# Patient Record
Sex: FEMALE | Race: WHITE | Employment: UNEMPLOYED | URBAN - METROPOLITAN AREA
[De-identification: names, ages, dates, MRNs, and addresses within clinical notes are randomized per-mention and may not be internally consistent; named-entity substitution may affect disease eponyms.]

---

## 2017-02-28 ENCOUNTER — OFFICE VISIT (OUTPATIENT)
Dept: INTERNAL MEDICINE CLINIC | Age: 57
End: 2017-02-28

## 2017-02-28 VITALS
BODY MASS INDEX: 44.03 KG/M2 | HEART RATE: 82 BPM | RESPIRATION RATE: 16 BRPM | WEIGHT: 274 LBS | HEIGHT: 66 IN | DIASTOLIC BLOOD PRESSURE: 94 MMHG | SYSTOLIC BLOOD PRESSURE: 130 MMHG

## 2017-02-28 DIAGNOSIS — I10 BENIGN ESSENTIAL HTN: Primary | ICD-10-CM

## 2017-02-28 DIAGNOSIS — R73.01 IMPAIRED FASTING GLUCOSE: ICD-10-CM

## 2017-02-28 DIAGNOSIS — N18.30 CKD (CHRONIC KIDNEY DISEASE) STAGE 3, GFR 30-59 ML/MIN (HCC): ICD-10-CM

## 2017-02-28 DIAGNOSIS — K21.9 GASTROESOPHAGEAL REFLUX DISEASE WITHOUT ESOPHAGITIS: ICD-10-CM

## 2017-02-28 DIAGNOSIS — I87.2 VENOUS INSUFFICIENCY OF BOTH LOWER EXTREMITIES: ICD-10-CM

## 2017-02-28 DIAGNOSIS — E78.5 HYPERLIPIDEMIA, UNSPECIFIED HYPERLIPIDEMIA TYPE: ICD-10-CM

## 2017-02-28 DIAGNOSIS — E03.9 ACQUIRED HYPOTHYROIDISM: ICD-10-CM

## 2017-02-28 LAB
A/G RATIO: 1.8 (ref 1.1–2.2)
ALBUMIN SERPL-MCNC: 4.2 G/DL (ref 3.4–5)
ALP BLD-CCNC: 130 U/L (ref 40–129)
ALT SERPL-CCNC: 17 U/L (ref 10–40)
ANION GAP SERPL CALCULATED.3IONS-SCNC: 15 MMOL/L (ref 3–16)
AST SERPL-CCNC: 13 U/L (ref 15–37)
BILIRUB SERPL-MCNC: 0.4 MG/DL (ref 0–1)
BUN BLDV-MCNC: 21 MG/DL (ref 7–20)
CALCIUM SERPL-MCNC: 9.1 MG/DL (ref 8.3–10.6)
CHLORIDE BLD-SCNC: 103 MMOL/L (ref 99–110)
CHOLESTEROL, TOTAL: 174 MG/DL (ref 0–199)
CO2: 23 MMOL/L (ref 21–32)
CREAT SERPL-MCNC: 1.1 MG/DL (ref 0.6–1.1)
GFR AFRICAN AMERICAN: >60
GFR NON-AFRICAN AMERICAN: 51
GLOBULIN: 2.3 G/DL
GLUCOSE BLD-MCNC: 97 MG/DL (ref 70–99)
HDLC SERPL-MCNC: 36 MG/DL (ref 40–60)
LDL CHOLESTEROL CALCULATED: 95 MG/DL
POTASSIUM SERPL-SCNC: 4.5 MMOL/L (ref 3.5–5.1)
SODIUM BLD-SCNC: 141 MMOL/L (ref 136–145)
TOTAL PROTEIN: 6.5 G/DL (ref 6.4–8.2)
TRIGL SERPL-MCNC: 215 MG/DL (ref 0–150)
TSH SERPL DL<=0.05 MIU/L-ACNC: 0.79 UIU/ML (ref 0.27–4.2)
VLDLC SERPL CALC-MCNC: 43 MG/DL

## 2017-02-28 PROCEDURE — 99214 OFFICE O/P EST MOD 30 MIN: CPT | Performed by: INTERNAL MEDICINE

## 2017-03-01 LAB
ESTIMATED AVERAGE GLUCOSE: 131.2 MG/DL
HBA1C MFR BLD: 6.2 %

## 2017-03-03 ENCOUNTER — TELEPHONE (OUTPATIENT)
Dept: INTERNAL MEDICINE CLINIC | Age: 57
End: 2017-03-03

## 2017-03-07 ENCOUNTER — OFFICE VISIT (OUTPATIENT)
Dept: ORTHOPEDIC SURGERY | Age: 57
End: 2017-03-07

## 2017-03-07 VITALS — DIASTOLIC BLOOD PRESSURE: 82 MMHG | SYSTOLIC BLOOD PRESSURE: 124 MMHG | HEART RATE: 84 BPM

## 2017-03-07 DIAGNOSIS — M17.12 PRIMARY OSTEOARTHRITIS OF LEFT KNEE: Primary | ICD-10-CM

## 2017-03-07 PROCEDURE — 20610 DRAIN/INJ JOINT/BURSA W/O US: CPT | Performed by: ORTHOPAEDIC SURGERY

## 2017-03-07 PROCEDURE — 99214 OFFICE O/P EST MOD 30 MIN: CPT | Performed by: ORTHOPAEDIC SURGERY

## 2017-03-24 ENCOUNTER — TELEPHONE (OUTPATIENT)
Dept: INTERNAL MEDICINE CLINIC | Age: 57
End: 2017-03-24

## 2017-06-20 ENCOUNTER — OFFICE VISIT (OUTPATIENT)
Dept: INTERNAL MEDICINE CLINIC | Age: 57
End: 2017-06-20

## 2017-06-20 VITALS
DIASTOLIC BLOOD PRESSURE: 80 MMHG | SYSTOLIC BLOOD PRESSURE: 146 MMHG | WEIGHT: 264 LBS | HEART RATE: 70 BPM | BODY MASS INDEX: 42.43 KG/M2 | HEIGHT: 66 IN | OXYGEN SATURATION: 97 %

## 2017-06-20 DIAGNOSIS — E03.9 ACQUIRED HYPOTHYROIDISM: ICD-10-CM

## 2017-06-20 DIAGNOSIS — I87.2 VENOUS INSUFFICIENCY OF BOTH LOWER EXTREMITIES: ICD-10-CM

## 2017-06-20 DIAGNOSIS — K21.9 GASTROESOPHAGEAL REFLUX DISEASE WITHOUT ESOPHAGITIS: ICD-10-CM

## 2017-06-20 DIAGNOSIS — E78.5 HYPERLIPIDEMIA, UNSPECIFIED HYPERLIPIDEMIA TYPE: ICD-10-CM

## 2017-06-20 DIAGNOSIS — I10 BENIGN ESSENTIAL HTN: Primary | ICD-10-CM

## 2017-06-20 DIAGNOSIS — E66.01 MORBID OBESITY WITH BMI OF 40.0-44.9, ADULT (HCC): ICD-10-CM

## 2017-06-20 DIAGNOSIS — N18.30 CKD (CHRONIC KIDNEY DISEASE) STAGE 3, GFR 30-59 ML/MIN (HCC): ICD-10-CM

## 2017-06-20 DIAGNOSIS — R73.01 IMPAIRED FASTING GLUCOSE: ICD-10-CM

## 2017-06-20 LAB
A/G RATIO: 1.9 (ref 1.1–2.2)
ALBUMIN SERPL-MCNC: 4.3 G/DL (ref 3.4–5)
ALP BLD-CCNC: 163 U/L (ref 40–129)
ALT SERPL-CCNC: 37 U/L (ref 10–40)
ANION GAP SERPL CALCULATED.3IONS-SCNC: 16 MMOL/L (ref 3–16)
AST SERPL-CCNC: 27 U/L (ref 15–37)
BASOPHILS ABSOLUTE: 0 K/UL (ref 0–0.2)
BASOPHILS RELATIVE PERCENT: 0.8 %
BILIRUB SERPL-MCNC: 0.4 MG/DL (ref 0–1)
BUN BLDV-MCNC: 20 MG/DL (ref 7–20)
CALCIUM SERPL-MCNC: 9.2 MG/DL (ref 8.3–10.6)
CHLORIDE BLD-SCNC: 103 MMOL/L (ref 99–110)
CHOLESTEROL, TOTAL: 157 MG/DL (ref 0–199)
CO2: 25 MMOL/L (ref 21–32)
CREAT SERPL-MCNC: 1.2 MG/DL (ref 0.6–1.1)
EOSINOPHILS ABSOLUTE: 0.2 K/UL (ref 0–0.6)
EOSINOPHILS RELATIVE PERCENT: 3.3 %
GFR AFRICAN AMERICAN: 56
GFR NON-AFRICAN AMERICAN: 46
GLOBULIN: 2.3 G/DL
GLUCOSE BLD-MCNC: 160 MG/DL (ref 70–99)
HCT VFR BLD CALC: 42.1 % (ref 36–48)
HDLC SERPL-MCNC: 41 MG/DL (ref 40–60)
HEMOGLOBIN: 13.3 G/DL (ref 12–16)
LDL CHOLESTEROL CALCULATED: 86 MG/DL
LYMPHOCYTES ABSOLUTE: 1.1 K/UL (ref 1–5.1)
LYMPHOCYTES RELATIVE PERCENT: 17.5 %
MCH RBC QN AUTO: 27.5 PG (ref 26–34)
MCHC RBC AUTO-ENTMCNC: 31.5 G/DL (ref 31–36)
MCV RBC AUTO: 87.4 FL (ref 80–100)
MONOCYTES ABSOLUTE: 0.3 K/UL (ref 0–1.3)
MONOCYTES RELATIVE PERCENT: 5.2 %
NEUTROPHILS ABSOLUTE: 4.5 K/UL (ref 1.7–7.7)
NEUTROPHILS RELATIVE PERCENT: 73.2 %
PARATHYROID HORMONE INTACT: 140.9 PG/ML (ref 14–72)
PDW BLD-RTO: 14.6 % (ref 12.4–15.4)
PLATELET # BLD: 248 K/UL (ref 135–450)
PMV BLD AUTO: 9.3 FL (ref 5–10.5)
POTASSIUM SERPL-SCNC: 4.1 MMOL/L (ref 3.5–5.1)
RBC # BLD: 4.82 M/UL (ref 4–5.2)
SODIUM BLD-SCNC: 144 MMOL/L (ref 136–145)
TOTAL PROTEIN: 6.6 G/DL (ref 6.4–8.2)
TRIGL SERPL-MCNC: 152 MG/DL (ref 0–150)
TSH SERPL DL<=0.05 MIU/L-ACNC: 0.75 UIU/ML (ref 0.27–4.2)
VITAMIN D 25-HYDROXY: 16.3 NG/ML
VLDLC SERPL CALC-MCNC: 30 MG/DL
WBC # BLD: 6.2 K/UL (ref 4–11)

## 2017-06-20 PROCEDURE — 99214 OFFICE O/P EST MOD 30 MIN: CPT | Performed by: INTERNAL MEDICINE

## 2017-06-21 LAB
ESTIMATED AVERAGE GLUCOSE: 111.2 MG/DL
HBA1C MFR BLD: 5.5 %

## 2017-06-27 ENCOUNTER — OFFICE VISIT (OUTPATIENT)
Dept: ORTHOPEDIC SURGERY | Age: 57
End: 2017-06-27

## 2017-06-27 VITALS
BODY MASS INDEX: 40.18 KG/M2 | HEART RATE: 89 BPM | WEIGHT: 250 LBS | SYSTOLIC BLOOD PRESSURE: 128 MMHG | DIASTOLIC BLOOD PRESSURE: 82 MMHG | HEIGHT: 66 IN

## 2017-06-27 DIAGNOSIS — M17.12 PRIMARY OSTEOARTHRITIS OF LEFT KNEE: Primary | ICD-10-CM

## 2017-06-27 PROCEDURE — 20610 DRAIN/INJ JOINT/BURSA W/O US: CPT | Performed by: ORTHOPAEDIC SURGERY

## 2017-06-27 PROCEDURE — 99214 OFFICE O/P EST MOD 30 MIN: CPT | Performed by: ORTHOPAEDIC SURGERY

## 2017-08-30 ENCOUNTER — TELEPHONE (OUTPATIENT)
Dept: INTERNAL MEDICINE CLINIC | Age: 57
End: 2017-08-30

## 2017-08-30 NOTE — TELEPHONE ENCOUNTER
Pt is returning Denisse's call about her Mammogram results. Pt was given Mammogram results per Dr Malhotra Form note. Pt voiced understanding.

## 2017-09-15 ENCOUNTER — OFFICE VISIT (OUTPATIENT)
Dept: ORTHOPEDIC SURGERY | Age: 57
End: 2017-09-15

## 2017-09-15 VITALS
WEIGHT: 220 LBS | HEART RATE: 75 BPM | HEIGHT: 65 IN | DIASTOLIC BLOOD PRESSURE: 83 MMHG | BODY MASS INDEX: 36.65 KG/M2 | SYSTOLIC BLOOD PRESSURE: 125 MMHG

## 2017-09-15 DIAGNOSIS — M17.12 PRIMARY OSTEOARTHRITIS OF LEFT KNEE: Primary | ICD-10-CM

## 2017-09-15 PROCEDURE — 20610 DRAIN/INJ JOINT/BURSA W/O US: CPT | Performed by: ORTHOPAEDIC SURGERY

## 2017-09-29 ENCOUNTER — OFFICE VISIT (OUTPATIENT)
Dept: ORTHOPEDIC SURGERY | Age: 57
End: 2017-09-29

## 2017-09-29 ENCOUNTER — TELEPHONE (OUTPATIENT)
Dept: ORTHOPEDIC SURGERY | Age: 57
End: 2017-09-29

## 2017-09-29 VITALS
HEIGHT: 65 IN | SYSTOLIC BLOOD PRESSURE: 151 MMHG | HEART RATE: 85 BPM | BODY MASS INDEX: 36.65 KG/M2 | DIASTOLIC BLOOD PRESSURE: 98 MMHG | WEIGHT: 220 LBS

## 2017-09-29 DIAGNOSIS — S63.602A SPRAIN OF LEFT THUMB, UNSPECIFIED SITE OF FINGER, INITIAL ENCOUNTER: ICD-10-CM

## 2017-09-29 DIAGNOSIS — M79.642 LEFT HAND PAIN: ICD-10-CM

## 2017-09-29 DIAGNOSIS — S80.02XA CONTUSION OF LEFT KNEE, INITIAL ENCOUNTER: Primary | ICD-10-CM

## 2017-09-29 DIAGNOSIS — M25.572 LEFT ANKLE PAIN, UNSPECIFIED CHRONICITY: ICD-10-CM

## 2017-09-29 DIAGNOSIS — S93.492A HIGH ANKLE SPRAIN, LEFT, INITIAL ENCOUNTER: ICD-10-CM

## 2017-09-29 PROCEDURE — 73130 X-RAY EXAM OF HAND: CPT | Performed by: ORTHOPAEDIC SURGERY

## 2017-09-29 PROCEDURE — 73610 X-RAY EXAM OF ANKLE: CPT | Performed by: ORTHOPAEDIC SURGERY

## 2017-09-29 PROCEDURE — L3908 WHO COCK-UP NONMOLDE PRE OTS: HCPCS | Performed by: ORTHOPAEDIC SURGERY

## 2017-09-29 PROCEDURE — 99214 OFFICE O/P EST MOD 30 MIN: CPT | Performed by: ORTHOPAEDIC SURGERY

## 2017-09-29 PROCEDURE — L4361 PNEUMA/VAC WALK BOOT PRE OTS: HCPCS | Performed by: ORTHOPAEDIC SURGERY

## 2017-10-09 ENCOUNTER — OFFICE VISIT (OUTPATIENT)
Dept: INTERNAL MEDICINE CLINIC | Age: 57
End: 2017-10-09

## 2017-10-09 ENCOUNTER — HOSPITAL ENCOUNTER (OUTPATIENT)
Dept: OTHER | Age: 57
Discharge: HOME | End: 2017-10-09
Attending: INTERNAL MEDICINE | Admitting: INTERNAL MEDICINE

## 2017-10-09 VITALS — OXYGEN SATURATION: 94 % | DIASTOLIC BLOOD PRESSURE: 70 MMHG | SYSTOLIC BLOOD PRESSURE: 118 MMHG | HEART RATE: 96 BPM

## 2017-10-09 DIAGNOSIS — I10 BENIGN ESSENTIAL HTN: Primary | ICD-10-CM

## 2017-10-09 DIAGNOSIS — R52 PAIN: ICD-10-CM

## 2017-10-09 DIAGNOSIS — E78.5 HYPERLIPIDEMIA, UNSPECIFIED HYPERLIPIDEMIA TYPE: ICD-10-CM

## 2017-10-09 DIAGNOSIS — I87.2 VENOUS INSUFFICIENCY OF BOTH LOWER EXTREMITIES: ICD-10-CM

## 2017-10-09 DIAGNOSIS — R55 SYNCOPE, UNSPECIFIED SYNCOPE TYPE: ICD-10-CM

## 2017-10-09 DIAGNOSIS — N18.30 CKD (CHRONIC KIDNEY DISEASE) STAGE 3, GFR 30-59 ML/MIN (HCC): ICD-10-CM

## 2017-10-09 DIAGNOSIS — E03.9 ACQUIRED HYPOTHYROIDISM: ICD-10-CM

## 2017-10-09 DIAGNOSIS — S20.212A RIB CONTUSION, LEFT, INITIAL ENCOUNTER: ICD-10-CM

## 2017-10-09 DIAGNOSIS — R73.01 IMPAIRED FASTING GLUCOSE: ICD-10-CM

## 2017-10-09 LAB
BASOPHILS ABSOLUTE: 0.1 K/UL (ref 0–0.2)
BASOPHILS RELATIVE PERCENT: 0.6 %
EOSINOPHILS ABSOLUTE: 0.2 K/UL (ref 0–0.6)
EOSINOPHILS RELATIVE PERCENT: 1.6 %
HCT VFR BLD CALC: 40.1 % (ref 36–48)
HEMOGLOBIN: 13.1 G/DL (ref 12–16)
LYMPHOCYTES ABSOLUTE: 1.1 K/UL (ref 1–5.1)
LYMPHOCYTES RELATIVE PERCENT: 10.6 %
MCH RBC QN AUTO: 28.4 PG (ref 26–34)
MCHC RBC AUTO-ENTMCNC: 32.8 G/DL (ref 31–36)
MCV RBC AUTO: 86.7 FL (ref 80–100)
MONOCYTES ABSOLUTE: 0.9 K/UL (ref 0–1.3)
MONOCYTES RELATIVE PERCENT: 8.5 %
NEUTROPHILS ABSOLUTE: 7.9 K/UL (ref 1.7–7.7)
NEUTROPHILS RELATIVE PERCENT: 78.7 %
PDW BLD-RTO: 14.5 % (ref 12.4–15.4)
PLATELET # BLD: 231 K/UL (ref 135–450)
PMV BLD AUTO: 8.8 FL (ref 5–10.5)
RBC # BLD: 4.62 M/UL (ref 4–5.2)
WBC # BLD: 10.1 K/UL (ref 4–11)

## 2017-10-09 PROCEDURE — 99214 OFFICE O/P EST MOD 30 MIN: CPT | Performed by: INTERNAL MEDICINE

## 2017-10-09 RX ORDER — FUROSEMIDE 20 MG/1
20 TABLET ORAL DAILY
Qty: 90 TABLET | Refills: 3 | Status: SHIPPED | OUTPATIENT
Start: 2017-10-09 | End: 2018-10-18 | Stop reason: SDUPTHER

## 2017-10-09 RX ORDER — VALSARTAN 80 MG/1
80 TABLET ORAL DAILY
Qty: 90 TABLET | Refills: 3 | Status: SHIPPED | OUTPATIENT
Start: 2017-10-09 | End: 2018-09-20 | Stop reason: ALTCHOICE

## 2017-10-09 RX ORDER — LEVOTHYROXINE SODIUM 0.15 MG/1
150 TABLET ORAL DAILY
Qty: 90 TABLET | Refills: 3 | Status: SHIPPED | OUTPATIENT
Start: 2017-10-09 | End: 2018-10-18 | Stop reason: SDUPTHER

## 2017-10-09 RX ORDER — ATORVASTATIN CALCIUM 10 MG/1
10 TABLET, FILM COATED ORAL DAILY
Qty: 90 TABLET | Refills: 3 | Status: SHIPPED | OUTPATIENT
Start: 2017-10-09 | End: 2018-10-18 | Stop reason: SDUPTHER

## 2017-10-09 NOTE — PROGRESS NOTES
[facility]) Physicians  Internal Medicine  Patient Encounter  Addi Suresh D.O., [facility]          Chief Complaint   Patient presents with   Elsy Mage    Medication Check       HPI-- 61 y.o. female seen today for follow up regarding the status of her multiple chronic medical problems listed below along with a medication review. Clinically she is unchanged. She offers no new concerns. She did have a fall in [facility]. She fell in a parking lot and landed on her left hand, left chest, and left leg. She was not evaluated by ER. They traveled on to [facility]. This past Monday, she was on the beach. Upon standing, she had a syncopal episodes. She had pain at the time she passed out. There is no report of palpitations, chest pain, shortness of breath, heart racing. She refused to call 911. She felt fine when she came to. She continued to have left lateral lower leg pain. She was seen by the ortho this past Friday. X-rays were OK. Dr. Lillie Ruffin saw her and diagnosed her with contusion of the right knee, oblique strain, and injury to the left thumb. HTN-- Patient is been compliant with her medication including Diovan 80 mg daily. she denies any episodes of lightheadedness or syncope. She also denies any new episodes of unilateral weakness or paresthesias. She is on a full aspirin 325 mg daily. Hyperlipidemia:    Lab Results   Component Value Date    LDLCALC 86 06/20/2017   She denies any problems with new myalgias or weakness. She denies muscle cramps. She is compliant with the Lipitor 10 mg daily. Hypothyroidism--   She denies problems with constipation, diarrhea, cold or heat intolerance, hair loss, muscle cramps, tremor, edema or heart racing. Lab Results   Component Value Date    TSH 0.75 06/20/2017     Venous reflux disease--  H/O DVT x 5 times. Despite recommendation she continues to wear her compression stockings only intermittently.   She continues to have symptoms of severe venous insufficiency with tired achy legs. IFG-- her last blood sugar was 160. This was not fasting. She denies any new onset of polyuria or polydipsia. Her weight on 9/29/2017 was 220. This is down 30 pounds since June. Lab Results   Component Value Date    LABA1C 5.5 06/20/2017     Lab Results   Component Value Date    .2 06/20/2017        CKD-- She denies urinary difficulties. No increased edema above baseline. Lab Results   Component Value Date    BUN 20 06/20/2017      Lab Results   Component Value Date    CREATININE 1.2 (H) 06/20/2017            Medical/Surgical Histories     Past Medical History:   Diagnosis Date    Chronic superficial venous thrombosis of left lower extremity 8/13/2015    CKD (chronic kidney disease) stage 3, GFR 30-59 ml/min     [facility]    Gastroesophageal reflux disease without esophagitis 11/28/2016    GERD (gastroesophageal reflux disease)     Hypertension     IBS (irritable bowel syndrome)     Osteoarthritis, knee     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 Tongue midline. Throat is NL. NECK:  Supple. No thyromegaly or nodules. soft tissue masses. No JVD. LYMPH: No C/SC nodes   CARDIAC:   Regular rhythm, rate NL. No ectopy. VASC:   No carotid bruits. Pedal pulses sym. PULM:  Lungs are CTA. No wheezing or crackles. GI:  Abdomen is soft, NT,  No organomegaly. No masses. EXT:   Venous stasis changes.     +

## 2017-10-09 NOTE — PATIENT INSTRUCTIONS
Please call the office immediately with any complaints of lightheadedness, passing out, palpitations, chest pain, or shortness of breath. Patient Education        Vasovagal Syncope: Care Instructions  Your Care Instructions    Vasovagal syncope (say \"fyh-vcy-APJ-beatrice WALDRONWMUX-qxu-mgk\") is sudden dizziness or fainting that can be set off by things such as pain, stress, fear, or trauma. You may sweat or feel lightheaded, sick to your stomach, or tingly. The problem causes the heart rate to slow and the blood vessels to widen, or dilate, for a short time. When this happens, blood pools in the lower body, and less blood goes to the brain. You can usually get relief by lying down with your legs raised (elevated). This helps more blood to flow to your brain and may help relieve symptoms like feeling dizzy. Some doctors may recommend a technique that involves tensing your fists and arms. This type of fainting is often easy to predict. For example, it happens to some people when they see blood or have to get a shot. They may feel symptoms before they faint. An episode of vasovagal syncope usually responds well to self-care. Other treatment often isn't needed. But if the fainting keeps happening, your doctor may suggest further treatments. Follow-up care is a key part of your treatment and safety. Be sure to make and go to all appointments, and call your doctor if you are having problems. It's also a good idea to know your test results and keep a list of the medicines you take. How can you care for yourself at home? · Drink plenty of fluids to prevent dehydration. If you have kidney, heart, or liver disease and have to limit fluids, talk with your doctor before you increase your fluid intake. · Try to avoid things that you think may set off vasovagal syncope. · Talk to your doctor about any medicines you take. Some medicines may increase the chance of this condition occurring.   · If you feel symptoms, lie down with your legs raised. Talk to your doctor about what to do if your symptoms come back. When should you call for help? Call 911 anytime you think you may need emergency care. For example, call if:  · You have symptoms of a heart problem. These may include:  ¨ Chest pain or pressure. ¨ Severe trouble breathing. ¨ A fast or irregular heartbeat. Watch closely for changes in your health, and be sure to contact your doctor if:  · You have more episodes of fainting at home. · You do not get better as expected. Where can you learn more? Go to https://Vasona NetworkspeEmbrace+eb.miacosa. org and sign in to your Clinithink account. Enter L754 in the sharing.it box to learn more about \"Vasovagal Syncope: Care Instructions. \"     If you do not have an account, please click on the \"Sign Up Now\" link. Current as of: March 20, 2017  Content Version: 11.3  © 8882-5274 [employer], [employer]. Care instructions adapted under license by [facility] ([facility]). If you have questions about a medical condition or this instruction, always ask your healthcare professional. [place] [address] any warranty or liability for your use of this information.

## 2017-10-10 LAB
A/G RATIO: 1.9 (ref 1.1–2.2)
ALBUMIN SERPL-MCNC: 4.3 G/DL (ref 3.4–5)
ALP BLD-CCNC: 152 U/L (ref 40–129)
ALT SERPL-CCNC: 15 U/L (ref 10–40)
ANION GAP SERPL CALCULATED.3IONS-SCNC: 16 MMOL/L (ref 3–16)
AST SERPL-CCNC: 13 U/L (ref 15–37)
BILIRUB SERPL-MCNC: 0.4 MG/DL (ref 0–1)
BUN BLDV-MCNC: 25 MG/DL (ref 7–20)
CALCIUM SERPL-MCNC: 9.2 MG/DL (ref 8.3–10.6)
CHLORIDE BLD-SCNC: 101 MMOL/L (ref 99–110)
CHOLESTEROL, TOTAL: 156 MG/DL (ref 0–199)
CO2: 26 MMOL/L (ref 21–32)
CREAT SERPL-MCNC: 1.3 MG/DL (ref 0.6–1.1)
ESTIMATED AVERAGE GLUCOSE: 114 MG/DL
GFR AFRICAN AMERICAN: 51
GFR NON-AFRICAN AMERICAN: 42
GLOBULIN: 2.3 G/DL
GLUCOSE BLD-MCNC: 100 MG/DL (ref 70–99)
HBA1C MFR BLD: 5.6 %
HDLC SERPL-MCNC: 40 MG/DL (ref 40–60)
LDL CHOLESTEROL CALCULATED: 74 MG/DL
POTASSIUM SERPL-SCNC: 4.2 MMOL/L (ref 3.5–5.1)
SODIUM BLD-SCNC: 143 MMOL/L (ref 136–145)
TOTAL PROTEIN: 6.6 G/DL (ref 6.4–8.2)
TRIGL SERPL-MCNC: 211 MG/DL (ref 0–150)
TSH SERPL DL<=0.05 MIU/L-ACNC: 0.67 UIU/ML (ref 0.27–4.2)
VLDLC SERPL CALC-MCNC: 42 MG/DL

## 2017-10-12 ENCOUNTER — TELEPHONE (OUTPATIENT)
Dept: INTERNAL MEDICINE CLINIC | Age: 57
End: 2017-10-12

## 2017-10-12 NOTE — TELEPHONE ENCOUNTER
Pt called to get her x-ray results. I advised pt of Dr. Ann Marie Koenig message and pt voiced understanding.  Pt would still like for Governor  to give her a call when she gets in the office on Monday

## 2017-10-17 ENCOUNTER — OFFICE VISIT (OUTPATIENT)
Dept: ORTHOPEDIC SURGERY | Age: 57
End: 2017-10-17

## 2017-10-17 VITALS
HEART RATE: 74 BPM | WEIGHT: 220 LBS | SYSTOLIC BLOOD PRESSURE: 118 MMHG | BODY MASS INDEX: 36.65 KG/M2 | HEIGHT: 65 IN | DIASTOLIC BLOOD PRESSURE: 83 MMHG

## 2017-10-17 DIAGNOSIS — S93.402A SPRAIN OF LEFT ANKLE, UNSPECIFIED LIGAMENT, INITIAL ENCOUNTER: Primary | ICD-10-CM

## 2017-10-17 PROCEDURE — 99213 OFFICE O/P EST LOW 20 MIN: CPT | Performed by: ORTHOPAEDIC SURGERY

## 2017-10-17 PROCEDURE — L4350 ANKLE CONTROL ORTHO PRE OTS: HCPCS | Performed by: ORTHOPAEDIC SURGERY

## 2017-10-17 NOTE — PROGRESS NOTES
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 Philly Llanos  1/1999    KNEE ARTHROSCOPY  3/2015    right with medial meninsectomy    KNEE ARTHROSCOPY      right x 3-4     TONSILLECTOMY      TOTAL KNEE ARTHROPLASTY  5/2013    right    VARICOSE VEIN SURGERY  1980's.          Family History   Problem Relation Age of Onset    High Blood Pressure Mother     Osteoporosis Mother        Social History     Social History    Marital status:      Spouse name: N/A    Number of children: N/A    Years of education: N/A     Social History Main Topics    Smoking status: Never Smoker    Smokeless tobacco: Never Used    Alcohol use No    Drug use: No    Sexual activity: Not Asked     Other Topics Concern    None     Social History Narrative    None       Current Outpatient Prescriptions   Medication Sig Dispense Refill    levothyroxine (LEVOTHROID) 150 MCG tablet Take 1 tablet by mouth daily 90 tablet 3    furosemide (LASIX) 20 MG tablet Take 1 tablet by mouth daily 90 tablet 3    atorvastatin (LIPITOR) 10 MG tablet Take 1 tablet by mouth daily 90 tablet 3    valsartan (DIOVAN) 80 MG tablet Take 1 tablet by mouth daily 90 tablet 3    esomeprazole (NEXIUM) 40 MG delayed release capsule Take 1 capsule by mouth daily as needed For heart burn (Reflux) 90 capsule 3    aspirin 325 MG tablet Take 1 tablet by mouth daily 90 tablet 3     No current facility-administered

## 2017-10-19 NOTE — PROGRESS NOTES
Chief complaint: Left ankle sprain follow-up    HPI: Mrs. [place] comes in today 2 weeks status post a fall where she suffered a left ankle sprain, as well as right knee contusion, right thumb contusion, and a left flank/abdominal contusion. She states they are getting progressively better. She has been walking normally in her boot, but occasionally barefoot in her house with minimal problems. 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 She states the pain is slowly diminishing, but her function is not completely returned yet. She denies night pain. She does state that she saw her primary care physician for regularly scheduled follow-up, he obtained a chest x-ray to ensure there were no rib fractures, and this was negative. Overall she feels she is improving. Previous history: Patient comes in today for evaluation of left-sided abdominal pain, left thumb pain and left ankle pain and right knee pain. She was on vacation a week ago she tripped on a pavement and came down on her right knee and slid onto her left side. She's had a knee replacement done a few years ago on the right. She thinks her anterior knee pain on the right is from really bruised and there is no ecchymosis present in this region. Her pain on the left side is a little below her rib cage and primarily with direct pressure and with twisting turning movements. She has no shortness of breath.      Pain Assessment  Location of Pain: Ankle  Location Modifiers: Left  Severity of Pain: 6  Quality of Pain: Aching  Duration of Pain: A few minutes  Frequency of Pain: Intermittent  Aggravating Factors: Walking  Limiting Behavior: Yes  Relieving Factors: Rest  Result of Injury: Yes  Work-Related Injury: No  Are there other pain locations you wish to document?: No    Past Medical History:   Diagnosis Date    Chronic superficial venous thrombosis of left lower extremity 8/13/2015    CKD sign.  Negative Phalen sign. Skin: There are no additional worrisome rashes, ulcerations or lesions. Gait: Normal    Circulation: Well perfused      Imaging: No new imaging today    Impression: Left ankle sprain, Resolving. Plan: We are going to switch her over to an ankle stirrup brace today. She was provided an ankle home exercise plan today. She is to wean out of her boot over the next few days and transition to the brace. We will like her to wear the brace at all times with footwear. We'll explain her she is at risk of repeat ankle sprain until she regains strength and proprioception that she had prior to her fall. We think her prognosis is excellent. We'll see her back in a few more weeks time. We anticipate her needing to rehab this throughout the fallen into the winter before she regains to regain normal function and strength in her leg. Matias Lomax MD  Fellow - [address] [phone]            I supervised my sports medicine fellow in the evaluation and development of a treatment plan  for this patient. I personally interviewed the patient and performed a physical examination. In addition, I discussed the patient's condition and treatment options with them. All of their questions were answered. I personally reviewed the patient's pain scale, review of systems, family history, social history, past medical history, allergies and medications. 13 point review of systems was collected today and is filed in the medical record. Greater than 50% of the visit was spent counseling the patient. Zari Thurman MD  Sports Medicine, Arthroscopic Knee and Shoulder Surgery    This dictation was performed with a verbal recognition program [facility]) and it was checked for errors. It is possible that there are still dictated errors within this office note. If so, please bring any errors to my attention for an addendum.   All efforts were made to ensure that this office note is accurate.

## 2017-12-12 ENCOUNTER — OFFICE VISIT (OUTPATIENT)
Dept: ORTHOPEDIC SURGERY | Age: 57
End: 2017-12-12

## 2017-12-12 VITALS
SYSTOLIC BLOOD PRESSURE: 145 MMHG | HEART RATE: 77 BPM | BODY MASS INDEX: 36.65 KG/M2 | WEIGHT: 220 LBS | DIASTOLIC BLOOD PRESSURE: 91 MMHG | HEIGHT: 65 IN

## 2017-12-12 DIAGNOSIS — M17.12 PRIMARY OSTEOARTHRITIS OF LEFT KNEE: Primary | ICD-10-CM

## 2017-12-12 PROCEDURE — 20610 DRAIN/INJ JOINT/BURSA W/O US: CPT | Performed by: ORTHOPAEDIC SURGERY

## 2017-12-12 NOTE — PROGRESS NOTES
Review of Systems   HENT:        Thyroid disease   Cardiovascular:        Hypertension  Poor circulation   Musculoskeletal:        Left knee pain    All other systems reviewed and are negative.

## 2017-12-12 NOTE — PROGRESS NOTES
Cortisone injection: Left Knee    2cc depo medrol 40mg    ERJ:[id number]  Exp 01/2020  Ndc:[id number]    4cc lidocaine 1%hcl    Lot:-[id number]  Exp: Jan 1 2019  Marc Serrano: [id number]

## 2017-12-12 NOTE — PROGRESS NOTES
Date:  2017    Name:  Comfort Lynch  Address:  [address] [address]    :  1960      Age:   61 y.o.    SSN:  [id number]      Medical Record Number:  [id number]    Reason for Visit:    Chief Complaint    Follow-up (L knee pain; requesting injection)      DOS:2017     HPI: Comfort yLnch is a 61 y.o. female here today for her left knee osteoarthritis. She had a steroid injection in 3 months ago. Her pain is returned. She is requesting another injection. No new injuries to the left knee. No change in her medical history. Pain Assessment  Location of Pain: Knee  Location Modifiers: Left  Severity of Pain: 10  Frequency of Pain: Constant  Limiting Behavior: Yes  Relieving Factors: Rest, Ice  Result of Injury: No  Work-Related Injury: No  Are there other pain locations you wish to document?: No    ROS: A 14 point review of systems available in the scanned medical record as documented by the patient. The review is negative with the exception of those things mentioned in the History of Present Illness and Past Medical History.        Past Medical History:   Diagnosis Date    Chronic superficial venous thrombosis of left lower extremity 2015    CKD (chronic kidney disease) stage 3, GFR 30-59 ml/min     [facility]) [facility] 2003, 2013    Gastroesophageal reflux disease without esophagitis 2016    GERD (gastroesophageal reflux disease)     Hypertension     IBS (irritable bowel syndrome)     Osteoarthritis, knee     Saphenofemoral venous reflux 2015    BIlateral    Steatosis of liver     Thyroid disease     Venous insufficiency         Past Surgical History:   Procedure Laterality Date    BREAST SURGERY      left fibroid tumor    CHOLECYSTECTOMY  2003    COLONOSCOPY  2015    Dr. Hermosillo Ear  1999    KNEE ARTHROSCOPY  3/2015    right with medial meninsectomy    KNEE ARTHROSCOPY      right x 3-4     TONSILLECTOMY      TOTAL KNEE ARTHROPLASTY  5/2013    right    VARICOSE VEIN SURGERY  1980's. Family History   Problem Relation Age of Onset    High Blood Pressure Mother     Osteoporosis Mother        Social History     Social History    Marital status:      Spouse name: N/A    Number of children: N/A    Years of education: N/A     Social History Main Topics    Smoking status: Never Smoker    Smokeless tobacco: Never Used    Alcohol use No    Drug use: No    Sexual activity: Not Asked     Other Topics Concern    None     Social History Narrative    None       Current Outpatient Prescriptions   Medication Sig Dispense Refill    esomeprazole (NEXIUM) 40 MG delayed release capsule TAKE 1 CAPSULE EVERY MORNING BEFORE BREAKFAST 90 capsule 0    levothyroxine (LEVOTHROID) 150 MCG tablet Take 1 tablet by mouth daily 90 tablet 3    furosemide (LASIX) 20 MG tablet Take 1 tablet by mouth daily 90 tablet 3    atorvastatin (LIPITOR) 10 MG tablet Take 1 tablet by mouth daily 90 tablet 3    valsartan (DIOVAN) 80 MG tablet Take 1 tablet by mouth daily 90 tablet 3    esomeprazole (NEXIUM) 40 MG delayed release capsule Take 1 capsule by mouth daily as needed For heart burn (Reflux) 90 capsule 3    aspirin 325 MG tablet Take 1 tablet by mouth daily 90 tablet 3     No current facility-administered medications for this visit. Allergies   Allergen Reactions    Codeine Other (See Comments)     hallucinations    Morphine And Related        Vital signs:  BP (!) 145/91 (Site: Right Arm)   Pulse 77   Ht 5' 5\" (1.651 m)   Wt 220 lb (99.8 kg)   BMI 36.61 kg/m²      Body mass index is 36.61 kg/m². Neuro: Alert & oriented x 3,  normal,  no focal deficits noted. Normal affect.   Eyes: sclera clear  Ears: Normal external ear  Lymph:  No lymphedema  Pulm: Respirations unlabored and regular  Pulse: Regular rate and rhythm   Skin: Warm, well perfused       left Knee Exam:   Overall alignment is was provided. Questions were entertained and answered to the patient's satisfaction. Procedure: The patient was seated over the end of the exam table with the left knee at 90deg. Skin was prepped with chlorhexidine. Ethyl chloride was used for superficial anesthesia. 80mg depomedrol and 4cc 1% lidocaine was injected from an sonal-lateral approach without complication. The patient tolerated the procedure well. Follow-up in 3 months. Joce Gayle MD  Clinical Fellow in [address] [place] [address] Certified Orthopaedic Surgeon  [address] [place] [address]    Date:    12/12/2017    This dictation was performed with a verbal recognition program [facility]) and it was checked for errors. It is possible that there are still dictated errors within this office note. If so, please bring any errors to my attention for an addendum. All efforts were made to ensure that this office note is accurate.

## 2018-02-27 ENCOUNTER — OFFICE VISIT (OUTPATIENT)
Dept: INTERNAL MEDICINE CLINIC | Age: 58
End: 2018-02-27

## 2018-02-27 VITALS
RESPIRATION RATE: 12 BRPM | DIASTOLIC BLOOD PRESSURE: 84 MMHG | HEIGHT: 65 IN | HEART RATE: 94 BPM | BODY MASS INDEX: 42.49 KG/M2 | TEMPERATURE: 97.8 F | SYSTOLIC BLOOD PRESSURE: 120 MMHG | WEIGHT: 255 LBS

## 2018-02-27 DIAGNOSIS — E03.9 ACQUIRED HYPOTHYROIDISM: ICD-10-CM

## 2018-02-27 DIAGNOSIS — E78.5 HYPERLIPIDEMIA, UNSPECIFIED HYPERLIPIDEMIA TYPE: ICD-10-CM

## 2018-02-27 DIAGNOSIS — I87.2 VENOUS INSUFFICIENCY OF BOTH LOWER EXTREMITIES: ICD-10-CM

## 2018-02-27 DIAGNOSIS — R73.01 IMPAIRED FASTING GLUCOSE: ICD-10-CM

## 2018-02-27 DIAGNOSIS — K21.9 GASTROESOPHAGEAL REFLUX DISEASE WITHOUT ESOPHAGITIS: ICD-10-CM

## 2018-02-27 DIAGNOSIS — I10 BENIGN ESSENTIAL HTN: Primary | ICD-10-CM

## 2018-02-27 DIAGNOSIS — N18.30 CKD (CHRONIC KIDNEY DISEASE) STAGE 3, GFR 30-59 ML/MIN (HCC): ICD-10-CM

## 2018-02-27 DIAGNOSIS — R25.2 LEG CRAMPS: ICD-10-CM

## 2018-02-27 PROCEDURE — 99214 OFFICE O/P EST MOD 30 MIN: CPT | Performed by: INTERNAL MEDICINE

## 2018-02-27 RX ORDER — RANITIDINE 150 MG/1
150 TABLET ORAL 2 TIMES DAILY
Qty: 60 TABLET | Refills: 3 | Status: SHIPPED | OUTPATIENT
Start: 2018-02-27 | End: 2019-01-28 | Stop reason: ALTCHOICE

## 2018-02-27 NOTE — PROGRESS NOTES
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 10/2017 and Dec 2017 weights incorrect. GEN: NAD, A&O, Non-toxic  HEENT: NC/AT, SILAS, EOMI, Anicteric, Throat NL. Oral cavity Clear,  TM's NL, Nasal cavity clear. NECK: Supple. No thyromegaly. No JVD  LYMPH: No C/SC nodes. CV: Regular rhythm. Rate normal.  No murmurs. No ectopy. PULM: CTA. No crackles. GI:  Soft, Obese, NT, BS +. No masses. EXT: Large legs. 1+ LE edema, less. SKIN:  Venous stasis changes bilateral lower extremity. NEURO: No focal or lateralizing deficits. ASSESSMENT[de-identified]  Marleny Penaloza was seen today for fever, diarrhea and fatigue. Diagnoses and all orders for this visit:    Benign essential HTN  -- Condition is well controlled. Due for lab  -- Continue current medication  -- Advised patient to stay well-hydrated particularly in times when she is having diarrhea  -     Comprehensive Metabolic Panel  -     Lipid Panel    Venous insufficiency of both lower extremities  -- Condition is improved  -- Continue wearing compression stockings  -- Continue a no added salt and low sodium diet  -- Continue efforts with weight loss    Impaired fasting glucose  -- Condition stability is uncertain. Due for lab  -- Continue working with weight loss.   -- Counseled on lifestyle modification including a carb restricted diet  -     Hemoglobin A1C  -     Comprehensive Metabolic Panel    Hyperlipidemia, unspecified hyperlipidemia type  --

## 2018-02-27 NOTE — PATIENT INSTRUCTIONS
(Tylenol), ibuprofen (Advil, Motrin), or naproxen (Aleve). Be safe with medicines. Read and follow all instructions on the label. · Drink plenty of fluids. If you have kidney, heart, or liver disease and have to limit fluids, talk with your doctor before you increase the amount of fluids you drink. When should you call for help? Watch closely for changes in your health, and be sure to contact your doctor if:  ? · You often have muscle cramps that do not go away after home treatment. ? · Your muscle cramps often wake you up at night. ? · You do not get better as expected. Where can you learn more? Go to https://ConvivapemyThingseweb.[facility]. org and sign in to your [employer] account. Enter S910 in the Darby Smart box to learn more about \"Nighttime Leg Cramps: Care Instructions. \"     If you do not have an account, please click on the \"Sign Up Now\" link. Current as of: October 14, 2016  Content Version: 11.5  © 2477-2585 Let it Wave. Care instructions adapted under license by [facility] ([facility]). If you have questions about a medical condition or this instruction, always ask your healthcare professional. [place] [address] any warranty or liability for your use of this information. Patient Education        Gastroesophageal Reflux Disease (GERD): Care Instructions  Your Care Instructions    Gastroesophageal reflux disease (GERD) is the backward flow of stomach acid into the esophagus. The esophagus is the tube that leads from your throat to your stomach. A one-way valve prevents the stomach acid from moving up into this tube. When you have GERD, this valve does not close tightly enough. If you have mild GERD symptoms including heartburn, you may be able to control the problem with antacids or over-the-counter medicine. Changing your diet, losing weight, and making other lifestyle changes can also help reduce symptoms.   Follow-up care is a key part of your treatment and closely for changes in your health, and be sure to contact your doctor if:  ? · Your symptoms have not improved after 2 days. ? · Food seems to catch in your throat or chest.   Where can you learn more? Go to https://chmisbaheb.Newshubby. org and sign in to your [employer] account. Enter T077 in the real trends box to learn more about \"Gastroesophageal Reflux Disease (GERD): Care Instructions. \"     If you do not have an account, please click on the \"Sign Up Now\" link. Current as of: May 12, 2017  Content Version: 11.5  © 8316-7200 [employer]wise, Incorporated. Care instructions adapted under license by [facility] ([facility]). If you have questions about a medical condition or this instruction, always ask your healthcare professional. [address] any warranty or liability for your use of this information.

## 2018-02-28 LAB
A/G RATIO: 2 (ref 1.1–2.2)
ALBUMIN SERPL-MCNC: 4.4 G/DL (ref 3.4–5)
ALP BLD-CCNC: 146 U/L (ref 40–129)
ALT SERPL-CCNC: 30 U/L (ref 10–40)
ANION GAP SERPL CALCULATED.3IONS-SCNC: 14 MMOL/L (ref 3–16)
AST SERPL-CCNC: 13 U/L (ref 15–37)
BASOPHILS ABSOLUTE: 0 K/UL (ref 0–0.2)
BASOPHILS RELATIVE PERCENT: 0.4 %
BILIRUB SERPL-MCNC: 0.4 MG/DL (ref 0–1)
BUN BLDV-MCNC: 16 MG/DL (ref 7–20)
CALCIUM SERPL-MCNC: 9.2 MG/DL (ref 8.3–10.6)
CHLORIDE BLD-SCNC: 105 MMOL/L (ref 99–110)
CHOLESTEROL, TOTAL: 147 MG/DL (ref 0–199)
CO2: 26 MMOL/L (ref 21–32)
CREAT SERPL-MCNC: 1.1 MG/DL (ref 0.6–1.1)
EOSINOPHILS ABSOLUTE: 0.2 K/UL (ref 0–0.6)
EOSINOPHILS RELATIVE PERCENT: 1.8 %
ESTIMATED AVERAGE GLUCOSE: 116.9 MG/DL
GFR AFRICAN AMERICAN: >60
GFR NON-AFRICAN AMERICAN: 51
GLOBULIN: 2.2 G/DL
GLUCOSE BLD-MCNC: 110 MG/DL (ref 70–99)
HBA1C MFR BLD: 5.7 %
HCT VFR BLD CALC: 42.1 % (ref 36–48)
HDLC SERPL-MCNC: 35 MG/DL (ref 40–60)
HEMOGLOBIN: 13.8 G/DL (ref 12–16)
LDL CHOLESTEROL CALCULATED: 70 MG/DL
LYMPHOCYTES ABSOLUTE: 1.4 K/UL (ref 1–5.1)
LYMPHOCYTES RELATIVE PERCENT: 16.4 %
MAGNESIUM: 2.1 MG/DL (ref 1.8–2.4)
MCH RBC QN AUTO: 28.2 PG (ref 26–34)
MCHC RBC AUTO-ENTMCNC: 32.9 G/DL (ref 31–36)
MCV RBC AUTO: 85.8 FL (ref 80–100)
MONOCYTES ABSOLUTE: 1.1 K/UL (ref 0–1.3)
MONOCYTES RELATIVE PERCENT: 12.8 %
NEUTROPHILS ABSOLUTE: 5.9 K/UL (ref 1.7–7.7)
NEUTROPHILS RELATIVE PERCENT: 68.6 %
PARATHYROID HORMONE INTACT: 132.7 PG/ML (ref 14–72)
PDW BLD-RTO: 15 % (ref 12.4–15.4)
PLATELET # BLD: 245 K/UL (ref 135–450)
PMV BLD AUTO: 9.2 FL (ref 5–10.5)
POTASSIUM SERPL-SCNC: 4 MMOL/L (ref 3.5–5.1)
RBC # BLD: 4.91 M/UL (ref 4–5.2)
SODIUM BLD-SCNC: 145 MMOL/L (ref 136–145)
TOTAL PROTEIN: 6.6 G/DL (ref 6.4–8.2)
TRIGL SERPL-MCNC: 208 MG/DL (ref 0–150)
TSH SERPL DL<=0.05 MIU/L-ACNC: 0.81 UIU/ML (ref 0.27–4.2)
VLDLC SERPL CALC-MCNC: 42 MG/DL
WBC # BLD: 8.6 K/UL (ref 4–11)

## 2018-03-01 ENCOUNTER — TELEPHONE (OUTPATIENT)
Dept: INTERNAL MEDICINE CLINIC | Age: 58
End: 2018-03-01

## 2018-03-23 ENCOUNTER — OFFICE VISIT (OUTPATIENT)
Dept: ORTHOPEDIC SURGERY | Age: 58
End: 2018-03-23

## 2018-03-23 VITALS
SYSTOLIC BLOOD PRESSURE: 128 MMHG | WEIGHT: 255 LBS | HEIGHT: 65 IN | BODY MASS INDEX: 42.49 KG/M2 | HEART RATE: 88 BPM | DIASTOLIC BLOOD PRESSURE: 86 MMHG

## 2018-03-23 DIAGNOSIS — M76.62 LEFT ACHILLES TENDINITIS: ICD-10-CM

## 2018-03-23 DIAGNOSIS — M17.12 PRIMARY OSTEOARTHRITIS OF LEFT KNEE: Primary | ICD-10-CM

## 2018-03-23 PROCEDURE — 99214 OFFICE O/P EST MOD 30 MIN: CPT | Performed by: ORTHOPAEDIC SURGERY

## 2018-03-23 PROCEDURE — 20610 DRAIN/INJ JOINT/BURSA W/O US: CPT | Performed by: ORTHOPAEDIC SURGERY

## 2018-03-23 NOTE — PROGRESS NOTES
Review of Systems   Constitutional: Positive for weight loss. Cardiovascular:        High blood pressure    Musculoskeletal: Positive for joint pain. Left knee pain   Thyroid disease   Left ankle pain    All other systems reviewed and are negative.

## 2018-03-23 NOTE — PROGRESS NOTES
Cortisone injection: left knee    2cc depo medrol 40mg    Lot:[id number]  Exp:02/2019  Ndc:0702-3836-91    4cc lidocaine 1%hcl    Lot:-[id number]  Exp:jan 1 2019  Ndc: 5652-0580-64

## 2018-03-23 NOTE — LETTER
Patient Name: Teja Garner MRN: [id number]  DOS: 3/23/2018   Diagnosis:   1. Primary osteoarthritis of left knee    2. Left Achilles tendinitis                           Goal:  []Decrease Pain and/or Swelling [x]Increase ROM and/or Flexibility     [x]Increase Function                           [x]Increase Strength and/or Endurance   []Other   Evaluation:  [x]Evaluation and Treatment []KT-1000   []Isokinetic Exam   []Preoperative Eval    Recommended Modalities:  [x]Modalities of Choice      []HCVS            []Electrical Stimulation     [] Remove Dressing  []Ultrasound        []TENS/TNS    [] Lumbar Traction           [] Cervical Traction   []Phonophoresis         []Hot Pack/Cold Pack   []PT Treatment, Unlisted []Other:  Therapeutic Exercises:    []Isometrics    []Range of Motion []Progressive Exer. []Balance Coordination   []Flexibility  []ROM Limited  []Total Hip Replacement   []Passive  []ROM Full   []Total Knee Replacement  []Active Assisted    []Shoulder Impingement Prog  []Active   []Tennis Elbow Program   []Capsular Shift Regular        []Isokinetics      []Spine Program   []Straight Leg Raises  [] Gait    []Fixation                   [] Supine                                              [] Running   [] Extension   [] Prone   [] Throwing   [] Stabilization   [] AB    [] [place]grin Denita Oka   [] AD      [] Spine Eval   [] Cervical Eval  [] Conditioning   [] Lumbar  [] Stationary Bike   [] [place] Track   [] Lumbar Exer.  [] Stairmaster         [] Treadmill   [] Functional Cap [] Aquatic Prog.      [] Return to work    Treatment Program:  Frequency: [] 1x  [x] 2x  [] 3x  [] 4x  [] 5x week/month  Duration: [] 1  [] 2  [] 3  [x] 4  [] 5 week/month  Weight Bearing: [] Non  [] 1/4  [] 1/2  [] 3/4  [] Full  ROM: [] Restricted  [] Full  [] Follow established:        [] Other:

## 2018-03-28 ENCOUNTER — HOSPITAL ENCOUNTER (OUTPATIENT)
Dept: PHYSICAL THERAPY | Age: 58
Discharge: HOME | End: 2018-03-31
Admitting: ORTHOPAEDIC SURGERY

## 2018-04-01 ENCOUNTER — HOSPITAL ENCOUNTER (OUTPATIENT)
Dept: PHYSICAL THERAPY | Age: 58
Discharge: HOME | End: 2018-04-30
Attending: ORTHOPAEDIC SURGERY | Admitting: ORTHOPAEDIC SURGERY

## 2018-04-10 ENCOUNTER — HOSPITAL ENCOUNTER (OUTPATIENT)
Dept: PHYSICAL THERAPY | Age: 58
Discharge: HOME | End: 2018-04-11
Admitting: ORTHOPAEDIC SURGERY

## 2018-04-12 ENCOUNTER — HOSPITAL ENCOUNTER (OUTPATIENT)
Dept: PHYSICAL THERAPY | Age: 58
Discharge: HOME | End: 2018-04-13
Admitting: ORTHOPAEDIC SURGERY

## 2018-04-18 ENCOUNTER — HOSPITAL ENCOUNTER (OUTPATIENT)
Dept: PHYSICAL THERAPY | Age: 58
Discharge: HOME | End: 2018-04-19
Admitting: ORTHOPAEDIC SURGERY

## 2018-04-24 ENCOUNTER — HOSPITAL ENCOUNTER (OUTPATIENT)
Dept: PHYSICAL THERAPY | Age: 58
Discharge: HOME | End: 2018-04-25
Admitting: ORTHOPAEDIC SURGERY

## 2018-04-26 ENCOUNTER — HOSPITAL ENCOUNTER (OUTPATIENT)
Dept: PHYSICAL THERAPY | Age: 58
Discharge: HOME | End: 2018-04-27
Admitting: ORTHOPAEDIC SURGERY

## 2018-05-01 ENCOUNTER — HOSPITAL ENCOUNTER (OUTPATIENT)
Dept: PHYSICAL THERAPY | Age: 58
Discharge: HOME | End: 2018-05-31
Attending: ORTHOPAEDIC SURGERY | Admitting: ORTHOPAEDIC SURGERY

## 2018-06-15 ENCOUNTER — OFFICE VISIT (OUTPATIENT)
Dept: ORTHOPEDIC SURGERY | Age: 58
End: 2018-06-15

## 2018-06-15 VITALS — WEIGHT: 220 LBS | HEIGHT: 65 IN | BODY MASS INDEX: 36.65 KG/M2

## 2018-06-15 DIAGNOSIS — M17.12 PRIMARY OSTEOARTHRITIS OF LEFT KNEE: Primary | ICD-10-CM

## 2018-06-15 PROCEDURE — 20610 DRAIN/INJ JOINT/BURSA W/O US: CPT | Performed by: ORTHOPAEDIC SURGERY

## 2018-06-15 PROCEDURE — 99214 OFFICE O/P EST MOD 30 MIN: CPT | Performed by: ORTHOPAEDIC SURGERY

## 2018-07-20 RX ORDER — ESOMEPRAZOLE MAGNESIUM 40 MG/1
CAPSULE, DELAYED RELEASE ORAL
Qty: 30 CAPSULE | Refills: 0 | Status: SHIPPED | OUTPATIENT
Start: 2018-07-20 | End: 2018-08-23 | Stop reason: SDUPTHER

## 2018-08-21 ENCOUNTER — OFFICE VISIT (OUTPATIENT)
Dept: ORTHOPEDIC SURGERY | Age: 58
End: 2018-08-21

## 2018-08-21 VITALS
HEART RATE: 77 BPM | HEIGHT: 65 IN | BODY MASS INDEX: 39.99 KG/M2 | WEIGHT: 240 LBS | SYSTOLIC BLOOD PRESSURE: 124 MMHG | DIASTOLIC BLOOD PRESSURE: 86 MMHG

## 2018-08-21 DIAGNOSIS — M17.12 PRIMARY OSTEOARTHRITIS OF LEFT KNEE: Primary | ICD-10-CM

## 2018-08-21 PROCEDURE — 20610 DRAIN/INJ JOINT/BURSA W/O US: CPT | Performed by: ORTHOPAEDIC SURGERY

## 2018-08-21 PROCEDURE — 99214 OFFICE O/P EST MOD 30 MIN: CPT | Performed by: ORTHOPAEDIC SURGERY

## 2018-08-21 NOTE — PROGRESS NOTES
Review of Systems   Cardiovascular:        Hypertension    Musculoskeletal: Positive for joint pain. Left knee pain    All other systems reviewed and are negative.

## 2018-08-21 NOTE — PROGRESS NOTES
History of Present Illness:  Chapin Deshpande is a pleasant 60 y.o. female presenting today for follow-up of left knee osteoarthritis. Underwent a left knee cortisone injection on 6/15/2018 with significant relief. Recently she's had a flareup of left knee pain and has her daughter's wedding coming up in the near future. She is requesting another cortisone injection today. No recent injuries reported. As an aside, she's been doing the exercises for left Achilles tendinitis but she still has some discomfort with it. Medical History:  Patient's medications, allergies, past medical, surgical, social and family histories were reviewed and updated as appropriate. Pertinent items are noted in HPI  Review of systems reviewed from Patient History Form dated on 8/21/2018 and available in the patient's chart under the Media tab. Vital Signs:  Vitals:    08/21/18 1117   BP: 124/86   Pulse: 77         Neuro: Alert & oriented x 3,  normal,  no focal deficits noted. Normal affect. Eyes: sclera clear  Ears: Normal external ear  Mouth:  No perioral lesions  Pulm: Respirations unlabored and regular  Pulse: Regular rate and rhythm   Skin: Warm, well perfused      Constitutional: In no apparent distress. Normal affect. Alert and oriented X3 and is cooperative. left Knee Examination:    Gait: No use of assistive devices. No antalgic gait. Alignment: Alignment appreciated. Inspection/skin: Quadriceps well developed. Skin is intact without erythema or ecchymosis. No gross deformity. Palpation: Crepitus. Nontender along joint line. No pain with compression of patella. Nontender to light touch. Range of Motion: Full ROM. Strength: 5/5 quad strength    Effusion: No apparent effusion. Ligamentous stability: Stable to valgus and varus stress at 0° and 30°. Solid endpoint with Lachman's. Negative posterior and anterior drawer signs. Patella tracking: Smooth translation of patella. Special tests: Negative Jody sign. Patella apprehension sign negative. Neurologic and vascular: Skin is warm and well-perfused. Distally neurovascularly intact. Additional findings: Calf soft nontender. Sensation is intact to light-touch. No pretibial edema. Comparison right Knee Examination:    Gait: No use of assistive devices. No antalgic gait. Alignment: Alignment appreciated. Inspection/skin: Quadriceps well developed. Skin is intact without erythema or ecchymosis. No gross deformity. Palpation: No crepitus. Nontender along joint line. No pain with compression of patella. Nontender to light touch. Range of Motion: Full ROM. Strength: 5/5 quad strength    Effusion: No apparent effusion. Ligamentous stability: Stable to valgus and varus stress at 0° and 30°. Solid endpoint with Lachman's. Negative posterior and anterior drawer signs. Patella tracking: Smooth translation of patella. Special tests: Negative Jody sign. Patella apprehension sign negative. Neurologic and vascular: Skin is warm and well-perfused. Distally neurovascularly intact. Additional findings: Calf soft nontender. Sensation is intact to light-touch. No pretibial edema. Radiology:     Radiographs were obtained and reviewed in the office; 4 views: bilateral PA, bilateral Radha Hurry, bilateral Merchants AND left lateral    Impression: left knee medial compartment bone-on-bone osteoarthritis and patellofemoral osteoarthritis. Right total knee replacement. Assessment :  58-year-old female with left knee osteoarthritis. Impression:  Encounter Diagnosis   Name Primary?     Primary osteoarthritis of left knee Yes       Office Procedures:  Orders Placed This Encounter   Procedures    XR KNEE LEFT (MIN 4 VIEWS)     [id number]     Order Specific Question:   Reason for exam:     Answer:   Pain    XR KNEE RIGHT (3 VIEWS)     Order Specific Question:   Reason for exam:     Answer: verbal recognition program (DRAGON) and it was checked for errors. It is possible that there are still dictated errors within this office note. If so, please bring any errors to my attention for an addendum. All efforts were made to ensure that this office note is accurate.

## 2018-08-21 NOTE — PROGRESS NOTES
History of Present Illness:  Andrew Vera is a pleasant 60 y.o. female presenting today for follow-up of left knee osteoarthritis. Underwent a left knee cortisone injection on 6/15/2018 with significant relief. Recently she's had a flareup of left knee pain and has her daughter's wedding coming up in the near future. She is requesting another cortisone injection today. No recent injuries reported. As an aside, she's been doing the exercises for left Achilles tendinitis but she still has some discomfort with it. Medical History:  Patient's medications, allergies, past medical, surgical, social and family histories were reviewed and updated as appropriate. Pertinent items are noted in HPI  Review of systems reviewed from Patient History Form dated on 8/21/2018 and available in the patient's chart under the Media tab. Vital Signs:  Vitals:    08/21/18 1117   BP: 124/86   Pulse: 77         Neuro: Alert & oriented x 3,  normal,  no focal deficits noted. Normal affect. Eyes: sclera clear  Ears: Normal external ear  Mouth:  No perioral lesions  Pulm: Respirations unlabored and regular  Pulse: Regular rate and rhythm   Skin: Warm, well perfused      Constitutional: In no apparent distress. Normal affect. Alert and oriented X3 and is cooperative. left Knee Examination:    Gait: No use of assistive devices. No antalgic gait. Alignment: Alignment appreciated. Inspection/skin: Quadriceps well developed. Skin is intact without erythema or ecchymosis. No gross deformity. Palpation: Crepitus. Nontender along joint line. No pain with compression of patella. Nontender to light touch. Range of Motion: Full ROM. Strength: 5/5 quad strength    Effusion: No apparent effusion. Ligamentous stability: Stable to valgus and varus stress at 0° and 30°. Solid endpoint with Lachman's. Negative posterior and anterior drawer signs. Patella tracking: Smooth translation of patella. Special tests: Negative Jody sign. Patella apprehension sign negative. Neurologic and vascular: Skin is warm and well-perfused. Distally neurovascularly intact. Additional findings: Calf soft nontender. Sensation is intact to light-touch. No pretibial edema. Comparison right Knee Examination:    Gait: No use of assistive devices. No antalgic gait. Alignment: Alignment appreciated. Inspection/skin: Quadriceps well developed. Skin is intact without erythema or ecchymosis. No gross deformity. Palpation: No crepitus. Nontender along joint line. No pain with compression of patella. Nontender to light touch. Range of Motion: Full ROM. Strength: 5/5 quad strength    Effusion: No apparent effusion. Ligamentous stability: Stable to valgus and varus stress at 0° and 30°. Solid endpoint with Lachman's. Negative posterior and anterior drawer signs. Patella tracking: Smooth translation of patella. Special tests: Negative Jody sign. Patella apprehension sign negative. Neurologic and vascular: Skin is warm and well-perfused. Distally neurovascularly intact. Additional findings: Calf soft nontender. Sensation is intact to light-touch. No pretibial edema. Radiology:     Radiographs were obtained and reviewed in the office; 4 views: bilateral PA, bilateral Yennifer Teresa, bilateral Merchants AND left lateral    Impression: left knee medial compartment bone-on-bone osteoarthritis and patellofemoral osteoarthritis. Right total knee replacement. Assessment :  69-year-old female with left knee osteoarthritis. Impression:  Encounter Diagnosis   Name Primary?     Primary osteoarthritis of left knee Yes       Office Procedures:  Orders Placed This Encounter   Procedures    XR KNEE LEFT (MIN 4 VIEWS)     [id number]     Order Specific Question:   Reason for exam:     Answer:   Pain    XR KNEE RIGHT (3 VIEWS)     Order Specific Question:   Reason for exam:     Answer:

## 2018-08-23 RX ORDER — ESOMEPRAZOLE MAGNESIUM 40 MG/1
CAPSULE, DELAYED RELEASE ORAL
Qty: 30 CAPSULE | Refills: 0 | Status: SHIPPED | OUTPATIENT
Start: 2018-08-23 | End: 2018-11-27 | Stop reason: SDUPTHER

## 2018-09-05 ENCOUNTER — OFFICE VISIT (OUTPATIENT)
Dept: INTERNAL MEDICINE CLINIC | Age: 58
End: 2018-09-05

## 2018-09-05 VITALS
HEART RATE: 84 BPM | HEIGHT: 65 IN | SYSTOLIC BLOOD PRESSURE: 120 MMHG | DIASTOLIC BLOOD PRESSURE: 84 MMHG | WEIGHT: 252 LBS | BODY MASS INDEX: 41.99 KG/M2 | RESPIRATION RATE: 12 BRPM

## 2018-09-05 DIAGNOSIS — E78.5 HYPERLIPIDEMIA, UNSPECIFIED HYPERLIPIDEMIA TYPE: ICD-10-CM

## 2018-09-05 DIAGNOSIS — N18.30 CKD (CHRONIC KIDNEY DISEASE) STAGE 3, GFR 30-59 ML/MIN (HCC): ICD-10-CM

## 2018-09-05 DIAGNOSIS — I10 BENIGN ESSENTIAL HTN: Primary | ICD-10-CM

## 2018-09-05 DIAGNOSIS — R73.01 IMPAIRED FASTING GLUCOSE: ICD-10-CM

## 2018-09-05 DIAGNOSIS — I87.2 SAPHENOFEMORAL VENOUS REFLUX: ICD-10-CM

## 2018-09-05 DIAGNOSIS — I87.009 POSTPHLEBITIC SYNDROME: ICD-10-CM

## 2018-09-05 DIAGNOSIS — I87.2 VENOUS INSUFFICIENCY OF BOTH LOWER EXTREMITIES: ICD-10-CM

## 2018-09-05 DIAGNOSIS — E03.9 ACQUIRED HYPOTHYROIDISM: ICD-10-CM

## 2018-09-05 PROCEDURE — 99214 OFFICE O/P EST MOD 30 MIN: CPT | Performed by: INTERNAL MEDICINE

## 2018-09-05 ASSESSMENT — PATIENT HEALTH QUESTIONNAIRE - PHQ9
SUM OF ALL RESPONSES TO PHQ9 QUESTIONS 1 & 2: 0
2. FEELING DOWN, DEPRESSED OR HOPELESS: 0
1. LITTLE INTEREST OR PLEASURE IN DOING THINGS: 0
SUM OF ALL RESPONSES TO PHQ QUESTIONS 1-9: 0
SUM OF ALL RESPONSES TO PHQ QUESTIONS 1-9: 0

## 2018-09-05 NOTE — PROGRESS NOTES
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 10/2017 and Dec 2017 weights incorrect. GEN: NAD, A&O, Non-toxic  HEENT: NC/AT, SILAS, EOMI, Anicteric, Throat NL. Oral cavity Clear,  TM's NL, Nasal cavity clear. NECK: Supple. No thyromegaly. No JVD  LYMPH: No C/SC nodes. CV: Regular rhythm. Rate normal.  No murmurs. No ectopy. PULM: CTA. No crackles. GI:  Soft, Obese, NT, BS +. No masses. EXT: Large legs. 1+ LE edema, less. SKIN:  Venous stasis changes bilateral lower extremity. NEURO: No focal or lateralizing deficits. VASC:  BL LE venous stasis changes and dilated venules and varicose veins BL. Pedal pulses are symmetrical.      ASSESSMENT/PLAN:    1. Benign essential HTN  Blood pressure is well controlled  Continue current medications  Continue no added salt dietliterature provided  - Comprehensive Metabolic Panel; Future  - CBC Auto Differential; Future  - Lipid Panel; Future  - TSH without Reflex; Future    2. Acquired hypothyroidism  Condition control is uncertain. Due for lab  Continue levothyroxine  - TSH without Reflex; Future    3. Impaired fasting glucose  Condition stability is uncertain. Due for lab  Continue a sensible low simple sugar diet  Encouraged patient to be more physically active. - Comprehensive Metabolic Panel;  Future  - Hemoglobin A1C;

## 2018-09-05 NOTE — PATIENT INSTRUCTIONS
Patient Education        DASH Diet: Care Instructions  Your Care Instructions    The DASH diet is an eating plan that can help lower your blood pressure. DASH stands for Dietary Approaches to Stop Hypertension. Hypertension is high blood pressure. The DASH diet focuses on eating foods that are high in calcium, potassium, and magnesium. These nutrients can lower blood pressure. The foods that are highest in these nutrients are fruits, vegetables, low-fat dairy products, nuts, seeds, and legumes. But taking calcium, potassium, and magnesium supplements instead of eating foods that are high in those nutrients does not have the same effect. The DASH diet also includes whole grains, fish, and poultry. The DASH diet is one of several lifestyle changes your doctor may recommend to lower your high blood pressure. Your doctor may also want you to decrease the amount of sodium in your diet. Lowering sodium while following the DASH diet can lower blood pressure even further than just the DASH diet alone. Follow-up care is a key part of your treatment and safety. Be sure to make and go to all appointments, and call your doctor if you are having problems. It's also a good idea to know your test results and keep a list of the medicines you take. How can you care for yourself at home? Following the DASH diet  · Eat 4 to 5 servings of fruit each day. A serving is 1 medium-sized piece of fruit, ½ cup chopped or canned fruit, 1/4 cup dried fruit, or 4 ounces (½ cup) of fruit juice. Choose fruit more often than fruit juice. · Eat 4 to 5 servings of vegetables each day. A serving is 1 cup of lettuce or raw leafy vegetables, ½ cup of chopped or cooked vegetables, or 4 ounces (½ cup) of vegetable juice. Choose vegetables more often than vegetable juice. · Get 2 to 3 servings of low-fat and fat-free dairy each day. A serving is 8 ounces of milk, 1 cup of yogurt, or 1 ½ ounces of cheese. · Eat 6 to 8 servings of grains each day. A serving is 1 slice of bread, 1 ounce of dry cereal, or ½ cup of cooked rice, pasta, or cooked cereal. Try to choose whole-grain products as much as possible. · Limit lean meat, poultry, and fish to 2 servings each day. A serving is 3 ounces, about the size of a deck of cards. · Eat 4 to 5 servings of nuts, seeds, and legumes (cooked dried beans, lentils, and split peas) each week. A serving is 1/3 cup of nuts, 2 tablespoons of seeds, or ½ cup of cooked beans or peas. · Limit fats and oils to 2 to 3 servings each day. A serving is 1 teaspoon of vegetable oil or 2 tablespoons of salad dressing. · Limit sweets and added sugars to 5 servings or less a week. A serving is 1 tablespoon jelly or jam, ½ cup sorbet, or 1 cup of lemonade. · Eat less than 2,300 milligrams (mg) of sodium a day. If you limit your sodium to 1,500 mg a day, you can lower your blood pressure even more. Tips for success  · Start small. Do not try to make dramatic changes to your diet all at once. You might feel that you are missing out on your favorite foods and then be more likely to not follow the plan. Make small changes, and stick with them. Once those changes become habit, add a few more changes. · Try some of the following:  ¨ Make it a goal to eat a fruit or vegetable at every meal and at snacks. This will make it easy to get the recommended amount of fruits and vegetables each day. ¨ Try yogurt topped with fruit and nuts for a snack or healthy dessert. ¨ Add lettuce, tomato, cucumber, and onion to sandwiches. ¨ Combine a ready-made pizza crust with low-fat mozzarella cheese and lots of vegetable toppings. Try using tomatoes, squash, spinach, broccoli, carrots, cauliflower, and onions. ¨ Have a variety of cut-up vegetables with a low-fat dip as an appetizer instead of chips and dip. ¨ Sprinkle sunflower seeds or chopped almonds over salads. Or try adding chopped walnuts or almonds to cooked vegetables.   ¨ Try some vegetarian meals using beans and peas. Add garbanzo or kidney beans to salads. Make burritos and tacos with mashed sanchez beans or black beans. Where can you learn more? Go to https://haider.Foldees. org and sign in to your easy2map account. Enter O993 in the [facility] box to learn more about \"DASH Diet: Care Instructions. \"     If you do not have an account, please click on the \"Sign Up Now\" link. Current as of: December 6, 2017  Content Version: 11.7  © 6535-9753 [employer]. Care instructions adapted under license by [facility] ([facility]). If you have questions about a medical condition or this instruction, always ask your healthcare professional. [address] any warranty or liability for your use of this information. Patient Education        High Cholesterol: Care Instructions  Your Care Instructions    Cholesterol is a type of fat in your blood. It is needed for many body functions, such as making new cells. Cholesterol is made by your body. It also comes from food you eat. High cholesterol means that you have too much of the fat in your blood. This raises your risk of a heart attack and stroke. LDL and HDL are part of your total cholesterol. LDL is the \"bad\" cholesterol. High LDL can raise your risk for heart disease, heart attack, and stroke. HDL is the \"good\" cholesterol. It helps clear bad cholesterol from the body. High HDL is linked with a lower risk of heart disease, heart attack, and stroke. Your cholesterol levels help your doctor find out your risk for having a heart attack or stroke. You and your doctor can talk about whether you need to lower your risk and what treatment is best for you. A heart-healthy lifestyle along with medicines can help lower your cholesterol and your risk. The way you choose to lower your risk will depend on how high your risk is for heart attack and stroke.  It will also depend on how you feel about taking medicines. Follow-up care is a key part of your treatment and safety. Be sure to make and go to all appointments, and call your doctor if you are having problems. It's also a good idea to know your test results and keep a list of the medicines you take. How can you care for yourself at home? · Eat a variety of foods every day. Good choices include fruits, vegetables, whole grains (like oatmeal), dried beans and peas, nuts and seeds, soy products (like tofu), and fat-free or low-fat dairy products. · Replace butter, margarine, and hydrogenated or partially hydrogenated oils with olive and canola oils. (Canola oil margarine without trans fat is fine.)  · Replace red meat with fish, poultry, and soy protein (like tofu). · Limit processed and packaged foods like chips, crackers, and cookies. · Bake, broil, or steam foods. Don't marley them. · Be physically active. Get at least 30 minutes of exercise on most days of the week. Walking is a good choice. You also may want to do other activities, such as running, swimming, cycling, or playing tennis or team sports. · Stay at a healthy weight or lose weight by making the changes in eating and physical activity listed above. Losing just a small amount of weight, even 5 to 10 pounds, can reduce your risk for having a heart attack or stroke. · Do not smoke. When should you call for help? Watch closely for changes in your health, and be sure to contact your doctor if:    · You need help making lifestyle changes.     · You have questions about your medicine. Where can you learn more? Go to https://Lumena Pharmaceuticalsnormaneweb.Alloy Digital. org and sign in to your VtagO account. Enter W750 in the [employer] box to learn more about \"High Cholesterol: Care Instructions. \"     If you do not have an account, please click on the \"Sign Up Now\" link. Current as of: May 10, 2017  Content Version: 11.7  © 0089-1414 [employer].  Care instructions adapted under

## 2018-09-06 DIAGNOSIS — N18.30 CKD (CHRONIC KIDNEY DISEASE) STAGE 3, GFR 30-59 ML/MIN (HCC): Primary | ICD-10-CM

## 2018-09-06 DIAGNOSIS — N25.81 SECONDARY HYPERPARATHYROIDISM (HCC): ICD-10-CM

## 2018-09-11 ENCOUNTER — TELEPHONE (OUTPATIENT)
Dept: INTERNAL MEDICINE CLINIC | Age: 58
End: 2018-09-11

## 2018-09-17 RX ORDER — ESOMEPRAZOLE MAGNESIUM 40 MG/1
CAPSULE, DELAYED RELEASE ORAL
Qty: 30 CAPSULE | Refills: 5 | Status: SHIPPED | OUTPATIENT
Start: 2018-09-17 | End: 2019-02-19 | Stop reason: SDUPTHER

## 2018-09-19 ENCOUNTER — TELEPHONE (OUTPATIENT)
Dept: INTERNAL MEDICINE CLINIC | Age: 58
End: 2018-09-19

## 2018-09-20 ENCOUNTER — TELEPHONE (OUTPATIENT)
Dept: INTERNAL MEDICINE CLINIC | Age: 58
End: 2018-09-20

## 2018-09-20 RX ORDER — IRBESARTAN 150 MG/1
150 TABLET ORAL DAILY
Qty: 90 TABLET | Refills: 3 | Status: SHIPPED | OUTPATIENT
Start: 2018-09-20 | End: 2019-02-12 | Stop reason: SDUPTHER

## 2018-09-20 NOTE — TELEPHONE ENCOUNTER
They left message on non emergency line. They received rx for Valsartan 80 mg  This med is on recall. Would the doctor like to prescribe a alternate med? Please call phone# provided.

## 2018-09-25 ENCOUNTER — TELEPHONE (OUTPATIENT)
Dept: INTERNAL MEDICINE CLINIC | Age: 58
End: 2018-09-25

## 2018-09-26 NOTE — TELEPHONE ENCOUNTER
She can start by taking 1/2 of the 150 and see how her BP goes if she would feel more comfortable, but watch for high BP. Jeannette Gale You cannot compare 80 vs the 150 because they are different medications.

## 2018-10-24 ENCOUNTER — TELEPHONE (OUTPATIENT)
Dept: INTERNAL MEDICINE CLINIC | Age: 58
End: 2018-10-24

## 2018-10-26 ENCOUNTER — TELEPHONE (OUTPATIENT)
Dept: INTERNAL MEDICINE CLINIC | Age: 58
End: 2018-10-26

## 2018-11-20 ENCOUNTER — OFFICE VISIT (OUTPATIENT)
Dept: INTERNAL MEDICINE CLINIC | Age: 58
End: 2018-11-20
Payer: COMMERCIAL

## 2018-11-20 VITALS — TEMPERATURE: 97.7 F | SYSTOLIC BLOOD PRESSURE: 140 MMHG | HEART RATE: 81 BPM | DIASTOLIC BLOOD PRESSURE: 72 MMHG

## 2018-11-20 DIAGNOSIS — L03.116 LEFT LEG CELLULITIS: ICD-10-CM

## 2018-11-20 DIAGNOSIS — I87.2 VENOUS STASIS DERMATITIS OF LEFT LOWER EXTREMITY: Primary | ICD-10-CM

## 2018-11-20 LAB
ANION GAP SERPL CALCULATED.3IONS-SCNC: 15 MMOL/L (ref 3–16)
BASOPHILS ABSOLUTE: 0.1 K/UL (ref 0–0.2)
BASOPHILS RELATIVE PERCENT: 0.7 %
BUN BLDV-MCNC: 20 MG/DL (ref 7–20)
C-REACTIVE PROTEIN: 10.7 MG/L (ref 0–5.1)
CALCIUM SERPL-MCNC: 9.5 MG/DL (ref 8.3–10.6)
CHLORIDE BLD-SCNC: 106 MMOL/L (ref 99–110)
CO2: 25 MMOL/L (ref 21–32)
CREAT SERPL-MCNC: 1.3 MG/DL (ref 0.6–1.1)
EOSINOPHILS ABSOLUTE: 0.3 K/UL (ref 0–0.6)
EOSINOPHILS RELATIVE PERCENT: 3.8 %
GFR AFRICAN AMERICAN: 51
GFR NON-AFRICAN AMERICAN: 42
GLUCOSE BLD-MCNC: 126 MG/DL (ref 70–99)
HCT VFR BLD CALC: 42.3 % (ref 36–48)
HEMOGLOBIN: 13.6 G/DL (ref 12–16)
LYMPHOCYTES ABSOLUTE: 1.7 K/UL (ref 1–5.1)
LYMPHOCYTES RELATIVE PERCENT: 20 %
MCH RBC QN AUTO: 28.7 PG (ref 26–34)
MCHC RBC AUTO-ENTMCNC: 32.1 G/DL (ref 31–36)
MCV RBC AUTO: 89.4 FL (ref 80–100)
MONOCYTES ABSOLUTE: 0.7 K/UL (ref 0–1.3)
MONOCYTES RELATIVE PERCENT: 8.6 %
NEUTROPHILS ABSOLUTE: 5.6 K/UL (ref 1.7–7.7)
NEUTROPHILS RELATIVE PERCENT: 66.9 %
PDW BLD-RTO: 14.3 % (ref 12.4–15.4)
PLATELET # BLD: 256 K/UL (ref 135–450)
PMV BLD AUTO: 9 FL (ref 5–10.5)
POTASSIUM SERPL-SCNC: 4.9 MMOL/L (ref 3.5–5.1)
RBC # BLD: 4.74 M/UL (ref 4–5.2)
SODIUM BLD-SCNC: 146 MMOL/L (ref 136–145)
WBC # BLD: 8.4 K/UL (ref 4–11)

## 2018-11-20 PROCEDURE — 99213 OFFICE O/P EST LOW 20 MIN: CPT | Performed by: INTERNAL MEDICINE

## 2018-11-20 RX ORDER — CEPHALEXIN 500 MG/1
500 CAPSULE ORAL 4 TIMES DAILY
Qty: 40 CAPSULE | Refills: 0 | Status: SHIPPED | OUTPATIENT
Start: 2018-11-20 | End: 2018-11-30

## 2018-11-20 RX ORDER — TRIAMCINOLONE ACETONIDE 1 MG/G
CREAM TOPICAL
Qty: 60 G | Refills: 1 | Status: SHIPPED | OUTPATIENT
Start: 2018-11-20 | End: 2019-04-25

## 2018-11-20 RX ORDER — PREDNISONE 20 MG/1
20 TABLET ORAL DAILY
Qty: 5 TABLET | Refills: 0 | Status: SHIPPED | OUTPATIENT
Start: 2018-11-20 | End: 2018-11-25

## 2018-11-20 NOTE — PATIENT INSTRUCTIONS
scrapes, or other injuries to your skin. Cellulitis most often occurs where there is a break in the skin. · If you get a scrape, cut, mild burn, or bite, wash the wound with clean water as soon as you can to help avoid infection. Don't use hydrogen peroxide or alcohol, which can slow healing. · If you have swelling in your legs (edema), support stockings and good skin care may help prevent leg sores and cellulitis. · Take care of your feet, especially if you have diabetes or other conditions that increase the risk of infection. Wear shoes and socks. Do not go barefoot. If you have athlete's foot or other skin problems on your feet, talk to your doctor about how to treat them. When should you call for help? Call your doctor now or seek immediate medical care if:    · You have signs that your infection is getting worse, such as:  ? Increased pain, swelling, warmth, or redness. ? Red streaks leading from the area. ? Pus draining from the area. ? A fever.     · You get a rash.    Watch closely for changes in your health, and be sure to contact your doctor if:    · You do not get better as expected. Where can you learn more? Go to https://Vdopia.2CRisk. org and sign in to your Jaeger account. Enter X750 in the [facility] box to learn more about \"Cellulitis: Care Instructions. \"     If you do not have an account, please click on the \"Sign Up Now\" link. Current as of: April 18, 2018  Content Version: 11.8  © 1545-7550 [employer]. Care instructions adapted under license by [facility] ([facility]). If you have questions about a medical condition or this instruction, always ask your healthcare professional. [place] [address] any warranty or liability for your use of this information.

## 2018-11-20 NOTE — PROGRESS NOTES
room if symptoms persist or worsen or if new symptoms develop such as fever  4. We'll place her on prednisone 20 mg daily for 5 days only along with topical triamcinolone cream to address the stasis dermatitis. Discussed medications with patient who voiced understanding of their use, indication and potential side effects. Pt also understands the above recommendations. All questions answered.

## 2018-11-21 LAB — SEDIMENTATION RATE, ERYTHROCYTE: 16 MM/HR (ref 0–30)

## 2018-11-27 ENCOUNTER — OFFICE VISIT (OUTPATIENT)
Dept: INTERNAL MEDICINE CLINIC | Age: 58
End: 2018-11-27
Payer: COMMERCIAL

## 2018-11-27 VITALS — DIASTOLIC BLOOD PRESSURE: 72 MMHG | SYSTOLIC BLOOD PRESSURE: 120 MMHG

## 2018-11-27 DIAGNOSIS — I87.2 VENOUS STASIS DERMATITIS OF LEFT LOWER EXTREMITY: Primary | ICD-10-CM

## 2018-11-27 PROCEDURE — 99212 OFFICE O/P EST SF 10 MIN: CPT | Performed by: INTERNAL MEDICINE

## 2018-11-29 ENCOUNTER — HOSPITAL ENCOUNTER (OUTPATIENT)
Age: 58
Discharge: HOME | End: 2018-11-29
Payer: COMMERCIAL

## 2018-11-29 ENCOUNTER — HOSPITAL ENCOUNTER (OUTPATIENT)
Dept: ULTRASOUND IMAGING | Age: 58
Discharge: HOME | End: 2018-11-29
Payer: COMMERCIAL

## 2018-11-29 DIAGNOSIS — N18.30 CHRONIC KIDNEY DISEASE, STAGE III (MODERATE) (HCC): ICD-10-CM

## 2018-11-29 LAB
24HR URINE VOLUME (ML): 1950 ML
ALBUMIN SERPL-MCNC: 3.8 G/DL (ref 3.4–5)
ANION GAP SERPL CALCULATED.3IONS-SCNC: 12 MMOL/L (ref 3–16)
BILIRUBIN URINE: NEGATIVE
BLOOD, URINE: NEGATIVE
BUN BLDV-MCNC: 22 MG/DL (ref 7–20)
C3 COMPLEMENT: 179.3 MG/DL (ref 90–180)
C4 COMPLEMENT: 32.7 MG/DL (ref 10–40)
CALCIUM SERPL-MCNC: 9.1 MG/DL (ref 8.3–10.6)
CHLORIDE BLD-SCNC: 102 MMOL/L (ref 99–110)
CLARITY: CLEAR
CO2: 23 MMOL/L (ref 21–32)
COLOR: YELLOW
CREAT SERPL-MCNC: 1.3 MG/DL (ref 0.6–1.1)
CREAT SERPL-MCNC: 1.3 MG/DL (ref 0.6–1.2)
CREATININE 24 HOUR URINE: 1.7 G/24HR (ref 0.6–1.5)
CREATININE CLEARANCE: 75 ML/MIN (ref 88–128)
CREATININE URINE: 43.9 MG/DL (ref 28–259)
EPITHELIAL CELLS, UA: 1 /HPF (ref 0–5)
GFR AFRICAN AMERICAN: 51
GFR NON-AFRICAN AMERICAN: 42
GLUCOSE BLD-MCNC: 138 MG/DL (ref 70–99)
GLUCOSE URINE: NEGATIVE MG/DL
HCT VFR BLD CALC: 43.2 % (ref 36–48)
HEMOGLOBIN: 13.8 G/DL (ref 12–16)
HYALINE CASTS: 0 /LPF (ref 0–8)
KETONES, URINE: NEGATIVE MG/DL
LEUKOCYTE ESTERASE, URINE: ABNORMAL
Lab: 24 HR
MAGNESIUM: 2.2 MG/DL (ref 1.8–2.4)
MCH RBC QN AUTO: 28.2 PG (ref 26–34)
MCHC RBC AUTO-ENTMCNC: 31.8 G/DL (ref 31–36)
MCV RBC AUTO: 88.6 FL (ref 80–100)
MICROSCOPIC EXAMINATION: YES
NITRITE, URINE: NEGATIVE
PDW BLD-RTO: 14.7 % (ref 12.4–15.4)
PH UA: 6
PHOSPHORUS: 3.4 MG/DL (ref 2.5–4.9)
PLATELET # BLD: 271 K/UL (ref 135–450)
PMV BLD AUTO: 9.3 FL (ref 5–10.5)
POTASSIUM SERPL-SCNC: 4.3 MMOL/L (ref 3.5–5.1)
PROTEIN 24 HOUR URINE: 0.25 G/24HR
PROTEIN PROTEIN: 8 MG/DL
PROTEIN UA: NEGATIVE MG/DL
RBC # BLD: 4.87 M/UL (ref 4–5.2)
RBC UA: 1 /HPF (ref 0–4)
SODIUM BLD-SCNC: 137 MMOL/L (ref 136–145)
SPECIFIC GRAVITY UA: 1.01
URINE TYPE: ABNORMAL
UROBILINOGEN, URINE: 0.2 E.U./DL
WBC # BLD: 9.6 K/UL (ref 4–11)
WBC UA: 1 /HPF (ref 0–5)

## 2018-11-29 PROCEDURE — 83735 ASSAY OF MAGNESIUM: CPT

## 2018-11-29 PROCEDURE — 36415 COLL VENOUS BLD VENIPUNCTURE: CPT

## 2018-11-29 PROCEDURE — 86038 ANTINUCLEAR ANTIBODIES: CPT

## 2018-11-29 PROCEDURE — 84155 ASSAY OF PROTEIN SERUM: CPT

## 2018-11-29 PROCEDURE — 82575 CREATININE CLEARANCE TEST: CPT

## 2018-11-29 PROCEDURE — 80069 RENAL FUNCTION PANEL: CPT

## 2018-11-29 PROCEDURE — 84165 PROTEIN E-PHORESIS SERUM: CPT

## 2018-11-29 PROCEDURE — 81001 URINALYSIS AUTO W/SCOPE: CPT

## 2018-11-29 PROCEDURE — 84166 PROTEIN E-PHORESIS/URINE/CSF: CPT

## 2018-11-29 PROCEDURE — 76770 US EXAM ABDO BACK WALL COMP: CPT

## 2018-11-29 PROCEDURE — 85027 COMPLETE CBC AUTOMATED: CPT

## 2018-11-29 PROCEDURE — 84156 ASSAY OF PROTEIN URINE: CPT

## 2018-11-29 PROCEDURE — 86160 COMPLEMENT ANTIGEN: CPT

## 2018-11-29 PROCEDURE — 82570 ASSAY OF URINE CREATININE: CPT

## 2018-11-30 ENCOUNTER — OFFICE VISIT (OUTPATIENT)
Dept: ORTHOPEDIC SURGERY | Age: 58
End: 2018-11-30
Payer: COMMERCIAL

## 2018-11-30 VITALS
BODY MASS INDEX: 46.38 KG/M2 | DIASTOLIC BLOOD PRESSURE: 84 MMHG | HEART RATE: 66 BPM | SYSTOLIC BLOOD PRESSURE: 130 MMHG | HEIGHT: 62 IN | WEIGHT: 252 LBS

## 2018-11-30 DIAGNOSIS — M53.3 SACROILIAC JOINT DYSFUNCTION OF LEFT SIDE: ICD-10-CM

## 2018-11-30 DIAGNOSIS — M25.552 LEFT HIP PAIN: ICD-10-CM

## 2018-11-30 DIAGNOSIS — M47.816 LUMBAR SPONDYLOSIS: ICD-10-CM

## 2018-11-30 DIAGNOSIS — M17.12 PRIMARY OSTEOARTHRITIS OF LEFT KNEE: Primary | ICD-10-CM

## 2018-11-30 LAB
ALBUMIN SERPL-MCNC: 3.3 G/DL (ref 3.1–4.9)
ALPHA-1-GLOBULIN: 0.2 G/DL (ref 0.2–0.4)
ALPHA-2-GLOBULIN: 0.9 G/DL (ref 0.4–1.1)
ANA INTERPRETATION: NORMAL
ANTI-NUCLEAR ANTIBODY (ANA): NEGATIVE
BETA GLOBULIN: 1.1 G/DL (ref 0.9–1.6)
GAMMA GLOBULIN: 0.7 G/DL (ref 0.6–1.8)
SPE/IFE INTERPRETATION: NORMAL
TOTAL PROTEIN: 6.2 G/DL (ref 6.4–8.2)
URINE ELECTROPHORESIS INTERP: NORMAL

## 2018-11-30 PROCEDURE — 99214 OFFICE O/P EST MOD 30 MIN: CPT | Performed by: ORTHOPAEDIC SURGERY

## 2018-11-30 PROCEDURE — 20610 DRAIN/INJ JOINT/BURSA W/O US: CPT | Performed by: ORTHOPAEDIC SURGERY

## 2018-11-30 RX ORDER — PREDNISONE 10 MG/1
TABLET ORAL
Qty: 30 TABLET | Refills: 0 | Status: SHIPPED | OUTPATIENT
Start: 2018-11-30 | End: 2019-01-28 | Stop reason: ALTCHOICE

## 2018-11-30 NOTE — PROGRESS NOTES
Chief Complaint  Follow-up (left knee ) and Hip Pain (left hip pain )      History of Present Illness:  Chirag Yancey is a pleasant 60 y.o. female who comes in today for continued left knee pain from osteoarthritis. She states her pain has increased and bothers her when she stands and walks. The patient received a cortisone injection on 8/21/2018 which did help her pain for a while. She also states her pain has intensified. She also complains of new left hip pain that has been present for 6 months. She is unable to recall a specific injury. The pain intermittently bothers her at night and is increased when she stands for long periods of time. She states her pain has gotten so bad that all she wants to do is sit. Patient has been taking up to 10 pills a day of Tylenol Arthritis. She has not had any chiropractic treatment. Pain is exacerbated by standing and walking.      Pain Assessment  Location of Pain: Pelvis  Location Modifiers: Left  Severity of Pain: 10  Quality of Pain: Aching, Dull  Duration of Pain: Persistent  Frequency of Pain: Constant  Aggravating Factors: Walking, Stairs  Limiting Behavior: Yes  Relieving Factors: Rest  Result of Injury: No  Work-Related Injury: No  Are there other pain locations you wish to document?: No    Past Medical History:   Diagnosis Date    Chronic superficial venous thrombosis of left lower extremity 8/13/2015    CKD (chronic kidney disease) stage 3, GFR 30-59 ml/min ([facility])     DVT (deep venous thrombosis) ([facility]) 4/2003, 5/2013    Gastroesophageal reflux disease without esophagitis 11/28/2016    GERD (gastroesophageal reflux disease)     Hypertension     IBS (irritable bowel syndrome)     Osteoarthritis, knee     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 Normal affect. Eyes: sclera clear  Ears: Normal external ear  Mouth:  No perioral lesions  Pulm: Respirations unlabored and regular  Pulse: Regular rate and rhythm   Skin: Warm, well perfused      Constitutional: In no apparent distress. Normal affect. Alert and oriented X3 and is cooperative. LEFT knee exam    Gait: No use of assistive devices. No antalgic gait. Alignment: normal alignment. Inspection/skin: Skin is intact without erythema or ecchymosis. No gross deformity. Palpation: Mild crepitus. Mild joint line tenderness present. Range of Motion: There is full range of motion. Strength: Normal quadriceps development. Effusion: No effusion or swelling present. Ligamentous stability: No cruciate or collateral ligament instability. Neurologic and vascular: Skin is warm and well-perfused. Sensation is intact to light-touch. Special tests: Negative Jody sign. RIGHT knee exam    Gait: No use of assistive devices. No antalgic gait. Alignment: normal alignment. Inspection/skin: Skin is intact without erythema or ecchymosis. No gross deformity. Palpation: no crepitus. no joint line tenderness present. Range of Motion: There is full range of motion. Strength: Normal quadriceps development. Effusion: No effusion or swelling present. Ligamentous stability: No cruciate or collateral ligament instability. Neurologic and vascular: Skin is warm and well-perfused. Sensation is intact to light-touch. Special tests: Negative Jody sign. LEFT Hip Examination:    Inspection:  No erythema swelling or signs of infection    Palpation:  Mild tenderness to palpation of the lateral aspect over the SI joint. Lower lumbar pain.      Range of Motion:  Full range of motion but does have some discomfort in the hip     Strength:  5/5 hip flexion and abduction and adduction    Special Tests:  Negative Bautista's test.     Skin: There are no rashes, ulcerations or lesions. Gait: Normal    Vascular: Skin warm dry and well perfused. No calf tenderness. Neurologic: No focal motor deficits. Sensation intact. Additional Findings: No additional remarkable findings. RIGHT Hip Examination:    Inspection:  No erythema swelling or signs of infection    Palpation:  No tenderness found on exam.     Range of Motion:  Full range of motion but does have some discomfort laterally at the hip    Strength:  5/5 hip flexion and abduction and adduction    Special Tests:  Negative Bautista's test.    Skin: There are no rashes, ulcerations or lesions. Gait: Normal    Vascular: Skin warm dry and well perfused. No calf tenderness. Neurologic: No focal motor deficits. Sensation intact. Additional Findings: No additional remarkable findings. Radiology:     Radiographs of the left hip were taken and reviewed today. Views include AP, Pelvis and Frog Lateral.     Impression: No bony abnormalities in left hip. Lumbar spondylosis. Assessment :  Left knee osteoarthritis, lumbar spondylosis and SI joint dysfunction. Impression:  Encounter Diagnoses   Name Primary?  Primary osteoarthritis of left knee Yes    Left hip pain     Lumbar spondylosis     Sacroiliac joint dysfunction of left side        Office Procedures:  Orders Placed This Encounter   Procedures    XR HIP 2-3 VW W PELVIS LEFT     Order Specific Question:   Reason for exam:     Answer:   pain         Plan: I think at this time the patient is amenable to a cortisone injection in the left knee due to the exacerbation of her symptoms. We have had a lengthy discussion in regards to the proper administration of all OTC NSAIDs and Tylenol. In regards to her hip, I think she would benefit from chiropractic treatment. She states she does know one that another family member sees, so she will try to get an appointment with them.  If she does not see relief from chiropractic treatment, then collected and reviewed today. It is noncontributory. I personally performed the services described in this documentation and scribed by Jun Gottlieb ATC. Carloz Martinez MD  Sports Medicine, Arthroscopic Knee and Shoulder Surgery    This dictation was performed with a verbal recognition program [facility]) and it was checked for errors. It is possible that there are still dictated errors within this office note. If so, please bring any errors to my attention for an addendum. All efforts were made to ensure that this office note is accurate.

## 2018-11-30 NOTE — PROGRESS NOTES
Cortisone injection: LEFT knee    2cc methylprednisolone 40mg    Lot:[id number]  Exp:12/2019  LSY:1153-9496-50    4cc ropivocaine 0.5%    MYL:[id number]  Exp:05/2022  Nd: [id number]

## 2019-01-28 ENCOUNTER — OFFICE VISIT (OUTPATIENT)
Dept: INTERNAL MEDICINE CLINIC | Age: 59
End: 2019-01-28
Payer: COMMERCIAL

## 2019-01-28 VITALS
OXYGEN SATURATION: 97 % | WEIGHT: 272 LBS | RESPIRATION RATE: 12 BRPM | DIASTOLIC BLOOD PRESSURE: 94 MMHG | HEART RATE: 88 BPM | HEIGHT: 62 IN | SYSTOLIC BLOOD PRESSURE: 140 MMHG | BODY MASS INDEX: 50.05 KG/M2

## 2019-01-28 DIAGNOSIS — R73.01 IMPAIRED FASTING GLUCOSE: ICD-10-CM

## 2019-01-28 DIAGNOSIS — I87.2 VENOUS INSUFFICIENCY OF BOTH LOWER EXTREMITIES: Primary | ICD-10-CM

## 2019-01-28 DIAGNOSIS — I87.2 VENOUS STASIS DERMATITIS OF BOTH LOWER EXTREMITIES: ICD-10-CM

## 2019-01-28 DIAGNOSIS — I10 BENIGN ESSENTIAL HTN: ICD-10-CM

## 2019-01-28 DIAGNOSIS — E78.5 HYPERLIPIDEMIA, UNSPECIFIED HYPERLIPIDEMIA TYPE: ICD-10-CM

## 2019-01-28 DIAGNOSIS — I87.2 SAPHENOFEMORAL VENOUS REFLUX: ICD-10-CM

## 2019-01-28 DIAGNOSIS — E03.9 ACQUIRED HYPOTHYROIDISM: ICD-10-CM

## 2019-01-28 DIAGNOSIS — J30.9 ALLERGIC RHINITIS, UNSPECIFIED SEASONALITY, UNSPECIFIED TRIGGER: ICD-10-CM

## 2019-01-28 PROCEDURE — 99214 OFFICE O/P EST MOD 30 MIN: CPT | Performed by: INTERNAL MEDICINE

## 2019-01-28 RX ORDER — FLUTICASONE PROPIONATE 50 MCG
2 SPRAY, SUSPENSION (ML) NASAL DAILY
Qty: 1 BOTTLE | Refills: 2 | Status: SHIPPED | OUTPATIENT
Start: 2019-01-28 | End: 2019-04-25

## 2019-01-28 RX ORDER — CETIRIZINE HYDROCHLORIDE 10 MG/1
10 TABLET ORAL DAILY
Qty: 30 TABLET | Refills: 2 | Status: SHIPPED | OUTPATIENT
Start: 2019-01-28 | End: 2019-02-27

## 2019-01-28 RX ORDER — GUAIFENESIN 600 MG/1
600 TABLET, EXTENDED RELEASE ORAL 2 TIMES DAILY
Qty: 30 TABLET | Refills: 0 | COMMUNITY
Start: 2019-01-28 | End: 2019-02-12

## 2019-01-31 ENCOUNTER — TELEPHONE (OUTPATIENT)
Dept: INTERNAL MEDICINE CLINIC | Age: 59
End: 2019-01-31

## 2019-02-01 ENCOUNTER — OFFICE VISIT (OUTPATIENT)
Dept: ORTHOPEDIC SURGERY | Age: 59
End: 2019-02-01
Payer: COMMERCIAL

## 2019-02-01 VITALS
SYSTOLIC BLOOD PRESSURE: 139 MMHG | DIASTOLIC BLOOD PRESSURE: 88 MMHG | HEIGHT: 62 IN | WEIGHT: 272 LBS | HEART RATE: 89 BPM | BODY MASS INDEX: 50.05 KG/M2

## 2019-02-01 DIAGNOSIS — M17.12 PRIMARY OSTEOARTHRITIS OF LEFT KNEE: Primary | ICD-10-CM

## 2019-02-01 PROCEDURE — 99213 OFFICE O/P EST LOW 20 MIN: CPT | Performed by: ORTHOPAEDIC SURGERY

## 2019-02-01 PROCEDURE — 20610 DRAIN/INJ JOINT/BURSA W/O US: CPT | Performed by: ORTHOPAEDIC SURGERY

## 2019-02-04 DIAGNOSIS — E78.5 HYPERLIPIDEMIA, UNSPECIFIED HYPERLIPIDEMIA TYPE: Primary | ICD-10-CM

## 2019-02-04 RX ORDER — PRAVASTATIN SODIUM 40 MG
40 TABLET ORAL DAILY
Qty: 90 TABLET | Refills: 1 | Status: SHIPPED | OUTPATIENT
Start: 2019-02-04 | End: 2019-07-03 | Stop reason: SDUPTHER

## 2019-02-12 ENCOUNTER — TELEPHONE (OUTPATIENT)
Dept: INTERNAL MEDICINE CLINIC | Age: 59
End: 2019-02-12

## 2019-02-12 RX ORDER — IRBESARTAN 150 MG/1
150 TABLET ORAL DAILY
Qty: 90 TABLET | Refills: 3 | Status: SHIPPED | OUTPATIENT
Start: 2019-02-12 | End: 2019-02-19

## 2019-02-19 ENCOUNTER — TELEPHONE (OUTPATIENT)
Dept: INTERNAL MEDICINE CLINIC | Age: 59
End: 2019-02-19

## 2019-02-19 RX ORDER — IRBESARTAN 150 MG/1
150 TABLET ORAL DAILY
Qty: 90 TABLET | Refills: 3 | Status: CANCELLED | OUTPATIENT
Start: 2019-02-19

## 2019-02-19 RX ORDER — ESOMEPRAZOLE MAGNESIUM 40 MG/1
CAPSULE, DELAYED RELEASE ORAL
Qty: 90 CAPSULE | Refills: 3 | Status: SHIPPED | OUTPATIENT
Start: 2019-02-19 | End: 2020-02-12

## 2019-02-19 RX ORDER — IRBESARTAN 150 MG/1
150 TABLET ORAL DAILY
Qty: 90 TABLET | Refills: 3 | Status: SHIPPED | OUTPATIENT
Start: 2019-02-19 | End: 2019-07-03 | Stop reason: SDUPTHER

## 2019-03-04 ENCOUNTER — TELEPHONE (OUTPATIENT)
Dept: INTERNAL MEDICINE CLINIC | Age: 59
End: 2019-03-04

## 2019-03-04 DIAGNOSIS — M79.661 RIGHT CALF PAIN: Primary | ICD-10-CM

## 2019-03-04 DIAGNOSIS — Z86.718 HISTORY OF DVT (DEEP VEIN THROMBOSIS): ICD-10-CM

## 2019-03-05 ENCOUNTER — HOSPITAL ENCOUNTER (OUTPATIENT)
Dept: VASCULAR LAB | Age: 59
Discharge: HOME | End: 2019-03-05
Payer: COMMERCIAL

## 2019-03-05 DIAGNOSIS — Z86.718 HISTORY OF RECURRENT DEEP VEIN THROMBOSIS (DVT): ICD-10-CM

## 2019-03-05 DIAGNOSIS — Z86.718 HISTORY OF DVT (DEEP VEIN THROMBOSIS): ICD-10-CM

## 2019-03-05 DIAGNOSIS — M79.661 RIGHT CALF PAIN: ICD-10-CM

## 2019-03-05 DIAGNOSIS — I82.811 ACUTE SUPERFICIAL VENOUS THROMBOSIS OF RIGHT LOWER EXTREMITY: Primary | ICD-10-CM

## 2019-03-05 PROCEDURE — 93971 EXTREMITY STUDY: CPT

## 2019-04-04 ENCOUNTER — HOSPITAL ENCOUNTER (OUTPATIENT)
Dept: VASCULAR LAB | Age: 59
Discharge: HOME | End: 2019-04-04
Payer: COMMERCIAL

## 2019-04-04 DIAGNOSIS — R60.0 EDEMA OF BOTH LEGS: Primary | ICD-10-CM

## 2019-04-04 PROCEDURE — 93970 EXTREMITY STUDY: CPT

## 2019-04-19 DIAGNOSIS — I87.2 VENOUS INSUFFICIENCY OF BOTH LOWER EXTREMITIES: ICD-10-CM

## 2019-04-19 DIAGNOSIS — E03.9 ACQUIRED HYPOTHYROIDISM: ICD-10-CM

## 2019-04-19 DIAGNOSIS — I10 BENIGN ESSENTIAL HTN: ICD-10-CM

## 2019-04-20 RX ORDER — FUROSEMIDE 20 MG/1
TABLET ORAL
Qty: 90 TABLET | Refills: 3 | Status: SHIPPED | OUTPATIENT
Start: 2019-04-20 | End: 2020-05-06 | Stop reason: SDUPTHER

## 2019-04-20 RX ORDER — LEVOTHYROXINE SODIUM 0.15 MG/1
TABLET ORAL
Qty: 90 TABLET | Refills: 3 | Status: SHIPPED | OUTPATIENT
Start: 2019-04-20 | End: 2020-05-06 | Stop reason: SDUPTHER

## 2019-04-25 ENCOUNTER — OFFICE VISIT (OUTPATIENT)
Dept: VASCULAR SURGERY | Age: 59
End: 2019-04-25
Payer: COMMERCIAL

## 2019-04-25 VITALS
WEIGHT: 274 LBS | SYSTOLIC BLOOD PRESSURE: 160 MMHG | DIASTOLIC BLOOD PRESSURE: 90 MMHG | HEIGHT: 65 IN | BODY MASS INDEX: 45.65 KG/M2

## 2019-04-25 DIAGNOSIS — E66.01 MORBID OBESITY WITH BMI OF 45.0-49.9, ADULT (HCC): ICD-10-CM

## 2019-04-25 DIAGNOSIS — I87.2 STASIS DERMATITIS OF BOTH LEGS: ICD-10-CM

## 2019-04-25 DIAGNOSIS — I87.2 VENOUS INSUFFICIENCY OF BOTH LOWER EXTREMITIES: Primary | ICD-10-CM

## 2019-04-25 DIAGNOSIS — I82.811 ACUTE SUPERFICIAL VENOUS THROMBOSIS OF LOWER EXTREMITY, RIGHT: ICD-10-CM

## 2019-04-25 PROCEDURE — 29580 STRAPPING UNNA BOOT: CPT | Performed by: SURGERY

## 2019-04-25 PROCEDURE — 99205 OFFICE O/P NEW HI 60 MIN: CPT | Performed by: SURGERY

## 2019-04-25 NOTE — Clinical Note
Marcellus,I saw Abby Chavez in the office today for evaluation of her leg swelling and changes as well as her superficial venous thrombophlebitis. This is a difficult problem but can be managed with the initiation of good compression wraps and good compression stockings long-term. I would be in favor of lifelong anticoagulation considering her multiple recurrent phlebitic events. I will see her back in the office on a weekly basis until her problems have been controlled and a reasonable resolution has been accomplished. Thanks for asking see her and please let me know if I can help you with any of your other patients in the future. Vinod Chirinos

## 2019-04-25 NOTE — PROGRESS NOTES
Subjective:      Patient ID: Thelma Grimes is a 60 y.o. female. HPI Referral from Job Scheuermann MD for evaluation of her legs as related to several \"blood clots\" most recently diagnosed in early March involving R calf varicose veins. Scan also noted B chronic phlebitic changes c/w with previous episodes of thrombophlebitis - 2 or 3 episodes of VV related SVT as well as DVT L leg 10-15 years ago following lap rony at [facility] requiring hospitalization and oral anticoagulants. Since most recent SVT she has begun Xarelto and seen Felisha Almeida MD for hematologic workup - presumed hypercoag w/u. Long h/o B leg swelling with skin changes described as red - L>R. No known ulceration or drainage. Has worn stockings in the past but not within the last several weeks. 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 Ventura Paniagua  1/1999    KNEE ARTHROSCOPY  3/2015    right with medial meninsectomy    KNEE ARTHROSCOPY      right x 3-4     TONSILLECTOMY      TOTAL KNEE ARTHROPLASTY  5/2013    right    VARICOSE VEIN SURGERY  1980's.        Allergies   Allergen Reactions    Codeine Other (See Comments)     hallucinations  Not on file   Social History Narrative    Not on file     Family History   Problem Relation Age of Onset    High Blood Pressure Mother     Osteoporosis Mother          Review of Systems   All other systems reviewed and are negative. 15 pt ROS confirmed by MD personally. Objective:   Physical Exam   Constitutional: She is oriented to person, place, and time. Morbid obesity - central obesity   HENT:   Head: Normocephalic and atraumatic. Eyes: Pupils are equal, round, and reactive to light. Conjunctivae are normal.   Neck: Normal range of motion. Neck supple. No JVD present. No tracheal deviation present. No thyromegaly present. Cardiovascular: Normal rate, regular rhythm, normal heart sounds and intact distal pulses. Pulmonary/Chest: Effort normal and breath sounds normal.   Abdominal: Soft. Bowel sounds are normal. She exhibits no mass. Musculoskeletal: She exhibits edema (R caf 1-2+ pitting; L 2-3+ pitting). Lymphadenopathy:     She has no cervical adenopathy. Neurological: She is alert and oriented to person, place, and time. No cranial nerve deficit or sensory deficit. She exhibits normal muscle tone. Coordination normal.   Skin: Skin is warm and dry. Capillary refill takes less than 2 seconds. There is erythema (pretibial B legs (L>R)). Psychiatric: She has a normal mood and affect. Her behavior is normal. Judgment and thought content normal.   Nursing note and vitals reviewed. R size  L size     Spider  telangiectasias       Reticular veins     Calf/thigh 8-10 mm Varicose   veins Calf/thigh 8-10 mm       Assessment:      1) Chronic venous insufficiency B legs with secondary varicose veins and stasis dermatitis  2) Superficial venous thrombophlebitis R calf  3) Recurrent SVT  4) S/P SVT LLE  5) Morbid obesity      Plan:      Agree with anticoagulants (Xarelto is a good choice). Long term is likely best option considering past history even if HC w/u negative.   Warm compresses R calf.  Will need to control edema first to allow for control of dermatitis. Unna boots bilaterally. Elevate feet as possible. Steroid cream under Unna boots. F/U one week and re-evaluate venous and edema status. Recommend aggressive weight control measures up to bariatric surgery. Pt to consider. F/U one week. Eventual custom 30/40 mmHg KH zippered compression stockings once edema controlled.

## 2019-04-29 ENCOUNTER — TELEPHONE (OUTPATIENT)
Dept: VASCULAR SURGERY | Age: 59
End: 2019-04-29

## 2019-04-29 NOTE — TELEPHONE ENCOUNTER
Patient called stating tenderness in calf, but she is wearing unna boots bilaterally and elevating and staying off them. She states will continue to elevate and see Dr David Bob Thursday. matt

## 2019-04-29 NOTE — TELEPHONE ENCOUNTER
Pt is calling stating that the inside of her left leg is very painful and would like to know what she should do. The pt would like a call back.  Please review

## 2019-05-02 ENCOUNTER — OFFICE VISIT (OUTPATIENT)
Dept: VASCULAR SURGERY | Age: 59
End: 2019-05-02
Payer: COMMERCIAL

## 2019-05-02 VITALS — WEIGHT: 274 LBS | BODY MASS INDEX: 45.65 KG/M2 | HEIGHT: 65 IN

## 2019-05-02 DIAGNOSIS — I87.2 VENOUS INSUFFICIENCY OF BOTH LOWER EXTREMITIES: Primary | ICD-10-CM

## 2019-05-02 DIAGNOSIS — I87.2 STASIS DERMATITIS OF BOTH LEGS: ICD-10-CM

## 2019-05-02 PROCEDURE — 29580 STRAPPING UNNA BOOT: CPT | Performed by: SURGERY

## 2019-05-02 NOTE — PROGRESS NOTES
Seen for B leg edema associated with dermatitis and recurrent SVT,. Unna boots placed last week. Tolerated reasonably well but c/o medial L calf pain. Wraps stayed up this past week. EXAM:  Inflammation bilaterally decreased. Firm and tender medially on L calf - adjacent to soft VV. R leg edema markedly decreased with slight slippage. L leg edema less reduced    A/P: CVI B legs with edema and dermatitis. Improved with wraps. Steroid cream today with B Unna boots. Continue elevation as possible. RTO next Wednesday for stockings per [facility] - may fit KH 20/30 temporarily and size for zippered [facility] [address] 20/30 mmHg. F/U one week after fitting and/or placement of stockings. If tenderness and haedness persists may warrant duplex to R/O new SVT (however unlikely). May eventually get venous reflux scan to assess options for treatment.

## 2019-05-14 ENCOUNTER — OFFICE VISIT (OUTPATIENT)
Dept: ORTHOPEDIC SURGERY | Age: 59
End: 2019-05-14
Payer: COMMERCIAL

## 2019-05-14 VITALS
WEIGHT: 274 LBS | DIASTOLIC BLOOD PRESSURE: 92 MMHG | HEIGHT: 65 IN | BODY MASS INDEX: 45.65 KG/M2 | SYSTOLIC BLOOD PRESSURE: 141 MMHG | HEART RATE: 94 BPM

## 2019-05-14 DIAGNOSIS — M17.12 OSTEOARTHRITIS OF LEFT KNEE, UNSPECIFIED OSTEOARTHRITIS TYPE: Primary | ICD-10-CM

## 2019-05-14 PROCEDURE — 20610 DRAIN/INJ JOINT/BURSA W/O US: CPT | Performed by: ORTHOPAEDIC SURGERY

## 2019-05-14 PROCEDURE — 99213 OFFICE O/P EST LOW 20 MIN: CPT | Performed by: ORTHOPAEDIC SURGERY

## 2019-05-14 NOTE — PROGRESS NOTES
Review of Systems   Cardiovascular:        High blood pressure    Musculoskeletal:        Left knee pain   Left hip pain    All other systems reviewed and are negative.

## 2019-05-14 NOTE — PROGRESS NOTES
Chief Complaint  Follow-up (left knee. )      History of Present Illness:  Matilde Lucas is a pleasant 60 y.o. female here today for followup of left knee. She has known left knee osteoarthritis and over 5 years status post right total knee arthroplasty by Dr. Lala Elliott. She's had intraarticular cortisone injections in the past, most recently 2/1/2019. No new injuries. The only change in her medical history is since she was last seen she was diagnosed with a DVT in the right calf and currently taking Xeralto. Medical History:  Patient's medications, allergies, past medical, surgical, social and family histories were reviewed and updated as appropriate. Pertinent items are noted in HPI  Review of systems reviewed from Patient History Form completed today and available in the patient's chart under the Media tab. Vital Signs:  Vitals:    05/14/19 1347   BP: (!) 141/92   Pulse: 94           Neuro: Alert & oriented x 3,  normal,  no focal deficits noted. Normal affect. Eyes: sclera clear  Ears: Normal external ear  Mouth:  No perioral lesions  Pulm: Respirations unlabored and regular  Pulse: Extremities well perfused. 2+ peripheral pulses. Skin: Warm. No ulcerations. Constitutional: The physical examination finds the patient to be well-developed and well-nourished. The patient is alert and oriented x3 and was cooperative throughout the visit. LEFT Knee Exam:        Gait/Alignment: Normal                            Patella tracking: Normal      Inspection/Skin: Normal     Effusion: Small     Palpation: Moderate crepitus.   Mild tenderness.     Range of Motion: Restricted flexion     Strength: Mild quadricep weakness     Ligamentous Stability: Stable      Neurologic and vascular: Intact     Additional findings: Calf soft nontender         RIGHT Knee Exam:        Alignment:      Normal                            Patella tracking:  Normal      Inspection/Skin:     Normal     Effusion: None.     Palpation:     Minimal crepitus. Nontender.     Range of Motion:      Full     Strength:      Normal     Ligamentous Testing:      Stable      Neurologic:      Sensation intact to light touch     Vascular:      Skin warm and well-perfused.          Additional findings: Calf soft nontender      Radiology:       Pertinent imaging reviewed, images only - no report available. Assessment :  64yo female 64yo female with left knee osteoarthritis; 5 years status post right total knee arthroplasty by Dr. Bay Cahcon. Impression:  Encounter Diagnosis   Name Primary?  Osteoarthritis of left knee, unspecified osteoarthritis type Yes       Office Procedures:  No orders of the defined types were placed in this encounter. Plan: The nature and natural history of osteoarthritis was discussed in detail the patient today. Treatment options both surgical and nonsurgical were discussed in detail. Patient was counseled with regard to the importance of activity modification as well as weight control. The role for medications, intra-articular injections as well as surgery were discussed. Patient's questions were answered.     Believe patient is a candidate for left knee intraarticular cortisone injection. Post injection care sheet was offered and reviewed. For her lower back pain we will refer her to Dr. Judy Aldana. Followup as needed. Kyle Cruz is in agreement with this plan. All questions were answered to patient's satisfaction and was encouraged to call with any further questions. The indications and risks of steroid injection as well as treatment alternatives were discussed with the patient who consented to the procedure. Under sterile conditions and after informed consent was obtained the patient was given an injection into the LEFT knee. 2cc 40 mg of Depo-Medrol and 4 mL of 1% lidocaine were placed in the knee after it was prepped with chlorhexidine.   This resulted in good relief of symptoms. There were no complications. The patient was advised to ice the knee this evening and to avoid vigorous activities for the next 2 days. They were advised to call us if there was any erythema, enduration, swelling or increasing pain. Patient's blood pressure is noted to be elevated on 2 separate occasions. Patient was advised to recheck their blood pressure this evening and again tomorrow. If it remains elevated the patient was advised to contact their primary care provider. [address], PA-C  5/14/2019       During this examination, I, [address], PA-C, functioned as a scribe for Dr. Leila Moody. The history taking and physical examination were performed by Dr. Leila Moody. All counseling during the appointment was performed between the patient and Dr. Leila Moody. 5/14/2019 2:52 PM      This dictation was performed with a verbal recognition program ([employer]) and it was checked for errors. It is possible that there are still dictated errors within this office note. If so, please bring any errors to my attention for an addendum. All efforts were made to ensure that this office note is accurate. I personally reviewed the patient's pain scale, review of systems, family history, social history, past medical history, allergies and medications. Review of systems was collected today, reviewed and is included in the medical record. It is available under the media tab. I personally performed the services described in this documentation and scribed by [address] [place] [address] Jennifer LANDRY. Rita Marino MD  Sports Medicine, Arthroscopic Knee and Shoulder Surgery    This dictation was performed with a verbal recognition program [facility]) and it was checked for errors. It is possible that there are still dictated errors within this office note. If so, please bring any errors to my attention for an addendum. All efforts were made to ensure that this office note is accurate.

## 2019-05-21 ENCOUNTER — OFFICE VISIT (OUTPATIENT)
Dept: INTERNAL MEDICINE CLINIC | Age: 59
End: 2019-05-21
Payer: COMMERCIAL

## 2019-05-21 VITALS
WEIGHT: 268 LBS | HEIGHT: 65 IN | DIASTOLIC BLOOD PRESSURE: 70 MMHG | BODY MASS INDEX: 44.65 KG/M2 | HEART RATE: 90 BPM | SYSTOLIC BLOOD PRESSURE: 126 MMHG | RESPIRATION RATE: 12 BRPM

## 2019-05-21 DIAGNOSIS — E03.9 ACQUIRED HYPOTHYROIDISM: ICD-10-CM

## 2019-05-21 DIAGNOSIS — I87.2 CHRONIC VENOUS INSUFFICIENCY: ICD-10-CM

## 2019-05-21 DIAGNOSIS — L98.9 SKIN LESION OF RIGHT LEG: ICD-10-CM

## 2019-05-21 DIAGNOSIS — N18.30 CKD (CHRONIC KIDNEY DISEASE) STAGE 3, GFR 30-59 ML/MIN (HCC): ICD-10-CM

## 2019-05-21 DIAGNOSIS — I87.2 VENOUS INSUFFICIENCY OF BOTH LOWER EXTREMITIES: ICD-10-CM

## 2019-05-21 DIAGNOSIS — I10 BENIGN ESSENTIAL HTN: Primary | ICD-10-CM

## 2019-05-21 DIAGNOSIS — E78.5 HYPERLIPIDEMIA, UNSPECIFIED HYPERLIPIDEMIA TYPE: ICD-10-CM

## 2019-05-21 DIAGNOSIS — R73.01 IMPAIRED FASTING GLUCOSE: ICD-10-CM

## 2019-05-21 DIAGNOSIS — M54.50 LEFT-SIDED LOW BACK PAIN WITHOUT SCIATICA, UNSPECIFIED CHRONICITY: ICD-10-CM

## 2019-05-21 PROCEDURE — 99214 OFFICE O/P EST MOD 30 MIN: CPT | Performed by: INTERNAL MEDICINE

## 2019-05-21 ASSESSMENT — PATIENT HEALTH QUESTIONNAIRE - PHQ9
2. FEELING DOWN, DEPRESSED OR HOPELESS: 0
1. LITTLE INTEREST OR PLEASURE IN DOING THINGS: 0
SUM OF ALL RESPONSES TO PHQ QUESTIONS 1-9: 0
SUM OF ALL RESPONSES TO PHQ9 QUESTIONS 1 & 2: 0
SUM OF ALL RESPONSES TO PHQ QUESTIONS 1-9: 0

## 2019-05-21 NOTE — PROGRESS NOTES
[facility]  Internal Medicine  Patient Encounter  Milena Graham D.O., [facility]        Chief Complaint   Patient presents with    Medication Check    Check-Up       HPI: 60 y.o. female seen today for routine checkup regarding the status of current medical issues listed below along with a medication review. Also, she has additional complaints of skin lesion front part of left lower leg. Lesion has been there for 2 months. She tried squeezing and was able to get out what she describes as pus. There is also occasional bleeding. Also, she has additional complaints of low back pain. She went to a chiropractor. Her orthopedist referred her to Dr. Lennox Loffler. She locates the pain in the left low back. She denies any radiculopathy. Pain is worse with certain movements. She would like to go to the specialist.  Dr. Tamera Hicks did not give her a printed referral and it does not appear that he will do the referral in the Epic system. HTN--she is compliant with her blood pressure medications. She is also on a diuretic. CKD-- She is under the care of a nephrologist.  She denies any foamy or bubbly urine. She denies any hematuria. Lab Results   Component Value Date    BUN 18 01/28/2019      Lab Results   Component Value Date    CREATININE 1.2 (H) 01/28/2019        Venous Insufficiency/bilateral DVT/SVT--   Patient has had complications of postphlebitic syndrome and stasis dermatitis. She has seen the vascular surgeon, Dr. Cheri Burns. His note is reviewed in electronic health record. Patient had Unna boots placed in the past.  The size of her legs decreased. She was given a topical steroid cream for the dermatitis. Unna boots were replaced. Her Unna boots came off about 2 weeks ago. She is now in compression stocking. Swelling is much better. She is doing better with 20-30.     Dopplers done on 4/4/2019 showed age indeterminate partially occluding superficial venous thrombosis involving right varicosities. She also had a chronic DVT involving the right gastroc vein. She had a chronic SVT involving the right greater saphenous vein and a chronic SVT involving the left saphenofemoral junction and greater saphenous vein. She remains uncertain relatively low 10 mg daily. She is seeing Dr. Nandini Engel. Hypothyroidism-- She denies problems with constipation, diarrhea, cold or heat intolerance, hair loss, muscle cramps, tremor, edema or palpitations. She is compliant with the medication. Lab Results   Component Value Date    TSH 1.51 01/28/2019      Hyperlipidemia:    Lab Results   Component Value Date    LDLCALC 101 (H) 01/28/2019   Patient has been on statin therapy for several years. She is now on pravastatin 40 mg daily and tolerating therapy well. She denies any new myalgias. IFG-- Her last A1c that the criteria for the provisional diagnosis of type 2 diabetes. She's had no excessive thirst or frequent urination. An unsuccessful with any meaningful or sustainable weight loss. She is down 6 pounds this month.   Lab Results   Component Value Date    LABA1C 6.9 01/28/2019     Lab Results   Component Value Date    .3 01/28/2019        Past Medical History:   Diagnosis Date    Acute superficial venous thrombosis of lower extremity, right 03/05/2019    Chronic superficial venous thrombosis of left lower extremity 8/13/2015    CKD (chronic kidney disease) stage 3, GFR 30-59 ml/min ([facility])     DVT (deep venous thrombosis) ([facility] [address] 4/2003, 5/2013    Gastroesophageal reflux disease without esophagitis 11/28/2016    GERD (gastroesophageal reflux disease)     Hypertension     IBS (irritable bowel syndrome)     Osteoarthritis, knee     Saphenofemoral venous reflux 8/13/2015    BIlateral    Steatosis of liver     SVT (supraventricular tachycardia) ([facility] [address] 03/05/2019    Thyroid disease     Venous insufficiency        Review of Systems - As per HPI      OBJECTIVE:  Vitals: 05/21/19 1050 05/21/19 1131   BP: 134/74 126/70   Pulse: 90    Resp: 12    Weight: 268 lb (121.6 kg)    Height: 5' 5\" (1.651 m)      Body mass index is 44.6 kg/m². Wt Readings from Last 3 Encounters:   05/21/19 268 lb (121.6 kg)   05/14/19 274 lb (124.3 kg)   05/02/19 274 lb (124.3 kg)     BP Readings from Last 3 Encounters:   05/21/19 126/70   05/14/19 (!) 141/92   04/25/19 (!) 160/90      GEN: NAD, A&O, Non-toxic  HEENT: NC/AT, SILAS, EOMI, Anicteric, Throat NL. Oral cavity Clear,  TM's NL. NECK: Supple. No thyromegaly. No JVD  LYMPH: No C/SC nodes. CV: Regular rhythm. Rate normal.  No murmurs. No ectopy. PULM: CTA. No crackles. GI:  Soft, Obese, NT  EXT: Large legs. + BL 1+edema.  + Lipedema  SKIN:  Venous stasis changes bilateral lower extremity. NEURO: No focal or lateralizing deficits. VASC:  BL LE venous stasis changes and dilated venules and varicose veins BL. Early lichenification. Lipodermatosclerosis. Hyperemia, hyperpigmentation. SKIN:  Left anterior tibial region with a 0.5 cm, nodular lesion with a fissure. Serous material expressed. No erythema. MS: Minimal reproducible tenderness with palpation of the left lower lumbar paraspinal musculature. ASSESSMENT/PLAN:    1. Benign essential HTN  Blood pressure is well controlled  Continue current medication  No added salt diet. Literature provided  - Comprehensive Metabolic Panel; Future  - Lipid Panel; Future    2. Venous insufficiency of both lower extremities  As below    3. Acquired hypothyroidism  Condition stability is uncertain. Due for lab  Continue current dose of levothyroxine for now  - TSH without Reflex; Future    4. Skin lesion of right leg  Rule out squamous cell or basal cell cancer  Referral to dermatology  - External Referral To Dermatology    5. Hyperlipidemia, unspecified hyperlipidemia type  Condition stability is uncertain.   Due for lab  Continue lifestyle modification including a low-fat, portion controlled diet and regular exercise  - Comprehensive Metabolic Panel; Future  - Lipid Panel; Future    6. Impaired fasting glucose  Condition stability and control her uncertain. Due for lab. Her last A1c met the criteria for type 2 diabetes however it was only 1 test.  This needs to be repeated  Will have patient back if her A1c is 6.5% or higher  Low-carb diet recommended. Patient educated and literature provided  - Comprehensive Metabolic Panel; Future  - Hemoglobin A1C; Future    7. CKD (chronic kidney disease) stage 3, GFR 30-59 ml/min (formerly [facility])  Condition stability is uncertain  Due for follow-up with nephrology  Avoid NSAIDs  Stay well hydrated  Continue ARB with caution  - Comprehensive Metabolic Panel; Future    8. Chronic venous insufficiency  Condition is improved  Continue compression therapy  Call with increased dermatitis problems. Triamcinolone cream could be used if needed      9.   Left-sided low back pain  Etiology is unclear but suspect degenerative disc and facet arthropathy  Refer to physical medicine rehabilitation for evaluation

## 2019-05-21 NOTE — PATIENT INSTRUCTIONS
See her dermatologist as soon as possible  Patient Education        DASH Diet: Care Instructions  Your Care Instructions    The DASH diet is an eating plan that can help lower your blood pressure. DASH stands for Dietary Approaches to Stop Hypertension. Hypertension is high blood pressure. The DASH diet focuses on eating foods that are high in calcium, potassium, and magnesium. These nutrients can lower blood pressure. The foods that are highest in these nutrients are fruits, vegetables, low-fat dairy products, nuts, seeds, and legumes. But taking calcium, potassium, and magnesium supplements instead of eating foods that are high in those nutrients does not have the same effect. The DASH diet also includes whole grains, fish, and poultry. The DASH diet is one of several lifestyle changes your doctor may recommend to lower your high blood pressure. Your doctor may also want you to decrease the amount of sodium in your diet. Lowering sodium while following the DASH diet can lower blood pressure even further than just the DASH diet alone. Follow-up care is a key part of your treatment and safety. Be sure to make and go to all appointments, and call your doctor if you are having problems. It's also a good idea to know your test results and keep a list of the medicines you take. How can you care for yourself at home? Following the DASH diet  · Eat 4 to 5 servings of fruit each day. A serving is 1 medium-sized piece of fruit, ½ cup chopped or canned fruit, 1/4 cup dried fruit, or 4 ounces (½ cup) of fruit juice. Choose fruit more often than fruit juice. · Eat 4 to 5 servings of vegetables each day. A serving is 1 cup of lettuce or raw leafy vegetables, ½ cup of chopped or cooked vegetables, or 4 ounces (½ cup) of vegetable juice. Choose vegetables more often than vegetable juice. · Get 2 to 3 servings of low-fat and fat-free dairy each day.  A serving is 8 ounces of milk, 1 cup of yogurt, or 1 ½ ounces of cheese. · Eat 6 to 8 servings of grains each day. A serving is 1 slice of bread, 1 ounce of dry cereal, or ½ cup of cooked rice, pasta, or cooked cereal. Try to choose whole-grain products as much as possible. · Limit lean meat, poultry, and fish to 2 servings each day. A serving is 3 ounces, about the size of a deck of cards. · Eat 4 to 5 servings of nuts, seeds, and legumes (cooked dried beans, lentils, and split peas) each week. A serving is 1/3 cup of nuts, 2 tablespoons of seeds, or ½ cup of cooked beans or peas. · Limit fats and oils to 2 to 3 servings each day. A serving is 1 teaspoon of vegetable oil or 2 tablespoons of salad dressing. · Limit sweets and added sugars to 5 servings or less a week. A serving is 1 tablespoon jelly or jam, ½ cup sorbet, or 1 cup of lemonade. · Eat less than 2,300 milligrams (mg) of sodium a day. If you limit your sodium to 1,500 mg a day, you can lower your blood pressure even more. Tips for success  · Start small. Do not try to make dramatic changes to your diet all at once. You might feel that you are missing out on your favorite foods and then be more likely to not follow the plan. Make small changes, and stick with them. Once those changes become habit, add a few more changes. · Try some of the following:  ? Make it a goal to eat a fruit or vegetable at every meal and at snacks. This will make it easy to get the recommended amount of fruits and vegetables each day. ? Try yogurt topped with fruit and nuts for a snack or healthy dessert. ? Add lettuce, tomato, cucumber, and onion to sandwiches. ? Combine a ready-made pizza crust with low-fat mozzarella cheese and lots of vegetable toppings. Try using tomatoes, squash, spinach, broccoli, carrots, cauliflower, and onions. ? Have a variety of cut-up vegetables with a low-fat dip as an appetizer instead of chips and dip. ? Sprinkle sunflower seeds or chopped almonds over salads.  Or try adding chopped walnuts or almonds to cooked vegetables. ? Try some vegetarian meals using beans and peas. Add garbanzo or kidney beans to salads. Make burritos and tacos with mashed sanchez beans or black beans. Where can you learn more? Go to https://chpejameeleweb.[employer]. org and sign in to your [employer] account. Enter R979 in the [facility] box to learn more about \"DASH Diet: Care Instructions. \"     If you do not have an account, please click on the \"Sign Up Now\" link. Current as of: July 22, 2018  Content Version: 12.0  © 2515-8581 [employer]. Care instructions adapted under license by [facility] ([facility]). If you have questions about a medical condition or this instruction, always ask your healthcare professional. [address] any warranty or liability for your use of this information. Patient Education        Low Sodium Diet (2,000 Milligram): Care Instructions  Your Care Instructions    Too much sodium causes your body to hold on to extra water. This can raise your blood pressure and force your heart and kidneys to work harder. In very serious cases, this could cause you to be put in the hospital. It might even be life-threatening. By limiting sodium, you will feel better and lower your risk of serious problems. The most common source of sodium is salt. People get most of the salt in their diet from canned, prepared, and packaged foods. Fast food and restaurant meals also are very high in sodium. Your doctor will probably limit your sodium to less than 2,000 milligrams (mg) a day. This limit counts all the sodium in prepared and packaged foods and any salt you add to your food. Follow-up care is a key part of your treatment and safety. Be sure to make and go to all appointments, and call your doctor if you are having problems. It's also a good idea to know your test results and keep a list of the medicines you take. How can you care for yourself at home?   Read food labels  · Read labels on cans and food packages. The labels tell you how much sodium is in each serving. Make sure that you look at the serving size. If you eat more than the serving size, you have eaten more sodium. · Food labels also tell you the Percent Daily Value for sodium. Choose products with low Percent Daily Values for sodium. · Be aware that sodium can come in forms other than salt, including monosodium glutamate (MSG), sodium citrate, and sodium bicarbonate (baking soda). MSG is often added to Asian food. When you eat out, you can sometimes ask for food without MSG or added salt. Buy low-sodium foods  · Buy foods that are labeled \"unsalted\" (no salt added), \"sodium-free\" (less than 5 mg of sodium per serving), or \"low-sodium\" (less than 140 mg of sodium per serving). Foods labeled \"reduced-sodium\" and \"light sodium\" may still have too much sodium. Be sure to read the label to see how much sodium you are getting. · Buy fresh vegetables, or frozen vegetables without added sauces. Buy low-sodium versions of canned vegetables, soups, and other canned goods. Prepare low-sodium meals  · Cut back on the amount of salt you use in cooking. This will help you adjust to the taste. Do not add salt after cooking. One teaspoon of salt has about 2,300 mg of sodium. · Take the salt shaker off the table. · Flavor your food with garlic, lemon juice, onion, vinegar, herbs, and spices. Do not use soy sauce, lite soy sauce, steak sauce, onion salt, garlic salt, celery salt, mustard, or ketchup on your food. · Use low-sodium salad dressings, sauces, and ketchup. Or make your own salad dressings and sauces without adding salt. · Use less salt (or none) when recipes call for it. You can often use half the salt a recipe calls for without losing flavor. Other foods such as rice, pasta, and grains do not need added salt. · Rinse canned vegetables, and cook them in fresh water.  This removes some--but not all--of the salt.  · Avoid water that is naturally high in sodium or that has been treated with water softeners, which add sodium. Call your local water company to find out the sodium content of your water supply. If you buy bottled water, read the label and choose a sodium-free brand. Avoid high-sodium foods  · Avoid eating:  ? Smoked, cured, salted, and canned meat, fish, and poultry. ? Ham, krishnan, hot dogs, and luncheon meats. ? Regular, hard, and processed cheese and regular peanut butter. ? Crackers with salted tops, and other salted snack foods such as pretzels, chips, and salted popcorn. ? Frozen prepared meals, unless labeled low-sodium. ? Canned and dried soups, broths, and bouillon, unless labeled sodium-free or low-sodium. ? Canned vegetables, unless labeled sodium-free or low-sodium. ? [place] fries, pizza, tacos, and other fast foods. ? Pickles, olives, ketchup, and other condiments, especially soy sauce, unless labeled sodium-free or low-sodium. Where can you learn more? Go to https://Inaaya.[employer]. org and sign in to your [employer] account. Enter V705 in the [facility] box to learn more about \"Low Sodium Diet (2,000 Milligram): Care Instructions. \"     If you do not have an account, please click on the \"Sign Up Now\" link. Current as of: November 7, 2018  Content Version: 12.0  © 4499-9313 [employer]. Care instructions adapted under license by [facility] ([facility]). If you have questions about a medical condition or this instruction, always ask your healthcare professional. [place] [address] any warranty or liability for your use of this information. Patient Education        Learning About Low-Carbohydrate Diets for Weight Loss  What is a low-carbohydrate diet? Low-carb diets avoid foods that are high in carbohydrate. These high-carb foods include pasta, bread, rice, cereal, fruits, and starchy vegetables.  Instead, these diets usually have you eat foods that are high in fat and protein. Many people lose weight quickly on a low-carb diet. But the early weight loss is water. People on this diet often gain the weight back after they start eating carbs again. Not all diet plans are safe or work well. A lot of the evidence shows that low-carb diets aren't healthy. That's because these diets often don't include healthy foods like fruits and vegetables. Losing weight safely means balancing protein, fat, and carbs with every meal and snack. And low-carb diets don't always provide the vitamins, minerals, and fiber you need. If you have a serious medical condition, talk to your doctor before you try any diet. These conditions include kidney disease, heart disease, type 2 diabetes, high cholesterol, and high blood pressure. If you are pregnant, it may not be safe for your baby if you are on a low-carb diet. How can you lose weight safely? You might have heard that a diet plan helped another person lose weight. But that doesn't mean that it will work for you. It is very hard to stay on a diet that includes lots of big changes in your eating habits. If you want to get to a healthy weight and stay there, making healthy lifestyle changes will often work better than dieting. These steps can help. · Make a plan for change. Work with your doctor to create a plan that is right for you. · See a dietitian. He or she can show you how to make healthy changes in your eating habits. · Manage stress. If you have a lot of stress in your life, it can be hard to focus on making healthy changes to your daily habits. · Track your food and activity. You are likely to do better at losing weight if you keep track of what you eat and what you do. Follow-up care is a key part of your treatment and safety. Be sure to make and go to all appointments, and call your doctor if you are having problems.  It's also a good idea to know your test results and keep a list of the medicines you

## 2019-06-04 ENCOUNTER — HOSPITAL ENCOUNTER (OUTPATIENT)
Age: 59
Discharge: HOME | End: 2019-06-04
Payer: COMMERCIAL

## 2019-06-04 LAB
ALBUMIN SERPL-MCNC: 4.3 G/DL (ref 3.4–5)
ANION GAP SERPL CALCULATED.3IONS-SCNC: 11 MMOL/L (ref 3–16)
BILIRUBIN URINE: NEGATIVE
BLOOD, URINE: NEGATIVE
BUN BLDV-MCNC: 21 MG/DL (ref 7–20)
CALCIUM SERPL-MCNC: 9.2 MG/DL (ref 8.3–10.6)
CHLORIDE BLD-SCNC: 104 MMOL/L (ref 99–110)
CLARITY: CLEAR
CO2: 26 MMOL/L (ref 21–32)
COLOR: YELLOW
CREAT SERPL-MCNC: 1.2 MG/DL (ref 0.6–1.2)
CREATININE URINE: 41.6 MG/DL (ref 28–259)
EPITHELIAL CELLS, UA: 3 /HPF (ref 0–5)
GFR AFRICAN AMERICAN: 55
GFR NON-AFRICAN AMERICAN: 46
GLUCOSE BLD-MCNC: 124 MG/DL (ref 70–99)
GLUCOSE URINE: NEGATIVE MG/DL
HCT VFR BLD CALC: 42.3 % (ref 36–48)
HEMOGLOBIN: 13.7 G/DL (ref 12–16)
HYALINE CASTS: 1 /LPF (ref 0–8)
KETONES, URINE: NEGATIVE MG/DL
LEUKOCYTE ESTERASE, URINE: ABNORMAL
MCH RBC QN AUTO: 28 PG (ref 26–34)
MCHC RBC AUTO-ENTMCNC: 32.3 G/DL (ref 31–36)
MCV RBC AUTO: 86.6 FL (ref 80–100)
MICROSCOPIC EXAMINATION: YES
NITRITE, URINE: NEGATIVE
PDW BLD-RTO: 14.5 % (ref 12.4–15.4)
PH UA: 6 (ref 5–8)
PHOSPHORUS: 2.8 MG/DL (ref 2.5–4.9)
PLATELET # BLD: 243 K/UL (ref 135–450)
PMV BLD AUTO: 9.4 FL (ref 5–10.5)
POTASSIUM SERPL-SCNC: 4.2 MMOL/L (ref 3.5–5.1)
PROTEIN PROTEIN: 10 MG/DL
PROTEIN UA: NEGATIVE MG/DL
RBC # BLD: 4.89 M/UL (ref 4–5.2)
RBC UA: 1 /HPF (ref 0–4)
SODIUM BLD-SCNC: 141 MMOL/L (ref 136–145)
SPECIFIC GRAVITY UA: 1.01 (ref 1–1.03)
URINE TYPE: ABNORMAL
UROBILINOGEN, URINE: 0.2 E.U./DL
WBC # BLD: 9.7 K/UL (ref 4–11)
WBC UA: 4 /HPF (ref 0–5)

## 2019-06-04 PROCEDURE — 81001 URINALYSIS AUTO W/SCOPE: CPT

## 2019-06-04 PROCEDURE — 80069 RENAL FUNCTION PANEL: CPT

## 2019-06-04 PROCEDURE — 82570 ASSAY OF URINE CREATININE: CPT

## 2019-06-04 PROCEDURE — 84156 ASSAY OF PROTEIN URINE: CPT

## 2019-06-04 PROCEDURE — 85027 COMPLETE CBC AUTOMATED: CPT

## 2019-06-04 PROCEDURE — 36415 COLL VENOUS BLD VENIPUNCTURE: CPT

## 2019-06-07 PROBLEM — N28.9 RENAL LESION: Status: ACTIVE | Noted: 2019-06-07

## 2019-06-07 PROBLEM — M12.9 ARTHROPATHY: Status: ACTIVE | Noted: 2019-06-07

## 2019-06-10 ENCOUNTER — OFFICE VISIT (OUTPATIENT)
Dept: INTERNAL MEDICINE CLINIC | Age: 59
End: 2019-06-10
Payer: COMMERCIAL

## 2019-06-10 VITALS
BODY MASS INDEX: 42.29 KG/M2 | SYSTOLIC BLOOD PRESSURE: 120 MMHG | HEART RATE: 96 BPM | DIASTOLIC BLOOD PRESSURE: 80 MMHG | WEIGHT: 262 LBS | RESPIRATION RATE: 16 BRPM

## 2019-06-10 DIAGNOSIS — R73.9 ELEVATED BLOOD SUGAR: Primary | ICD-10-CM

## 2019-06-10 DIAGNOSIS — E11.9 NEW ONSET TYPE 2 DIABETES MELLITUS (HCC): ICD-10-CM

## 2019-06-10 LAB
CHP ED QC CHECK: NORMAL
GLUCOSE BLD-MCNC: 100 MG/DL

## 2019-06-10 PROCEDURE — 82962 GLUCOSE BLOOD TEST: CPT | Performed by: INTERNAL MEDICINE

## 2019-06-10 PROCEDURE — 99213 OFFICE O/P EST LOW 20 MIN: CPT | Performed by: INTERNAL MEDICINE

## 2019-06-10 NOTE — PATIENT INSTRUCTIONS
Patient Education        Learning About Diabetes Food Guidelines  Your Care Instructions    Meal planning is important to manage diabetes. It helps keep your blood sugar at a target level (which you set with your doctor). You don't have to eat special foods. You can eat what your family eats, including sweets once in a while. But you do have to pay attention to how often you eat and how much you eat of certain foods. You may want to work with a dietitian or a certified diabetes educator (CDE) to help you plan meals and snacks. A dietitian or CDE can also help you lose weight if that is one of your goals. What should you know about eating carbs? Managing the amount of carbohydrate (carbs) you eat is an important part of healthy meals when you have diabetes. Carbohydrate is found in many foods. · Learn which foods have carbs. And learn the amounts of carbs in different foods. ? Bread, cereal, pasta, and rice have about 15 grams of carbs in a serving. A serving is 1 slice of bread (1 ounce), ½ cup of cooked cereal, or 1/3 cup of cooked pasta or rice. ? Fruits have 15 grams of carbs in a serving. A serving is 1 small fresh fruit, such as an apple or orange; ½ of a banana; ½ cup of cooked or canned fruit; ½ cup of fruit juice; 1 cup of melon or raspberries; or 2 tablespoons of dried fruit. ? Milk and no-sugar-added yogurt have 15 grams of carbs in a serving. A serving is 1 cup of milk or 2/3 cup of no-sugar-added yogurt. ? Starchy vegetables have 15 grams of carbs in a serving. A serving is ½ cup of mashed potatoes or sweet potato; 1 cup winter squash; ½ of a small baked potato; ½ cup of cooked beans; or ½ cup cooked corn or green peas. · Learn how much carbs to eat each day and at each meal. A dietitian or CDE can teach you how to keep track of the amount of carbs you eat. This is called carbohydrate counting. · If you are not sure how to count carbohydrate grams, use the Plate Method to plan meals.  It is a good, quick way to make sure that you have a balanced meal. It also helps you spread carbs throughout the day. ? Divide your plate by types of foods. Put non-starchy vegetables on half the plate, meat or other protein food on one-quarter of the plate, and a grain or starchy vegetable in the final quarter of the plate. To this you can add a small piece of fruit and 1 cup of milk or yogurt, depending on how many carbs you are supposed to eat at a meal.  · Try to eat about the same amount of carbs at each meal. Do not \"save up\" your daily allowance of carbs to eat at one meal.  · Proteins have very little or no carbs per serving. Examples of proteins are beef, chicken, turkey, fish, eggs, tofu, cheese, cottage cheese, and peanut butter. A serving size of meat is 3 ounces, which is about the size of a deck of cards. Examples of meat substitute serving sizes (equal to 1 ounce of meat) are 1/4 cup of cottage cheese, 1 egg, 1 tablespoon of peanut butter, and ½ cup of tofu. How can you eat out and still eat healthy? · Learn to estimate the serving sizes of foods that have carbohydrate. If you measure food at home, it will be easier to estimate the amount in a serving of restaurant food. · If the meal you order has too much carbohydrate (such as potatoes, corn, or baked beans), ask to have a low-carbohydrate food instead. Ask for a salad or green vegetables. · If you use insulin, check your blood sugar before and after eating out to help you plan how much to eat in the future. · If you eat more carbohydrate at a meal than you had planned, take a walk or do other exercise. This will help lower your blood sugar. What else should you know? · Limit saturated fat, such as the fat from meat and dairy products. This is a healthy choice because people who have diabetes are at higher risk of heart disease. So choose lean cuts of meat and nonfat or low-fat dairy products.  Use olive or canola oil instead of butter or shortening when cooking. · Don't skip meals. Your blood sugar may drop too low if you skip meals and take insulin or certain medicines for diabetes. · Check with your doctor before you drink alcohol. Alcohol can cause your blood sugar to drop too low. Alcohol can also cause a bad reaction if you take certain diabetes medicines. Follow-up care is a key part of your treatment and safety. Be sure to make and go to all appointments, and call your doctor if you are having problems. It's also a good idea to know your test results and keep a list of the medicines you take. Where can you learn more? Go to https://chpepiceweb.[email or web address] org and sign in to your Materia account. Enter Z801 in the [employer] box to learn more about \"Learning About Diabetes Food Guidelines. \"     If you do not have an account, please click on the \"Sign Up Now\" link. Current as of: July 25, 2018  Content Version: 12.0  © 9442-7378 [employer]. Care instructions adapted under license by [facility] ([facility]). If you have questions about a medical condition or this instruction, always ask your healthcare professional. [address] any warranty or liability for your use of this information. Patient Education        Learning About Meal Planning for Diabetes  Why plan your meals? Meal planning can be a key part of managing diabetes. Planning meals and snacks with the right balance of carbohydrate, protein, and fat can help you keep your blood sugar at the target level you set with your doctor. You don't have to eat special foods. You can eat what your family eats, including sweets once in a while. But you do have to pay attention to how often you eat and how much you eat of certain foods. You may want to work with a dietitian or a certified diabetes educator. He or she can give you tips and meal ideas and can answer your questions about meal planning.  This health professional can also help you reach blood sugar levels. A registered dietitian or diabetes educator can help you plan how much carbohydrate to include in each meal and snack. A guideline for your daily amount of carbohydrate is:  · 45 to 60 grams at each meal. That's about the same as 3 to 4 carbohydrate servings. · 15 to 20 grams at each snack. That's about the same as 1 carbohydrate serving. The Nutrition Facts label on packaged foods tells you how much carbohydrate is in a serving of the food. First, look at the serving size on the food label. Is that the amount you eat in a serving? All of the nutrition information on a food label is based on that serving size. So if you eat more or less than that, you'll need to adjust the other numbers. Total carbohydrate is the next thing you need to look for on the label. If you count carbohydrate servings, one serving of carbohydrate is 15 grams. For foods that don't come with labels, such as fresh fruits and vegetables, you'll need a guide that lists carbohydrate in these foods. Ask your doctor, dietitian, or diabetes educator about books or other nutrition guides you can use. If you take insulin, you need to know how many grams of carbohydrate are in a meal. This lets you know how much rapid-acting insulin to take before you eat. If you use an insulin pump, you get a constant rate of insulin during the day. So the pump must be programmed at meals to give you extra insulin to cover the rise in blood sugar after meals. When you know how much carbohydrate you will eat, you can take the right amount of insulin. Or, if you always use the same amount of insulin, you need to make sure that you eat the same amount of carbohydrate at meals. If you need more help to understand carbohydrate counting and food labels, ask your doctor, dietitian, or diabetes educator. How do you get started with meal planning? Here are some tips to get started:  · Plan your meals a week at a time.  Don't forget to include snacks too.  · Use cookbooks or online recipes to plan several main meals. Plan some quick meals for busy nights. You also can double some recipes that freeze well. Then you can save half for other busy nights when you don't have time to cook. · Make sure you have the ingredients you need for your recipes. If you're running low on basic items, put these items on your shopping list too. · List foods that you use to make breakfasts, lunches, and snacks. List plenty of fruits and vegetables. · Post this list on the refrigerator. Add to it as you think of more things you need. · Take the list to the store to do your weekly shopping. Follow-up care is a key part of your treatment and safety. Be sure to make and go to all appointments, and call your doctor if you are having problems. It's also a good idea to know your test results and keep a list of the medicines you take. Where can you learn more? Go to https://[email or web address].[email or web address] org and sign in to your [employer] account. Enter Z328 in the [employer] box to learn more about \"Learning About Meal Planning for Diabetes. \"     If you do not have an account, please click on the \"Sign Up Now\" link. Current as of: July 25, 2018  Content Version: 12.0  © 8913-5580 Healthwise, Incorporated. Care instructions adapted under license by [facility] ([facility]). If you have questions about a medical condition or this instruction, always ask your healthcare professional. [place] [address] any warranty or liability for your use of this information. Patient Education        Diabetes Foot Health: Care Instructions  Your Care Instructions    When you have diabetes, your feet need extra care and attention. Diabetes can damage the nerve endings and blood vessels in your feet, making you less likely to notice when your feet are injured. Diabetes also limits your body's ability to fight infection and get blood to areas that need it.  If you get a minor foot injury, it could become an ulcer or a serious infection. With good foot care, you can prevent most of these problems. Caring for your feet can be quick and easy. Most of the care can be done when you are bathing or getting ready for bed. Follow-up care is a key part of your treatment and safety. Be sure to make and go to all appointments, and call your doctor if you are having problems. It's also a good idea to know your test results and keep a list of the medicines you take. How can you care for yourself at home? · Keep your blood sugar close to normal by watching what and how much you eat, monitoring blood sugar, taking medicines if prescribed, and getting regular exercise. · Do not smoke. Smoking affects blood flow and can make foot problems worse. If you need help quitting, talk to your doctor about stop-smoking programs and medicines. These can increase your chances of quitting for good. · Eat a diet that is low in fats. High fat intake can cause fat to build up in your blood vessels and decrease blood flow. · Inspect your feet daily for blisters, cuts, cracks, or sores. If you cannot see well, use a mirror or have someone help you. · Take care of your feet:  ? Wash your feet every day. Use warm (not hot) water. Check the water temperature with your wrists or other part of your body, not your feet. ? Dry your feet well. Pat them dry. Do not rub the skin on your feet too hard. Dry well between your toes. If the skin on your feet stays moist, bacteria or a fungus can grow, which can lead to infection. ? Keep your skin soft. Use moisturizing skin cream to keep the skin on your feet soft and prevent calluses and cracks. But do not put the cream between your toes, and stop using any cream that causes a rash. ? Clean underneath your toenails carefully. Do not use a sharp object to clean underneath your toenails. Use the blunt end of a nail file or other rounded tool.   ? Trim and file your toenails straight across to prevent ingrown toenails. Use a nail clipper, not scissors. Use an emery board to smooth the edges. · Change socks daily. Socks without seams are best, because seams often rub the feet. You can find socks for people with diabetes from specialty catalogs. · Look inside your shoes every day for things like gravel or torn linings, which could cause blisters or sores. · Buy shoes that fit well:  ? Look for shoes that have plenty of space around the toes. This helps prevent bunions and blisters. ? Try on shoes while wearing the kind of socks you will usually wear with the shoes. ? Avoid plastic shoes. They may rub your feet and cause blisters. Good shoes should be made of materials that are flexible and breathable, such as leather or cloth. ? Break in new shoes slowly by wearing them for no more than an hour a day for several days. Take extra time to check your feet for red areas, blisters, or other problems after you wear new shoes. · Do not go barefoot. Do not wear sandals, and do not wear shoes with very thin soles. Thin soles are easy to puncture. They also do not protect your feet from hot pavement or cold weather. · Have your doctor check your feet during each visit. If you have a foot problem, see your doctor. Do not try to treat an early foot problem at home. Home remedies or treatments that you can buy without a prescription (such as corn removers) can be harmful. · Always get early treatment for foot problems. A minor irritation can lead to a major problem if not properly cared for early. When should you call for help?   Call your doctor now or seek immediate medical care if:    · You have a foot sore, an ulcer or break in the skin that is not healing after 4 days, bleeding corns or calluses, or an ingrown toenail.     · You have blue or black areas, which can mean bruising or blood flow problems.     · You have peeling skin or tiny blisters between your toes or cracking or oozing of the is to keep your blood sugar within a target range. That's because over time, high blood sugar can lead to serious problems. It can:  · Harm your eyes, nerves, and kidneys. · Damage your blood vessels, leading to heart disease and stroke. · Reduce blood flow and cause nerve damage to parts of your body, especially your feet. This can cause slow healing and pain when you walk. · Make your immune system weak and less able to fight infections. When people hear the word \"diabetes,\" they often think of problems like these. But daily care and treatment can help prevent or delay these problems. The goal is to keep your blood sugar in a target range. That's the best way to reduce your chance of having more problems from diabetes. What are the symptoms? Some people who have type 2 diabetes may not have any symptoms early on. Many people with the disease don't even know they have it at first. But with time, diabetes starts to cause symptoms. You experience most symptoms of type 2 diabetes when your blood sugar is either too high or too low. The most common symptoms of high blood sugar include:  · Thirst.  · Frequent urination. · Weight loss. · Blurry vision. The symptoms of low blood sugar include:  · Sweating. · Shakiness. · Weakness. · Hunger. · Confusion. How can you prevent type 2 diabetes? The best way to prevent or delay type 2 diabetes is to adopt healthy habits, which include:  · Staying at a healthy weight. · Exercising regularly. · Eating healthy foods. How is type 2 diabetes treated? If you have type 2 diabetes, here are the most important things you can do. · Take your diabetes medicines. · Check your blood sugar as often as your doctor recommends. Also, get a hemoglobin A1c test at least every 6 months. · Try to eat a variety of foods and to spread carbohydrate throughout the day. Carbohydrate raises blood sugar higher and more quickly than any other nutrient does.  Carbohydrate is found in sugar, breads and cereals, fruit, starchy vegetables such as potatoes and corn, and milk and yogurt. · Get at least 30 minutes of exercise on most days of the week. Walking is a good choice. You also may want to do other activities, such as running, swimming, cycling, or playing tennis or team sports. If your doctor says it's okay, do muscle-strengthening exercises at least 2 times a week. · See your doctor for checkups and tests on a regular schedule. · If you have high blood pressure or high cholesterol, take the medicines as prescribed by your doctor. · Do not smoke. Smoking can make health problems worse. This includes problems you might have with type 2 diabetes. If you need help quitting, talk to your doctor about stop-smoking programs and medicines. These can increase your chances of quitting for good. Follow-up care is a key part of your treatment and safety. Be sure to make and go to all appointments, and call your doctor if you are having problems. It's also a good idea to know your test results and keep a list of the medicines you take. Where can you learn more? Go to https://PatientKeeper.Craftsvilla. org and sign in to your SiTime account. Enter A687 in the [employer] box to learn more about \"Learning About Type 2 Diabetes. \"     If you do not have an account, please click on the \"Sign Up Now\" link. Current as of: July 25, 2018  Content Version: 12.0  © 6574-9941 Healthwise, Incorporated. Care instructions adapted under license by [facility] ([facility]). If you have questions about a medical condition or this instruction, always ask your healthcare professional. [address] any warranty or liability for your use of this information.

## 2019-06-10 NOTE — PROGRESS NOTES
[facility]) Physicians  Internal Medicine  Patient Encounter  Manny Zamora D.O., [facility]        Chief Complaint   Patient presents with    Results       HPI: 60 y.o. female seen today to review results. Most recent lab testing confirms the diagnosis of type 2 diabetes. She is asymptomatic. Specifically she denies any problems with excessive thirst, frequent urination. She has already started to make diet changes. She does not exercise. Past Medical History:   Diagnosis Date    Acute superficial venous thrombosis of lower extremity, right 03/05/2019    Arthritis     Chronic superficial venous thrombosis of left lower extremity 8/13/2015    CKD (chronic kidney disease) stage 2, GFR 60-89 ml/min     CKD (chronic kidney disease) stage 3, GFR 30-59 ml/min (HCC)     Cystic kidney disease     DVT (deep venous thrombosis) ([facility]) 4/2003, 5/2013    Gastroesophageal reflux disease without esophagitis 11/28/2016    GERD (gastroesophageal reflux disease)     H/O deep venous thrombosis     H/O simple renal cyst     Hypertension     IBS (irritable bowel syndrome)     Osteoarthritis, knee     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  Authorizing Provider   cephALEXin (KEFLEX) 500 MG capsule Take 1 capsule by mouth 3 times daily Yes Historical Provider, MD   rivaroxaban (XARELTO) 10 MG TABS tablet Take 10 mg by mouth Yes Historical Provider, MD   furosemide (LASIX) 20 MG tablet TAKE 1 TABLET DAILY Yes Marcellus Taylor DO   levothyroxine (SYNTHROID) 150 MCG tablet TAKE 1 TABLET DAILY Yes Marcellus Taylor DO   esomeprazole (NEXIUM) 40 MG delayed release capsule TAKE 1 CAPSULE BY MOUTH EVERY DAY IN THE MORNING BEFORE BREAKFAST Yes Marcellus Taylor DO   irbesartan (AVAPRO) 150 MG tablet Take 1 tablet by mouth daily Yes Marcellus Taylor,    pravastatin (PRAVACHOL) 40 MG tablet Take 1 tablet by mouth daily Yes [id number], DO           Review of Systems - As per HPI      OBJECTIVE:  Vitals:    06/10/19 1542   BP: 120/80   Pulse: 96   Resp: 16   Weight: 262 lb (118.8 kg)     Body mass index is 42.29 kg/m². Wt Readings from Last 3 Encounters:   06/10/19 262 lb (118.8 kg)   06/07/19 267 lb (121.1 kg)   05/21/19 268 lb (121.6 kg)     BP Readings from Last 3 Encounters:   06/10/19 120/80   06/07/19 131/87   05/21/19 126/70         GEN: NAD, A&O, Non-toxic  HEENT: NC/AT, SILAS, EOMI, Oral cavity Clear,  TM's NL, Nasal cavity clear. NECK: Supple. No thyromegaly. LYMPH: No C/SC nodes. CV: S1 S2 NL, RRR. No murmurs, clicks or rubs. PULM: CTA, symmentric air exchange  EXT: No C/C/E  GI: Abdomen is soft, NT, BS+, No hepatomegaly. NEURO: No focal or lateralizing deficits. VASC:  No carotid bruits. Pulses symmetric    ASSESSMENT[de-identified]  Meron Jeffers was seen today for results. Diagnoses and all orders for this visit:    Elevated blood sugar  -     POCT Glucose    New onset type 2 diabetes mellitus ([address]  -      DIABETES FOOT EXAM        Additional Plan:  1. Diabetic eye exam  2. Foot exam  3. Literature provided on glucometer checks, general information on diabetes, carb counting  4. Glucometer checks daily. Will start with once a day for now. Discussed medications with patient who voiced understanding of their use, indication and potential side effects. Pt also understands the above recommendations. All questions answered. This note was generated completely or in part utilizing Dragon dictation speech recognition software. Occasionally, words are mistranscribed and despite editing, the text may contain inaccuracies due to incorrect word recognition.   If further clarification is needed please contact the office at [phone]

## 2019-06-11 ENCOUNTER — TELEPHONE (OUTPATIENT)
Dept: INTERNAL MEDICINE CLINIC | Age: 59
End: 2019-06-11

## 2019-06-11 NOTE — TELEPHONE ENCOUNTER
The following message was left on the Non-Emergency Line:    Pharmacy states they received a prescription for patient One Touch Meter and Test Strips, but there were No directions. Please contact the pharmacy with directions.

## 2019-07-03 ENCOUNTER — OFFICE VISIT (OUTPATIENT)
Dept: INTERNAL MEDICINE CLINIC | Age: 59
End: 2019-07-03
Payer: COMMERCIAL

## 2019-07-03 VITALS
BODY MASS INDEX: 42.59 KG/M2 | WEIGHT: 265 LBS | SYSTOLIC BLOOD PRESSURE: 120 MMHG | DIASTOLIC BLOOD PRESSURE: 72 MMHG | HEIGHT: 66 IN

## 2019-07-03 DIAGNOSIS — I10 BENIGN ESSENTIAL HTN: ICD-10-CM

## 2019-07-03 DIAGNOSIS — E78.5 HYPERLIPIDEMIA, UNSPECIFIED HYPERLIPIDEMIA TYPE: ICD-10-CM

## 2019-07-03 DIAGNOSIS — E11.9 TYPE 2 DIABETES MELLITUS WITHOUT COMPLICATION, WITHOUT LONG-TERM CURRENT USE OF INSULIN (HCC): Primary | ICD-10-CM

## 2019-07-03 PROCEDURE — 99213 OFFICE O/P EST LOW 20 MIN: CPT | Performed by: INTERNAL MEDICINE

## 2019-07-03 RX ORDER — IRBESARTAN 150 MG/1
150 TABLET ORAL DAILY
Qty: 90 TABLET | Refills: 3 | Status: SHIPPED | OUTPATIENT
Start: 2019-07-03 | End: 2020-06-12

## 2019-07-03 RX ORDER — PRAVASTATIN SODIUM 40 MG
40 TABLET ORAL DAILY
Qty: 90 TABLET | Refills: 3 | Status: SHIPPED | OUTPATIENT
Start: 2019-07-03 | End: 2020-07-15

## 2019-07-05 ENCOUNTER — TELEPHONE (OUTPATIENT)
Dept: INTERNAL MEDICINE CLINIC | Age: 59
End: 2019-07-05

## 2019-07-05 DIAGNOSIS — E11.9 TYPE 2 DIABETES MELLITUS WITHOUT COMPLICATION, WITHOUT LONG-TERM CURRENT USE OF INSULIN (HCC): Primary | ICD-10-CM

## 2019-07-05 RX ORDER — GLUCOSAMINE HCL/CHONDROITIN SU 500-400 MG
1 CAPSULE ORAL DAILY
Qty: 100 STRIP | Refills: 3 | Status: SHIPPED | OUTPATIENT
Start: 2019-07-05 | End: 2021-06-07 | Stop reason: SDUPTHER

## 2019-08-13 ENCOUNTER — OFFICE VISIT (OUTPATIENT)
Dept: ORTHOPEDIC SURGERY | Age: 59
End: 2019-08-13
Payer: COMMERCIAL

## 2019-08-13 VITALS — BODY MASS INDEX: 41.78 KG/M2 | WEIGHT: 260 LBS | HEIGHT: 66 IN

## 2019-08-13 DIAGNOSIS — M25.562 LEFT KNEE PAIN, UNSPECIFIED CHRONICITY: ICD-10-CM

## 2019-08-13 DIAGNOSIS — M17.12 PRIMARY OSTEOARTHRITIS OF LEFT KNEE: Primary | ICD-10-CM

## 2019-08-13 DIAGNOSIS — Z96.651 STATUS POST TOTAL RIGHT KNEE REPLACEMENT: ICD-10-CM

## 2019-08-13 DIAGNOSIS — M25.561 RIGHT KNEE PAIN, UNSPECIFIED CHRONICITY: ICD-10-CM

## 2019-08-13 PROCEDURE — 99213 OFFICE O/P EST LOW 20 MIN: CPT | Performed by: ORTHOPAEDIC SURGERY

## 2019-08-13 PROCEDURE — 20610 DRAIN/INJ JOINT/BURSA W/O US: CPT | Performed by: ORTHOPAEDIC SURGERY

## 2019-08-14 NOTE — PROGRESS NOTES
intact.     Neurologic and vascular: Skin is warm dry and well perfused. Sensation is intact to light touch over the knee.     Additional findings: Calf soft nontender. No patellar instability. Right Knee Exam:    Gait: No use of assistive devices. No antalgic gait. Alignment: normal alignment. Inspection/skin: Skin is intact without erythema or ecchymosis. No gross deformity. Status post total knee replacement, incision well healed. Palpation: no crepitus. no joint line tenderness present. Range of Motion: 0 - 110 degrees. Strength: Normal quadriceps development. Effusion: No effusion or swelling present. Ligamentous stability: No cruciate or collateral ligament instability. Neurologic and vascular: Skin is warm and well-perfused. Sensation is intact to light-touch. Special tests: Negative Jody sign. Radiology:     Pertinent imaging reviewed. Radiographs were obtained and reviewed in the office; 4 views: bilateral PA, bilateral Dorothyann Saras, bilateral Merchants AND bilateral lateral    Impression: Right knee components in good position without evidence of loosening. Left knee high grade bone on bone arthritis. Assessment :  Osteoarthritis, left knee, Status post right total knee replacement    Impression:  Encounter Diagnoses   Name Primary?     Primary osteoarthritis of left knee Yes    Status post total right knee replacement     Left knee pain, unspecified chronicity     Right knee pain, unspecified chronicity        Office Procedures:  Orders Placed This Encounter   Procedures    XR KNEE LEFT (MIN 4 VIEWS)     Standing Status:   Future     Number of Occurrences:   1     Standing Expiration Date:   8/13/2020     Order Specific Question:   Reason for exam:     Answer:   pain    XR KNEE RIGHT (MIN 4 VIEWS)     [id number]     Standing Status:   Future     Number of Occurrences:   1     Standing Expiration Date:   8/13/2020     Order Specific Question: Reason for exam:     Answer:   Pain         Plan: Pertinent imaging was reviewed. The etiology, natural history, and treatment options for the disorder were discussed. The roles of activity medication, antiinflammatories, injections, bracing, physical therapy, and surgical interventions were all described to the patient and questions were answered. I believe she is a candidate for a repeat intraarticular cortisone injection in her left knee as it has provided her good relief in the past. We discussed that she is a candidate for a left total knee replacement but she wishes to hold of on surgery at this time. I believe her right knee is doing well. I will see her back in 1 year for reevaluation of her right knee, and as needed for her left knee. Giuliana Lee is in agreement with this plan. All questions were answered to patient's satisfaction and was encouraged to call with any further questions. The indications and risks of steroid injection as well as treatment alternatives were discussed with the patient who consented to the procedure. Under sterile conditions and after informed consent was obtained the patient was given an injection into the left knee. 2cc 40 mg of Depo-Medrol and 4 mL of 1% lidocaine were placed in the knee after it was prepped with chlorhexidine. This resulted in good relief of symptoms. There were no complications. The patient was advised to ice the knee this evening and to avoid vigorous activities for the next 2 days. They were advised to call us if there was any erythema, enduration, swelling or increasing pain. May ARROYO ATC, am scribing for and in the presence of Dr. Rocio Nolasco. 08/14/19 2:06 PM May Mcclain ATC        I personally reviewed the patient's pain scale, review of systems, family history, social history, past medical history, allergies and medications.   Review of systems was collected today, reviewed and is included in the medical

## 2019-09-27 ENCOUNTER — OFFICE VISIT (OUTPATIENT)
Dept: INTERNAL MEDICINE CLINIC | Age: 59
End: 2019-09-27
Payer: COMMERCIAL

## 2019-09-27 VITALS
SYSTOLIC BLOOD PRESSURE: 120 MMHG | RESPIRATION RATE: 12 BRPM | TEMPERATURE: 98.4 F | HEART RATE: 85 BPM | HEIGHT: 66 IN | DIASTOLIC BLOOD PRESSURE: 74 MMHG | WEIGHT: 264 LBS | BODY MASS INDEX: 42.43 KG/M2

## 2019-09-27 DIAGNOSIS — J45.998 POST-VIRAL REACTIVE AIRWAY DISEASE: ICD-10-CM

## 2019-09-27 DIAGNOSIS — E03.9 ACQUIRED HYPOTHYROIDISM: ICD-10-CM

## 2019-09-27 DIAGNOSIS — I10 BENIGN ESSENTIAL HTN: ICD-10-CM

## 2019-09-27 DIAGNOSIS — N18.30 CKD (CHRONIC KIDNEY DISEASE) STAGE 3, GFR 30-59 ML/MIN (HCC): ICD-10-CM

## 2019-09-27 DIAGNOSIS — Z23 FLU VACCINE NEED: ICD-10-CM

## 2019-09-27 DIAGNOSIS — I87.2 VENOUS INSUFFICIENCY OF BOTH LOWER EXTREMITIES: ICD-10-CM

## 2019-09-27 DIAGNOSIS — E11.9 TYPE 2 DIABETES MELLITUS WITHOUT COMPLICATION, WITHOUT LONG-TERM CURRENT USE OF INSULIN (HCC): Primary | ICD-10-CM

## 2019-09-27 DIAGNOSIS — Z23 NEED FOR PNEUMOCOCCAL VACCINATION: ICD-10-CM

## 2019-09-27 DIAGNOSIS — E78.5 HYPERLIPIDEMIA, UNSPECIFIED HYPERLIPIDEMIA TYPE: ICD-10-CM

## 2019-09-27 PROCEDURE — 90686 IIV4 VACC NO PRSV 0.5 ML IM: CPT | Performed by: INTERNAL MEDICINE

## 2019-09-27 PROCEDURE — 99214 OFFICE O/P EST MOD 30 MIN: CPT | Performed by: INTERNAL MEDICINE

## 2019-09-27 PROCEDURE — 90471 IMMUNIZATION ADMIN: CPT | Performed by: INTERNAL MEDICINE

## 2019-09-27 PROCEDURE — 90732 PPSV23 VACC 2 YRS+ SUBQ/IM: CPT | Performed by: INTERNAL MEDICINE

## 2019-09-27 PROCEDURE — 90472 IMMUNIZATION ADMIN EACH ADD: CPT | Performed by: INTERNAL MEDICINE

## 2019-09-27 RX ORDER — DOXYCYCLINE HYCLATE 100 MG
100 TABLET ORAL 2 TIMES DAILY
Qty: 14 TABLET | Refills: 0 | Status: SHIPPED | OUTPATIENT
Start: 2019-09-27 | End: 2019-10-04

## 2019-09-27 RX ORDER — GUAIFENESIN AND DEXTROMETHORPHAN HYDROBROMIDE 600; 30 MG/1; MG/1
1 TABLET, EXTENDED RELEASE ORAL 2 TIMES DAILY
Qty: 28 TABLET | Refills: 0 | COMMUNITY
Start: 2019-09-27 | End: 2020-01-27 | Stop reason: ALTCHOICE

## 2019-09-27 NOTE — PATIENT INSTRUCTIONS
Patient Education        Reactive Airway Disease: Care Instructions  Your Care Instructions    Reactive airway disease is a breathing problem that appears as wheezing, a whistling noise in your airways. It may be caused by a viral or bacterial infection, allergies, tobacco smoke, or something else in the environment. When you are around these triggers, your body releases chemicals that make the airways get tight. Reactive airway disease is a lot like asthma. Both can cause wheezing. But asthma is ongoing, while reactive airway disease may occur only now and then. Tests can be done to tell whether you have asthma. You may take the same medicines used to treat asthma. Good home care and follow-up care with your doctor can help you recover. Follow-up care is a key part of your treatment and safety. Be sure to make and go to all appointments, and call your doctor if you are having problems. It's also a good idea to know your test results and keep a list of the medicines you take. How can you care for yourself at home? · Take your medicines exactly as prescribed. Call your doctor if you think you are having a problem with your medicine. · Do not smoke or allow others to smoke around you. If you need help quitting, talk to your doctor about stop-smoking programs and medicines. These can increase your chances of quitting for good. · If you know what caused your wheezing (such as perfume or the odor of household chemicals), try to avoid it in the future. · Wash your hands several times a day, and consider using hand gels or wipes that contain alcohol. This can prevent colds and other infections. When should you call for help? Call 911 anytime you think you may need emergency care.  For example, call if:    · You have severe trouble breathing.    Watch closely for changes in your health, and be sure to contact your doctor if:    · You cough up yellow, dark brown, or bloody mucus.     · You have a fever.     · Your dose of flu vaccine is recommended every flu season. Children 6 months through 6years of age may need two doses during the same flu season. Everyone else needs only one dose each flu season. Some inactivated flu vaccines contain a very small amount of a mercury-based preservative called thimerosal. Studies have not shown thimerosal in vaccines to be harmful, but flu vaccines that do not contain thimerosal are available. There is no live flu virus in flu shots. They cannot cause the flu. There are many flu viruses, and they are always changing. Each year a new flu vaccine is made to protect against three or four viruses that are likely to cause disease in the upcoming flu season. But even when the vaccine doesn't exactly match these viruses, it may still provide some protection. Flu vaccine cannot prevent:  · Flu that is caused by a virus not covered by the vaccine. · Illnesses that look like flu but are not. Some people should not get this vaccine  Tell the person who is giving you the vaccine:  · If you have any severe (life-threatening) allergies. If you ever had a life-threatening allergic reaction after a dose of flu vaccine, or have a severe allergy to any part of this vaccine, you may be advised not to get vaccinated. Most, but not all, types of flu vaccine contain a small amount of egg protein. · If you ever had Guillain-Barré syndrome (also called GBS) Some people with a history of GBS should not get this vaccine. This should be discussed with your doctor. · If you are not feeling well. It is usually okay to get flu vaccine when you have a mild illness, but you might be asked to come back when you feel better. Risks of a vaccine reaction  With any medicine, including vaccines, there is a chance of reactions. These are usually mild and go away on their own, but serious reactions are also possible. Most people who get a flu shot do not have any problems with it.   Minor problems following a flu shot include:  · Soreness, redness, or swelling where the shot was given  · Hoarseness  · Sore, red or itchy eyes  · Cough  · Fever  · Aches  · Headache  · Itching  · Fatigue  If these problems occur, they usually begin soon after the shot and last 1 or 2 days. More serious problems following a flu shot can include the following:  · There may be a small increased risk of Guillain-Barré Syndrome (GBS) after inactivated flu vaccine. This risk has been estimated at 1 or 2 additional cases per million people vaccinated. This is much lower than the risk of severe complications from flu, which can be prevented by flu vaccine. · [address] [place] [address] children who get the flu shot along with pneumococcal vaccine (PCV13) and/or DTaP vaccine at the same time might be slightly more likely to have a seizure caused by fever. Ask your doctor for more information. Tell your doctor if a child who is getting flu vaccine has ever had a seizure  Problems that could happen after any injected vaccine:  · People sometimes faint after a medical procedure, including vaccination. Sitting or lying down for about 15 minutes can help prevent fainting, and injuries caused by a fall. Tell your doctor if you feel dizzy, or have vision changes or ringing in the ears. · Some people get severe pain in the shoulder and have difficulty moving the arm where a shot was given. This happens very rarely. · Any medication can cause a severe allergic reaction. Such reactions from a vaccine are very rare, estimated at about 1 in a million doses, and would happen within a few minutes to a few hours after the vaccination. As with any medicine, there is a very remote chance of a vaccine causing a serious injury or death. The safety of vaccines is always being monitored. For more information, visit: www.cdc.gov/vaccinesafety/. What if there is a serious reaction? What should I look for?   · Look for anything that concerns you, such as signs of a severe allergic reaction, very

## 2019-11-15 ENCOUNTER — TELEPHONE (OUTPATIENT)
Dept: VASCULAR SURGERY | Age: 59
End: 2019-11-15

## 2019-12-10 ENCOUNTER — OFFICE VISIT (OUTPATIENT)
Dept: ORTHOPEDIC SURGERY | Age: 59
End: 2019-12-10
Payer: COMMERCIAL

## 2019-12-10 VITALS — HEIGHT: 66 IN | BODY MASS INDEX: 42.43 KG/M2 | WEIGHT: 264 LBS

## 2019-12-10 DIAGNOSIS — M17.12 PRIMARY OSTEOARTHRITIS OF LEFT KNEE: Primary | ICD-10-CM

## 2019-12-10 PROCEDURE — 20610 DRAIN/INJ JOINT/BURSA W/O US: CPT | Performed by: ORTHOPAEDIC SURGERY

## 2019-12-10 PROCEDURE — 99213 OFFICE O/P EST LOW 20 MIN: CPT | Performed by: ORTHOPAEDIC SURGERY

## 2019-12-10 RX ORDER — METHYLPREDNISOLONE ACETATE 40 MG/ML
40 INJECTION, SUSPENSION INTRA-ARTICULAR; INTRALESIONAL; INTRAMUSCULAR; SOFT TISSUE ONCE
Status: COMPLETED | OUTPATIENT
Start: 2019-12-10 | End: 2019-12-10

## 2019-12-10 RX ADMIN — METHYLPREDNISOLONE ACETATE 40 MG: 40 INJECTION, SUSPENSION INTRA-ARTICULAR; INTRALESIONAL; INTRAMUSCULAR; SOFT TISSUE at 14:32

## 2020-01-15 ENCOUNTER — OFFICE VISIT (OUTPATIENT)
Dept: VASCULAR SURGERY | Age: 60
End: 2020-01-15
Payer: COMMERCIAL

## 2020-01-15 VITALS — HEIGHT: 66 IN | WEIGHT: 268 LBS | BODY MASS INDEX: 43.07 KG/M2

## 2020-01-15 PROCEDURE — 99212 OFFICE O/P EST SF 10 MIN: CPT | Performed by: SURGERY

## 2020-01-15 NOTE — PROGRESS NOTES
Seen for B leg edema associated with dermatitis and recurrent SVT,. Since last see has been wearing [address] 20/30 stockings religiously. No reported ulcers, bleeding or swelling. On Xarelto. EXAM:  No inflammation bilaterally    All VVs bilaterally soft, nontender    B leg edema well controlled    A/P: CVI B legs with edema and dermatitis (CEAP C4b) - well controlled   Continue stockings as prescribed daily. No plans for intervention. Reminded to wear stockings immediately after planned L TKR. F/U one year or earlier as needed.

## 2020-01-23 ENCOUNTER — TELEPHONE (OUTPATIENT)
Dept: INTERNAL MEDICINE CLINIC | Age: 60
End: 2020-01-23

## 2020-01-23 NOTE — TELEPHONE ENCOUNTER
Patient requesting lab orders be entered to have drawn before Monday's appointment. Please notify patient when entered at phone number provided.

## 2020-01-27 ENCOUNTER — OFFICE VISIT (OUTPATIENT)
Dept: INTERNAL MEDICINE CLINIC | Age: 60
End: 2020-01-27
Payer: COMMERCIAL

## 2020-01-27 VITALS
WEIGHT: 267 LBS | SYSTOLIC BLOOD PRESSURE: 130 MMHG | HEART RATE: 92 BPM | BODY MASS INDEX: 42.91 KG/M2 | DIASTOLIC BLOOD PRESSURE: 85 MMHG | RESPIRATION RATE: 12 BRPM | HEIGHT: 66 IN

## 2020-01-27 LAB
CREATININE URINE: 279.3 MG/DL (ref 28–259)
MICROALBUMIN UR-MCNC: 2.4 MG/DL
MICROALBUMIN/CREAT UR-RTO: 8.6 MG/G (ref 0–30)

## 2020-01-27 PROCEDURE — 99214 OFFICE O/P EST MOD 30 MIN: CPT | Performed by: INTERNAL MEDICINE

## 2020-01-27 NOTE — PROGRESS NOTES
[facility]) Physicians  Internal Medicine  Patient Encounter  Tony Ramirez D.O., [facility]        Chief Complaint   Patient presents with    Medication Check    Check-Up        HPI: 60 y.o. female with multiple complex medical problems seen today requesting her routine checkup regarding the status of current chronic medical issues listed below along with a medication review. Pt states she has been seeing her orthopedist (Dr. Patricia Dale) and got a steroid injection in December. She needs a knee replacement. She was given a referral for her back. He did back x-rays. I do not see those reports. She was referred to Dr. Alpesh Blanchard. Diabetes Mellitus Type II, Follow-up--   Lab Results   Component Value Date    LABA1C 6.5 01/24/2020      Lab Results   Component Value Date    .9 01/24/2020     Patient states she continues to watch her diet-- she works on portion size. She is not strict with carb restriction. Meal planning is difficult so she drives through [employer]. Her home sugars have been good. She spaces them out. Some checks are in the AM and some are at other times during the day. Last Eye Exam: May, 2019. No report. U.Microalbumin/Cr: Overdue  Pt is on an ARB. Complications--none  Insulin Treated? No.    ASA: No.  Currently on Xarelto  Tobacco: No     HTN--Patient denies any new headaches, lightheadedness, episodes of unilateral weakness or paresthesias. She does try to watch her sodium. CKD--She is under the care of Dr. Arnulfo Vides. Patient denies any increased swelling above baseline. She denies any foamy or frothy urine. She denies any dysuria or hematuria. Lab Results   Component Value Date    BUN 19 01/24/2020      Lab Results   Component Value Date    CREATININE 1.2 01/24/2020        Venous Insufficiency/bilateral DVT/SVT--   Previous history ----> patient has had complications of postphlebitic syndrome and stasis dermatitis.   She has seen the vascular surgeon,  Mark Choudhury.  She has required treatment with Unna boots in the past.  Dopplers done on 4/4/2019 showed age indeterminate partially occluding superficial venous thrombosis involving right varicosities. She also had a chronic DVT involving the right gastroc vein. She had a chronic SVT involving the right greater saphenous vein and a chronic SVT involving the left saphenofemoral junction and greater saphenous vein. She remains uncertain relatively low 10 mg daily. She is seeing Dr. Nir Hunter. Interval Hx--->  She states she is wearing her [employer] stockings religiously. Swelling is much better. She is pleased with her legs. She is on Xarelto. She has seen Dr. Reyes Peters. She is not on ASA as her note indicates. She is on Xarelto. She was also seen by Dr. Mark Choudhury and will follow up in 1 year. Hypothyroidism-- She denies problems with constipation, diarrhea, cold or heat intolerance, hair loss, muscle cramps, tremor, edema or palpitations. She is compliant with the medication. Lab Results   Component Value Date    TSH 1.20 01/24/2020      Hyperlipidemia:    Lab Results   Component Value Date    LDLCALC 107 (H) 01/24/2020   Patient denies any new problems with her pravastatin therapy. She is on 40 mg. She has been on pravastatin for some time now. She denies any new myalgias or muscle cramping.          Past Medical History:   Diagnosis Date    Acute superficial venous thrombosis of lower extremity, right 03/05/2019    Arthritis     Chronic superficial venous thrombosis of left lower extremity 8/13/2015    CKD (chronic kidney disease) stage 2, GFR 60-89 ml/min     CKD (chronic kidney disease) stage 3, GFR 30-59 ml/min (HCC)     Cystic kidney disease     [facility] 4/2003, 5/2013    Gastroesophageal reflux disease without esophagitis 11/28/2016    GERD (gastroesophageal reflux disease)     H/O deep venous thrombosis     H/O simple renal cyst     Hypertension  IBS (irritable bowel syndrome)     Osteoarthritis, knee     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     Venous insufficiency        Review of Systems - As per HPI      OBJECTIVE:  Vitals:    01/27/20 1317   BP: 130/85   Pulse: 92   Resp: 12   Weight: 267 lb (121.1 kg)   Height: 5' 6\" (1.676 m)     Body mass index is 43.09 kg/m². Wt Readings from Last 3 Encounters:   01/27/20 267 lb (121.1 kg)   01/15/20 268 lb (121.6 kg)   12/10/19 264 lb (119.7 kg)     BP Readings from Last 3 Encounters:   01/27/20 130/85   09/27/19 120/74   07/03/19 120/72      GEN: NAD, A&O, Non-toxic, morbidly obese  HEENT: NC/AT, SILAS, EOMI, Anicteric, Throat NL. Oral cavity Clear,  TM's NL. NECK: Supple. No thyromegaly or masses. No JVD  LYMPH: No C/SC nodes. CV: Regular rhythm. Rate normal.  No murmurs. No ectopy. PULM: Essentially clear to auscultation.  + Scattered forced expiratory wheeze. Moist cough. No basilar crackles  GI:  Soft, nontender, nondistended. EXT:  + Lipedema. Trace bilateral lower extremity edema  SKIN:  Venous stasis changes bilateral lower extremity. NEURO: No focal or lateralizing deficits. No ataxia. Moves all extremities. VASC: No carotid bruits. Pedal pulses are symmetric  MS: No synovitis. ASSESSMENT/PLAN:    1. Type 2 diabetes mellitus without complication, without long-term current use of insulin (HCC)  Diabetes is well controlled based on her A1c of 6.5%. Continue aggressive efforts with lifestyle modification. I have suggested she meet with our dietitian. No need for medication at this time  Eye exam due in May 2020. We never received the report from 2019. This will be requested  - Microalbumin / Creatinine Urine Ratio    2.  Benign essential HTN  Blood pressure is well controlled  Continue her current medications which include only Avapro 150 mg daily and Lasix 20 mg daily. Monitor for accelerated blood pressures    3. Hyperlipidemia, unspecified hyperlipidemia type  Condition is uncontrolled with an LDL of 107  Continue aggressive lifestyle modification to try to get the LDL below 100. Consider increasing pravastatin to 80 mg    4. CKD (chronic kidney disease) stage 3, GFR 30-59 ml/min ([facility])  Condition is stable and well-controlled  Continue to follow-up with nephrology  Avoid NSAIDs  Avoid dehydration    5. Acquired hypothyroidism  Condition is well controlled based on TSH level  Continue current dose of levothyroxine      6. Adenomatous polyp of colon, unspecified part of colon  Due for colonoscopy  - Bob Groves MD, Gastroenterology, [facility]    7.  Venous insufficiency of both lower extremities  Condition is improved with daily use of compression stockings

## 2020-02-12 RX ORDER — ESOMEPRAZOLE MAGNESIUM 40 MG/1
CAPSULE, DELAYED RELEASE ORAL
Qty: 90 CAPSULE | Refills: 4 | Status: SHIPPED | OUTPATIENT
Start: 2020-02-12 | End: 2021-04-26

## 2020-03-10 ENCOUNTER — OFFICE VISIT (OUTPATIENT)
Dept: INTERNAL MEDICINE CLINIC | Age: 60
End: 2020-03-10

## 2020-03-10 NOTE — PROGRESS NOTES
Medical Nutrition Therapy for Diabetes    Rosalina Carvajal  March 10, 2020      Patient Care Team:  Eric Hayes DO as PCP - General (Internal Medicine)  Eirc Hayes DO as PCP - [facility] Empaneled Provider  Taj Bailey MD as Consulting Physician (Otolaryngology)  Essence Anderson MD as Consulting Physician (Hematology and Oncology)  Anel Garcia MD as Consulting Physician (Hematology and Oncology)    Reason for visit: \"Dr. Marvie Harada wanted me to talk to you about my weight\"    ASSESSMENT/PLAN:   NUTRITION DIAGNOSIS    #1 Problem: Altered Nutrition-Related Laboratory Values (NC-2.2)  Related to: Endocrine/Diabetes   As Evidenced by: Elevated Plasma glucose and/or HgbA1c levels           #2 Problem: Overweight/Obesity (NC-3.3)  Related to: Excessive energy intake or physical inactivity  As Evidenced by: BMI more than normative standard for age and sex (BMI=43.09)    NUTRITION INTERVENTION  Nutrition Prescription: Aim for 30  g Carb per meal; 15 - 20 g Carb per snack      Diabetes Education/Counseling included:  Carbohydrate Control, Activity/exercise and Monitoring    Interventions:  Control Carbohydrate Intake using Plate Guide, Increase intake of vegetables and Increase activity level by adding chair exercises and stationary bike    NUTRITION MONITORING AND EVALUATION    Patient Instructions   BEHAVIOR GOALS     When to eat: Aim for meals/snacks spaced evenly throughout the day (avoid going more than 5 - 6 hours while awake)    What and how much to eat:   1) Follow Plate Guide, including vegetables, fruits, and whole grains daily.   2) Aim for 2 carrb per meal; 1 Carb serving per snack  3) Listen to your body, eating only till satisfied    Physical Activity: Add stationary bike or chair exercise on all or most days of the week                   Patient Active Problem List   Diagnosis    Benign essential HTN    Hypothyroidism    Saphenofemoral venous reflux    Chronic venous insufficiency    Steatosis of liver    Impaired fasting glucose    Hyperlipidemia    CKD (chronic kidney disease) stage 3, GFR 30-59 ml/min ([facility]    Irritable bowel syndrome with diarrhea    Primary osteoarthritis of left knee    Hearing loss due to cerumen impaction    Sensorineural hearing loss of left ear    Gastroesophageal reflux disease without esophagitis    Arthropathy    Renal lesion    Type 2 diabetes mellitus without complication, without long-term current use of insulin ([facility])       Current Outpatient Medications   Medication Sig Dispense Refill    esomeprazole (NEXIUM) 40 MG delayed release capsule TAKE 1 CAPSULE DAILY IN THE MORNING BEFORE BREAKFAST 90 capsule 4    ONETOUCH DELICA LANCETS FINE MISC USE AS DIRECTED TO TEST BLOOD SUGAR ONCE EVERY  each 6    blood glucose monitor strips 1 strip by Other route daily Test 2 times a day & as needed for symptoms of irregular blood glucose. 100 strip 3    pravastatin (PRAVACHOL) 40 MG tablet Take 1 tablet by mouth daily 90 tablet 3    irbesartan (AVAPRO) 150 MG tablet Take 1 tablet by mouth daily 90 tablet 3    rivaroxaban (XARELTO) 10 MG TABS tablet Take 10 mg by mouth      furosemide (LASIX) 20 MG tablet TAKE 1 TABLET DAILY 90 tablet 3    levothyroxine (SYNTHROID) 150 MCG tablet TAKE 1 TABLET DAILY 90 tablet 3     No current facility-administered medications for this visit.           NUTRITION ASSESSMENT    Biochemical Data:  A1c down from 6.9 in January 2019    Lab Results   Component Value Date    LABA1C 6.5 01/24/2020     Lab Results   Component Value Date    .9 01/24/2020       Lab Results   Component Value Date    CHOL 174 01/24/2020    CHOL 155 09/25/2019    CHOL 162 05/24/2019     Lab Results   Component Value Date    TRIG 114 01/24/2020    TRIG 152 (H) 09/25/2019    TRIG 129 05/24/2019     Lab Results   Component Value Date    HDL 44 01/24/2020    HDL 39 (L) 09/25/2019    HDL 43 05/24/2019     Lab Results   Component Value Date yesterday, up at 10:30a  Doesn't eat many vegetables--only raw  First meal--Usual time: several hours after waking  Today: nothing yet at 1p  Different Day: [employer] restaurant at 1p---pulled pork (partial portion), no bun, coleslaw, red potatoes (a few)  Or peanut butter and crackers    Second meal--Usual time: varies  Recent: carry-out in [facility]--B&Bs--part of a [employer] sandwich(3\")  Different Day: Sunday after [employer]--Chicken Parmesan from [employer], salad, 1 breadstick    Snack  Popcorn--no butter or salt    Third meal--Usual time: none      Barriers:   -resistance to change  -sporadic eating pattern, high Calorie restaurant meals several times per week          Follow Up Plan: declined    Referring Provider: Carlos Eduardo Salazar DO      Time spent with patient: 60 minutes

## 2020-03-13 ENCOUNTER — OFFICE VISIT (OUTPATIENT)
Dept: ORTHOPEDIC SURGERY | Age: 60
End: 2020-03-13
Payer: COMMERCIAL

## 2020-03-13 VITALS
HEIGHT: 65 IN | WEIGHT: 256 LBS | HEART RATE: 80 BPM | DIASTOLIC BLOOD PRESSURE: 89 MMHG | BODY MASS INDEX: 42.65 KG/M2 | SYSTOLIC BLOOD PRESSURE: 140 MMHG

## 2020-03-13 PROCEDURE — 20610 DRAIN/INJ JOINT/BURSA W/O US: CPT | Performed by: ORTHOPAEDIC SURGERY

## 2020-03-13 PROCEDURE — 99213 OFFICE O/P EST LOW 20 MIN: CPT | Performed by: ORTHOPAEDIC SURGERY

## 2020-03-13 RX ORDER — METHYLPREDNISOLONE ACETATE 40 MG/ML
40 INJECTION, SUSPENSION INTRA-ARTICULAR; INTRALESIONAL; INTRAMUSCULAR; SOFT TISSUE ONCE
Status: COMPLETED | OUTPATIENT
Start: 2020-03-13 | End: 2020-03-13

## 2020-03-13 RX ADMIN — METHYLPREDNISOLONE ACETATE 40 MG: 40 INJECTION, SUSPENSION INTRA-ARTICULAR; INTRALESIONAL; INTRAMUSCULAR; SOFT TISSUE at 13:31

## 2020-03-13 NOTE — PROGRESS NOTES
3/13/20 12:07 PM     Lidocaine Injection      NDC: [address]-89    Lot Number: [id number]    Body Part: left knee

## 2020-03-13 NOTE — PROGRESS NOTES
Chief Complaint  Follow-up (left knee. pt state that she continues to get pain with activity )      History of Present Illness:  Satnam Lowe is a pleasant 63 y.o. female here today for followup of LEFT knee. He has left knee osteoarthritis. She has left knee osteoarthritis and over 6 years status post right total knee arthroplasty by Dr. Ja Kumar. She had a left knee intraarticular cortisone injection on 12/10/2019. Comes in today because her left knee pain has started to flareup again. The knee pain is worse with extended periods of use. No new injuries reported. As an aside, she is on Xeralto for multiple blood clots and follows up on a regular basis for this. Medical History:  Patient's medications, allergies, past medical, surgical, social and family histories were reviewed and updated as appropriate. Pain Assessment  Location of Pain: Knee  Location Modifiers: Left  Severity of Pain: 10  Quality of Pain: Sharp, Dull, Aching  Duration of Pain: Persistent  Frequency of Pain: Intermittent  Aggravating Factors: (everything )  Limiting Behavior: Yes  Relieving Factors: Rest, Ice  Result of Injury: No  Work-Related Injury: No  Are there other pain locations you wish to document?: No  ROS: Review of systems reviewed from Patient History Form completed today and available in the patient's chart under the Media tab. Pertinent items are noted in HPI  Review of systems reviewed from Patient History Form completed today and available in the patient's chart under the Media tab. Vital Signs:  BP (!) 140/89   Pulse 80   Ht 5' 5\" (1.651 m)   Wt 256 lb (116.1 kg)   BMI 42.60 kg/m²         Neuro: Alert & oriented x 3,  normal,  no focal deficits noted. Normal affect. Eyes: sclera clear  Ears: Normal external ear  Mouth:  No perioral lesions  Pulm: Respirations unlabored and regular  Pulse: Extremities well perfused. 2+ peripheral pulses. Skin: Warm. No ulcerations.       Constitutional: The physical examination finds the patient to be well-developed and well-nourished. The patient is alert and oriented x3 and was cooperative throughout the visit. Left Knee Exam:        Gait/Alignment: Varus alignment     Patella tracking: Normal      Inspection/Skin: No rashes are identified.     Effusion: Small effusion     Palpation: Medial joint line tenderness. Mild crepitus.     Range of Motion: Full extension and 100° of flexion     Strength: Mild quadriceps weakness.     Ligamentous Stability: Pseudo-laxity is present medially. Anterior and posterior cruciate ligaments were intact. Lateral collateral ligament is intact.     Neurologic and vascular: Skin is warm dry and well perfused. Sensation is intact to light touch over the knee.     Additional findings: Calf soft and nontender. No patellar instability.        Right Knee Exam:     Gait: No use of assistive devices. No antalgic gait.     Alignment: normal alignment.     Inspection/skin: Skin is intact without erythema or ecchymosis. No gross deformity. Status post total knee replacement, incision well healed.     Palpation: no crepitus. no joint line tenderness present.     Range of Motion: 0 - 110 degrees.     Strength: Normal quadriceps development.      Effusion: No effusion or swelling present.      Ligamentous stability: No cruciate or collateral ligament instability.      Neurologic and vascular: Calf soft and nontender. Skin is warm and well-perfused. Sensation is intact to light-touch.         Radiology:       Pertinent imaging reviewed, images only - no report available. Assessment :  62yo female with left knee osteoarthritis; status post right total knee arthroplasty. Impression:  Encounter Diagnoses   Name Primary?     Primary osteoarthritis of left knee Yes    S/P total knee arthroplasty, right        Office Procedures:  Orders Placed This Encounter   Procedures    [place] ARTHROCENTESIS ASPIR&/INJ MAJOR JT/BURSA W/O US         Plan: The nature and natural history of osteoarthritis was discussed in detail the patient today. Treatment options both surgical and nonsurgical were discussed in detail. Patient was counseled with regard to the importance of activity modification as well as weight control. The role for medications, intra-articular injections as well as surgery were discussed. Patient's questions were answered.     Believe patient is a candidate for left knee intraarticular cortisone injection. Post injection care was reviewed. Followup as needed. Skip Casillas is in agreement with this plan. All questions were answered to patient's satisfaction and was encouraged to call with any further questions. The indications and risks of steroid injection as well as treatment alternatives were discussed with the patient who consented to the procedure. Under sterile conditions and after informed consent was obtained the patient was given an injection into the LEFT knee. 2cc 40 mg of Depo-Medrol and 4 mL of 1% lidocaine were placed in the knee after it was prepped with chlorhexidine. This resulted in good relief of symptoms. There were no complications. The patient was advised to ice the knee this evening and to avoid vigorous activities for the next 2 days. They were advised to call us if there was any erythema, enduration, swelling or increasing pain. [address] [place] [address], PA-  3/13/2020       During this examination, I, [address], PA-C, functioned as a scribe for Dr. Daly Shelton. The history taking and physical examination were performed by Dr. Daly Shelton. All counseling during the appointment was performed between the patient and Dr. Daly Shelton. 3/13/2020 2:03 PM      This dictation was performed with a verbal recognition program (DRAGON) and it was checked for errors. It is possible that there are still dictated errors within this office note. If so, please bring any errors to my attention for an addendum.   All

## 2020-03-16 ENCOUNTER — HOSPITAL ENCOUNTER (OUTPATIENT)
Age: 60
Discharge: HOME | End: 2020-03-16
Payer: COMMERCIAL

## 2020-03-16 LAB
ALBUMIN SERPL-MCNC: 4.3 G/DL (ref 3.4–5)
ANION GAP SERPL CALCULATED.3IONS-SCNC: 12 MMOL/L (ref 3–16)
BUN BLDV-MCNC: 23 MG/DL (ref 7–20)
CALCIUM SERPL-MCNC: 9.4 MG/DL (ref 8.3–10.6)
CHLORIDE BLD-SCNC: 107 MMOL/L (ref 99–110)
CO2: 26 MMOL/L (ref 21–32)
CREAT SERPL-MCNC: 1.2 MG/DL (ref 0.6–1.2)
CREATININE URINE: 211.2 MG/DL (ref 28–259)
GFR AFRICAN AMERICAN: 55
GFR NON-AFRICAN AMERICAN: 46
GLUCOSE BLD-MCNC: 125 MG/DL (ref 70–99)
HCT VFR BLD CALC: 42.6 % (ref 36–48)
HEMOGLOBIN: 13.9 G/DL (ref 12–16)
MCH RBC QN AUTO: 28.4 PG (ref 26–34)
MCHC RBC AUTO-ENTMCNC: 32.7 G/DL (ref 31–36)
MCV RBC AUTO: 86.9 FL (ref 80–100)
PDW BLD-RTO: 14.5 % (ref 12.4–15.4)
PHOSPHORUS: 3 MG/DL (ref 2.5–4.9)
PLATELET # BLD: 248 K/UL (ref 135–450)
PMV BLD AUTO: 8.9 FL (ref 5–10.5)
POTASSIUM SERPL-SCNC: 4.8 MMOL/L (ref 3.5–5.1)
PROTEIN PROTEIN: 33 MG/DL
RBC # BLD: 4.9 M/UL (ref 4–5.2)
SODIUM BLD-SCNC: 145 MMOL/L (ref 136–145)
WBC # BLD: 10.5 K/UL (ref 4–11)

## 2020-03-16 PROCEDURE — 84156 ASSAY OF PROTEIN URINE: CPT

## 2020-03-16 PROCEDURE — 82570 ASSAY OF URINE CREATININE: CPT

## 2020-03-16 PROCEDURE — 85027 COMPLETE CBC AUTOMATED: CPT

## 2020-03-16 PROCEDURE — 80069 RENAL FUNCTION PANEL: CPT

## 2020-05-06 RX ORDER — FUROSEMIDE 20 MG/1
TABLET ORAL
Qty: 90 TABLET | Refills: 3 | Status: SHIPPED | OUTPATIENT
Start: 2020-05-06 | End: 2021-04-05

## 2020-05-06 RX ORDER — LEVOTHYROXINE SODIUM 0.15 MG/1
TABLET ORAL
Qty: 90 TABLET | Refills: 3 | Status: CANCELLED | OUTPATIENT
Start: 2020-05-06

## 2020-05-06 RX ORDER — FUROSEMIDE 20 MG/1
TABLET ORAL
Qty: 90 TABLET | Refills: 3 | Status: CANCELLED | OUTPATIENT
Start: 2020-05-06

## 2020-05-06 RX ORDER — LEVOTHYROXINE SODIUM 0.15 MG/1
TABLET ORAL
Qty: 90 TABLET | Refills: 3 | Status: SHIPPED | OUTPATIENT
Start: 2020-05-06 | End: 2021-04-05

## 2020-05-18 ENCOUNTER — VIRTUAL VISIT (OUTPATIENT)
Dept: INTERNAL MEDICINE CLINIC | Age: 60
End: 2020-05-18
Payer: COMMERCIAL

## 2020-05-18 VITALS
HEART RATE: 89 BPM | HEIGHT: 66 IN | WEIGHT: 261 LBS | SYSTOLIC BLOOD PRESSURE: 115 MMHG | BODY MASS INDEX: 41.95 KG/M2 | DIASTOLIC BLOOD PRESSURE: 85 MMHG

## 2020-05-18 PROCEDURE — 99214 OFFICE O/P EST MOD 30 MIN: CPT | Performed by: INTERNAL MEDICINE

## 2020-05-18 RX ORDER — CHOLESTYRAMINE 4 G/9G
1 POWDER, FOR SUSPENSION ORAL NIGHTLY
COMMUNITY
End: 2021-05-10

## 2020-05-18 ASSESSMENT — ENCOUNTER SYMPTOMS
DIARRHEA: 1
VOMITING: 0
SHORTNESS OF BREATH: 0
ABDOMINAL PAIN: 0
TROUBLE SWALLOWING: 0
COUGH: 0
NAUSEA: 0

## 2020-05-18 NOTE — PATIENT INSTRUCTIONS
To do list:    #1 obtain lab work as soon as possible. Make sure you wear your mask    #2 continue a no added salt and low-sodium diet along with carbohydrate restriction and calorie restriction. #3 continue to work on increasing aerobic activity such as walking. #4 please notify Dr. Sarah Sim if diarrhea continues. #5 continue the Benefiber in addition to the cholestyramine.

## 2020-05-18 NOTE — PROGRESS NOTES
2020    [facility]) Physicians  Internal Medicine  Patient Encounter  Jose Almazan D.O., [address] -- Audio/Visual (During - public health emergency)      I discussed at this telephone/video visit is a nontraditional type of visit that we are conducting in lieu of an office visit to minimize patient risk during the coronavirus pandemic. I discussed with the patient that I would not be able to perform a full physical examination, but that in most other respects the visit would be beneficial to his/her continued medical care. He/She again gave verbal consent for us to conduct this type of visit. Chief Complaint   Patient presents with    Check-Up    Medication Check    Diabetes    Hypertension    Hyperlipidemia         HPI:    Aliya Luna (:  1960) has requested an audio/video evaluation for the following concern(s): Check up, Med check    63 y.o. female being evaluated via virtual video visit due to the coronavirus pandemic emergency and public health crisis and inability to see the patient face-to-face in the office. Patient is being evaluated regarding the status of current chronic medical problems as the below along with a medication review and reconciliation. She has seen Dr. Alba Ayala for diarrhea. She had a colonoscopy and is now on FODMAP diet and Cholestyramine. She does have some incontinence. She also saw Dr. William Castanon for her back-- no correspondence. MRI ordered. Diabetes Mellitus Type II, Follow-up--   Lab Results   Component Value Date    LABA1C 6.5 2020     Lab Results   Component Value Date    .9 2020         She states she is checking her blood sugars. AM-- 100-120's. Before lunch-- 117-130  Hs-- 154  She states she has been watching the carbs. She has not lost weight. She denies dysesthesias in the feet. She denies visual changes. Last Eye Exam: May, 2019. OVERDUE.   U.Microalbumin/Cr: 1/27/2020  Pt is on an ARB. Complications--none  Insulin Treated? No.    ASA: No.  Currently on Xarelto  Tobacco: No      HTN--Patient denies any new headaches, lightheadedness, episodes of unilateral weakness or paresthesias. She does try to watch her sodium.     CKD--She is under the care of Dr. Sean Abdi. Patient denies any increased swelling above baseline. She denies any foamy or frothy urine. She denies any dysuria or hematuria. Venous Insufficiency/bilateral DVT/SVT--   Previous history ----> patient has had complications of postphlebitic syndrome and stasis dermatitis. She has seen the vascular surgeon, Dr. Nga Mendez. She has required treatment with Unna boots in the past.  Dopplers done on 4/4/2019 showed age indeterminate partially occluding superficial venous thrombosis involving right varicosities. She also had a chronic DVT involving the right gastroc vein. She had a chronic SVT involving the right greater saphenous vein and a chronic SVT involving the left saphenofemoral junction and greater saphenous vein. She remains uncertain relatively low 10 mg daily. She is seeing Dr. Dee Rice. Interval Hx--->  She states her swelling is controlled when she wears her Jobst stockings. She will see Dr. Nga Mendez in Jan 2021. She is on Xarelto. She sees Dr. Dee Rice.       Hypothyroidism-- She denies problems with constipation, diarrhea, cold or heat intolerance, hair loss, muscle cramps, tremor, edema or palpitations. She is compliant with the medication. Lab Results   Component Value Date    TSH 1.20 01/24/2020        Hyperlipidemia:    Lab Results   Component Value Date    LDLCALC 107 (H) 01/24/2020    Patient denies any new problems with her pravastatin therapy. She is on 40 mg. She has been on pravastatin for some time now. She denies any new myalgias or muscle cramping. GERD-- She is on Nexium. She denies heart burn or dysphagia.     Lab Results   Component Value Date    MG

## 2020-06-04 ENCOUNTER — HOSPITAL ENCOUNTER (OUTPATIENT)
Dept: MRI IMAGING | Age: 60
Discharge: HOME | End: 2020-06-04
Payer: COMMERCIAL

## 2020-06-04 PROCEDURE — 72148 MRI LUMBAR SPINE W/O DYE: CPT

## 2020-06-09 ENCOUNTER — OFFICE VISIT (OUTPATIENT)
Dept: ORTHOPEDIC SURGERY | Age: 60
End: 2020-06-09
Payer: COMMERCIAL

## 2020-06-09 VITALS — BODY MASS INDEX: 41.78 KG/M2 | TEMPERATURE: 97.3 F | WEIGHT: 260 LBS | HEIGHT: 66 IN

## 2020-06-09 PROCEDURE — 20610 DRAIN/INJ JOINT/BURSA W/O US: CPT | Performed by: ORTHOPAEDIC SURGERY

## 2020-06-09 PROCEDURE — 99213 OFFICE O/P EST LOW 20 MIN: CPT | Performed by: ORTHOPAEDIC SURGERY

## 2020-06-09 RX ORDER — METHYLPREDNISOLONE ACETATE 40 MG/ML
40 INJECTION, SUSPENSION INTRA-ARTICULAR; INTRALESIONAL; INTRAMUSCULAR; SOFT TISSUE ONCE
Status: COMPLETED | OUTPATIENT
Start: 2020-06-09 | End: 2020-06-09

## 2020-06-09 RX ADMIN — METHYLPREDNISOLONE ACETATE 40 MG: 40 INJECTION, SUSPENSION INTRA-ARTICULAR; INTRALESIONAL; INTRAMUSCULAR; SOFT TISSUE at 17:30

## 2020-06-09 NOTE — PROGRESS NOTES
gait.    Alignment: normal alignment. Inspection/skin: Skin is intact without erythema or ecchymosis. No gross deformity. Incision well healed. Palpation: no crepitus. Medial and lateral joint line tenderness present. Range of Motion: 0 - 90 degrees of flexion. Strength: Normal quadriceps development. Effusion: No effusion or swelling present. Ligamentous stability: No cruciate or collateral ligament instability. Neurologic and vascular: Skin is warm and well-perfused. Sensation is intact to light-touch. Special tests: Negative Jody sign. Radiology:     Pertinent imaging reviewed. No new imaging was obtained during today's visit. Assessment :  Osteoarthritis of left knee; Painful right total knee replacement    Impression:  No diagnosis found. Office Procedures:  No orders of the defined types were placed in this encounter. Plan: Pertinent imaging was reviewed. The etiology, natural history, and treatment options for the disorder were discussed. The roles of activity medication, antiinflammatories, injections, bracing, physical therapy, and surgical interventions were all described to the patient and questions were answered. I believe she is a candidate for a repeat intraarticular cortisone injection as it has provided good relief in the past. She wishes to proceed with this entity. Continue home exercises for strengthening. She has been referred to Dr. Christine Cantrell for her painful total knee replacement. I will see her back if symptoms persist or worsen. Meghan Vinson is in agreement with this plan. All questions were answered to patient's satisfaction and was encouraged to call with any further questions. The indications and risks of steroid injection as well as treatment alternatives were discussed with the patient who consented to the procedure.  Under sterile conditions and after informed consent was obtained the patient was given an injection into the LEFT knee. 2cc 40 mg of Depo-Medrol and 4 mL of 1% lidocaine were placed in the knee after it was prepped with chlorhexidine. This resulted in good relief of symptoms. There were no complications. The patient was advised to ice the knee this evening and to avoid vigorous activities for the next 2 days. They were advised to call us if there was any erythema, enduration, swelling or increasing pain. I, Yrn Tabares ATC, am scribing for and in the presence of Dr. Chely Nicholas. 06/09/20 4:56 PM Yrn Tabares ATC        I personally reviewed the patient's pain scale, review of systems, family history, social history, past medical history, allergies and medications. Review of systems was collected today, reviewed and is included in the medical record. It is available under the media tab. I personally performed the services described in this documentation and scribed by Yrn Tabares ATC. Alxeis Appiah MD  Sports Medicine, Arthroscopic Knee and Shoulder Surgery    This dictation was performed with a verbal recognition program [facility]) and it was checked for errors. It is possible that there are still dictated errors within this office note. If so, please bring any errors to my attention for an addendum. All efforts were made to ensure that this office note is accurate.

## 2020-06-12 ENCOUNTER — HOSPITAL ENCOUNTER (OUTPATIENT)
Age: 60
Discharge: HOME | End: 2020-06-12
Payer: COMMERCIAL

## 2020-06-12 LAB
A/G RATIO: 1.8 (ref 1.1–2.2)
ALBUMIN SERPL-MCNC: 3.8 G/DL (ref 3.4–5)
ALP BLD-CCNC: 125 U/L (ref 40–129)
ALT SERPL-CCNC: 14 U/L (ref 10–40)
ANION GAP SERPL CALCULATED.3IONS-SCNC: 9 MMOL/L (ref 3–16)
AST SERPL-CCNC: 13 U/L (ref 15–37)
BILIRUB SERPL-MCNC: 0.3 MG/DL (ref 0–1)
BUN BLDV-MCNC: 30 MG/DL (ref 7–20)
CALCIUM SERPL-MCNC: 8.9 MG/DL (ref 8.3–10.6)
CHLORIDE BLD-SCNC: 108 MMOL/L (ref 99–110)
CHOLESTEROL, TOTAL: 158 MG/DL (ref 0–199)
CO2: 26 MMOL/L (ref 21–32)
CREAT SERPL-MCNC: 1.3 MG/DL (ref 0.6–1.2)
ESTIMATED AVERAGE GLUCOSE: 145.6 MG/DL
GFR AFRICAN AMERICAN: 50
GFR NON-AFRICAN AMERICAN: 42
GLOBULIN: 2.1 G/DL
GLUCOSE BLD-MCNC: 121 MG/DL (ref 70–99)
HBA1C MFR BLD: 6.7 %
HDLC SERPL-MCNC: 41 MG/DL (ref 40–60)
LDL CHOLESTEROL CALCULATED: 84 MG/DL
MAGNESIUM: 2.1 MG/DL (ref 1.8–2.4)
POTASSIUM SERPL-SCNC: 4.9 MMOL/L (ref 3.5–5.1)
SODIUM BLD-SCNC: 143 MMOL/L (ref 136–145)
TOTAL PROTEIN: 5.9 G/DL (ref 6.4–8.2)
TRIGL SERPL-MCNC: 165 MG/DL (ref 0–150)
TSH SERPL DL<=0.05 MIU/L-ACNC: 1.08 UIU/ML (ref 0.27–4.2)
VLDLC SERPL CALC-MCNC: 33 MG/DL

## 2020-06-12 PROCEDURE — 80053 COMPREHEN METABOLIC PANEL: CPT

## 2020-06-12 PROCEDURE — 80061 LIPID PANEL: CPT

## 2020-06-12 PROCEDURE — 83036 HEMOGLOBIN GLYCOSYLATED A1C: CPT

## 2020-06-12 PROCEDURE — 36415 COLL VENOUS BLD VENIPUNCTURE: CPT

## 2020-06-12 PROCEDURE — 83735 ASSAY OF MAGNESIUM: CPT

## 2020-06-12 PROCEDURE — 84443 ASSAY THYROID STIM HORMONE: CPT

## 2020-06-12 RX ORDER — IRBESARTAN 150 MG/1
TABLET ORAL
Qty: 90 TABLET | Refills: 3 | Status: SHIPPED | OUTPATIENT
Start: 2020-06-12 | End: 2021-06-04

## 2020-07-15 RX ORDER — PRAVASTATIN SODIUM 40 MG
TABLET ORAL
Qty: 90 TABLET | Refills: 3 | Status: SHIPPED | OUTPATIENT
Start: 2020-07-15 | End: 2020-09-16 | Stop reason: ALTCHOICE

## 2020-07-20 ENCOUNTER — HOSPITAL ENCOUNTER (OUTPATIENT)
Age: 60
Discharge: HOME | End: 2020-07-20
Payer: COMMERCIAL

## 2020-07-20 LAB
C-REACTIVE PROTEIN: 11.3 MG/L (ref 0–5.1)
SEDIMENTATION RATE, ERYTHROCYTE: 15 MM/HR (ref 0–30)

## 2020-07-20 PROCEDURE — 86140 C-REACTIVE PROTEIN: CPT

## 2020-07-20 PROCEDURE — 36415 COLL VENOUS BLD VENIPUNCTURE: CPT

## 2020-07-20 PROCEDURE — 85652 RBC SED RATE AUTOMATED: CPT

## 2020-07-30 ENCOUNTER — HOSPITAL ENCOUNTER (OUTPATIENT)
Age: 60
Discharge: HOME | End: 2020-07-30
Payer: COMMERCIAL

## 2020-07-30 LAB
C-REACTIVE PROTEIN: 10.1 MG/L (ref 0–5.1)
SEDIMENTATION RATE, ERYTHROCYTE: 12 MM/HR (ref 0–30)

## 2020-07-30 PROCEDURE — 85652 RBC SED RATE AUTOMATED: CPT

## 2020-07-30 PROCEDURE — 36415 COLL VENOUS BLD VENIPUNCTURE: CPT

## 2020-07-30 PROCEDURE — 86140 C-REACTIVE PROTEIN: CPT

## 2020-09-08 ENCOUNTER — VIRTUAL VISIT (OUTPATIENT)
Dept: INTERNAL MEDICINE CLINIC | Age: 60
End: 2020-09-08
Payer: COMMERCIAL

## 2020-09-08 PROCEDURE — 99214 OFFICE O/P EST MOD 30 MIN: CPT | Performed by: INTERNAL MEDICINE

## 2020-09-08 ASSESSMENT — ENCOUNTER SYMPTOMS
COUGH: 0
DIARRHEA: 0
VOMITING: 0
TROUBLE SWALLOWING: 0
ABDOMINAL PAIN: 0
NAUSEA: 0
SHORTNESS OF BREATH: 0

## 2020-09-08 ASSESSMENT — PATIENT HEALTH QUESTIONNAIRE - PHQ9
SUM OF ALL RESPONSES TO PHQ QUESTIONS 1-9: 0
1. LITTLE INTEREST OR PLEASURE IN DOING THINGS: 0
SUM OF ALL RESPONSES TO PHQ9 QUESTIONS 1 & 2: 0
2. FEELING DOWN, DEPRESSED OR HOPELESS: 0
SUM OF ALL RESPONSES TO PHQ QUESTIONS 1-9: 0

## 2020-09-08 NOTE — PROGRESS NOTES
2020    [facility]) Physicians  Internal Medicine  Patient Encounter  Ygnacio Boxer, D.O., [address] -- Audio/Visual (During RQCMI-48 public health emergency)      I discussed at this telephone/video visit is a nontraditional type of visit that we are conducting in lieu of an office visit to minimize patient risk during the coronavirus pandemic. I discussed with the patient that I would not be able to perform a full physical examination, but that in most other respects the visit would be beneficial to his/her continued medical care. He/She again gave verbal consent for us to conduct this type of visit. Chief Complaint   Patient presents with    Medication Check    Check-Up         HPI:    Jennie Beltran (:  1960) has requested an audio/video evaluation for the following concern(s): Checkup and medication review    63 y.o. female being evaluated via virtual video visit due to the coronavirus pandemic emergency and public health crisis and inability to see the patient face-to-face in the office. Patient is being evaluated regarding the status of current chronic medical problems as the below along with a medication review and reconciliation. Patient has a known history of type 2 diabetes, hypertension, chronic kidney disease, venous insufficiency with history of DVT and SVT, hypothyroidism, hyperlipidemia, and GERD. Patient is not on too many medications but states that she is compliant with her meds. She denies any adverse side effects. She specifically denies any problems with myalgias or new muscle weakness from her statin therapy. She further denies any symptoms of chest pain, shortness of breath, lightheadedness, syncope. She denies any episodes of unilateral weakness and paresthesias. She denies any speech or visual disturbances. She denies any diarrhea or constipation. She denies any cough or wheezing.   She denies any heartburn or dysphagia. She denies any bruising or excessive bleeding as she is on Xarelto. She has seen Dr. Asad Olson who referred her to Dr. Dot Gomez. She also saw Dr. Nurys Anaya for her back. She received FAISAL in the Lumbar. She is looking to get neck injection. She is due to have her Diabetic eye exam this week. She is due for Diabetic foot exam    She states she had symptoms of lethargy, sweats, clamminess. She did not spike a temp. She reports she had weakness, diarrhea. She did not have cough or SOB. She did not get tested. She recovered. Her  had some mild respiratory symptoms and had been exposed to COVID+ individuals. She just started to feel better. She has not felt well from 8/4 until now. She apologized for not calling our office.       Past Medical History:   Diagnosis Date    Acute superficial venous thrombosis of lower extremity, right 03/05/2019    Arthritis     Chronic superficial venous thrombosis of left lower extremity 8/13/2015    CKD (chronic kidney disease) stage 2, GFR 60-89 ml/min     CKD (chronic kidney disease) stage 3, GFR 30-59 ml/min (HCC)     Cystic kidney disease     DVT (deep venous thrombosis) ([facility] [address] 4/2003, 5/2013    Gastroesophageal reflux disease without esophagitis 11/28/2016    GERD (gastroesophageal reflux disease)     H/O deep venous thrombosis     H/O simple renal cyst     Hypertension     IBS (irritable bowel syndrome)     Osteoarthritis, knee     Saphenofemoral venous reflux 8/13/2015    BIlateral    Squamous cell carcinoma of skin of lower limb, left     Steatosis of liver     SVT (supraventricular tachycardia) ([facility] [address] 03/05/2019    Thyroid disease     Type 2 diabetes mellitus (HCC)     Venous insufficiency        Medication Sig   pravastatin (PRAVACHOL) 40 MG tablet TAKE 1 TABLET DAILY   irbesartan (AVAPRO) 150 MG tablet TAKE 1 TABLET DAILY   cholestyramine (QUESTRAN) 4 g packet Take 1 packet by mouth nightly   levothyroxine (SYNTHROID) 150 MCG tablet TAKE 1 TABLET DAILY   furosemide (LASIX) 20 MG tablet TAKE 1 TABLET DAILY   esomeprazole (NEXIUM) 40 MG delayed release capsule TAKE 1 CAPSULE DAILY IN THE MORNING BEFORE BREAKFAST   rivaroxaban (XARELTO) 10 MG TABS tablet Take 10 mg by mouth   ONETOUCH DELICA LANCETS FINE MISC USE AS DIRECTED TO TEST BLOOD SUGAR ONCE EVERY DAY   blood glucose monitor strips 1 strip by Other route daily Test 2 times a day & as needed for symptoms of irregular blood glucose. Review of Systems   Constitutional: Negative for chills, fatigue, fever and unexpected weight change. As per HPI. Fatigue is better   HENT: Negative for hearing loss, nosebleeds and trouble swallowing. Eyes: Negative for visual disturbance. Respiratory: Negative for cough and shortness of breath. Cardiovascular: Negative for chest pain, palpitations and leg swelling. No claudication  No Orthopnea     Gastrointestinal: Negative for abdominal pain, diarrhea, nausea and vomiting. Genitourinary: Negative for difficulty urinating. Neurological: Negative for dizziness and headaches. No unilateral weakness or paresthesias           PHYSICAL EXAMINATION:    Vital Signs: (As obtained by patient/caregiver or practitioner observation)    Patient-Reported Vitals 9/8/2020   Patient-Reported Weight 256lb   Patient-Reported Height 5'6   Patient-Reported Systolic 429   Patient-Reported Diastolic 90   Patient-Reported Pulse 90          Wt Readings from Last 3 Encounters:   06/09/20 260 lb (117.9 kg)   05/18/20 261 lb (118.4 kg)   03/13/20 256 lb (116.1 kg)     BP Readings from Last 3 Encounters:   05/18/20 115/85   03/13/20 (!) 140/89   01/27/20 130/85           Physical Exam  Constitutional:       General: She is not in acute distress. Appearance: Normal appearance. She is obese. She is not ill-appearing. HENT:      Head: Normocephalic and atraumatic. Eyes:      Extraocular Movements: Extraocular movements intact. Conjunctiva/sclera: Conjunctivae normal.   Neck:      Musculoskeletal: Normal range of motion. Pulmonary:      Effort: Pulmonary effort is normal. No respiratory distress. Abdominal:      General: There is no distension. Musculoskeletal:      Right lower leg: Edema present. Left lower leg: Edema present. Skin:     Findings: No rash. Neurological:      Mental Status: She is alert and oriented to person, place, and time. Psychiatric:         Mood and Affect: Mood normal.         Thought Content: Thought content normal.         Judgment: Judgment normal.           Other pertinent observable physical exam findings-     Due to this being a TeleHealth encounter, evaluation of the following organ systems is limited: Vitals/Constitutional/EENT/Resp/CV/GI//MS/Neuro/Skin/Heme-Lymph-Imm. ASSESSMENT/PLAN:  1. Type 2 diabetes mellitus without complication, without long-term current use of insulin ([facility])  Condition stability is uncertain  Check L4M  Foot exam next visit  Eye exam is scheduled   - Comprehensive Metabolic Panel; Future  - Hemoglobin A1C; Future  - Lipid Panel; Future  - TSH without Reflex; Future    2. Hyperlipidemia, unspecified hyperlipidemia type  Condition stability is uncertain  Due for LAB  Continue statin therapy for cardiovascular disease risk reduction  - Comprehensive Metabolic Panel; Future  - Lipid Panel; Future    3. Benign essential HTN  BP is well controlled. Counseled on the need to continue a low sodium diet. - Comprehensive Metabolic Panel; Future  - Lipid Panel; Future    4. Venous insufficiency of both lower extremities  Condition is stable  She will Dr. Grier 1/20/2021  Continue compression stockings. 5. CKD (chronic kidney disease) stage 3, GFR 30-59 ml/min ([facility])  Condition stability and control are uncertain. Due for LAB  Avoid NSAIDs  Stay well hydrated. She will follow up with nephrology  - Comprehensive Metabolic Panel; Future    6.  Acquired

## 2020-09-14 ENCOUNTER — HOSPITAL ENCOUNTER (OUTPATIENT)
Age: 60
Discharge: HOME | End: 2020-09-14
Payer: COMMERCIAL

## 2020-09-14 LAB
A/G RATIO: 1.6 (ref 1.1–2.2)
ALBUMIN SERPL-MCNC: 4.3 G/DL (ref 3.4–5)
ALP BLD-CCNC: 123 U/L (ref 40–129)
ALT SERPL-CCNC: 21 U/L (ref 10–40)
ANION GAP SERPL CALCULATED.3IONS-SCNC: 15 MMOL/L (ref 3–16)
AST SERPL-CCNC: 18 U/L (ref 15–37)
BILIRUB SERPL-MCNC: 0.4 MG/DL (ref 0–1)
BUN BLDV-MCNC: 21 MG/DL (ref 7–20)
CALCIUM SERPL-MCNC: 9.8 MG/DL (ref 8.3–10.6)
CHLORIDE BLD-SCNC: 105 MMOL/L (ref 99–110)
CHOLESTEROL, TOTAL: 199 MG/DL (ref 0–199)
CO2: 22 MMOL/L (ref 21–32)
CREAT SERPL-MCNC: 1.2 MG/DL (ref 0.6–1.2)
CREATININE URINE: 371.4 MG/DL (ref 28–259)
GFR AFRICAN AMERICAN: 55
GFR NON-AFRICAN AMERICAN: 46
GLOBULIN: 2.7 G/DL
GLUCOSE BLD-MCNC: 139 MG/DL (ref 70–99)
HCT VFR BLD CALC: 43.7 % (ref 36–48)
HDLC SERPL-MCNC: 46 MG/DL (ref 40–60)
HEMOGLOBIN: 13.9 G/DL (ref 12–16)
LDL CHOLESTEROL CALCULATED: 117 MG/DL
MCH RBC QN AUTO: 28.4 PG (ref 26–34)
MCHC RBC AUTO-ENTMCNC: 31.9 G/DL (ref 31–36)
MCV RBC AUTO: 89.1 FL (ref 80–100)
PDW BLD-RTO: 15.3 % (ref 12.4–15.4)
PHOSPHORUS: 3.2 MG/DL (ref 2.5–4.9)
PLATELET # BLD: 280 K/UL (ref 135–450)
PMV BLD AUTO: 9.2 FL (ref 5–10.5)
POTASSIUM SERPL-SCNC: 4.2 MMOL/L (ref 3.5–5.1)
PROTEIN PROTEIN: 81 MG/DL
RBC # BLD: 4.9 M/UL (ref 4–5.2)
SODIUM BLD-SCNC: 142 MMOL/L (ref 136–145)
TOTAL PROTEIN: 7 G/DL (ref 6.4–8.2)
TRIGL SERPL-MCNC: 182 MG/DL (ref 0–150)
TSH SERPL DL<=0.05 MIU/L-ACNC: 1.21 UIU/ML (ref 0.27–4.2)
VLDLC SERPL CALC-MCNC: 36 MG/DL
WBC # BLD: 9.8 K/UL (ref 4–11)

## 2020-09-14 PROCEDURE — 80061 LIPID PANEL: CPT

## 2020-09-14 PROCEDURE — 83036 HEMOGLOBIN GLYCOSYLATED A1C: CPT

## 2020-09-14 PROCEDURE — 85027 COMPLETE CBC AUTOMATED: CPT

## 2020-09-14 PROCEDURE — 84100 ASSAY OF PHOSPHORUS: CPT

## 2020-09-14 PROCEDURE — 84156 ASSAY OF PROTEIN URINE: CPT

## 2020-09-14 PROCEDURE — 84443 ASSAY THYROID STIM HORMONE: CPT

## 2020-09-14 PROCEDURE — 36415 COLL VENOUS BLD VENIPUNCTURE: CPT

## 2020-09-14 PROCEDURE — 80053 COMPREHEN METABOLIC PANEL: CPT

## 2020-09-14 PROCEDURE — 82570 ASSAY OF URINE CREATININE: CPT

## 2020-09-14 PROCEDURE — 86769 SARS-COV-2 COVID-19 ANTIBODY: CPT

## 2020-09-15 LAB
ESTIMATED AVERAGE GLUCOSE: 151.3 MG/DL
HBA1C MFR BLD: 6.9 %

## 2020-09-16 LAB — SARS-COV-2, IGG: POSITIVE

## 2020-09-16 RX ORDER — ROSUVASTATIN CALCIUM 10 MG/1
10 TABLET, COATED ORAL NIGHTLY
Qty: 30 TABLET | Refills: 3 | Status: SHIPPED | OUTPATIENT
Start: 2020-09-16 | End: 2020-12-17 | Stop reason: SDUPTHER

## 2020-09-16 RX ORDER — ROSUVASTATIN CALCIUM 10 MG/1
10 TABLET, COATED ORAL NIGHTLY
Qty: 30 TABLET | Refills: 3 | Status: SHIPPED | OUTPATIENT
Start: 2020-09-16 | End: 2020-09-16 | Stop reason: SDUPTHER

## 2020-09-18 PROBLEM — E66.3 OVERWEIGHT: Status: ACTIVE | Noted: 2020-05-13

## 2020-09-18 PROBLEM — Z86.718 PERSONAL HISTORY OF OTHER VENOUS THROMBOSIS AND EMBOLISM: Status: ACTIVE | Noted: 2020-09-18

## 2020-09-18 PROBLEM — R60.0 EDEMA OF LOWER EXTREMITY: Status: ACTIVE | Noted: 2020-09-18

## 2020-09-18 PROBLEM — M54.16 LUMBAR RADICULOPATHY: Status: ACTIVE | Noted: 2020-05-13

## 2020-09-18 PROBLEM — M54.50 LOW BACK PAIN: Status: ACTIVE | Noted: 2020-05-13

## 2020-09-18 PROBLEM — M79.604 PAIN OF RIGHT LOWER EXTREMITY: Status: ACTIVE | Noted: 2020-09-18

## 2020-09-25 ENCOUNTER — OFFICE VISIT (OUTPATIENT)
Dept: ORTHOPEDIC SURGERY | Age: 60
End: 2020-09-25
Payer: COMMERCIAL

## 2020-09-25 VITALS — TEMPERATURE: 97.7 F | BODY MASS INDEX: 40.18 KG/M2 | WEIGHT: 250 LBS | HEIGHT: 66 IN

## 2020-09-25 PROCEDURE — 20610 DRAIN/INJ JOINT/BURSA W/O US: CPT | Performed by: ORTHOPAEDIC SURGERY

## 2020-09-25 PROCEDURE — 99213 OFFICE O/P EST LOW 20 MIN: CPT | Performed by: ORTHOPAEDIC SURGERY

## 2020-09-25 RX ORDER — METHYLPREDNISOLONE ACETATE 40 MG/ML
40 INJECTION, SUSPENSION INTRA-ARTICULAR; INTRALESIONAL; INTRAMUSCULAR; SOFT TISSUE ONCE
Status: SHIPPED | OUTPATIENT
Start: 2020-09-25

## 2020-09-25 NOTE — PROGRESS NOTES
9/25/20 12:11 PM     Lidocaine Injection      NDC: [address]-67    Lot Number: [id number]    Body Part: left knee

## 2020-09-25 NOTE — PROGRESS NOTES
Chief Complaint  Follow-up (left knee. wanting to discuss treatment options. )      History of Present Illness:  Dennie Service is a pleasant 63 y.o. female  who presents today for follow up evaluation of left knee pain. She has known osteoarthritis of the left knee and over  11 years status post right total knee arthroplasty by Dr. Leonel Mishra. She had a left knee intraarticular cortisone injection on 6/09/2020 which helped her pain for 3 months. Comes in today because her left knee pain has started to flareup again. No new injuries reported.  Kirti Mott is seeing Dr. Ina Felix for right knee replacement follow-up, therapy is going well. Of note, she recently underwent emergency surgery for a left retinal detachment, Kirti Mott confirmed that the surgeon cleared her for a corticosteroid injection in the knee. Medical History:  Patient's medications, allergies, past medical, surgical, social and family histories were reviewed and updated as appropriate. Pain Assessment  Location of Pain: Knee  Location Modifiers: Left  Severity of Pain: 10  Quality of Pain: Aching  Duration of Pain: A few minutes  Frequency of Pain: Constant  Aggravating Factors: (sitting)  Limiting Behavior: Yes  Relieving Factors: Ice  Result of Injury: No  Work-Related Injury: No  Are there other pain locations you wish to document?: No  ROS: Review of systems reviewed from Patient History Form completed today and available in the patient's chart under the Media tab. Pertinent items are noted in HPI  Review of systems reviewed from Patient History Form completed today and available in the patient's chart under the Media tab. Vital Signs:  Temp 97.7 °F (36.5 °C)   Ht 5' 6\" (1.676 m)   Wt 250 lb (113.4 kg)   BMI 40.35 kg/m²         Neuro: Alert & oriented x 3,  normal,  no focal deficits noted. Normal affect.   Eyes: sclera clear  Ears: Normal external ear  Mouth:  No perioral lesions  Pulm: Respirations unlabored and regular  Pulse: Extremities well perfused. 2+ peripheral pulses. Skin: Warm. No ulcerations. Constitutional: The physical examination finds the patient to be well-developed and well-nourished. The patient is alert and oriented x3 and was cooperative throughout the visit. Left Knee Examination:    Gait: No use of assistive devices. No antalgic gait. Alignment: Alignment appreciated. Inspection/skin: Quadriceps well developed. Skin is intact without erythema or ecchymosis. No gross deformity. Palpation: Crepitus. Nontender along joint line. No pain with compression of patella. Nontender to light touch. Range of Motion: 0-100    Strength: 5/5 quad strength    Effusion: No apparent effusion. Ligamentous stability: Stable to valgus and varus stress at 0° and 30°. Solid endpoint with Lachman's. Negative posterior and anterior drawer signs. Patella tracking: Smooth translation of patella. Special tests: Negative oJdy sign. Patella apprehension sign negative. Neurologic and vascular: Skin is warm and well-perfused. Distally neurovascularly intact. Additional findings: Calf soft nontender. Sensation is intact to light-touch. No pretibial edema. Right knee comparison exam:     Gait: No use of assistive devices. No antalgic gait. Alignment: s/p TKA    Inspection/skin: Well healed surgical incision     Palpation: mild crepitus. no joint line tenderness present. Range of Motion: There is full range of motion. Strength: Normal quadriceps development. Effusion: No effusion or swelling present. Ligamentous stability: No cruciate or collateral ligament instability. Neurologic and vascular: Skin is warm and well-perfused. Sensation is intact to light-touch. Special tests: Negative Jody sign.          Radiology:       Pertinent imaging reviewed    Radiographs were obtained and reviewed in the office; 4 views: bilateral PA, bilateral Piter Fear, bilateral Merchants AND left lateral    Impression: Severe tricompartmental osteoarthritis of the left knee. Right knee hardware consistent with total knee replacement. Assessment : Osteoarthritis of the left knee; painfull right total knee replacement    Impression:  Encounter Diagnoses   Name Primary?  Primary osteoarthritis of left knee Yes    Left knee pain, unspecified chronicity        Office Procedures:  Orders Placed This Encounter   Procedures    XR KNEE LEFT (MIN 4 VIEWS)     Standing Status:   Future     Number of Occurrences:   1     Standing Expiration Date:   9/24/2021     Order Specific Question:   Reason for exam:     Answer:   knee pain    XR KNEE RIGHT (3 VIEWS)     Standing Status:   Future     Number of Occurrences:   1     Standing Expiration Date:   9/24/2021     Order Specific Question:   Reason for exam:     Answer:   knee pain         Plan: Pertinent imaging was reviewed. The etiology, natural history, and treatment options for the disorder were discussed. The roles of activity medication, antiinflammatories, injections, bracing, physical therapy, and surgical interventions were all described to the patient and questions were answered. I believe Marilee Phillip is a candidate for a repeat intra-articular cortisone injection as it provided 3 months of relief. She wishes to proceed with this entity. She is to continue her home exercises for strengthening. She is to continue seeing Dr. Lin Zacarias for her painful total knee replacement. I will see her back if symptoms persist or worsen. Michela Ramos is in agreement with this plan. All questions were answered to patient's satisfaction and was encouraged to call with any further questions. The indications and risks of steroid injection as well as treatment alternatives were discussed with the patient who consented to the procedure.  Under sterile conditions and after informed consent was obtained the patient was given an injection into

## 2020-10-21 ENCOUNTER — NURSE ONLY (OUTPATIENT)
Dept: INTERNAL MEDICINE CLINIC | Age: 60
End: 2020-10-21
Payer: COMMERCIAL

## 2020-10-21 PROCEDURE — 90471 IMMUNIZATION ADMIN: CPT | Performed by: INTERNAL MEDICINE

## 2020-10-21 PROCEDURE — 90686 IIV4 VACC NO PRSV 0.5 ML IM: CPT | Performed by: INTERNAL MEDICINE

## 2020-11-19 ENCOUNTER — HOSPITAL ENCOUNTER (OUTPATIENT)
Age: 60
Discharge: HOME | End: 2020-11-19
Payer: COMMERCIAL

## 2020-11-19 LAB
ALBUMIN SERPL-MCNC: 3.7 G/DL (ref 3.4–5)
ALP BLD-CCNC: 120 U/L (ref 40–129)
ALT SERPL-CCNC: 17 U/L (ref 10–40)
AST SERPL-CCNC: 12 U/L (ref 15–37)
BILIRUB SERPL-MCNC: 0.3 MG/DL (ref 0–1)
BILIRUBIN DIRECT: <0.2 MG/DL (ref 0–0.3)
BILIRUBIN, INDIRECT: ABNORMAL MG/DL (ref 0–1)
CHOLESTEROL, TOTAL: 163 MG/DL (ref 0–199)
HDLC SERPL-MCNC: 43 MG/DL (ref 40–60)
LDL CHOLESTEROL CALCULATED: 82 MG/DL
TOTAL PROTEIN: 6.3 G/DL (ref 6.4–8.2)
TRIGL SERPL-MCNC: 188 MG/DL (ref 0–150)
VLDLC SERPL CALC-MCNC: 38 MG/DL

## 2020-11-19 PROCEDURE — 80061 LIPID PANEL: CPT

## 2020-11-19 PROCEDURE — 80076 HEPATIC FUNCTION PANEL: CPT

## 2020-11-19 PROCEDURE — 36415 COLL VENOUS BLD VENIPUNCTURE: CPT

## 2020-12-17 ENCOUNTER — TELEPHONE (OUTPATIENT)
Dept: INTERNAL MEDICINE CLINIC | Age: 60
End: 2020-12-17

## 2020-12-17 RX ORDER — ROSUVASTATIN CALCIUM 10 MG/1
10 TABLET, COATED ORAL NIGHTLY
Qty: 90 TABLET | Refills: 3 | Status: SHIPPED | OUTPATIENT
Start: 2020-12-17 | End: 2021-05-21

## 2020-12-17 NOTE — TELEPHONE ENCOUNTER
Last appointment: 9/8/2020  Next appointment: 1/12/2021  Last refill: 09/16/2020 # 30 with three refills

## 2021-01-12 ENCOUNTER — VIRTUAL VISIT (OUTPATIENT)
Dept: INTERNAL MEDICINE CLINIC | Age: 61
End: 2021-01-12
Payer: COMMERCIAL

## 2021-01-12 DIAGNOSIS — I87.2 VENOUS INSUFFICIENCY OF BOTH LOWER EXTREMITIES: ICD-10-CM

## 2021-01-12 DIAGNOSIS — R53.83 FATIGUE, UNSPECIFIED TYPE: Primary | ICD-10-CM

## 2021-01-12 DIAGNOSIS — R06.09 DOE (DYSPNEA ON EXERTION): ICD-10-CM

## 2021-01-12 DIAGNOSIS — N18.31 STAGE 3A CHRONIC KIDNEY DISEASE (HCC): ICD-10-CM

## 2021-01-12 DIAGNOSIS — E78.5 HYPERLIPIDEMIA, UNSPECIFIED HYPERLIPIDEMIA TYPE: ICD-10-CM

## 2021-01-12 DIAGNOSIS — K21.9 GASTROESOPHAGEAL REFLUX DISEASE WITHOUT ESOPHAGITIS: ICD-10-CM

## 2021-01-12 DIAGNOSIS — R06.83 SNORING: ICD-10-CM

## 2021-01-12 DIAGNOSIS — Z86.16 HISTORY OF COVID-19: ICD-10-CM

## 2021-01-12 DIAGNOSIS — E11.9 TYPE 2 DIABETES MELLITUS WITHOUT COMPLICATION, WITHOUT LONG-TERM CURRENT USE OF INSULIN (HCC): ICD-10-CM

## 2021-01-12 DIAGNOSIS — E03.9 ACQUIRED HYPOTHYROIDISM: ICD-10-CM

## 2021-01-12 DIAGNOSIS — I10 BENIGN ESSENTIAL HTN: ICD-10-CM

## 2021-01-12 PROCEDURE — 99214 OFFICE O/P EST MOD 30 MIN: CPT | Performed by: INTERNAL MEDICINE

## 2021-01-12 ASSESSMENT — PATIENT HEALTH QUESTIONNAIRE - PHQ9
SUM OF ALL RESPONSES TO PHQ QUESTIONS 1-9: 0
2. FEELING DOWN, DEPRESSED OR HOPELESS: 0

## 2021-01-12 NOTE — PROGRESS NOTES
2021    [facility]) Physicians  Internal Medicine  Patient Encounter  Jennifer Ortega D.O., [address] -- Audio/Visual (During [place] [id number] public health emergency)      I discussed at this telephone/video visit is a nontraditional type of visit that we are conducting in lieu of an office visit to minimize patient risk during the coronavirus pandemic. I discussed with the patient that I would not be able to perform a full physical examination, but that in most other respects the visit would be beneficial to his/her continued medical care. He/She again gave verbal consent for us to conduct this type of visit. Chief Complaint   Patient presents with    Medication Check    Check-Up         HPI:    Renetta Mathur (:  1960) has requested an audio/video evaluation for the following concern(s): Check up, Med check    63 y.o. female being evaluated via virtual video visit due to the coronavirus pandemic emergency and public health crisis and inability to see the patient face-to-face in the office. Patient is being evaluated regarding the status of current chronic medical problems as the below along with a medication review and reconciliation. She states she has been feeling well. She has been seen by her eye doctor in September. She was found to have a torn retina. She had emergency surgery at [facility]. She had surgery end of September. She went back 10/23/2020, she was found to have a repeat detached retina. She had surgery again the next day. She has tested positive for antibodies for COVID-19. She was stubborn and did not want to get tested when she was ill. She remains fatigued. She states she has poor energy. She has EPPS. She does snore.         Diabetes Mellitus Type II, Follow-up--   Lab Results   Component Value Date    LABA1C 6.9 2020     Lab Results   Component Value Date    .3 2020         She states she is checking her blood sugars. Sugars have been good. She denies excessive thirst or frequent urination. She denies weight changes. She denies burning in the feet. Last Eye Exam: 9/2020  U. Microalbumin/Cr: 1/27/2020, Overdue  Pt is on an ARB. Complications--none. CKD not due to Diabetes. Insulin Treated? No.    ASA: No.  Currently on Xarelto  Tobacco: No      HTN--Patient denies pain, dizziness. She has been short of breath with exertion. She has very little energy. She denies any lightheadedness or syncope.     CKD--She is under the care of Dr. Joan Quinones. Patient denies any increased swelling above baseline. She denies any foamy or frothy urine. She denies any dysuria or hematuria. Venous Insufficiency/bilateral DVT/SVT--   Previous history ----> patient has had complications of postphlebitic syndrome and stasis dermatitis. She has seen the vascular surgeon, Dr. Avelina Soto. She has required treatment with Unna boots in the past.  Dopplers done on 4/4/2019 showed age indeterminate partially occluding superficial venous thrombosis involving right varicosities. She also had a chronic DVT involving the right gastroc vein. She had a chronic SVT involving the right greater saphenous vein and a chronic SVT involving the left saphenofemoral junction and greater saphenous vein. She remains uncertain relatively low 10 mg daily. She is seeing Dr. Lee Lund. Interval Hx--->   She will see Dr. Avelina Soto in 1 week. She wears her compression stockings regularly. She is on Xarelto. She denies bleeding. Patient continues to have issues with swelling. Her swelling is no worse than usual.       Hypothyroidism-- She denies problems with constipation, diarrhea, cold or heat intolerance, hair loss, muscle cramps, tremor, edema or palpitations.   Lab Results   Component Value Date    TSH 1.21 09/14/2020        Hyperlipidemia:    Lab Results   Component Value Date    [facility] 82 11/19/2020    Patient denies any new problems of irregular blood glucose. ROS:  As per HPI      PHYSICAL EXAMINATION:    Vital Signs: (As obtained by patient/caregiver or practitioner observation)    Patient-Reported Vitals 1/12/2021   Patient-Reported Weight 250lb   Patient-Reported Height 5'5   Patient-Reported Systolic 837   Patient-Reported Diastolic 88   Patient-Reported Pulse 86          Wt Readings from Last 3 Encounters:   09/25/20 250 lb (113.4 kg)   06/09/20 260 lb (117.9 kg)   05/18/20 261 lb (118.4 kg)     BP Readings from Last 3 Encounters:   05/18/20 115/85   03/13/20 (!) 140/89   01/27/20 130/85           Physical Exam  Constitutional:       General: She is not in acute distress. Appearance: Normal appearance. She is obese. She is not ill-appearing. HENT:      Head: Normocephalic and atraumatic. Right Ear: External ear normal.      Left Ear: External ear normal.   Eyes:      General: No scleral icterus. Extraocular Movements: Extraocular movements intact. Conjunctiva/sclera: Conjunctivae normal.   Neck:      Musculoskeletal: Normal range of motion. Pulmonary:      Effort: Pulmonary effort is normal. No respiratory distress. Neurological:      Mental Status: She is alert and oriented to person, place, and time. Psychiatric:         Mood and Affect: Mood normal.         Thought Content: Thought content normal.         Judgment: Judgment normal.           Other pertinent observable physical exam findings-     Due to this being a TeleHealth encounter, evaluation of the following organ systems is limited: Vitals/Constitutional/EENT/Resp/CV/GI//MS/Neuro/Skin/Heme-Lymph-Imm. ASSESSMENT/PLAN:    1.  Fatigue, unspecified type  Problem is worse since she had Covid in August.  Unfortunately we do not really have a date of a positive test.  The patient was sick for the better part of August.  She did test positive for the Covid antibody  Rule out other causes such as sleep apnea--refer to sleep  - CBC Auto Differential; Future  - TSH without Reflex; Future  - ECHO Complete 2D W Doppler W Color; Future  - XR CHEST (2 VW); Future    2. EPPS (dyspnea on exertion)  Etiology is unclear  Rule out congestive heart failure  Rule out other causes such as hypoventilation syndrome due to obesity  - Comprehensive Metabolic Panel; Future  - CBC Auto Differential; Future  - BRAIN NATRIURETIC PEPTIDE (BNP); Future  - ECHO Complete 2D W Doppler W Color; Future  - XR CHEST (2 VW); Future    3. Snoring  Rule out sleep apnea  - ECHO Complete 2D W Doppler W Color; Future    4. Type 2 diabetes mellitus without complication, without long-term current use of insulin (HCC)  Condition stability and control are uncertain  Check J2Z  Obtain eye exam report from September  Foot exam next in person visit  - Microalbumin / Creatinine Urine Ratio; Future  - Lipid Panel; Future  - Comprehensive Metabolic Panel; Future  - CBC Auto Differential; Future  - Hemoglobin A1C; Future  - TSH without Reflex; Future    5. Benign essential HTN  Blood pressure is fairly well controlled. Her blood pressure was a little elevated initially but did come down at home. Continue follow-up with her nephrologist  Continue to monitor blood pressure at home  Avoid NSAIDs  Recommend a no added salt/low sodium diet  - Lipid Panel; Future  - Comprehensive Metabolic Panel; Future  - CBC Auto Differential; Future  - TSH without Reflex; Future  - ECHO Complete 2D W Doppler W Color; Future    6. Stage 3a chronic kidney disease  Condition stability is uncertain  Check lab  Avoid NSAIDs  Stay well-hydrated  Monitor weight  - Comprehensive Metabolic Panel; Future  - CBC Auto Differential; Future    7. Acquired hypothyroidism    - TSH without Reflex; Future    8. Gastroesophageal reflux disease without esophagitis  Condition is well controlled  She will continue Nexium with caution  Continue to monitor renal function    9.  Hyperlipidemia, unspecified hyperlipidemia type  Condition stability and control are uncertain  Continue statin as part of her overall cardiovascular disease risk reduction strategy. - Lipid Panel; Future  - Comprehensive Metabolic Panel; Future    10. Venous insufficiency of both lower extremities  Condition is stable  Patient will follow up with her vascular specialist    11. History of COVID-19  Recheck Covid antibody. It will be interesting to see if they are still positive  - Covid-19, Antibody; Future  - ECHO Complete 2D W Doppler W Color; Future  - XR CHEST (2 VW); Future      Return in about 4 months (around 5/12/2021) for check up for chronic medical problems, 30 min. An  electronic signature was used to authenticate this note. --Ethan Sams, DO on 1/12/2021 at 1:38 PM    119}    Pursuant to the emergency declaration under the [facility] waiver authority and the [employer] and [employer] Act, this Virtual  Visit was conducted, with patient's consent, to reduce the patient's risk of exposure to COVID-19 and provide continuity of care for an established patient. Services were provided through a video synchronous discussion virtually to substitute for in-person clinic visit.

## 2021-01-12 NOTE — PATIENT INSTRUCTIONS
Patient Education        Fatigue: Care Instructions  Your Care Instructions     Fatigue is a feeling of tiredness, exhaustion, or lack of energy. You may feel fatigue because of too much or not enough activity. It can also come from stress, lack of sleep, boredom, and poor diet. Many medical problems, such as viral infections, can cause fatigue. Emotional problems, especially depression, are often the cause of fatigue. Fatigue is most often a symptom of another problem. Treatment for fatigue depends on the cause. For example, if you have fatigue because you have a certain health problem, treating this problem also treats your fatigue. If depression or anxiety is the cause, treatment may help. Follow-up care is a key part of your treatment and safety. Be sure to make and go to all appointments, and call your doctor if you are having problems. It's also a good idea to know your test results and keep a list of the medicines you take. How can you care for yourself at home? · Get regular exercise. But don't overdo it. Go back and forth between rest and exercise. · Get plenty of rest.  · Eat a healthy diet. Do not skip meals, especially breakfast.  · Reduce your use of caffeine, tobacco, and alcohol. Caffeine is most often found in coffee, tea, cola drinks, and chocolate. · Limit medicines that can cause fatigue. This includes tranquilizers and cold and allergy medicines. When should you call for help? Watch closely for changes in your health, and be sure to contact your doctor if:    · You have new symptoms such as fever or a rash.     · Your fatigue gets worse.     · You have been feeling down, depressed, or hopeless. Or you may have lost interest in things that you usually enjoy.     · You are not getting better as expected. Where can you learn more? Go to https://chpepiceweb.CityHour. org and sign in to your "[employer] account. Enter F354 in the Fastrhire box to learn more about \"Fatigue: Care Instructions. \"     If you do not have an account, please click on the \"Sign Up Now\" link. Current as of: June 26, 2019               Content Version: 12.6  © 2432-8047 [employer]. Care instructions adapted under license by [facility] ([facility]). If you have questions about a medical condition or this instruction, always ask your healthcare professional. [address] any warranty or liability for your use of this information. Patient Education        Shortness of Breath: Care Instructions  Your Care Instructions     Shortness of breath has many causes. Sometimes conditions such as anxiety can lead to shortness of breath. Some people get mild shortness of breath when they exercise. Trouble breathing also can be a symptom of a serious problem, such as asthma, lung disease, emphysema, heart problems, and pneumonia. If your shortness of breath continues, you may need tests and treatment. Watch for any changes in your breathing and other symptoms. Follow-up care is a key part of your treatment and safety. Be sure to make and go to all appointments, and call your doctor if you are having problems. It's also a good idea to know your test results and keep a list of the medicines you take. How can you care for yourself at home? · Do not smoke or allow others to smoke around you. If you need help quitting, talk to your doctor about stop-smoking programs and medicines. These can increase your chances of quitting for good. · Get plenty of rest and sleep. · Take your medicines exactly as prescribed. Call your doctor if you think you are having a problem with your medicine. · Find healthy ways to deal with stress. ? Exercise daily. ? Get plenty of sleep. ? Eat regularly and well. When should you call for help? Snoring is a noise that you may make while breathing during sleep. You snore when the flow of air from your mouth or nose to your lungs makes the tissues of your throat vibrate while you sleep. This usually is caused by a blockage or narrowing in your nose, mouth, or throat (airway). Snoring can be soft, loud, raspy, harsh, hoarse, or fluttering. Your bed partner may notice that you sleep with your mouth open and that you are restless while sleeping. If snoring interferes with your or your bed partner's sleep, either or both of you may feel tired during the day. You may be able to help reduce your snoring by making changes in your activities and in the way you sleep. Follow-up care is a key part of your treatment and safety. Be sure to make and go to all appointments, and call your doctor if you are having problems. It's also a good idea to know your test results and keep a list of the medicines you take. How can you care for yourself at home? · Lose weight, if needed. Many people who snore are overweight. Weight loss can help reduce the narrowing of the airway and might reduce or stop snoring. · Limit the use of alcohol and medicines. Drinking a lot of alcohol or taking certain medicines, especially sleeping pills or tranquilizers, before sleep may make snoring worse. · Go to bed at the same time each night, and get plenty of sleep. You may snore more when you have not had enough sleep. · Sleep on your side. Sleeping on your side may stop snoring. Try sewing a pocket in the middle of the back of your paEvri top, putting a tennis ball into the pocket, and stitching it closed. This will help keep you from sleeping on your back. · Treat breathing problems. Breathing problems caused by colds or allergies can disturb airflow. This can lead to snoring. · Use a device that helps keep your airway open during sleep. This could be a device that you put in your mouth. Other examples include strips or disks that you use on your nose. · Do not smoke. Smoking can make snoring worse. If you need help quitting, talk to your doctor about stop-smoking programs and medicines. These can increase your chances of quitting for good. · Raise the head of your bed 4 to 6 inches by putting bricks under the legs of the bed. This may prevent your tongue from falling toward the back of the throat, which can make a blocked or narrow airway worse. Putting pillows under your head will not help. When should you call for help? Watch closely for changes in your health, and be sure to contact your doctor if:    · You snore, and you feel sleepy during the day.     · Your sleeping partner or you notice that you gasp, choke, or stop breathing during sleep.     · You do not get better as expected. Where can you learn more? Go to https://AgileMeshpeBigCalceb.Globitel. org and sign in to your [employer] account. Enter P071 in the Bidstalk box to learn more about \"Snoring: Care Instructions. \"     If you do not have an account, please click on the \"Sign Up Now\" link. Current as of: February 24, 2020               Content Version: 12.6  © 7929-2017 [employer]. Care instructions adapted under license by [facility] ([facility]). If you have questions about a medical condition or this instruction, always ask your healthcare professional. [place] [address] any warranty or liability for your use of this information.

## 2021-01-19 ENCOUNTER — HOSPITAL ENCOUNTER (OUTPATIENT)
Age: 61
Discharge: HOME | End: 2021-01-19
Payer: COMMERCIAL

## 2021-01-19 ENCOUNTER — HOSPITAL ENCOUNTER (OUTPATIENT)
Dept: GENERAL RADIOLOGY | Age: 61
Discharge: HOME | End: 2021-01-19
Payer: COMMERCIAL

## 2021-01-19 DIAGNOSIS — E78.5 HYPERLIPIDEMIA, UNSPECIFIED HYPERLIPIDEMIA TYPE: ICD-10-CM

## 2021-01-19 DIAGNOSIS — R53.83 FATIGUE, UNSPECIFIED TYPE: ICD-10-CM

## 2021-01-19 DIAGNOSIS — Z86.16 HISTORY OF COVID-19: ICD-10-CM

## 2021-01-19 DIAGNOSIS — R06.09 DOE (DYSPNEA ON EXERTION): ICD-10-CM

## 2021-01-19 DIAGNOSIS — N18.31 STAGE 3A CHRONIC KIDNEY DISEASE (HCC): ICD-10-CM

## 2021-01-19 DIAGNOSIS — E11.9 TYPE 2 DIABETES MELLITUS WITHOUT COMPLICATION, WITHOUT LONG-TERM CURRENT USE OF INSULIN (HCC): ICD-10-CM

## 2021-01-19 DIAGNOSIS — I10 BENIGN ESSENTIAL HTN: ICD-10-CM

## 2021-01-19 DIAGNOSIS — E03.9 ACQUIRED HYPOTHYROIDISM: ICD-10-CM

## 2021-01-19 LAB
A/G RATIO: 1.4 (ref 1.1–2.2)
ALBUMIN SERPL-MCNC: 3.6 G/DL (ref 3.4–5)
ALP BLD-CCNC: 115 U/L (ref 40–129)
ALT SERPL-CCNC: 13 U/L (ref 10–40)
ANION GAP SERPL CALCULATED.3IONS-SCNC: 9 MMOL/L (ref 3–16)
AST SERPL-CCNC: 13 U/L (ref 15–37)
BASOPHILS ABSOLUTE: 0.1 K/UL (ref 0–0.2)
BASOPHILS RELATIVE PERCENT: 1 %
BILIRUB SERPL-MCNC: 0.4 MG/DL (ref 0–1)
BUN BLDV-MCNC: 21 MG/DL (ref 7–20)
CALCIUM SERPL-MCNC: 9.4 MG/DL (ref 8.3–10.6)
CHLORIDE BLD-SCNC: 104 MMOL/L (ref 99–110)
CHOLESTEROL, TOTAL: 165 MG/DL (ref 0–199)
CO2: 28 MMOL/L (ref 21–32)
CREAT SERPL-MCNC: 1.3 MG/DL (ref 0.6–1.2)
CREATININE URINE: 270.7 MG/DL (ref 28–259)
EOSINOPHILS ABSOLUTE: 0.1 K/UL (ref 0–0.6)
EOSINOPHILS RELATIVE PERCENT: 1.1 %
GFR AFRICAN AMERICAN: 50
GFR NON-AFRICAN AMERICAN: 42
GLOBULIN: 2.6 G/DL
GLUCOSE BLD-MCNC: 179 MG/DL (ref 70–99)
HCT VFR BLD CALC: 39.8 % (ref 36–48)
HDLC SERPL-MCNC: 45 MG/DL (ref 40–60)
HEMOGLOBIN: 12.8 G/DL (ref 12–16)
LDL CHOLESTEROL CALCULATED: 91 MG/DL
LYMPHOCYTES ABSOLUTE: 2.2 K/UL (ref 1–5.1)
LYMPHOCYTES RELATIVE PERCENT: 18.5 %
MCH RBC QN AUTO: 28.6 PG (ref 26–34)
MCHC RBC AUTO-ENTMCNC: 32.2 G/DL (ref 31–36)
MCV RBC AUTO: 88.7 FL (ref 80–100)
MICROALBUMIN UR-MCNC: 3.9 MG/DL
MICROALBUMIN/CREAT UR-RTO: 14.4 MG/G (ref 0–30)
MONOCYTES ABSOLUTE: 0.8 K/UL (ref 0–1.3)
MONOCYTES RELATIVE PERCENT: 6.8 %
NEUTROPHILS ABSOLUTE: 8.7 K/UL (ref 1.7–7.7)
NEUTROPHILS RELATIVE PERCENT: 72.6 %
PDW BLD-RTO: 14.3 % (ref 12.4–15.4)
PLATELET # BLD: 240 K/UL (ref 135–450)
PMV BLD AUTO: 9.6 FL (ref 5–10.5)
POTASSIUM SERPL-SCNC: 4.8 MMOL/L (ref 3.5–5.1)
PRO-BNP: 458 PG/ML (ref 0–124)
RBC # BLD: 4.49 M/UL (ref 4–5.2)
SODIUM BLD-SCNC: 141 MMOL/L (ref 136–145)
TOTAL PROTEIN: 6.2 G/DL (ref 6.4–8.2)
TRIGL SERPL-MCNC: 143 MG/DL (ref 0–150)
TSH SERPL DL<=0.05 MIU/L-ACNC: 0.83 UIU/ML (ref 0.27–4.2)
VLDLC SERPL CALC-MCNC: 29 MG/DL
WBC # BLD: 12 K/UL (ref 4–11)

## 2021-01-19 PROCEDURE — 85025 COMPLETE CBC W/AUTO DIFF WBC: CPT

## 2021-01-19 PROCEDURE — 86769 SARS-COV-2 COVID-19 ANTIBODY: CPT

## 2021-01-19 PROCEDURE — 80053 COMPREHEN METABOLIC PANEL: CPT

## 2021-01-19 PROCEDURE — 82043 UR ALBUMIN QUANTITATIVE: CPT

## 2021-01-19 PROCEDURE — 84443 ASSAY THYROID STIM HORMONE: CPT

## 2021-01-19 PROCEDURE — 71046 X-RAY EXAM CHEST 2 VIEWS: CPT

## 2021-01-19 PROCEDURE — 83036 HEMOGLOBIN GLYCOSYLATED A1C: CPT

## 2021-01-19 PROCEDURE — 83880 ASSAY OF NATRIURETIC PEPTIDE: CPT

## 2021-01-19 PROCEDURE — 36415 COLL VENOUS BLD VENIPUNCTURE: CPT

## 2021-01-19 PROCEDURE — 80061 LIPID PANEL: CPT

## 2021-01-19 PROCEDURE — 82570 ASSAY OF URINE CREATININE: CPT

## 2021-01-20 ENCOUNTER — OFFICE VISIT (OUTPATIENT)
Dept: VASCULAR SURGERY | Age: 61
End: 2021-01-20
Payer: COMMERCIAL

## 2021-01-20 VITALS — WEIGHT: 279 LBS | BODY MASS INDEX: 46.48 KG/M2 | HEIGHT: 65 IN | TEMPERATURE: 96.9 F

## 2021-01-20 DIAGNOSIS — I87.2 VENOUS INSUFFICIENCY OF BOTH LOWER EXTREMITIES: Primary | ICD-10-CM

## 2021-01-20 LAB
ESTIMATED AVERAGE GLUCOSE: 159.9 MG/DL
HBA1C MFR BLD: 7.2 %

## 2021-01-20 PROCEDURE — 99213 OFFICE O/P EST LOW 20 MIN: CPT | Performed by: SURGERY

## 2021-01-20 NOTE — PROGRESS NOTES
Subjective:      Patient ID: Deanna Zavala is a 63 y.o. female. HPI Original referral from Eyad Fernández MD for evaluation of her legs as related to recurrent SVTs. Has remained on Xarelto per Rhoda Grigsby MD. Long h/o B leg swelling with skin changes described as red - L>R. No known ulceration or drainage but no recurrence since wearing stockings daily and using moisturizing cream.    Past Medical History:   Diagnosis Date    Acute superficial venous thrombosis of lower extremity, right 03/05/2019    Arthritis     Chronic superficial venous thrombosis of left lower extremity 8/13/2015    CKD (chronic kidney disease) stage 2, GFR 60-89 ml/min     CKD (chronic kidney disease) stage 3, GFR 30-59 ml/min     Cystic kidney disease     DVT (deep venous thrombosis) ([address] 4/2003, 5/2013    Gastroesophageal reflux disease without esophagitis 11/28/2016    GERD (gastroesophageal reflux disease)     H/O deep venous thrombosis     H/O simple renal cyst     Hypertension     IBS (irritable bowel syndrome)     Osteoarthritis, knee     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 Luis Carlos Mcnair  01/1999    KNEE ARTHROSCOPY  03/2015    right with medial meninsectomy    KNEE ARTHROSCOPY      right x 3-4     MOHS SURGERY  07/01/2019    Dr. Valarie Traylor Left 09/30/2020    RETINAL DETACHMENT SURGERY Left 10/24/2020    TONSILLECTOMY      TOTAL KNEE ARTHROPLASTY  05/2013    right    VARICOSE VEIN SURGERY  1980's.        Allergies   Allergen Reactions    Codeine Other (See Comments) hallucinations    Morphine And Related      Current Outpatient Medications   Medication Sig Dispense Refill    rosuvastatin (CRESTOR) 10 MG tablet Take 1 tablet by mouth nightly 90 tablet 3    irbesartan (AVAPRO) 150 MG tablet TAKE 1 TABLET DAILY 90 tablet 3    cholestyramine (QUESTRAN) 4 g packet Take 1 packet by mouth nightly      levothyroxine (SYNTHROID) 150 MCG tablet TAKE 1 TABLET DAILY 90 tablet 3    furosemide (LASIX) 20 MG tablet TAKE 1 TABLET DAILY 90 tablet 3    esomeprazole (NEXIUM) 40 MG delayed release capsule TAKE 1 CAPSULE DAILY IN THE MORNING BEFORE BREAKFAST 90 capsule 4    ONETOUCH DELICA LANCETS FINE MISC USE AS DIRECTED TO TEST BLOOD SUGAR ONCE EVERY  each 6    blood glucose monitor strips 1 strip by Other route daily Test 2 times a day & as needed for symptoms of irregular blood glucose.  100 strip 3    rivaroxaban (XARELTO) 10 MG TABS tablet Take 10 mg by mouth       Current Facility-Administered Medications   Medication Dose Route Frequency Provider Last Rate Last Admin    methylPREDNISolone acetate (DEPO-MEDROL) injection 40 mg  40 mg Intra-articular Once Lien Franco MD         Social History     Socioeconomic History    Marital status:      Spouse name: Not on file    Number of children: Not on file    Years of education: Not on file    Highest education level: Not on file   Occupational History    Not on file   Social Needs    Financial resource strain: Not on file    Food insecurity     Worry: Not on file     Inability: Not on file    Transportation needs     Medical: Not on file     Non-medical: Not on file   Tobacco Use    Smoking status: Never Smoker    Smokeless tobacco: Never Used   Substance and Sexual Activity    Alcohol use: No    Drug use: No    Sexual activity: Not on file   Lifestyle    Physical activity     Days per week: Not on file     Minutes per session: Not on file    Stress: Not on file   Relationships  Social connections     Talks on phone: Not on file     Gets together: Not on file     Attends Protestant service: Not on file     Active member of club or organization: Not on file     Attends meetings of clubs or organizations: Not on file     Relationship status: Not on file    Intimate partner violence     Fear of current or ex partner: Not on file     Emotionally abused: Not on file     Physically abused: Not on file     Forced sexual activity: Not on file   Other Topics Concern    Not on file   Social History Narrative    Not on file     Family History   Problem Relation Age of Onset    High Blood Pressure Mother     Osteoporosis Mother          Review of Systems   All other systems reviewed and are negative. 15 pt ROS confirmed by MD personally. Objective:   Physical Exam  Vitals signs and nursing note reviewed. Constitutional:       Comments: Morbid obesity - central obesity   HENT:      Head: Normocephalic and atraumatic. Eyes:      Conjunctiva/sclera: Conjunctivae normal.      Pupils: Pupils are equal, round, and reactive to light. Neck:      Musculoskeletal: Normal range of motion and neck supple. Thyroid: No thyromegaly. Vascular: No JVD. Trachea: No tracheal deviation. Cardiovascular:      Rate and Rhythm: Normal rate and regular rhythm. Heart sounds: Normal heart sounds. Pulmonary:      Effort: Pulmonary effort is normal.      Breath sounds: Normal breath sounds. Abdominal:      General: Bowel sounds are normal.      Palpations: Abdomen is soft. There is no mass. Musculoskeletal:      Right lower leg: No edema. Left lower leg: No edema. Comments: Edema well controlled   Lymphadenopathy:      Cervical: No cervical adenopathy. Skin:     General: Skin is warm. Capillary Refill: Capillary refill takes less than 2 seconds. Findings: No erythema (pretibial B legs (L>R)). Comments: Very dry skin without breakdown.    Neurological: Mental Status: She is alert and oriented to person, place, and time. Cranial Nerves: No cranial nerve deficit. Sensory: No sensory deficit. Motor: No abnormal muscle tone. Coordination: Coordination normal.   Psychiatric:         Mood and Affect: Mood normal.         Behavior: Behavior normal.         Thought Content: Thought content normal.         Judgment: Judgment normal.       R size  L size     Spider  telangiectasias       Reticular veins     Calf/thigh 8-10 mm Varicose   veins Calf/thigh 8-10 mm       Assessment:      1) Chronic venous insufficiency B legs with secondary varicose veins and stasis dermatitis - well controlled with stocking compliance  2) H/O Recurrent SVT  3) Morbid obesity      Plan:      Continue current management with daily moisturizers and compression stockings. Recommend that she obtain as many pairs as possible so that she can extend their life span. Understands well and is agreeable to this. Will return to see us in 1 year or earlier if necessary.

## 2021-01-22 LAB — SARS-COV-2, IGG: POSITIVE

## 2021-01-25 ENCOUNTER — HOSPITAL ENCOUNTER (OUTPATIENT)
Dept: NON INVASIVE DIAGNOSTICS | Age: 61
Discharge: HOME | End: 2021-01-25
Payer: COMMERCIAL

## 2021-01-25 DIAGNOSIS — I10 BENIGN ESSENTIAL HTN: ICD-10-CM

## 2021-01-25 DIAGNOSIS — R06.83 SNORING: ICD-10-CM

## 2021-01-25 DIAGNOSIS — Z86.16 HISTORY OF COVID-19: ICD-10-CM

## 2021-01-25 DIAGNOSIS — R06.09 DOE (DYSPNEA ON EXERTION): ICD-10-CM

## 2021-01-25 DIAGNOSIS — R53.83 FATIGUE, UNSPECIFIED TYPE: ICD-10-CM

## 2021-01-25 LAB
LV EF: 55 %
LVEF MODALITY: NORMAL

## 2021-01-25 PROCEDURE — 93306 TTE W/DOPPLER COMPLETE: CPT

## 2021-03-03 ENCOUNTER — OFFICE VISIT (OUTPATIENT)
Dept: ORTHOPEDIC SURGERY | Age: 61
End: 2021-03-03
Payer: COMMERCIAL

## 2021-03-03 VITALS — WEIGHT: 279 LBS | HEIGHT: 65 IN | TEMPERATURE: 97.7 F | BODY MASS INDEX: 46.48 KG/M2

## 2021-03-03 DIAGNOSIS — Z96.651 STATUS POST TOTAL RIGHT KNEE REPLACEMENT: ICD-10-CM

## 2021-03-03 DIAGNOSIS — M17.12 PRIMARY OSTEOARTHRITIS OF LEFT KNEE: Primary | ICD-10-CM

## 2021-03-03 PROCEDURE — 20610 DRAIN/INJ JOINT/BURSA W/O US: CPT | Performed by: ORTHOPAEDIC SURGERY

## 2021-03-03 PROCEDURE — 99213 OFFICE O/P EST LOW 20 MIN: CPT | Performed by: ORTHOPAEDIC SURGERY

## 2021-03-03 RX ORDER — METHYLPREDNISOLONE ACETATE 40 MG/ML
40 INJECTION, SUSPENSION INTRA-ARTICULAR; INTRALESIONAL; INTRAMUSCULAR; SOFT TISSUE ONCE
Status: COMPLETED | OUTPATIENT
Start: 2021-03-03 | End: 2021-03-03

## 2021-03-03 RX ADMIN — METHYLPREDNISOLONE ACETATE 40 MG: 40 INJECTION, SUSPENSION INTRA-ARTICULAR; INTRALESIONAL; INTRAMUSCULAR; SOFT TISSUE at 15:36

## 2021-03-03 NOTE — PROGRESS NOTES
3/3/21 3:35 PM     Lidocaine Injection      NDC: [address]-68    Lot Number: [id number]    Body Part: left knee

## 2021-03-03 NOTE — PROGRESS NOTES
Chief Complaint  Follow-up (left knee. )      History of Present Illness:  Kelsey Short is a pleasant 57 y.o. female who presents today for follow up evaluation of left knee pain. She has known osteoarthritis of the left knee and over  12 years status post right total knee arthroplasty by Dr. Eduard Cook. She had a left knee intraarticular cortisone injection on 09/25/2020 which helped her pain for 4 months. Comes in today for exacerbation of left knee pain. No new injuries reported.  Matias Thakur is seeing Dr. Miri Arora for right knee replacement follow-up, therapy is going well. Medical History:  Patient's medications, allergies, past medical, surgical, social and family histories were reviewed and updated as appropriate. Pain Assessment  Location of Pain: Knee  Location Modifiers: Right  ROS: Review of systems reviewed from Patient History Form completed today and available in the patient's chart under the Media tab. Pertinent items are noted in HPI  Review of systems reviewed from Patient History Form completed today and available in the patient's chart under the Media tab. Vital Signs:  Temp 97.7 °F (36.5 °C)   Ht 5' 5\" (1.651 m)   Wt 279 lb (126.6 kg)   BMI 46.43 kg/m²     Left Knee Examination:     Gait: No use of assistive devices. No antalgic gait.     Alignment: Alignment appreciated.      Inspection/skin: Quadriceps well developed. Skin is intact without erythema or ecchymosis. No gross deformity.       Palpation: Crepitus. Nontender along joint line. No pain with compression of patella. Nontender to light touch.     Range of Motion: 0-100     Strength: 5/5 quad strength     Effusion: No apparent effusion.     Ligamentous stability: Stable to valgus and varus stress at 0° and 30°. Solid endpoint with Lachman's.  Negative posterior and anterior drawer signs.      Patella tracking: Smooth translation of patella.     Special tests: Negative Jody sign. Patella apprehension sign negative.      Neurologic and vascular: Skin is warm and well-perfused. Distally neurovascularly intact.     Additional findings: Calf soft nontender. Sensation is intact to light-touch. No pretibial edema.        Right knee comparison exam:      Gait: No use of assistive devices. No antalgic gait.     Alignment: s/p TKA     Inspection/skin: Well healed surgical incision      Palpation: mild crepitus. no joint line tenderness present.     Range of Motion: There is full range of motion.      Strength: Normal quadriceps development.      Effusion: No effusion or swelling present.      Ligamentous stability: No cruciate or collateral ligament instability.      Neurologic and vascular: Skin is warm and well-perfused. Sensation is intact to light-touch.      Special tests: Negative Jody sign.        Radiology:       Pertinent imaging was interpreted and reviewed with the patient today, images only - no report available. No new imaging was obtained during today's visit. Impression: Severe tricompartmental osteoarthritis of the left knee. Right knee hardware consistent with total knee replacement.         Assessment :  Osteoarthritis of the left knee; painfull right total knee replacement    Impression:  Encounter Diagnoses   Name Primary?  Primary osteoarthritis of left knee Yes    Status post total right knee replacement        Office Procedures:  No orders of the defined types were placed in this encounter. Plan: Pertinent imaging was reviewed. The etiology, natural history, and treatment options for the disorder were discussed. The roles of activity medication, antiinflammatories, injections, bracing, physical therapy, and surgical interventions were all described to the patient and questions were answered. I believe Sina Salinas is a candidate for a repeat intra-articular cortisone injection as it provided 3 months of relief. She wishes to proceed with this entity. She is to continue her home exercises for strengthening. She is to continue seeing Dr. Brenda Armijo for her painful total knee replacement. She is going through complex retina procedures and I would like these to be resolved prior to her left knee replacement     Greg Harrell is in agreement with this plan. All questions were answered to patient's satisfaction and was encouraged to call with any further questions. The indications and risks of steroid injection as well as treatment alternatives were discussed with the patient who consented to the procedure. Under sterile conditions and after informed consent was obtained the patient was given an injection into the left knee. 2cc 40 mg of Depo-Medrol and 4 mL of 1% lidocaine were placed in the knee after it was prepped with chlorhexidine. This resulted in good relief of symptoms. There were no complications. The patient was advised to ice the knee this evening and to avoid vigorous activities for the next 2 days. They were advised to call us if there was any erythema, enduration, swelling or increasing pain. Total time spent for evaluation, education, and development of treatment plan: 25 minutes    Agueda Minor, [address] [facility] [address]  3/3/2021    During this exam, I, Agueda Minor PA-C, functioned as a scribe for Dr. Jesus Felipe. The history taking and physical examination were performed by Dr. Jesus Felipe. All counseling during the appointment was performed between the patient and Dr. Jesus Felipe.  3/3/2021 2:41 PM This dictation was performed with a verbal recognition program (DRAGON) and it was checked for errors. It is possible that there are still dictated errors within this office note. If so, please bring any areas to my attention for an addendum. All efforts were made to ensure that this office note is accurate. I attest that I met personally with the patient, performed the described exam, reviewed the radiographic studies and medical records associated with this patient and supervised the services that are described above.      Jesus Ashley MD

## 2021-03-23 ENCOUNTER — HOSPITAL ENCOUNTER (OUTPATIENT)
Age: 61
Discharge: HOME | End: 2021-03-23
Payer: COMMERCIAL

## 2021-03-23 DIAGNOSIS — E11.9 TYPE 2 DIABETES MELLITUS WITHOUT COMPLICATION, WITHOUT LONG-TERM CURRENT USE OF INSULIN (HCC): ICD-10-CM

## 2021-03-23 DIAGNOSIS — N18.2 CKD (CHRONIC KIDNEY DISEASE), STAGE II: ICD-10-CM

## 2021-03-23 LAB
A/G RATIO: 1.6 (ref 1.1–2.2)
ALBUMIN SERPL-MCNC: 4 G/DL (ref 3.4–5)
ALP BLD-CCNC: 111 U/L (ref 40–129)
ALT SERPL-CCNC: 29 U/L (ref 10–40)
ANION GAP SERPL CALCULATED.3IONS-SCNC: 10 MMOL/L (ref 3–16)
AST SERPL-CCNC: 29 U/L (ref 15–37)
BILIRUB SERPL-MCNC: 0.5 MG/DL (ref 0–1)
BUN BLDV-MCNC: 23 MG/DL (ref 7–20)
CALCIUM SERPL-MCNC: 9.1 MG/DL (ref 8.3–10.6)
CHLORIDE BLD-SCNC: 106 MMOL/L (ref 99–110)
CO2: 26 MMOL/L (ref 21–32)
CREAT SERPL-MCNC: 1.3 MG/DL (ref 0.6–1.2)
CREATININE URINE: 173.1 MG/DL (ref 28–259)
GFR AFRICAN AMERICAN: 50
GFR NON-AFRICAN AMERICAN: 41
GLOBULIN: 2.5 G/DL
GLUCOSE BLD-MCNC: 181 MG/DL (ref 70–99)
HCT VFR BLD CALC: 44.4 % (ref 36–48)
HEMOGLOBIN: 14.2 G/DL (ref 12–16)
MCH RBC QN AUTO: 28.1 PG (ref 26–34)
MCHC RBC AUTO-ENTMCNC: 32 G/DL (ref 31–36)
MCV RBC AUTO: 87.7 FL (ref 80–100)
PDW BLD-RTO: 14.3 % (ref 12.4–15.4)
PLATELET # BLD: 274 K/UL (ref 135–450)
PMV BLD AUTO: 9.7 FL (ref 5–10.5)
POTASSIUM SERPL-SCNC: 4.9 MMOL/L (ref 3.5–5.1)
PROTEIN PROTEIN: 28 MG/DL
RBC # BLD: 5.06 M/UL (ref 4–5.2)
SODIUM BLD-SCNC: 142 MMOL/L (ref 136–145)
TOTAL PROTEIN: 6.5 G/DL (ref 6.4–8.2)
WBC # BLD: 12.1 K/UL (ref 4–11)

## 2021-03-23 PROCEDURE — 85027 COMPLETE CBC AUTOMATED: CPT

## 2021-03-23 PROCEDURE — 84156 ASSAY OF PROTEIN URINE: CPT

## 2021-03-23 PROCEDURE — 36415 COLL VENOUS BLD VENIPUNCTURE: CPT

## 2021-03-23 PROCEDURE — 82570 ASSAY OF URINE CREATININE: CPT

## 2021-03-23 PROCEDURE — 80053 COMPREHEN METABOLIC PANEL: CPT

## 2021-03-24 ENCOUNTER — TELEPHONE (OUTPATIENT)
Dept: INTERNAL MEDICINE CLINIC | Age: 61
End: 2021-03-24

## 2021-03-24 NOTE — TELEPHONE ENCOUNTER
----- Message from [facility] sent at 3/24/2021 11:48 AM EDT -----  Subject: Message to Provider    QUESTIONS  Information for Provider? Requesting a renewal for handicap parking . would like to pick this up from office. Please follow up to advise   ---------------------------------------------------------------------------  --------------  CALL BACK INFO  What is the best way for the office to contact you? OK to leave message on   voicemail  Preferred Call Back Phone Number? [phone]  ---------------------------------------------------------------------------  --------------  SCRIPT ANSWERS  Relationship to Patient?  Self

## 2021-04-03 DIAGNOSIS — I10 BENIGN ESSENTIAL HTN: ICD-10-CM

## 2021-04-03 DIAGNOSIS — E03.9 ACQUIRED HYPOTHYROIDISM: ICD-10-CM

## 2021-04-03 DIAGNOSIS — I87.2 VENOUS INSUFFICIENCY OF BOTH LOWER EXTREMITIES: ICD-10-CM

## 2021-04-05 RX ORDER — LEVOTHYROXINE SODIUM 0.15 MG/1
TABLET ORAL
Qty: 90 TABLET | Refills: 3 | Status: SHIPPED | OUTPATIENT
Start: 2021-04-05 | End: 2022-03-30

## 2021-04-05 RX ORDER — FUROSEMIDE 20 MG/1
TABLET ORAL
Qty: 90 TABLET | Refills: 3 | Status: SHIPPED | OUTPATIENT
Start: 2021-04-05 | End: 2022-03-30

## 2021-04-26 RX ORDER — ESOMEPRAZOLE MAGNESIUM 40 MG/1
CAPSULE, DELAYED RELEASE ORAL
Qty: 90 CAPSULE | Refills: 3 | Status: SHIPPED | OUTPATIENT
Start: 2021-04-26 | End: 2022-04-23

## 2021-04-28 ENCOUNTER — OFFICE VISIT (OUTPATIENT)
Dept: ORTHOPEDIC SURGERY | Age: 61
End: 2021-04-28
Payer: COMMERCIAL

## 2021-04-28 VITALS — HEIGHT: 65 IN | TEMPERATURE: 98.7 F | BODY MASS INDEX: 43.32 KG/M2 | WEIGHT: 260 LBS

## 2021-04-28 DIAGNOSIS — M72.2 PLANTAR FASCIITIS, LEFT: Primary | ICD-10-CM

## 2021-04-28 DIAGNOSIS — M79.672 LEFT FOOT PAIN: ICD-10-CM

## 2021-04-28 PROCEDURE — 99214 OFFICE O/P EST MOD 30 MIN: CPT | Performed by: ORTHOPAEDIC SURGERY

## 2021-04-28 PROCEDURE — L3170 FOOT PLAS HEEL STABI PRE OTS: HCPCS | Performed by: PHYSICIAN ASSISTANT

## 2021-04-28 NOTE — PROGRESS NOTES
Date:  2021    Name:  Shaheen Martell  Address:  [place] [address]    :  1960      Age:   57 y.o.    SSN:        Medical Record Number:  [id number]    Reason for Visit:    Chief Complaint    Foot Pain (old patient / new problem left foot)      DOS:2021     HPI: Shaheen Martell is a 57 y.o. female here today for evaluation of left heel pain that has been ongoing for 3 weeks. She does not recall an associated injury. She complains of pain in her left heel, radiating along the plantar fascia. Pain is exacerbated with weight bearing. Denies numbness or tingling. She is ok sleeping at night. She has not had any formal treatment for this issue. ROS: Review of systems reviewed from Patient History Form completed today and available in the patient's chart under the Media tab.      Past Medical History:   Diagnosis Date    Acute superficial venous thrombosis of lower extremity, right 2019    Arthritis     Chronic superficial venous thrombosis of left lower extremity 2015    CKD (chronic kidney disease) stage 2, GFR 60-89 ml/min     CKD (chronic kidney disease) stage 3, GFR 30-59 ml/min     Cystic kidney disease     DVT (deep venous thrombosis) ([address] 2003, 2013    Gastroesophageal reflux disease without esophagitis 2016    GERD (gastroesophageal reflux disease)     H/O deep venous thrombosis     H/O simple renal cyst     Hypertension     IBS (irritable bowel syndrome)     Osteoarthritis, knee     Saphenofemoral venous reflux 2015    BIlateral    Squamous cell carcinoma of skin of lower limb, left     Steatosis of liver     SVT (supraventricular tachycardia) ([address] 2019    Thyroid disease     Type 2 diabetes mellitus ([address]     Venous insufficiency         Past Surgical History:   Procedure Laterality Date    BREAST SURGERY      left fibroid tumor    CHOLECYSTECTOMY  2003    COLONOSCOPY  2015     Eddie Sandi HYSTERECTOMY  55/2180    KNEE ARTHROSCOPY  03/2015    right with medial meninsectomy    KNEE ARTHROSCOPY      right x 3-4     MOHS SURGERY  07/01/2019    Dr. Jimenez Oas Left 09/30/2020    X 3 LAST ONE IN 2/2021    RETINAL DETACHMENT SURGERY Left 10/24/2020    TONSILLECTOMY      TOTAL KNEE ARTHROPLASTY  05/2013    right    VARICOSE VEIN SURGERY  1980's.          Family History   Problem Relation Age of Onset    High Blood Pressure Mother     Osteoporosis Mother        Social History     Socioeconomic History    Marital status:      Spouse name: Not on file    Number of children: Not on file    Years of education: Not on file    Highest education level: Not on file   Occupational History    Not on file   Social Needs    Financial resource strain: Not on file    Food insecurity     Worry: Not on file     Inability: Not on file    Transportation needs     Medical: Not on file     Non-medical: Not on file   Tobacco Use    Smoking status: Never Smoker    Smokeless tobacco: Never Used   Substance and Sexual Activity    Alcohol use: No    Drug use: No    Sexual activity: Not on file   Lifestyle    Physical activity     Days per week: Not on file     Minutes per session: Not on file    Stress: Not on file   Relationships    Social connections     Talks on phone: Not on file     Gets together: Not on file     Attends Spiritism service: Not on file     Active member of club or organization: Not on file     Attends meetings of clubs or organizations: Not on file     Relationship status: Not on file    Intimate partner violence     Fear of current or ex partner: Not on file     Emotionally abused: Not on file     Physically abused: Not on file     Forced sexual activity: Not on file   Other Topics Concern    Not on file   Social History Narrative    Not on file       Current Outpatient Medications   Medication Sig Dispense Refill    esomeprazole ([address]) 40 MG delayed release capsule TAKE 1 CAPSULE DAILY IN THE MORNING BEFORE BREAKFAST 90 capsule 3    levothyroxine (SYNTHROID) 150 MCG tablet TAKE 1 TABLET DAILY 90 tablet 3    furosemide (LASIX) 20 MG tablet TAKE 1 TABLET DAILY 90 tablet 3    rosuvastatin (CRESTOR) 10 MG tablet Take 1 tablet by mouth nightly 90 tablet 3    irbesartan (AVAPRO) 150 MG tablet TAKE 1 TABLET DAILY 90 tablet 3    cholestyramine (QUESTRAN) 4 g packet Take 1 packet by mouth nightly      ONETOUCH DELICA LANCETS FINE MISC USE AS DIRECTED TO TEST BLOOD SUGAR ONCE EVERY  each 6    blood glucose monitor strips 1 strip by Other route daily Test 2 times a day & as needed for symptoms of irregular blood glucose. 100 strip 3    rivaroxaban (XARELTO) 10 MG TABS tablet Take 10 mg by mouth       Current Facility-Administered Medications   Medication Dose Route Frequency Provider Last Rate Last Admin    methylPREDNISolone acetate (DEPO-MEDROL) injection 40 mg  40 mg Intra-articular Once Raza Womack MD           Allergies   Allergen Reactions    Codeine Other (See Comments)     hallucinations    Morphine And Related        Vital signs: There were no vitals taken for this visit. LEFT Foot Examination:    Inspection: Normal alignment. No erythema or ecchymosis. Midfoot pronation. Palpation: Tender over medial calcaneal tubercle. Mild plantar fascial tenderness. Range of Motion: Full. Moderate heel cord tightness. Strength:  5/5 eversion and inversion. Neurovascular: Normal sensation. Skin warm well perfused. RIGHT Foot Comparison Exam:    Inspection: There is normal alignment. No swelling or ecchymosis is present. Gait: Patient is able to weight-bear without pain. Range of motion: Patient can perform a toe rise without pain. There is full range of motion of the ankle, midfoot, hindfoot and forefoot. Stability: No instability is present. Strength: Normal strength is present. Palpation: There is no focal tenderness. Neurovascular: Skin is warm and well-perfused. Sensation normal.            Diagnostics:  Radiology:     Pertinent imaging was interpreted and reviewed with the patient. LEFT foot x-ray:    AP, Oblique, Lateral views were obtained  and reviewed of the left foot. Impression: No acute bony abnormalities appreciated on radiographic examination. Assessment: Plantar fasciitis of left foot    Plan: Pertinent imaging was reviewed. The etiology, natural history, and treatment options for the disorder were discussed. The roles of activity medication, antiinflammatories, injections, bracing, physical therapy, and surgical interventions were all described to the patient and questions were answered. I believe she is a candidate for a home exercise program for plantar fasciitis, handout provided. I also recommend topical Voltaren gel for her pain and inflammation as well as a pair of Visco heel N inserts. She wishes to proceed. I will see her back if symptoms persist or worsen. Tristan Patel is in agreement with this plan. All questions were answered to patient's satisfaction and was encouraged to call with any further questions. Orders Placed This Encounter   Procedures    XR FOOT LEFT (MIN 3 VIEWS)     Standing Status:   Future     Number of Occurrences:   1     Standing Expiration Date:   4/28/2022     Order Specific Question:   Reason for exam:     Answer:   pain    Visco N Heel Shoe Inserts     Patient was prescribed a Visco N Heel Shoe Insert. The left foot  will require protection / support from this orthosis to improve their function. The orthosis will assist in protecting the affected area, provide functional support and facilitate healing. The patient was educated and fit by a healthcare professional with expert knowledge and specialization in brace application while under the direct supervision of the treating physician.   Verbal and written instructions for the use of and application of this item were provided. They were instructed to contact the office immediately should the brace result in increased pain, decreased sensation, increased swelling or worsening of the condition. Total time spent for evaluation, education and development of treatment plan: 45 minutes      Nelly ARROYO ATC, am scribing for and in the presence of Dr. Arnel Castanon. 04/28/21 2:18 PM Nelly Gonzalez ATC    I attest that I met personally with the patient, performed the described exam, reviewed the radiographic studies and medical records associated with this patient and supervised the services that are described above.      Harleen Sexton MD

## 2021-05-10 ENCOUNTER — OFFICE VISIT (OUTPATIENT)
Dept: INTERNAL MEDICINE CLINIC | Age: 61
End: 2021-05-10
Payer: COMMERCIAL

## 2021-05-10 VITALS
DIASTOLIC BLOOD PRESSURE: 82 MMHG | WEIGHT: 277 LBS | SYSTOLIC BLOOD PRESSURE: 138 MMHG | HEIGHT: 65 IN | HEART RATE: 84 BPM | BODY MASS INDEX: 46.15 KG/M2 | RESPIRATION RATE: 12 BRPM

## 2021-05-10 DIAGNOSIS — E78.5 HYPERLIPIDEMIA, UNSPECIFIED HYPERLIPIDEMIA TYPE: ICD-10-CM

## 2021-05-10 DIAGNOSIS — I10 BENIGN ESSENTIAL HTN: ICD-10-CM

## 2021-05-10 DIAGNOSIS — K21.9 GASTROESOPHAGEAL REFLUX DISEASE WITHOUT ESOPHAGITIS: ICD-10-CM

## 2021-05-10 DIAGNOSIS — E11.9 TYPE 2 DIABETES MELLITUS WITHOUT COMPLICATION, WITHOUT LONG-TERM CURRENT USE OF INSULIN (HCC): Primary | ICD-10-CM

## 2021-05-10 DIAGNOSIS — E03.9 ACQUIRED HYPOTHYROIDISM: ICD-10-CM

## 2021-05-10 DIAGNOSIS — N18.31 STAGE 3A CHRONIC KIDNEY DISEASE (HCC): ICD-10-CM

## 2021-05-10 DIAGNOSIS — I87.2 VENOUS INSUFFICIENCY OF BOTH LOWER EXTREMITIES: ICD-10-CM

## 2021-05-10 PROCEDURE — 99214 OFFICE O/P EST MOD 30 MIN: CPT | Performed by: INTERNAL MEDICINE

## 2021-05-10 PROCEDURE — 3051F HG A1C>EQUAL 7.0%<8.0%: CPT | Performed by: INTERNAL MEDICINE

## 2021-05-10 NOTE — PATIENT INSTRUCTIONS
Patient Education        Diabetes Foot Health: Care Instructions  Your Care Instructions     When you have diabetes, your feet need extra care and attention. Diabetes can damage the nerve endings and blood vessels in your feet, making you less likely to notice when your feet are injured. Diabetes also limits your body's ability to fight infection and get blood to areas that need it. If you get a minor foot injury, it could become an ulcer or a serious infection. With good foot care, you can prevent most of these problems. Caring for your feet can be quick and easy. Most of the care can be done when you are bathing or getting ready for bed. Follow-up care is a key part of your treatment and safety. Be sure to make and go to all appointments, and call your doctor if you are having problems. It's also a good idea to know your test results and keep a list of the medicines you take. How can you care for yourself at home? · Keep your blood sugar close to normal by watching what and how much you eat, monitoring blood sugar, taking medicines if prescribed, and getting regular exercise. · Do not smoke. Smoking affects blood flow and can make foot problems worse. If you need help quitting, talk to your doctor about stop-smoking programs and medicines. These can increase your chances of quitting for good. · Eat a diet that is low in fats. High fat intake can cause fat to build up in your blood vessels and decrease blood flow. · Inspect your feet daily for blisters, cuts, cracks, or sores. If you cannot see well, use a mirror or have someone help you. · Take care of your feet:  ? Wash your feet every day. Use warm (not hot) water. Check the water temperature with your wrists or other part of your body, not your feet. ? Dry your feet well. Pat them dry. Do not rub the skin on your feet too hard. Dry well between your toes.  If the skin on your feet stays moist, bacteria or a fungus can grow, which can lead to infection. ? Keep your skin soft. Use moisturizing skin cream to keep the skin on your feet soft and prevent calluses and cracks. But do not put the cream between your toes, and stop using any cream that causes a rash. ? Clean underneath your toenails carefully. Do not use a sharp object to clean underneath your toenails. Use the blunt end of a nail file or other rounded tool. ? Trim and file your toenails straight across to prevent ingrown toenails. Use a nail clipper, not scissors. Use an emery board to smooth the edges. · Change socks daily. Socks without seams are best, because seams often rub the feet. You can find socks for people with diabetes from specialty catalogs. · Look inside your shoes every day for things like gravel or torn linings, which could cause blisters or sores. · Buy shoes that fit well:  ? Look for shoes that have plenty of space around the toes. This helps prevent bunions and blisters. ? Try on shoes while wearing the kind of socks you will usually wear with the shoes. ? Avoid plastic shoes. They may rub your feet and cause blisters. Good shoes should be made of materials that are flexible and breathable, such as leather or cloth. ? Break in new shoes slowly by wearing them for no more than an hour a day for several days. Take extra time to check your feet for red areas, blisters, or other problems after you wear new shoes. · Do not go barefoot. Do not wear sandals, and do not wear shoes with very thin soles. Thin soles are easy to puncture. They also do not protect your feet from hot pavement or cold weather. · Have your doctor check your feet during each visit. If you have a foot problem, see your doctor. Do not try to treat an early foot problem at home. Home remedies or treatments that you can buy without a prescription (such as corn removers) can be harmful. · Always get early treatment for foot problems.  A minor irritation can lead to a major problem if not properly cared for early. When should you call for help? Call your doctor now or seek immediate medical care if:    · You have a foot sore, an ulcer or break in the skin that is not healing after 4 days, bleeding corns or calluses, or an ingrown toenail.     · You have blue or black areas, which can mean bruising or blood flow problems.     · You have peeling skin or tiny blisters between your toes or cracking or oozing of the skin.     · You have a fever for more than 24 hours and a foot sore.     · You have new numbness or tingling in your feet that does not go away after you move your feet or change positions.     · You have unexplained or unusual swelling of the foot or ankle. Watch closely for changes in your health, and be sure to contact your doctor if:    · You cannot do proper foot care. Where can you learn more? Go to https://[email or web address] [email or web address].Queryday. org and sign in to your [employer] account. Enter A739 in the Getui box to learn more about \"Diabetes Foot Health: Care Instructions. \"     If you do not have an account, please click on the \"Sign Up Now\" link. Current as of: August 31, 2020               Content Version: 12.8  © 2006-2021 Healthwise[employer] Inc[employer]. Care instructions adapted under license by [facility] ([facility]). If you have questions about a medical condition or this instruction, always ask your healthcare professional. [place] [address] any warranty or liability for your use of this information.

## 2021-05-10 NOTE — PROGRESS NOTES
[facility]) Physicians  Internal Medicine  Patient Encounter  Maya Chavez D.O., [facility]        Chief Complaint   Patient presents with    Medication Check    Check-Up       HPI: 57 y.o. female with multiple complex medical problems seen today requesting her routine checkup regarding the status of current chronic medical problems as the below along with a medication review and reconciliation. The patient has multiple medical problems including but not limited to type 2 diabetes, hypertension, hyperlipidemia, venous insufficiency, venous stasis disease, bilateral DVT, superficial phlebitis, chronic kidney disease stage III, hypothyroidism, GERD, osteoarthritis. Patient was recently seen by Dr. Joel Go regarding her plantar fasciitis. His office note is reviewed. She was seen on 4/28/2021. Patient was placed on home exercise program and placed on topical Voltaren gel. It was recommended she get a pair of heel inserts. She is doing better. She is still contemplating a left total knee. She was seen by her nephrologist on 3/26/2021. The office note is reviewed. Her last microalbumin/creatinine ratio was normal at 14.4 in January 2021. Her CMP ordered by her nephrologist showed a BUN of 23 and creatinine 1.3 with a GFR of 41. No changes made. She is to return in 6 months. She saw Dr. Ag Murray for FAISAL x 2. We reviewed her last echocardiogram from 1/25/2021. This showed an ejection fraction of 55%. No other concerning findings noted. Since September 2020, she has had three eye surgeries for detached retina. She denies CP, SOB, palpitations, dizziness, increased swelling above baseline. She denies abd pain, nausea, vomiting, constipation. She denies heartburn or dysphagia. She does not check blood sugars. Home BP at home---> 120's-134/70's-80's.       Past Medical History:   Diagnosis Date    Acute superficial venous thrombosis of lower extremity, right 03/05/2019    unilateral weakness, paresthesia and dizziness. OBJECTIVE:  Vitals:    05/10/21 1300 05/10/21 1326   BP: (!) 142/90 138/82   Pulse: 84    Resp: 12    Weight: 277 lb (125.6 kg)    Height: 5' 5\" (1.651 m)      Body mass index is 46.1 kg/m². Wt Readings from Last 3 Encounters:   05/10/21 277 lb (125.6 kg)   04/28/21 260 lb (117.9 kg)   03/03/21 279 lb (126.6 kg)     BP Readings from Last 3 Encounters:   05/10/21 138/82   05/18/20 115/85   03/13/20 (!) 140/89      GEN: NAD, A&O, Non-toxic  HEENT: NC/AT, SILAS, EOMI, Oral cavity Clear,  TM's NL, Nasal cavity clear. NECK: Supple. No thyromegaly. No JVD  LYMPH: No C/SC nodes. CV: S1 S2 NL, RRR. No murmurs, clicks or rubs. PULM: CTA, symmentric air exchange  EXT: No C/C/E  GI: Abdomen is soft, NT, BS+, No hepatomegaly. No masses. NEURO: No focal or lateralizing deficits. Monofilament vibratory sensation intact in both feet  VASC:  No carotid bruits. Pulses symmetric  SKIN:  No rashes or lesions of concern. Feet normal color and temperature, no large calluses, ulcers or wounds       ASSESSMENT/PLAN:    1. Type 2 diabetes mellitus without complication, without long-term current use of insulin (HCC)  Condition stability and control are uncertain. Due for lab  Foot exam performed today  Continue efforts with Lifestyle modification including low calorie diet focusing on Low fat/low cholesterol and low carbohydrate intake, along with  increasing cardiovascular (aerobic) exercise. Patient counseled  -  DIABETES FOOT EXAM  - Hemoglobin A1C; Future  - Lipid Panel; Future  - TSH without Reflex; Future    2. Benign essential HTN  Blood pressure is a little accelerated today. She did not take her medications  Continue current medications  Continue efforts with lifestyle modification. Patient counseled on a no added salt diet    3. Acquired hypothyroidism    - TSH without Reflex; Future    4.  Venous insufficiency of both lower extremities  Condition is well controlled with compression stockings  Continue current program    5. Stage 3a chronic kidney disease  Condition is stable  Continue to follow-up with nephrology  Counseled patient on the need to avoid NSAIDs. However, the topical diclofenac gel should be okay, but use with caution    6. Gastroesophageal reflux disease without esophagitis  Condition is well controlled  Continue PPI therapy  Check magnesium  - Magnesium; Future    7. Hyperlipidemia, unspecified hyperlipidemia type  Condition stability and control are uncertain. Due for lab  Continue statin therapy as part of her overall cardiovascular disease primary prevention strategy  - Lipid Panel; Future            Discussed medications with patient who voiced understanding of their use, indication and potential side effects. Pt also understands the above recommendations. All questions answered. This note was generated completely or in part utilizing Dragon dictation speech recognition software. Occasionally, words are mistranscribed and despite editing, the text may contain inaccuracies due to incorrect word recognition.   If further clarification is needed please contact the office at [phone]       Electronically signed    Marlene Horner D.O.

## 2021-05-19 NOTE — TELEPHONE ENCOUNTER
According to Result note you wanted patient to start Tradjenta 50mg QD. Med was never pended so it was not sent to pharmacy. Looks like Cecily Button is the preferred medication for this patients insurance. Do you want to send that instead?    Thank you

## 2021-05-19 NOTE — TELEPHONE ENCOUNTER
----- Message from Shruthi Alford sent at 5/19/2021  3:53 PM EDT -----  Subject: Message to Provider    QUESTIONS  Information for Provider? Pt had an appt on 5/10. pt was told she would   get a script for new diabetes medication. pt has not been prescribed   anything yet. pt has checked with both working pharmacies and hasn't   received any notice os scripts. Pt would like to know if she should still   be expecting the new diabetes medication or it Dr. Gita Valencia change his mind. please give pt a call.  ---------------------------------------------------------------------------  --------------  CALL BACK INFO  What is the best way for the office to contact you? OK to leave message on   voicemail  Preferred Call Back Phone Number? [phone]  ---------------------------------------------------------------------------  --------------  SCRIPT ANSWERS  Relationship to Patient?  Self

## 2021-05-20 DIAGNOSIS — E78.5 HYPERLIPIDEMIA, UNSPECIFIED HYPERLIPIDEMIA TYPE: ICD-10-CM

## 2021-05-21 RX ORDER — ROSUVASTATIN CALCIUM 10 MG/1
TABLET, COATED ORAL
Qty: 30 TABLET | Refills: 3 | Status: SHIPPED | OUTPATIENT
Start: 2021-05-21 | End: 2021-12-02

## 2021-06-04 DIAGNOSIS — I10 BENIGN ESSENTIAL HTN: ICD-10-CM

## 2021-06-04 RX ORDER — IRBESARTAN 150 MG/1
TABLET ORAL
Qty: 90 TABLET | Refills: 3 | Status: SHIPPED | OUTPATIENT
Start: 2021-06-04 | End: 2021-09-24

## 2021-06-04 NOTE — TELEPHONE ENCOUNTER
Last appointment: 5/10/2021  Next appointment: Visit date not found  Last refill: 90 with 3 06/12/2020
detailed exam

## 2021-06-07 DIAGNOSIS — E11.9 TYPE 2 DIABETES MELLITUS WITHOUT COMPLICATION, WITHOUT LONG-TERM CURRENT USE OF INSULIN (HCC): ICD-10-CM

## 2021-06-07 RX ORDER — GLUCOSAMINE HCL/CHONDROITIN SU 500-400 MG
1 CAPSULE ORAL DAILY
Qty: 100 STRIP | Refills: 3 | Status: SHIPPED | OUTPATIENT
Start: 2021-06-07 | End: 2021-06-08

## 2021-06-08 DIAGNOSIS — E11.9 TYPE 2 DIABETES MELLITUS WITHOUT COMPLICATION, WITHOUT LONG-TERM CURRENT USE OF INSULIN (HCC): ICD-10-CM

## 2021-06-08 RX ORDER — BLOOD SUGAR DIAGNOSTIC
STRIP MISCELLANEOUS
Qty: 100 STRIP | Refills: 3 | Status: SHIPPED | OUTPATIENT
Start: 2021-06-08 | End: 2022-02-28 | Stop reason: SDUPTHER

## 2021-06-09 RX ORDER — LANCETS 30 GAUGE
1 EACH MISCELLANEOUS 2 TIMES DAILY
Qty: 300 EACH | Refills: 2 | Status: SHIPPED | OUTPATIENT
Start: 2021-06-09

## 2021-06-21 ENCOUNTER — TELEPHONE (OUTPATIENT)
Dept: INTERNAL MEDICINE CLINIC | Age: 61
End: 2021-06-21

## 2021-06-21 NOTE — TELEPHONE ENCOUNTER
Patient would like to have the following medication sent to a different pharmacy so that she could get it for much cheaper.      SITagliptin (JANUVIA) 50 MG tablet Take 1 tablet by mouth daily      Patient found out that she could get this medication for 40.00 from the following pharmacy:     [id number], [address] [phone] - F [phone]

## 2021-06-22 ENCOUNTER — TELEPHONE (OUTPATIENT)
Dept: INTERNAL MEDICINE CLINIC | Age: 61
End: 2021-06-22

## 2021-06-22 NOTE — TELEPHONE ENCOUNTER
I do not recall giving her the 100 mg capsules. She needs to be on 50 mg daily. We may have samples. If her insurance does not cover the Januvia, what does her insurance cover in that class?

## 2021-06-22 NOTE — TELEPHONE ENCOUNTER
Patient was given the 100mg because she is waiting on your long term pharmacy and 10 pill at the pharmacy was over 100$.   She wants to make sure you are ok with her taking nothing or do you want her to take the 100mg

## 2021-09-09 ENCOUNTER — TELEPHONE (OUTPATIENT)
Dept: INTERNAL MEDICINE CLINIC | Age: 61
End: 2021-09-09

## 2021-09-09 NOTE — TELEPHONE ENCOUNTER
Patient has a 3 month Diabetes appointment on 9/10 at 11:00. She just found out she is having knee replacement surgery on 10/12 with Dr. Oriana Hancock. We got her scheduled for a pre-op for 9/14 (within 30 days). Is it possible for her to combine both of these appointments and move to 9/14 only (within 30 days of 10/12)? Please call and let her know at number provided.

## 2021-09-14 ENCOUNTER — OFFICE VISIT (OUTPATIENT)
Dept: INTERNAL MEDICINE CLINIC | Age: 61
End: 2021-09-14
Payer: COMMERCIAL

## 2021-09-14 VITALS
DIASTOLIC BLOOD PRESSURE: 60 MMHG | SYSTOLIC BLOOD PRESSURE: 104 MMHG | WEIGHT: 268 LBS | BODY MASS INDEX: 44.6 KG/M2 | HEART RATE: 96 BPM

## 2021-09-14 DIAGNOSIS — M17.12 PRIMARY OSTEOARTHRITIS OF LEFT KNEE: ICD-10-CM

## 2021-09-14 DIAGNOSIS — I10 BENIGN ESSENTIAL HTN: ICD-10-CM

## 2021-09-14 DIAGNOSIS — H61.23 IMPACTED CERUMEN, BILATERAL: ICD-10-CM

## 2021-09-14 DIAGNOSIS — N18.31 STAGE 3A CHRONIC KIDNEY DISEASE (HCC): ICD-10-CM

## 2021-09-14 DIAGNOSIS — Z86.16 HISTORY OF COVID-19: ICD-10-CM

## 2021-09-14 DIAGNOSIS — E11.9 TYPE 2 DIABETES MELLITUS WITHOUT COMPLICATION, WITHOUT LONG-TERM CURRENT USE OF INSULIN (HCC): ICD-10-CM

## 2021-09-14 DIAGNOSIS — Z01.818 PRE-OPERATIVE EXAMINATION FOR INTERNAL MEDICINE: Primary | ICD-10-CM

## 2021-09-14 DIAGNOSIS — K21.9 GASTROESOPHAGEAL REFLUX DISEASE WITHOUT ESOPHAGITIS: ICD-10-CM

## 2021-09-14 DIAGNOSIS — I87.2 VENOUS INSUFFICIENCY OF BOTH LOWER EXTREMITIES: ICD-10-CM

## 2021-09-14 DIAGNOSIS — E03.9 ACQUIRED HYPOTHYROIDISM: ICD-10-CM

## 2021-09-14 PROCEDURE — 99243 OFF/OP CNSLTJ NEW/EST LOW 30: CPT | Performed by: INTERNAL MEDICINE

## 2021-09-14 PROCEDURE — 93000 ELECTROCARDIOGRAM COMPLETE: CPT | Performed by: INTERNAL MEDICINE

## 2021-09-14 NOTE — PATIENT INSTRUCTIONS
You will need to stop the Xarelto at least 48 hours prior to surgery. You may be asked to stop this 5 days before surgery. Recommend restarting Xarelto immediately after surgery. Other strategies to help prevent DVT include early mobilization, home sequential compression pumps.     Avoid NSAID's (Motrin, Aleve, Advil, Ibuprofen),  vitamin supplements and fish oil 1 week prior to procedure

## 2021-09-20 ENCOUNTER — HOSPITAL ENCOUNTER (OUTPATIENT)
Age: 61
Discharge: HOME | End: 2021-09-20
Payer: COMMERCIAL

## 2021-09-20 DIAGNOSIS — I10 ESSENTIAL HYPERTENSION: ICD-10-CM

## 2021-09-20 DIAGNOSIS — N18.2 CKD (CHRONIC KIDNEY DISEASE), STAGE II: ICD-10-CM

## 2021-09-20 LAB
ALBUMIN SERPL-MCNC: 4.1 G/DL (ref 3.4–5)
ANION GAP SERPL CALCULATED.3IONS-SCNC: 21 MMOL/L (ref 3–16)
BUN BLDV-MCNC: 19 MG/DL (ref 7–20)
CALCIUM SERPL-MCNC: 9.6 MG/DL (ref 8.3–10.6)
CHLORIDE BLD-SCNC: 105 MMOL/L (ref 99–110)
CO2: 18 MMOL/L (ref 21–32)
CREAT SERPL-MCNC: 1.6 MG/DL (ref 0.6–1.2)
CREATININE URINE: 219.1 MG/DL (ref 28–259)
GFR AFRICAN AMERICAN: 39
GFR NON-AFRICAN AMERICAN: 33
GLUCOSE BLD-MCNC: 192 MG/DL (ref 70–99)
HCT VFR BLD CALC: 40.5 % (ref 36–48)
HEMOGLOBIN: 13.4 G/DL (ref 12–16)
MCH RBC QN AUTO: 28 PG (ref 26–34)
MCHC RBC AUTO-ENTMCNC: 33 G/DL (ref 31–36)
MCV RBC AUTO: 84.8 FL (ref 80–100)
PDW BLD-RTO: 14.6 % (ref 12.4–15.4)
PHOSPHORUS: 2.9 MG/DL (ref 2.5–4.9)
PLATELET # BLD: 216 K/UL (ref 135–450)
PMV BLD AUTO: 9.3 FL (ref 5–10.5)
POTASSIUM SERPL-SCNC: 5 MMOL/L (ref 3.5–5.1)
PROTEIN PROTEIN: 28 MG/DL
RBC # BLD: 4.78 M/UL (ref 4–5.2)
SODIUM BLD-SCNC: 144 MMOL/L (ref 136–145)
WBC # BLD: 9.6 K/UL (ref 4–11)

## 2021-09-20 PROCEDURE — 85027 COMPLETE CBC AUTOMATED: CPT

## 2021-09-20 PROCEDURE — 36415 COLL VENOUS BLD VENIPUNCTURE: CPT

## 2021-09-20 PROCEDURE — 80069 RENAL FUNCTION PANEL: CPT

## 2021-09-20 PROCEDURE — 84156 ASSAY OF PROTEIN URINE: CPT

## 2021-09-20 PROCEDURE — 82570 ASSAY OF URINE CREATININE: CPT

## 2021-10-25 ENCOUNTER — NURSE ONLY (OUTPATIENT)
Dept: INTERNAL MEDICINE CLINIC | Age: 61
End: 2021-10-25
Payer: COMMERCIAL

## 2021-10-25 PROCEDURE — 90471 IMMUNIZATION ADMIN: CPT | Performed by: INTERNAL MEDICINE

## 2021-10-25 PROCEDURE — 90674 CCIIV4 VAC NO PRSV 0.5 ML IM: CPT | Performed by: INTERNAL MEDICINE

## 2021-11-24 ENCOUNTER — OFFICE VISIT (OUTPATIENT)
Dept: INTERNAL MEDICINE CLINIC | Age: 61
End: 2021-11-24
Payer: COMMERCIAL

## 2021-11-24 VITALS
SYSTOLIC BLOOD PRESSURE: 138 MMHG | DIASTOLIC BLOOD PRESSURE: 78 MMHG | HEIGHT: 66 IN | RESPIRATION RATE: 12 BRPM | BODY MASS INDEX: 41.95 KG/M2 | WEIGHT: 261 LBS | HEART RATE: 107 BPM | OXYGEN SATURATION: 95 %

## 2021-11-24 DIAGNOSIS — M17.12 PRIMARY OSTEOARTHRITIS OF LEFT KNEE: ICD-10-CM

## 2021-11-24 DIAGNOSIS — I10 BENIGN ESSENTIAL HTN: ICD-10-CM

## 2021-11-24 DIAGNOSIS — K21.9 GASTROESOPHAGEAL REFLUX DISEASE WITHOUT ESOPHAGITIS: ICD-10-CM

## 2021-11-24 DIAGNOSIS — Z86.718 HISTORY OF RECURRENT DEEP VEIN THROMBOSIS (DVT): ICD-10-CM

## 2021-11-24 DIAGNOSIS — I87.2 VENOUS INSUFFICIENCY OF BOTH LOWER EXTREMITIES: ICD-10-CM

## 2021-11-24 DIAGNOSIS — E11.9 TYPE 2 DIABETES MELLITUS WITHOUT COMPLICATION, WITHOUT LONG-TERM CURRENT USE OF INSULIN (HCC): ICD-10-CM

## 2021-11-24 DIAGNOSIS — E03.9 ACQUIRED HYPOTHYROIDISM: ICD-10-CM

## 2021-11-24 DIAGNOSIS — N18.31 STAGE 3A CHRONIC KIDNEY DISEASE (HCC): ICD-10-CM

## 2021-11-24 DIAGNOSIS — Z01.818 PRE-OPERATIVE EXAMINATION FOR INTERNAL MEDICINE: Primary | ICD-10-CM

## 2021-11-24 DIAGNOSIS — R06.83 SNORING: ICD-10-CM

## 2021-11-24 PROCEDURE — 99212 OFFICE O/P EST SF 10 MIN: CPT | Performed by: INTERNAL MEDICINE

## 2021-11-24 RX ORDER — IRBESARTAN 75 MG/1
75 TABLET ORAL DAILY
Qty: 90 TABLET | Refills: 1 | Status: SHIPPED | OUTPATIENT
Start: 2021-11-24

## 2021-11-24 NOTE — PROGRESS NOTES
[facility]) Physicians  Internal Medicine  Patient Encounter  Ministerio Cho D.O., [facility]          Chief Complaint   Patient presents with    Pre-op Exam     12/7/2021 Dr. Farrukh Arellano Left knee Arthoplasty Horace fax [phone]       HPI-- 57 y.o. female presents today for a preoperative physical.  Pt diagnosed with sever left knee osteoarthritis. Pt states she has had progressively worsening pain over the last 5-6 years. The pain is now affecting her ADLs. She reports increased pain with weightbearing, walking, stairs, getting in and out of a car. The knee can lock and buckle. Pt has failed outpatient conservative treatments including steroid injections and physical therapy. She his not able to take NSAID's due to CKD. Patient was originally scheduled for a left total knee replacement in October but this had to be canceled due to  The death of her sister. Surgery is now rescheduled. I have been asked to see patient for pre-operative risk assessment and clearance. Surgery scheduled for 12/7/2021 by Dr. Gabriel Rodriguez at [facility]    H/O DVT-- Pt has had recurrent DVT. The last DVT was in the setting of right TKA in 2013. She has saphenofemoral venous reflux/Chronic venous insufficiency. She is on Xarelto. She is under the care of Dr. Frannie Sue. She does wear compression stockings intermittently. She denies new leg pain or increased swelling. CKD-- Stage 3. She sees Dr. Fany Baptiste. She was last seen by his NP 11/15/2021. She denies foamy or frothy urine. She denies hematuria. Her Irbesartan was decreased to 75 mg daily because her Cr had increased slightly. Lab Results   Component Value Date    BUN 19 11/08/2021      Lab Results   Component Value Date    CREATININE 1.5 (H) 11/08/2021      Type 2 DM-- She is on Januvia 50 mg daily. Her sugars increase when she gets steroids. She denies hypoglycemia. She states she has tried to restrict carbs.   She has been focusing on portion control. She is due to see eye exam in Jan.  She sees Dr. Martin Rodriges. Lab Results   Component Value Date    LABA1C 7.7 09/17/2021     Lab Results   Component Value Date    .3 09/17/2021     HTN-- She denies new problems with HA, dizziness. She denies lightheadedness, syncope, CP, SOB. Her Avapro was decreased to 75 mg daily. BP has remained stable. Hypothyroidism-- She denies problems with constipation, diarrhea, cold or heat intolerance, hair loss, muscle cramps, tremor, edema or palpitations. Lab Results   Component Value Date    TSH 1.21 09/17/2021      GERD--Patient denies any active heartburn or dysphagia. She is on a daily PPI therapy. H2 blockers have been ineffective.   Lab Results   Component Value Date    MG 1.90 09/17/2021        Medical/Surgical Histories     Past Medical History:   Diagnosis Date    Acute superficial venous thrombosis of lower extremity, right 03/05/2019    Arthritis     Cataract, bilateral 2020    Chronic superficial venous thrombosis of left lower extremity 08/13/2015    CKD (chronic kidney disease) stage 2, GFR 60-89 ml/min     CKD (chronic kidney disease) stage 3, GFR 30-59 ml/min (HCC)     COVID-19 virus infection     Cystic kidney disease     DVT (deep venous thrombosis) ([address] 4/2003, 5/2013    Gastroesophageal reflux disease without esophagitis 11/28/2016    H/O deep venous thrombosis     H/O simple renal cyst     Hx of blood clots     Hyperlipidemia     Hypertension     IBS (irritable bowel syndrome)     Left retinal detachment 09/2020    Osteoarthritis, knee     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    BIlateral    Squamous cell carcinoma of skin of lower limb, left     Steatosis of liver     SVT (supraventricular tachycardia) ([address] 03/05/2019    Thyroid disease     Type 2 diabetes mellitus ([address]     Venous insufficiency           Past Surgical History:   Procedure Laterality Date    BREAST SURGERY      left fibroid tumor  CHOLECYSTECTOMY  04/2003    COLONOSCOPY  02/2015    Dr. Monge Boys  01/1999    KNEE ARTHROSCOPY  03/2015    right with medial meninsectomy    KNEE ARTHROSCOPY      right x 3-4     MOHS SURGERY  07/01/2019    Dr. Jodi Alex Left 09/30/2020    X 3 LAST ONE IN 2/2021    RETINAL DETACHMENT SURGERY Left 10/24/2020    RETINAL DETACHMENT SURGERY Left 2021    TONSILLECTOMY      TOTAL KNEE ARTHROPLASTY  05/2013    right    VARICOSE VEIN SURGERY  1980's.         No reported problems with general anesthesia    Medications/Allergies     Medication Sig   irbesartan (AVAPRO) 75 MG tablet Take 1 tablet by mouth daily   SITagliptin (JANUVIA) 50 MG tablet Take 1 tablet by mouth daily   Lancets MISC 1 each by Does not apply route 2 times daily Please dispense ONE TOUCH Lancets  Test BS BID   ONETOUCH ULTRA strip TEST 2 TIMES A DAY & AS NEEDED FOR SYMPTOMS OF IRREGULAR BLOOD GLUCOSE.   rosuvastatin (CRESTOR) 10 MG tablet TAKE 1 TABLET BY MOUTH EVERY DAY AT NIGHT   esomeprazole (NEXIUM) 40 MG delayed release capsule TAKE 1 CAPSULE DAILY IN THE MORNING BEFORE BREAKFAST   levothyroxine (SYNTHROID) 150 MCG tablet TAKE 1 TABLET DAILY   furosemide (LASIX) 20 MG tablet TAKE 1 TABLET DAILY   ONETOUCH DELICA LANCETS FINE MISC USE AS DIRECTED TO TEST BLOOD SUGAR ONCE EVERY DAY   rivaroxaban (XARELTO) 10 MG TABS tablet Take 10 mg by mouth          Allergies   Allergen Reactions    Codeine Other (See Comments)     hallucinations    Morphine And Related      hallucinations         Substance Use History     Social History     Tobacco Use    Smoking status: Never Smoker    Smokeless tobacco: Never Used   Substance Use Topics    Alcohol use: No    Drug use: No          Family History     Family History   Problem Relation Age of Onset    High Blood Pressure Mother     Osteoporosis Mother         No family history of problems with general anesthesia, venous thrombosis, or bleeding dyscrasias. REVIEW OF SYSTEMS:    CONSTITUTIONAL:  Neg   Recent weight changes, fatigue, fever, chills or night sweats, anorexia, Sleep difficulties  EYES: Neg  Blurry vision, loss of vision, double vision, tearing, itching, eye pain. EARS:  Neg Hearing loss, tinnitus, vertigo, discharge or otalgia. NOSE:  Neg  Rhinorrhea, sneezing, itching, allergy, epistaxis. + Snoring. She does not wear CPAP. She has not had sleep study. MOUTH/THROAT:  Neg Bleeding gums, hoarseness, sore throat, dysphagia, throat infections, or dentures  RESPIRATORY:  Neg SOB ,wheeze, cough, sputum, hemoptysis. No report of + TB test.    CARDIOVASCULAR:  Neg Chest pain, palpitations, heart murmur, dyspnea on exertion, orthopnea, paroxysmal nocturnal dyspnea.  + LE edema. She wears compression stockings. GASTROINTESTINAL:  Neg   Nausea, vomiting, or diarrhea, constipation hematemesis, heart burn, dysphagia,change in bowel movements or stool caliber, hematochezia, melena, abdominal pain. Colonoscopy: Yes  GENITOURINARY:  Neg  Urinary frequency, hesitancy, urgency, polyuria, dysuria, hematuria, nocturia, incontinence, change in stream, genital pain or swelling, kidney stones  HEMATOLOGIC/LYMPHATIC:  Neg  Anemia, bleeding dyscrasias, easy bruising. .  + History of recurrent DVT and superficial thrombophlebitis  MUSCULOSKELETAL:  As per HPI  NEUROLOGICAL:  Neg  Loss of Consciousness, memeory loss or forgetfulness, confusion, difficulty concentrating, seizures, insomina, aphasia or dysarthria, unilateral weakness or paresthesias, ataxia, headaches, syncope, tremor, or H/O head trauma. PSYCHIATRIC:  Neg  Depression, anxiety. Sad due to loss of her sister. SKIN :  Neg  Rash, nail changes, sun burns, tattoos, change in moles, or skin color changes  ENDOCRINE:  Neg  Polydipsia,polyuria,abnormal weight changes,heat /cold intolerance, Hair changes. No H/O Diabetes or Thyroid disease.        Physical Exam    Vitals:    11/24/21 0941   BP: 138/78   Pulse: 107   Resp: 12   SpO2: 95%   Weight: 261 lb (118.4 kg)   Height: 5' 6\" (1.676 m)     Body mass index is 42.13 kg/m². Wt Readings from Last 3 Encounters:   11/24/21 261 lb (118.4 kg)   09/14/21 268 lb (121.6 kg)   05/10/21 277 lb (125.6 kg)     BP Readings from Last 3 Encounters:   11/24/21 138/78   09/14/21 104/60   05/10/21 138/82      GEN:  57 y.o. female who is in NAD, A&O. She appears stated age and well nourished. She is cooperative and pleasant. Morbidly obese  HEAD:  NC/AT, no lesions. EYES:  NATAN, EOMI, No scleral icterus or conjunctival injection or discharge. Visual fields in tact to confrontation. EARS: Bilateral EACs obstructed with cerumen  NECK:  Supple. Full ROM. Trachea is midline. No increased JVD. No thyromegaly or nodules. No masses  LYMPH: No C/SC/A/F nodes  CARDIAC:  S1S2 NL. Regular rhythm. No murmur/clicks/rubs. No ectopy. PMI is non-displaced. VASC:  Pedal pulses 2/4. Carotid upstrokes 2+. No bruits noted. PULM:  Lungs are CTA. Symmetric breath sounds noted. AP Diameter NL. GI:  Abdomen is soft and nontender. No distension. No organomegaly. No masses. No pulsatile masses. EXT: + Bilateral lower extremity trace edema. Venous stasis hyperpigmentation noted bilaterally. SKIN: Warm and dry, normal turgor, no rash or lesions of concern. NEURO:  Cranial nerves 2-12 are NL. Speech fluent and coherent. Strength is 5/5 in all muscle groups. No sensory deficits. No focal or lateralizing deficits. Reflexes 2/4 and symmetric. Gait is normal.  MS: Left knee with crepitus with range of motion. Bony hypertrophy noted. Antalgic gait. PSYCH:  Mood and affect NL. Judgement and insight NL. Encounter Diagnoses   Name Primary?     Pre-operative examination for internal medicine Yes    Primary osteoarthritis of left knee     Benign essential HTN     Stage 3a chronic kidney disease (HCC)     Acquired hypothyroidism     Venous insufficiency of both lower extremities     Gastroesophageal reflux disease without esophagitis     History of recurrent deep vein thrombosis (DVT)     Type 2 diabetes mellitus without complication, without long-term current use of insulin (HCC)     Snoring--Probable JENNIE        Plan:  Acceptable risk for the planned procedure. No contraindications at this time    EKG-- See report from September  Lab drawn    Anticoagulant management per hematology. Patient to stop Xarelto at least 48 hours prior to surgery. Restart Xarelto immediately after surgery.   Also recommend early mobilization as well as sequential compression pumps perioperatively and at home if possible    Pt will avoid NSAID's, OTC vitamin supplements and fish oil 1 week prior to procedure            Electronically Signed:  Des Persaud D.O      Copy of H&P given to pt and sent to [facility]

## 2021-12-01 DIAGNOSIS — E78.5 HYPERLIPIDEMIA, UNSPECIFIED HYPERLIPIDEMIA TYPE: ICD-10-CM

## 2021-12-02 RX ORDER — ROSUVASTATIN CALCIUM 10 MG/1
TABLET, COATED ORAL
Qty: 90 TABLET | Refills: 3 | Status: SHIPPED | OUTPATIENT
Start: 2021-12-02

## 2021-12-02 NOTE — TELEPHONE ENCOUNTER
Last appointment: 11/24/2021  Next appointment: 2/16/2022  Last refill: 5/21/2021, [address], [facility]

## 2021-12-06 ENCOUNTER — OFFICE VISIT (OUTPATIENT)
Dept: ENT CLINIC | Age: 61
End: 2021-12-06
Payer: COMMERCIAL

## 2021-12-06 VITALS — HEART RATE: 108 BPM | DIASTOLIC BLOOD PRESSURE: 96 MMHG | TEMPERATURE: 97.3 F | SYSTOLIC BLOOD PRESSURE: 152 MMHG

## 2021-12-06 DIAGNOSIS — H90.0 CONDUCTIVE HEARING LOSS OF BOTH EARS: ICD-10-CM

## 2021-12-06 DIAGNOSIS — H61.23 IMPACTED CERUMEN OF BOTH EARS: ICD-10-CM

## 2021-12-06 PROCEDURE — 69210 REMOVE IMPACTED EAR WAX UNI: CPT | Performed by: OTOLARYNGOLOGY

## 2021-12-06 NOTE — PROGRESS NOTES
Chief Complaint   Patient presents with    Cerumen Impaction    Hearing Loss       HISTORY:    Micaela Downing stated that the hearing is decreased in both ears, which are plugged up with ear wax. EXAMINATION:    Both external ear canals were occluded by cerumen impaction, obscuring visualization of the TMs. PROCEDURE - REMOVAL OF BILATERAL CERUMEN IMPACTION:   The cerumen impaction was removed from both of the EACs, under otomicroscopy visualization, with instrumentation, using a Billeau wire loop   After successful cerumen removal, the EACs appeared to be normal and clear bilaterally without mass, exudate, or edema. The tympanic membranes appeared to be normal, including normal pneumatic mobility bilaterally. There was no evidence of acute disease. Micaela Downing reported improved hearing, back to usual level, after cerumen removal.          HEARING ASSESSMENT:  Finger rub:  Able to hear finger rub bilaterally. Tuning fork tests, [address] tuning fork:  Larry midline and Rinne air > bone bilaterally. IMPRESSION / Myriam Diss / Mennie Ciara / PROCEDURES:       Micaela Downing was seen today for cerumen impaction and hearing loss. Diagnoses and all orders for this visit:    Impacted cerumen of both ears    Conductive hearing loss of both ears        RECOMMENDATIONS / PLAN:   1. See Patient Instructions on file for this visit. 2. Return for recurrence of symptoms of excessive ear wax, or any other ear, nose, throat, or sinus problems.

## 2021-12-09 NOTE — PROGRESS NOTES
[facility] patients having surgery or anesthesia are required to be Covid tested OR to have been vaccinated at least 14 days prior to your procedure. It is very important to return our call to [phone] and notify the staff of your last vaccination date otherwise you will be required to complete Covid PCR test within the 5-6 days prior to surgery & quarantine. The results will need to be faxed to PreAdmission Testing at [phone]. PRIOR TO PROCEDURE DATE:        1. PLEASE FOLLOW ANY  GUIDELINES/ INSTRUCTIONS PRIOR TO YOUR PROCEDURE AS ADVISED BY YOUR SURGEON. 2. Arrange for someone to drive you home and be with you for the first 24 hours after discharge for your safety after your procedure for which you received sedation. Ensure it is someone we can share information with regarding your discharge. 3. You must contact your surgeon for instructions IF:   You are taking any blood thinners, aspirin, anti-inflammatory or vitamin E.   There is a change in your physical condition such as a cold, fever, rash, cuts, sores or any other infection, especially near your surgical site. 4. Do not drink alcohol the day before or day of your procedure. 5. A Pre-op History and Physical for surgery MUST be completed by your Physician or Urgent Care within 30 days of your procedure date. Please bring a copy with you on the day of your procedure and along with any other testing performed. THE DAY OF YOUR PROCEDURE:  1. Follow instructions for ARRIVAL TIME as DIRECTED BY YOUR SURGEON. 2. Enter the MAIN entrance from [employer] and follow the signs to the free [employer] or [employer] parking (offered free of charge 6am-5pm). 3. Enter the Main Entrance of the hospital (do not enter from the lower level of the parking garage). Upon entrance, check in with the  at the main desk on your left. to protect them while you are in surgery. 10. If you use a CPAP, please bring it with you on the day of your procedure. 11. We recommend that valuable personal  belongings such as cash, cell phones, e-tablets or jewelry, be left at home during your stay. The hospital will not be responsible for valuables that are not secured in the hospital safe. However, if your insurance requires a co-pay, you may want to bring a method of payment, i.e. Check or credit card, if you wish to pay your co-pay the day of surgery. 12. If you are to stay overnight, you may bring a bag with personal items. Please have any large items you may need brought in by your family after your arrival to your hospital room. 15. If you have a Living Will or Durable Power of , please bring a copy on the day of your procedure. 15. With your permission, one family member may accompany you while you are being prepared for surgery. Once you are ready, additional family members may join you. HOW WE KEEP YOU SAFE and WORK TO PREVENT SURGICAL SITE INFECTIONS:  1. Health care workers should always check your ID bracelet to verify your name and birth date. You will be asked many times to state your name, date of birth, and allergies. 2. Health care workers should always clean their hands with soap or alcohol gel before providing care to you. It is okay to ask anyone if they cleaned their hands before they touch you. 3. You will be actively involved in verifying the type of procedure you are having and ensuring the correct surgical site. This will be confirmed multiple times prior to your procedure. Do NOT leonor your surgery site UNLESS instructed to by your surgeon. 4. Do not shave or wax for 72 hours prior to procedure near your operative site. Shaving with a razor can irritate your skin and make it easier to develop an infection.  On the day of your procedure, any hair that needs to be removed near the surgical site will be clipped by a healthcare worker using a special clippers designed to avoid skin irritation. 5. When you are in the operating room, your surgical site will be cleansed with a special soap, and in most cases, you will be given an antibiotic before the surgery begins. What to expect AFTER YOUR PROCEDURE:  1. Immediately following your procedure, your will be taken to the PACU for the first phase of your recovery. Your nurse will help you recover from any potential side effects of anesthesia, such as extreme drowsiness, changes in your vital signs or breathing patterns. Nausea, headache, muscle aches, or sore throat may also occur after anesthesia. Your nurse will help you manage these potential side effects. 2. For comfort and safety, arrange to have someone at home with you for the first 24 hours after discharge. 3. You and your family will be given written instructions about your diet, activity, dressing care, medications, and return visits. 4. Once at home, should issues with nausea, pain, or bleeding occur, or should you notice any signs of infection, you should call your surgeon. 5. Always clean your hands before and after caring for your wound. Do not let your family touch your surgery site without cleaning their hands. 6. Narcotic pain medications can cause significant constipation. You may want to add a stool softener to your postoperative medication schedule or speak to your surgeon on how best to manage this SIDE EFFECT. SPECIAL INSTRUCTIONS     Thank you for allowing us to care for you. We strive to exceed your expectations in the delivery of care and service provided to you and your family. If you need to contact the [place] [address] staff for any reason, please call us at [phone]    Instructions reviewed with patient during preadmission testing phone interview.   Tc Michael RN.12/9/2021 .3:22 PM      ADDITIONAL EDUCATIONAL INFORMATION REVIEWED PER PHONE WITH YOU AND/OR YOUR FAMILY:  Yes Hibiclens® Bathing Instructions   No Antibacterial Soap

## 2021-12-13 ENCOUNTER — ANESTHESIA EVENT (OUTPATIENT)
Dept: OPERATING ROOM | Age: 61
End: 2021-12-13
Payer: COMMERCIAL

## 2021-12-14 ENCOUNTER — APPOINTMENT (OUTPATIENT)
Dept: GENERAL RADIOLOGY | Age: 61
End: 2021-12-14
Attending: ORTHOPAEDIC SURGERY
Payer: COMMERCIAL

## 2021-12-14 ENCOUNTER — HOSPITAL ENCOUNTER (OUTPATIENT)
Age: 61
Setting detail: OBSERVATION
Discharge: HOME | End: 2021-12-14
Attending: ORTHOPAEDIC SURGERY | Admitting: ORTHOPAEDIC SURGERY
Payer: COMMERCIAL

## 2021-12-14 ENCOUNTER — ANESTHESIA (OUTPATIENT)
Dept: OPERATING ROOM | Age: 61
End: 2021-12-14
Payer: COMMERCIAL

## 2021-12-14 VITALS — OXYGEN SATURATION: 96 % | TEMPERATURE: 96.8 F | SYSTOLIC BLOOD PRESSURE: 132 MMHG | DIASTOLIC BLOOD PRESSURE: 58 MMHG

## 2021-12-14 VITALS
RESPIRATION RATE: 18 BRPM | BODY MASS INDEX: 40.98 KG/M2 | DIASTOLIC BLOOD PRESSURE: 93 MMHG | OXYGEN SATURATION: 96 % | TEMPERATURE: 96.6 F | WEIGHT: 255 LBS | HEART RATE: 97 BPM | HEIGHT: 66 IN | SYSTOLIC BLOOD PRESSURE: 141 MMHG

## 2021-12-14 DIAGNOSIS — M17.12 ARTHRITIS OF LEFT KNEE: Primary | ICD-10-CM

## 2021-12-14 LAB
ABO/RH: NORMAL
ANTIBODY SCREEN: NORMAL
APTT: 27.8 SEC (ref 26.2–38.6)
C-REACTIVE PROTEIN: 9.3 MG/L (ref 0–5.1)
GLUCOSE BLD-MCNC: 145 MG/DL (ref 70–99)
GLUCOSE BLD-MCNC: 158 MG/DL (ref 70–99)
INR BLD: 1.03 (ref 0.88–1.12)
PERFORMED ON: ABNORMAL
PERFORMED ON: ABNORMAL
PROTHROMBIN TIME: 11.7 SEC (ref 9.9–12.7)
SEDIMENTATION RATE, ERYTHROCYTE: 22 MM/HR (ref 0–30)

## 2021-12-14 PROCEDURE — C1713 ANCHOR/SCREW BN/BN,TIS/BN: HCPCS | Performed by: ORTHOPAEDIC SURGERY

## 2021-12-14 PROCEDURE — 2580000003 HC RX 258: Performed by: ORTHOPAEDIC SURGERY

## 2021-12-14 PROCEDURE — 7100000000 HC PACU RECOVERY - FIRST 15 MIN: Performed by: ORTHOPAEDIC SURGERY

## 2021-12-14 PROCEDURE — 7100000010 HC PHASE II RECOVERY - FIRST 15 MIN: Performed by: ORTHOPAEDIC SURGERY

## 2021-12-14 PROCEDURE — 2720000010 HC SURG SUPPLY STERILE: Performed by: ORTHOPAEDIC SURGERY

## 2021-12-14 PROCEDURE — 97161 PT EVAL LOW COMPLEX 20 MIN: CPT

## 2021-12-14 PROCEDURE — 86850 RBC ANTIBODY SCREEN: CPT

## 2021-12-14 PROCEDURE — 7100000011 HC PHASE II RECOVERY - ADDTL 15 MIN: Performed by: ORTHOPAEDIC SURGERY

## 2021-12-14 PROCEDURE — 3700000000 HC ANESTHESIA ATTENDED CARE: Performed by: ORTHOPAEDIC SURGERY

## 2021-12-14 PROCEDURE — 97116 GAIT TRAINING THERAPY: CPT

## 2021-12-14 PROCEDURE — 2580000003 HC RX 258: Performed by: NURSE ANESTHETIST, CERTIFIED REGISTERED

## 2021-12-14 PROCEDURE — 3600000005 HC SURGERY LEVEL 5 BASE: Performed by: ORTHOPAEDIC SURGERY

## 2021-12-14 PROCEDURE — 6370000000 HC RX 637 (ALT 250 FOR IP): Performed by: ORTHOPAEDIC SURGERY

## 2021-12-14 PROCEDURE — 6360000002 HC RX W HCPCS: Performed by: NURSE ANESTHETIST, CERTIFIED REGISTERED

## 2021-12-14 PROCEDURE — 6360000002 HC RX W HCPCS: Performed by: ORTHOPAEDIC SURGERY

## 2021-12-14 PROCEDURE — 62327 NJX INTERLAMINAR LMBR/SAC: CPT | Performed by: ANESTHESIOLOGY

## 2021-12-14 PROCEDURE — 86140 C-REACTIVE PROTEIN: CPT

## 2021-12-14 PROCEDURE — 6370000000 HC RX 637 (ALT 250 FOR IP): Performed by: ANESTHESIOLOGY

## 2021-12-14 PROCEDURE — 85610 PROTHROMBIN TIME: CPT

## 2021-12-14 PROCEDURE — 64447 NJX AA&/STRD FEMORAL NRV IMG: CPT | Performed by: ANESTHESIOLOGY

## 2021-12-14 PROCEDURE — 2500000003 HC RX 250 WO HCPCS: Performed by: ORTHOPAEDIC SURGERY

## 2021-12-14 PROCEDURE — 6360000002 HC RX W HCPCS: Performed by: ANESTHESIOLOGY

## 2021-12-14 PROCEDURE — 73560 X-RAY EXAM OF KNEE 1 OR 2: CPT

## 2021-12-14 PROCEDURE — 85652 RBC SED RATE AUTOMATED: CPT

## 2021-12-14 PROCEDURE — 97530 THERAPEUTIC ACTIVITIES: CPT

## 2021-12-14 PROCEDURE — 2500000003 HC RX 250 WO HCPCS: Performed by: NURSE ANESTHETIST, CERTIFIED REGISTERED

## 2021-12-14 PROCEDURE — 2709999900 HC NON-CHARGEABLE SUPPLY: Performed by: ORTHOPAEDIC SURGERY

## 2021-12-14 PROCEDURE — C1776 JOINT DEVICE (IMPLANTABLE): HCPCS | Performed by: ORTHOPAEDIC SURGERY

## 2021-12-14 PROCEDURE — 97166 OT EVAL MOD COMPLEX 45 MIN: CPT

## 2021-12-14 PROCEDURE — 7100000001 HC PACU RECOVERY - ADDTL 15 MIN: Performed by: ORTHOPAEDIC SURGERY

## 2021-12-14 PROCEDURE — 3600000015 HC SURGERY LEVEL 5 ADDTL 15MIN: Performed by: ORTHOPAEDIC SURGERY

## 2021-12-14 PROCEDURE — 85730 THROMBOPLASTIN TIME PARTIAL: CPT

## 2021-12-14 PROCEDURE — 3700000001 HC ADD 15 MINUTES (ANESTHESIA): Performed by: ORTHOPAEDIC SURGERY

## 2021-12-14 PROCEDURE — 97535 SELF CARE MNGMENT TRAINING: CPT

## 2021-12-14 PROCEDURE — 86901 BLOOD TYPING SEROLOGIC RH(D): CPT

## 2021-12-14 PROCEDURE — 86900 BLOOD TYPING SEROLOGIC ABO: CPT

## 2021-12-14 DEVICE — COMPONENT FEM SZ -D L KNEE ZMLY PMMA PC PRI PRESSFIT CRUCE: Type: IMPLANTABLE DEVICE | Site: KNEE | Status: FUNCTIONAL

## 2021-12-14 DEVICE — COMPONENT ARTC SURF CR 3-4 C-H STD 42X66X14 MM TIB KNEE YEL: Type: IMPLANTABLE DEVICE | Site: KNEE | Status: FUNCTIONAL

## 2021-12-14 DEVICE — CEMENT BNE 20ML 41GM FULL DOSE PMMA W/ TOBRA M VISC RADPQ: Type: IMPLANTABLE DEVICE | Site: KNEE | Status: FUNCTIONAL

## 2021-12-14 DEVICE — NEXGEN ALL-POLY PATELLA 29MM: Type: IMPLANTABLE DEVICE | Site: KNEE | Status: FUNCTIONAL

## 2021-12-14 DEVICE — KNEE K1 TOT HEMI STD CEM IMPL CAPPED K1 ZIM: Type: IMPLANTABLE DEVICE | Site: KNEE | Status: FUNCTIONAL

## 2021-12-14 DEVICE — NEXGEN PRECOAT STEMMED TIBIAL PLATE SZ 3: Type: IMPLANTABLE DEVICE | Site: KNEE | Status: FUNCTIONAL

## 2021-12-14 DEVICE — SCREW BNE L48MM CONSTRN CNDYL KNEE HEX HD STEM FOR LEG NXGN: Type: IMPLANTABLE DEVICE | Site: KNEE | Status: FUNCTIONAL

## 2021-12-14 RX ORDER — SODIUM CHLORIDE 0.9 % (FLUSH) 0.9 %
5-40 SYRINGE (ML) INJECTION EVERY 12 HOURS SCHEDULED
Status: CANCELLED | OUTPATIENT
Start: 2021-12-14

## 2021-12-14 RX ORDER — PROPOFOL 10 MG/ML
INJECTION, EMULSION INTRAVENOUS PRN
Status: DISCONTINUED | OUTPATIENT
Start: 2021-12-14 | End: 2021-12-14 | Stop reason: SDUPTHER

## 2021-12-14 RX ORDER — SODIUM CHLORIDE 9 MG/ML
25 INJECTION, SOLUTION INTRAVENOUS PRN
Status: CANCELLED | OUTPATIENT
Start: 2021-12-14

## 2021-12-14 RX ORDER — OXYCODONE HYDROCHLORIDE 5 MG/1
5 TABLET ORAL EVERY 6 HOURS PRN
Qty: 28 TABLET | Refills: 0 | Status: SHIPPED | OUTPATIENT
Start: 2021-12-14 | End: 2021-12-21

## 2021-12-14 RX ORDER — ROSUVASTATIN CALCIUM 10 MG/1
10 TABLET, COATED ORAL NIGHTLY
Status: CANCELLED | OUTPATIENT
Start: 2021-12-14

## 2021-12-14 RX ORDER — OXYCODONE HYDROCHLORIDE AND ACETAMINOPHEN 5; 325 MG/1; MG/1
1 TABLET ORAL PRN
Status: COMPLETED | OUTPATIENT
Start: 2021-12-14 | End: 2021-12-14

## 2021-12-14 RX ORDER — OXYCODONE HYDROCHLORIDE 5 MG/1
10 TABLET ORAL EVERY 4 HOURS PRN
Status: CANCELLED | OUTPATIENT
Start: 2021-12-14

## 2021-12-14 RX ORDER — LIDOCAINE HYDROCHLORIDE 20 MG/ML
INJECTION, SOLUTION INTRAVENOUS
Status: DISCONTINUED
Start: 2021-12-14 | End: 2021-12-14 | Stop reason: HOSPADM

## 2021-12-14 RX ORDER — TRANEXAMIC ACID 100 MG/ML
INJECTION, SOLUTION INTRAVENOUS PRN
Status: DISCONTINUED | OUTPATIENT
Start: 2021-12-14 | End: 2021-12-14 | Stop reason: ALTCHOICE

## 2021-12-14 RX ORDER — POLYETHYLENE GLYCOL 3350 17 G/17G
17 POWDER, FOR SOLUTION ORAL DAILY PRN
Status: CANCELLED | OUTPATIENT
Start: 2021-12-14

## 2021-12-14 RX ORDER — BUPIVACAINE HYDROCHLORIDE 2.5 MG/ML
INJECTION, SOLUTION EPIDURAL; INFILTRATION; INTRACAUDAL PRN
Status: DISCONTINUED | OUTPATIENT
Start: 2021-12-14 | End: 2021-12-14 | Stop reason: ALTCHOICE

## 2021-12-14 RX ORDER — OXYCODONE HYDROCHLORIDE 5 MG/1
5 TABLET ORAL EVERY 4 HOURS PRN
Status: CANCELLED | OUTPATIENT
Start: 2021-12-14

## 2021-12-14 RX ORDER — HYDROXYZINE HYDROCHLORIDE 10 MG/1
10 TABLET, FILM COATED ORAL EVERY 8 HOURS PRN
Status: CANCELLED | OUTPATIENT
Start: 2021-12-14

## 2021-12-14 RX ORDER — SODIUM CHLORIDE 9 MG/ML
INJECTION, SOLUTION INTRAVENOUS CONTINUOUS
Status: CANCELLED | OUTPATIENT
Start: 2021-12-14

## 2021-12-14 RX ORDER — SENNA AND DOCUSATE SODIUM 50; 8.6 MG/1; MG/1
1 TABLET, FILM COATED ORAL 2 TIMES DAILY
Status: CANCELLED | OUTPATIENT
Start: 2021-12-14

## 2021-12-14 RX ORDER — PANTOPRAZOLE SODIUM 40 MG/1
40 TABLET, DELAYED RELEASE ORAL
Refills: 3 | Status: CANCELLED | OUTPATIENT
Start: 2021-12-15

## 2021-12-14 RX ORDER — HYDRALAZINE HYDROCHLORIDE 20 MG/ML
5 INJECTION INTRAMUSCULAR; INTRAVENOUS EVERY 10 MIN PRN
Status: DISCONTINUED | OUTPATIENT
Start: 2021-12-14 | End: 2021-12-14 | Stop reason: HOSPADM

## 2021-12-14 RX ORDER — BUPIVACAINE HYDROCHLORIDE 7.5 MG/ML
INJECTION, SOLUTION INTRASPINAL PRN
Status: DISCONTINUED | OUTPATIENT
Start: 2021-12-14 | End: 2021-12-14 | Stop reason: SDUPTHER

## 2021-12-14 RX ORDER — ONDANSETRON 2 MG/ML
4 INJECTION INTRAMUSCULAR; INTRAVENOUS EVERY 6 HOURS PRN
Status: CANCELLED | OUTPATIENT
Start: 2021-12-14

## 2021-12-14 RX ORDER — LOSARTAN POTASSIUM 25 MG/1
25 TABLET ORAL DAILY
Refills: 1 | Status: CANCELLED | OUTPATIENT
Start: 2021-12-14

## 2021-12-14 RX ORDER — SODIUM CHLORIDE, SODIUM LACTATE, POTASSIUM CHLORIDE, CALCIUM CHLORIDE 600; 310; 30; 20 MG/100ML; MG/100ML; MG/100ML; MG/100ML
INJECTION, SOLUTION INTRAVENOUS CONTINUOUS
Status: DISCONTINUED | OUTPATIENT
Start: 2021-12-14 | End: 2021-12-14 | Stop reason: HOSPADM

## 2021-12-14 RX ORDER — SODIUM CHLORIDE 0.9 % (FLUSH) 0.9 %
5-40 SYRINGE (ML) INJECTION PRN
Status: CANCELLED | OUTPATIENT
Start: 2021-12-14

## 2021-12-14 RX ORDER — DEXAMETHASONE SODIUM PHOSPHATE 10 MG/ML
10 INJECTION, SOLUTION INTRAMUSCULAR; INTRAVENOUS ONCE
Status: COMPLETED | OUTPATIENT
Start: 2021-12-14 | End: 2021-12-14

## 2021-12-14 RX ORDER — LEVOTHYROXINE SODIUM 0.15 MG/1
1 TABLET ORAL DAILY
Status: CANCELLED | OUTPATIENT
Start: 2021-12-14

## 2021-12-14 RX ORDER — LABETALOL HYDROCHLORIDE 5 MG/ML
5 INJECTION, SOLUTION INTRAVENOUS EVERY 10 MIN PRN
Status: DISCONTINUED | OUTPATIENT
Start: 2021-12-14 | End: 2021-12-14 | Stop reason: HOSPADM

## 2021-12-14 RX ORDER — ONDANSETRON 4 MG/1
4 TABLET, ORALLY DISINTEGRATING ORAL EVERY 8 HOURS PRN
Status: CANCELLED | OUTPATIENT
Start: 2021-12-14

## 2021-12-14 RX ORDER — PROPOFOL 10 MG/ML
INJECTION, EMULSION INTRAVENOUS CONTINUOUS PRN
Status: DISCONTINUED | OUTPATIENT
Start: 2021-12-14 | End: 2021-12-14 | Stop reason: SDUPTHER

## 2021-12-14 RX ORDER — ALOGLIPTIN 12.5 MG/1
12.5 TABLET, FILM COATED ORAL DAILY
Refills: 3 | Status: CANCELLED | OUTPATIENT
Start: 2021-12-14

## 2021-12-14 RX ORDER — FENTANYL CITRATE 50 UG/ML
50 INJECTION, SOLUTION INTRAMUSCULAR; INTRAVENOUS EVERY 5 MIN PRN
Status: DISCONTINUED | OUTPATIENT
Start: 2021-12-14 | End: 2021-12-14 | Stop reason: HOSPADM

## 2021-12-14 RX ORDER — FENTANYL CITRATE 50 UG/ML
25 INJECTION, SOLUTION INTRAMUSCULAR; INTRAVENOUS EVERY 5 MIN PRN
Status: DISCONTINUED | OUTPATIENT
Start: 2021-12-14 | End: 2021-12-14 | Stop reason: HOSPADM

## 2021-12-14 RX ORDER — FUROSEMIDE 20 MG/1
20 TABLET ORAL DAILY
Status: CANCELLED | OUTPATIENT
Start: 2021-12-14

## 2021-12-14 RX ORDER — ACETAMINOPHEN 500 MG
1000 TABLET ORAL EVERY 8 HOURS SCHEDULED
Status: CANCELLED | OUTPATIENT
Start: 2021-12-14

## 2021-12-14 RX ORDER — ACETAMINOPHEN 500 MG
1000 TABLET ORAL ONCE
Status: COMPLETED | OUTPATIENT
Start: 2021-12-14 | End: 2021-12-14

## 2021-12-14 RX ORDER — TAMSULOSIN HYDROCHLORIDE 0.4 MG/1
0.4 CAPSULE ORAL DAILY
Status: CANCELLED | OUTPATIENT
Start: 2021-12-14

## 2021-12-14 RX ORDER — MIDAZOLAM HYDROCHLORIDE 1 MG/ML
2 INJECTION INTRAMUSCULAR; INTRAVENOUS ONCE
Status: COMPLETED | OUTPATIENT
Start: 2021-12-14 | End: 2021-12-14

## 2021-12-14 RX ORDER — METHOCARBAMOL 500 MG/1
500 TABLET, FILM COATED ORAL 4 TIMES DAILY
Status: CANCELLED | OUTPATIENT
Start: 2021-12-14

## 2021-12-14 RX ORDER — SODIUM CHLORIDE 9 MG/ML
INJECTION, SOLUTION INTRAVENOUS CONTINUOUS PRN
Status: DISCONTINUED | OUTPATIENT
Start: 2021-12-14 | End: 2021-12-14 | Stop reason: SDUPTHER

## 2021-12-14 RX ORDER — ROPIVACAINE HYDROCHLORIDE 5 MG/ML
30 INJECTION, SOLUTION EPIDURAL; INFILTRATION; PERINEURAL ONCE
Status: DISCONTINUED | OUTPATIENT
Start: 2021-12-14 | End: 2021-12-14 | Stop reason: HOSPADM

## 2021-12-14 RX ORDER — MAGNESIUM HYDROXIDE 1200 MG/15ML
LIQUID ORAL CONTINUOUS PRN
Status: COMPLETED | OUTPATIENT
Start: 2021-12-14 | End: 2021-12-14

## 2021-12-14 RX ORDER — ONDANSETRON 2 MG/ML
4 INJECTION INTRAMUSCULAR; INTRAVENOUS
Status: DISCONTINUED | OUTPATIENT
Start: 2021-12-14 | End: 2021-12-14 | Stop reason: HOSPADM

## 2021-12-14 RX ORDER — ROPIVACAINE HYDROCHLORIDE 5 MG/ML
INJECTION, SOLUTION EPIDURAL; INFILTRATION; PERINEURAL
Status: COMPLETED | OUTPATIENT
Start: 2021-12-14 | End: 2021-12-14

## 2021-12-14 RX ORDER — SODIUM CHLORIDE, SODIUM LACTATE, POTASSIUM CHLORIDE, CALCIUM CHLORIDE 600; 310; 30; 20 MG/100ML; MG/100ML; MG/100ML; MG/100ML
INJECTION, SOLUTION INTRAVENOUS CONTINUOUS PRN
Status: DISCONTINUED | OUTPATIENT
Start: 2021-12-14 | End: 2021-12-14 | Stop reason: SDUPTHER

## 2021-12-14 RX ORDER — OXYCODONE HYDROCHLORIDE AND ACETAMINOPHEN 5; 325 MG/1; MG/1
2 TABLET ORAL PRN
Status: COMPLETED | OUTPATIENT
Start: 2021-12-14 | End: 2021-12-14

## 2021-12-14 RX ORDER — ONDANSETRON 2 MG/ML
INJECTION INTRAMUSCULAR; INTRAVENOUS PRN
Status: DISCONTINUED | OUTPATIENT
Start: 2021-12-14 | End: 2021-12-14 | Stop reason: SDUPTHER

## 2021-12-14 RX ADMIN — PROPOFOL 30 MG: 10 INJECTION, EMULSION INTRAVENOUS at 10:49

## 2021-12-14 RX ADMIN — SODIUM CHLORIDE, POTASSIUM CHLORIDE, SODIUM LACTATE AND CALCIUM CHLORIDE: 600; 310; 30; 20 INJECTION, SOLUTION INTRAVENOUS at 09:08

## 2021-12-14 RX ADMIN — FENTANYL CITRATE 50 MCG: 50 INJECTION INTRAMUSCULAR; INTRAVENOUS at 15:13

## 2021-12-14 RX ADMIN — SODIUM CHLORIDE: 9 INJECTION, SOLUTION INTRAVENOUS at 08:44

## 2021-12-14 RX ADMIN — SODIUM CHLORIDE, SODIUM LACTATE, POTASSIUM CHLORIDE, AND CALCIUM CHLORIDE: .6; .31; .03; .02 INJECTION, SOLUTION INTRAVENOUS at 11:02

## 2021-12-14 RX ADMIN — PHENYLEPHRINE HYDROCHLORIDE 100 MCG: 10 INJECTION, SOLUTION INTRAMUSCULAR; INTRAVENOUS; SUBCUTANEOUS at 10:52

## 2021-12-14 RX ADMIN — PROPOFOL 100 MCG/KG/MIN: 10 INJECTION, EMULSION INTRAVENOUS at 10:23

## 2021-12-14 RX ADMIN — ROPIVACAINE HYDROCHLORIDE 20 ML: 5 INJECTION, SOLUTION EPIDURAL; INFILTRATION; PERINEURAL at 08:51

## 2021-12-14 RX ADMIN — PHENYLEPHRINE HYDROCHLORIDE 100 MCG: 10 INJECTION, SOLUTION INTRAMUSCULAR; INTRAVENOUS; SUBCUTANEOUS at 10:55

## 2021-12-14 RX ADMIN — PHENYLEPHRINE HYDROCHLORIDE 100 MCG: 10 INJECTION, SOLUTION INTRAMUSCULAR; INTRAVENOUS; SUBCUTANEOUS at 10:49

## 2021-12-14 RX ADMIN — PHENYLEPHRINE HYDROCHLORIDE 100 MCG: 10 INJECTION, SOLUTION INTRAMUSCULAR; INTRAVENOUS; SUBCUTANEOUS at 11:12

## 2021-12-14 RX ADMIN — DEXAMETHASONE SODIUM PHOSPHATE 10 MG: 10 INJECTION, SOLUTION INTRAMUSCULAR; INTRAVENOUS at 09:06

## 2021-12-14 RX ADMIN — MIDAZOLAM 2 MG: 1 INJECTION INTRAMUSCULAR; INTRAVENOUS at 08:31

## 2021-12-14 RX ADMIN — CEFAZOLIN 2000 MG: 10 INJECTION, POWDER, FOR SOLUTION INTRAVENOUS at 10:21

## 2021-12-14 RX ADMIN — ACETAMINOPHEN 1000 MG: 500 TABLET ORAL at 09:03

## 2021-12-14 RX ADMIN — PHENYLEPHRINE HYDROCHLORIDE 100 MCG: 10 INJECTION, SOLUTION INTRAMUSCULAR; INTRAVENOUS; SUBCUTANEOUS at 11:14

## 2021-12-14 RX ADMIN — PROPOFOL 30 MG: 10 INJECTION, EMULSION INTRAVENOUS at 10:55

## 2021-12-14 RX ADMIN — ONDANSETRON 4 MG: 2 INJECTION INTRAMUSCULAR; INTRAVENOUS at 10:23

## 2021-12-14 RX ADMIN — PHENYLEPHRINE HYDROCHLORIDE 100 MCG: 10 INJECTION, SOLUTION INTRAMUSCULAR; INTRAVENOUS; SUBCUTANEOUS at 11:06

## 2021-12-14 RX ADMIN — BUPIVACAINE HYDROCHLORIDE IN DEXTROSE 0.5 ML: 7.5 INJECTION, SOLUTION SUBARACHNOID at 10:28

## 2021-12-14 RX ADMIN — OXYCODONE HYDROCHLORIDE AND ACETAMINOPHEN 2 TABLET: 5; 325 TABLET ORAL at 16:44

## 2021-12-14 RX ADMIN — VANCOMYCIN HYDROCHLORIDE 1750 MG: 10 INJECTION, POWDER, LYOPHILIZED, FOR SOLUTION INTRAVENOUS at 09:00

## 2021-12-14 RX ADMIN — BUPIVACAINE HYDROCHLORIDE IN DEXTROSE 1 ML: 7.5 INJECTION, SOLUTION SUBARACHNOID at 10:23

## 2021-12-14 RX ADMIN — PHENYLEPHRINE HYDROCHLORIDE 100 MCG: 10 INJECTION, SOLUTION INTRAMUSCULAR; INTRAVENOUS; SUBCUTANEOUS at 10:58

## 2021-12-14 ASSESSMENT — PULMONARY FUNCTION TESTS
PIF_VALUE: 0
PIF_VALUE: 1
PIF_VALUE: 0
PIF_VALUE: 0
PIF_VALUE: 2
PIF_VALUE: 0
PIF_VALUE: 1
PIF_VALUE: 0
PIF_VALUE: 1
PIF_VALUE: 0
PIF_VALUE: 1
PIF_VALUE: 0

## 2021-12-14 ASSESSMENT — PAIN DESCRIPTION - ONSET
ONSET: ON-GOING
ONSET: PROGRESSIVE

## 2021-12-14 ASSESSMENT — PAIN DESCRIPTION - PROGRESSION
CLINICAL_PROGRESSION: GRADUALLY WORSENING
CLINICAL_PROGRESSION: GRADUALLY WORSENING

## 2021-12-14 ASSESSMENT — PAIN DESCRIPTION - DESCRIPTORS
DESCRIPTORS: ACHING;THROBBING
DESCRIPTORS: ACHING;DISCOMFORT

## 2021-12-14 ASSESSMENT — PAIN SCALES - GENERAL
PAINLEVEL_OUTOF10: 7
PAINLEVEL_OUTOF10: 5
PAINLEVEL_OUTOF10: 7
PAINLEVEL_OUTOF10: 0
PAINLEVEL_OUTOF10: 0
PAINLEVEL_OUTOF10: 7

## 2021-12-14 ASSESSMENT — PAIN DESCRIPTION - ORIENTATION
ORIENTATION: LEFT
ORIENTATION: LEFT

## 2021-12-14 ASSESSMENT — PAIN DESCRIPTION - FREQUENCY
FREQUENCY: CONTINUOUS
FREQUENCY: CONTINUOUS

## 2021-12-14 ASSESSMENT — PAIN DESCRIPTION - LOCATION
LOCATION: KNEE
LOCATION: KNEE

## 2021-12-14 ASSESSMENT — LIFESTYLE VARIABLES: SMOKING_STATUS: 0

## 2021-12-14 ASSESSMENT — PAIN DESCRIPTION - PAIN TYPE
TYPE: ACUTE PAIN;SURGICAL PAIN
TYPE: SURGICAL PAIN

## 2021-12-14 NOTE — ANESTHESIA POSTPROCEDURE EVALUATION
Department of Anesthesiology  Postprocedure Note    Patient: Johnnie Lam  MRN: [id number]  YOB: 1960  Date of evaluation: 12/14/2021  Time:  2:21 PM     Procedure Summary     Date: 12/14/21 Room / Location: [address]    Anesthesia Start: 8085 Anesthesia Stop: 1227    Procedure: LEFT TOTAL KNEE ARTHROPLASTY (Left Knee) Diagnosis:       Primary osteoarthritis of left knee      (Primary osteoarthritis of left knee [M17.12])    Surgeons: Georgia Jackson MD Responsible Provider: Marylene Sickles, MD    Anesthesia Type: general, MAC, regional ASA Status: 3          Anesthesia Type: general, MAC, regional    Brock Phase I: Brock Score: 7    Brock Phase II:      Last vitals: Reviewed and per EMR flowsheets.        Anesthesia Post Evaluation    Patient location during evaluation: PACU  Level of consciousness: awake and alert  Airway patency: patent  Nausea & Vomiting: no vomiting  Complications: no  Cardiovascular status: blood pressure returned to baseline  Respiratory status: acceptable  Hydration status: euvolemic

## 2021-12-14 NOTE — PROGRESS NOTES
Occupational Therapy   Occupational Therapy Initial Assessment/Treatment  Date: 2021   Patient Name: Douglas Thurman  MRN: [id number]     : 1960    Date of Service: 2021    Discharge Recommendations:    Douglas Thurman scored a 20/24 on the AM-PAC ADL Inpatient form. Current research shows that an AM-PAC score of 18 or greater is typically associated with a discharge to the patient's home setting. Based on the patient's AM-PAC score, and their current ADL deficits, it is recommended that the patient have 2-3 sessions per week of Occupational Therapy at d/c to increase the patient's independence. At this time, this patient demonstrates the endurance and safety to discharge  home with home services and a follow up treatment frequency of 2-3x/wk. Please see assessment section for further patient specific details. If patient discharges prior to next session this note will serve as a discharge summary. Please see below for the latest assessment towards goals. OT Equipment Recommendations  Equipment Needed: No    Assessment   Performance deficits / Impairments: Decreased functional mobility ; Decreased ADL status; Decreased balance; Decreased endurance  Assessment: Pt is seen POD 0 TKR. Pt req CG-SBA for mobility and ADL. Anticipate good progress for discharge to home.   Treatment Diagnosis: Impaired ADL and functional mobility  Prognosis: Good  Decision Making: Medium Complexity  OT Education: OT Role; Plan of Care; ADL Adaptive Strategies; Transfer Training  Patient Education: Pt demonstrated understanding  REQUIRES OT FOLLOW UP: Yes  Activity Tolerance  Activity Tolerance: Patient Tolerated treatment well  Safety Devices  Safety Devices in place: Yes  Type of devices: Left in bed; Nurse notified           Treatment Diagnosis: Impaired ADL and functional mobility      Restrictions  Position Activity Restriction  Other position/activity restrictions: WBAT    Subjective General  Chart Reviewed: Yes  Additional Pertinent Hx: 12/14/21 Minimally Invasive left total knee arthroplasty, cemented,cruciate-retaining. PMH includes: arthritis, blood clots, DM, Squamous cell carcinoma, CKD, COVID-19, Rt TKR  Family / Caregiver Present: No  Referring Practitioner: Dr. Francisco Javier Granados  Diagnosis: Left knee tricompartmental arthrosis. Subjective  Subjective: Pt in bed upon entry. Pt plans to go home today. Patient Currently in Pain: Yes  Pain Assessment  Pain Assessment: 0-10  Pain Level: 7   Nurse aware  Social/Functional History  Social/Functional History  Lives With: Spouse (son in and out)  Type of Home: House  Home Layout: Multi-level, Laundry in basement, Bed/Bath upstairs, 1/2 bath on main level  Home Access: Stairs to enter with rails (2 BA)  Bathroom Shower/Tub: Walk-in shower  Bathroom Toilet: Standard  Bathroom Equipment: Shower chair  Home Equipment: Crutches  ADL Assistance: Independent  Homemaking Assistance:  (shares with )  Ambulation Assistance: Independent  Transfer Assistance: Independent  Active :  (hasn't been driving recently)  Additional Comments: Denies falls       Objective        Orientation  Overall Orientation Status: Within Functional Limits     Balance  Sitting Balance: Independent  Standing Balance: Contact guard assistance (to SBA)  Standing Balance  Time: ~3 min x3  Activity: walk in blakely, stairs, bathroom mobiloity/activity  Functional Mobility  Functional - Mobility Device: Rolling Walker  Activity: To/from bathroom;  Other  Assist Level: Contact guard assistance (to SBA)  Toilet Transfers  Toilet - Technique: Ambulating  Equipment Used: Standard toilet (grab bar)  Toilet Transfer: Contact guard assistance (to SBA + cues)  ADL  Grooming: Stand by assistance (to wash hands, standing at sink)  UE Dressing: Independent  LE Dressing: Contact guard assistance (to SBA)  Toileting: Contact guard assistance (to SBA)  Tone RUE  RUE Tone: Normotonic  Tone LUE  LUE Tone: Normotonic  Coordination  Movements Are Fluid And Coordinated: Yes     Bed mobility  Supine to Sit: Stand by assistance  Sit to Supine: Stand by assistance  Transfers  Stand Step Transfers: Contact guard assistance (to SBA + cues)  Sit to stand: Contact guard assistance (to SBA + cues)  Stand to sit: Contact guard assistance (to SBA + cues)     Cognition  Overall Cognitive Status: WNL                 LUE AROM (degrees)  LUE AROM : WFL  RUE AROM (degrees)  RUE AROM : WFL        Hand Dominance  Hand Dominance: Right         Treatment included ADL and transfer training.      Plan   Plan  Times per week: 7  Current Treatment Recommendations: Strengthening, Balance Training, Functional Mobility Training, Endurance Training, Self-Care / ADL    AM-PAC Score        AM-PAC Inpatient Daily Activity Raw Score: 20 (12/14/21 1621)  AM-PAC Inpatient ADL T-Scale Score : 42.03 (12/14/21 1621)  ADL Inpatient CMS 0-100% Score: 38.32 (12/14/21 1621)  ADL Inpatient CMS G-Code Modifier : Brianne Grewal (12/14/21 1621)    Goals                            No goals met  Short term goals  Time Frame for Short term goals: Discharge  Short term goal 1: Transfer to/from toilet with supervision  Short term goal 2: Stance with supervision x5 min while engaging in ADL/functional mobility  Short term goal 3: Supervision for lower body dressing  Patient Goals   Patient goals : Go home       Therapy Time   Individual Concurrent Group Co-treatment   Time In 1420         Time Out 1516         Minutes 56         Timed Code Treatment Minutes: [address], OTR/L [id number]

## 2021-12-14 NOTE — H&P
[facility]    [id number]    [facility] Same Day Surgery Update H & P  Department of General Surgery   Surgical Service   Pre-operative History and Physical  Last H & P within the last 30 days. DIAGNOSIS:   Primary osteoarthritis of left knee [M17.12]    Procedure(s):  LEFT TOTAL KNEE ARTHROPLASTY     History obtained from: Patient interview and EHR     HISTORY OF PRESENT ILLNESS:   Patient is a morbidly obese (Body mass index is 41.16 kg/m².), 57 y.o. female with c/o left knee pain and instability in the setting of arthrosis. The symptoms have been recalcitrant to conservative treatment and the patient presents today for the above procedure. Covid 19:  Patient denies fever, chills, worsening cough, or known exposure to Covid-19.       Past Medical History:        Diagnosis Date    Acute superficial venous thrombosis of lower extremity, right 03/05/2019    Arthritis     Cataract, bilateral 2020    Chronic superficial venous thrombosis of left lower extremity 08/13/2015    CKD (chronic kidney disease) stage 2, GFR 60-89 ml/min     CKD (chronic kidney disease) stage 3, GFR 30-59 ml/min ([facility])     COVID-19 virus infection 11/2020    Cystic kidney disease     DVT (deep venous thrombosis) ([address] 4/2003, 5/2013    Gastroesophageal reflux disease without esophagitis 11/28/2016    H/O deep venous thrombosis     H/O simple renal cyst     Hx of blood clots     Hyperlipidemia     Hypertension     IBS (irritable bowel syndrome)     Left retinal detachment 09/2020    Osteoarthritis, knee     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    BIlateral    Squamous cell carcinoma of skin of lower limb, left     Steatosis of liver     SVT (supraventricular tachycardia) ([address] 03/05/2019    Thyroid disease     Type 2 diabetes mellitus ([address]     Venous insufficiency      Past Surgical History:        Procedure Laterality Date    BREAST SURGERY      left fibroid tumor    CHOLECYSTECTOMY  04/2003 Ex-Partner: Not on file    Emotionally Abused: Not on file    Physically Abused: Not on file    Sexually Abused: Not on file   Housing Stability:     Unable to Pay for Housing in the Last Year: Not on file    Number of Places Lived in the Last Year: Not on file    Unstable Housing in the Last Year: Not on file         Medications Prior to Admission:      Prior to Admission medications    Medication Sig Start Date End Date Taking?  Authorizing Provider   rosuvastatin (CRESTOR) 10 MG tablet TAKE 1 TABLET NIGHTLY 12/2/21  Yes Marcellus Taylor DO   irbesartan (AVAPRO) 75 MG tablet Take 1 tablet by mouth daily 11/24/21  Yes Marcellus Taylor DO   SITagliptin (JANUVIA) 50 MG tablet Take 1 tablet by mouth daily 6/21/21  Yes Marcellus Taylor DO   esomeprazole (NEXIUM) 40 MG delayed release capsule TAKE 1 CAPSULE DAILY IN THE MORNING BEFORE BREAKFAST 4/26/21  Yes Marcellus Taylor DO   levothyroxine (SYNTHROID) 150 MCG tablet TAKE 1 TABLET DAILY 4/5/21  Yes Marcellus Taylor DO   furosemide (LASIX) 20 MG tablet TAKE 1 TABLET DAILY 4/5/21  Yes Marcellus Taylor DO   rivaroxaban (XARELTO) 10 MG TABS tablet Take 10 mg by mouth   Yes Historical Provider, MD   Lancets MISC 1 each by Does not apply route 2 times daily Please dispense ONE TOUCH Lancets  Test BS BID 6/9/21   Marcellus Taylor DO   ONETOUCH ULTRA strip TEST 2 TIMES A DAY & AS NEEDED FOR SYMPTOMS OF IRREGULAR BLOOD GLUCOSE. 6/8/21   Marcellus Taylor DO   ONETOUCH DELICA LANCETS FINE MISC USE AS DIRECTED TO TEST BLOOD SUGAR ONCE EVERY DAY 9/30/19   Marcellus Taylor DO         Allergies:  Codeine and Morphine and related    PHYSICAL EXAM:      /86   Pulse 97   Resp 22   Ht 5' 6\" (1.676 m)   Wt 255 lb (115.7 kg)   SpO2 98%   BMI 41.16 kg/m²      Airway:  Airway patent with no audible stridor    Heart:  Regular rate and rhythm, No murmur noted    Lungs:  No increased work of breathing, good air exchange, clear to auscultation bilaterally, no crackles or wheezing    Abdomen:  Soft, non-distended, non-tender, no masses palpated    ASSESSMENT AND PLAN    Patient is a 57 y.o. female with above specified procedure planned. 1.  The patients history and physical was obtained and signed off by the pre-admission testing department. Patient seen and focused exam done today- no new changes since last physical exam on 11/24/2021.    2.  Access to ancillary services are available per request of the provider.     CLAUS Shaikh - CNP     12/14/2021

## 2021-12-14 NOTE — PROGRESS NOTES
Patient admitted to PACU # 16 from OR at 1228 post LEFT TOTAL KNEE ARTHROPLASTY per Dr. Renata Enciso. Attached to PACU monitoring system and report received from anesthesia provider. Patient was reported to be hemodynamically stable during procedure. Patient drowsy on admission and denied pain.

## 2021-12-14 NOTE — PROGRESS NOTES
Physical Therapy    Facility/Department: [facility] GENERAL SURGERY  Initial Assessment and Treatment    NAME: Cheryl Smith  : 1960  MRN: [id number]    Date of Service: 2021    Discharge Recommendations:    Cheryl Smith scored a 18/24 on the AM-PAC short mobility form. Current research shows that an AM-PAC score of 18 or greater is typically associated with a discharge to the patient's home setting. Based on the patient's AM-PAC score and their current functional mobility deficits, it is recommended that the patient have 2-3 sessions per week of Physical Therapy at d/c to increase the patient's independence. At this time, this patient demonstrates the endurance and safety to discharge home with home vs OP services and a follow up treatment frequency of 2-3x/wk. Please see assessment section for further patient specific details. If patient discharges prior to next session this note will serve as a discharge summary. Please see below for the latest assessment towards goals. PT Equipment Recommendations  Equipment Needed: Yes (rolling walker)    Assessment   Body structures, Functions, Activity limitations: Decreased functional mobility ; Decreased ROM; Decreased strength; Increased pain  Assessment: Pt with slightly decreased independent  mobility from baseline s/p Minimally Invasive left total knee arthroplasty. Pt reports normally independent with mobility without AD. Currently needing CG/SB. Should progress well with gradual increase in activity. Plans to return home with  and has no concerns about mangaing. Rec home with 24 hr assist initially and OP PT to maximize mobiltiy and independence  Treatment Diagnosis: impaired functional mobility s/pMinimally Invasive left total knee arthroplasty  Decision Making: Low Complexity  PT Education: Goals; PT Role; Plan of Care; Home Exercise Program; General Safety; Weight-bearing Education; Precautions;  Functional Mobility Training  Patient Education: Pt verbalized understanding  REQUIRES PT FOLLOW UP: Yes       Patient Diagnosis(es): The encounter diagnosis was Arthritis of left knee. has a past medical history of Acute superficial venous thrombosis of lower extremity, right, Arthritis, Cataract, bilateral, Chronic superficial venous thrombosis of left lower extremity, CKD (chronic kidney disease) stage 2, GFR 60-89 ml/min, CKD (chronic kidney disease) stage 3, GFR 30-59 ml/min (HCC), COVID-19 virus infection, Cystic kidney disease, DVT (deep venous thrombosis) ([facility]), Gastroesophageal reflux disease without esophagitis, H/O deep venous thrombosis, H/O simple renal cyst, Hx of blood clots, Hyperlipidemia, Hypertension, IBS (irritable bowel syndrome), Left retinal detachment, Osteoarthritis, knee, Saphenofemoral venous reflux, Squamous cell carcinoma of skin of lower limb, left, Steatosis of liver, SVT (supraventricular tachycardia) ([facility] [address] [address] Thyroid disease, Type 2 diabetes mellitus ([address] and Venous insufficiency. has a past surgical history that includes Cholecystectomy (04/2003); Hysterectomy (01/1999); Breast surgery; Knee arthroscopy (03/2015); Total knee arthroplasty (05/2013); Knee arthroscopy; Tonsillectomy; Varicose vein surgery (1980's.  ); Colonoscopy (02/2015); Mohs surgery (07/01/2019); Retinal detachment surgery (Left, 09/30/2020); Retinal detachment surgery (Left, 10/24/2020); Retinal detachment surgery (Left, 2021); and joint replacement. Restrictions  Position Activity Restriction  Other position/activity restrictions: WBAT  Vision/Hearing  Vision:  (contacts or glasses)  Hearing: Within functional limits     Subjective  General  Chart Reviewed: Yes  Additional Pertinent Hx: Pt is a 57 y.o. female s/p Minimally Invasive left total knee arthroplasty, cemented on 12/14.    PMH: arthritis, hyperlipidemia, HTN, OA, DM, CKD, DVT, R TKR 2013  Referring Practitioner: Fany Amin MD  Referral Date : 12/14/21  Subjective  Subjective: Pt found supine on stretcher in PACU.   Agreeable to PT  Pain Screening  Patient Currently in Pain: Denies  Vital Signs  Patient Currently in Pain: Yes (L knee, not rated, RN aware)       Orientation  Orientation  Overall Orientation Status: Within Functional Limits  Social/Functional History  Social/Functional History  Lives With: Spouse (son in and out)  Type of Home: House  Home Layout: Multi-level, Laundry in basement, Bed/Bath upstairs, 1/2 bath on main level  Home Access: Stairs to enter with rails (2 BA)  Bathroom Shower/Tub: Walk-in shower  Bathroom Toilet: Standard  Bathroom Equipment: Shower chair  Home Equipment: Crutches  ADL Assistance: Independent  Homemaking Assistance:  (shares with )  Ambulation Assistance: Independent  Transfer Assistance: Independent  Active :  (hasn't been driving recently)  Additional Comments: Denies falls  Cognition        Objective          AROM RLE (degrees)  RLE AROM: WFL  AROM LLE (degrees)  LLE AROM :  (L knee approximately 15-60 degrees)  Strength RLE  Strength RLE: WFL  Strength LLE  Strength LLE:  (decreased grossly s/p surgery)        Bed mobility  Supine to Sit: Stand by assistance  Sit to Supine: Stand by assistance  Transfers  Sit to Stand: Contact guard assistance (cues for hand placement)  Stand to sit: Stand by assistance (cues for hand placement)  Ambulation  Ambulation?: Yes  Ambulation 1  Device: Rolling Walker  Assistance: Contact guard assistance  Quality of Gait: decreased stance time LLE, cues to not push walker too far ahead, decreased nila initially - improved the more she was up  Distance: 100', 10' x 3, 4'  Stairs/Curb  Stairs?: Yes (Amb up/down 3 steps with bilat rail, CGA, cues for sequence)        Exercises  Comments: Reviewed TKR HEP - pt verbalized and demonstrated understanding   Treatment included: bed mobility, transfers, gt/stair training, pt education  Plan   Plan  Times per week: 5-7  Current Treatment Recommendations: Strengthening, ROM, Gait Training, Stair training, Patient/Caregiver Education & Training, Safety Education & Training  Safety Devices  Type of devices:  (left on stretcher in PACU with RN present)    G-Code       OutComes Score                                                  AM-PAC Score  AM-PAC Inpatient Mobility Raw Score : 18 (12/14/21 1448)  AM-PAC Inpatient T-Scale Score : 43.63 (12/14/21 1448)  Mobility Inpatient CMS 0-100% Score: 46.58 (12/14/21 1448)  Mobility Inpatient CMS G-Code Modifier : CK (12/14/21 1448)          Goals  Short term goals  Time Frame for Short term goals: By discharge  Short term goal 1: Sup to sit independent  Short term goal 2: Pt will transfer sit to stand supervision  Short term goal 3: Pt will amb >200' with rolling walker supervision  Short term goal 4: Pt will up/down 4-8 steps with rail and supervision  Short term goal 5: Pt will be independent with L TKR HEP x 10 reps       Therapy Time   Individual Concurrent Group Co-treatment   Time In 1420         Time Out 1516         Minutes 56              Timed Code Treatment Minutes: 41      Total Treatment Minutes:  [facility],

## 2021-12-14 NOTE — ANESTHESIA PROCEDURE NOTES
Peripheral Block    Patient location during procedure: PACU  Staffing  Anesthesiologist: Nai Stone MD  Preanesthetic Checklist  Completed: patient identified, IV checked, site marked, risks and benefits discussed, surgical consent, monitors and equipment checked, pre-op evaluation, timeout performed, anesthesia consent given, oxygen available and patient being monitored  Peripheral Block  Patient position: supine  Prep: ChloraPrep  Patient monitoring: cardiac monitor, continuous pulse ox, frequent blood pressure checks and IV access  Block type: Femoral  Laterality: left  Injection technique: single-shot  Guidance: ultrasound guided  Local infiltration: lidocaine  Infiltration strength: 1 %  Adductor canal  Provider prep: mask and sterile gloves  Local infiltration: lidocaine  Needle  Needle type: combined needle/nerve stimulator   Needle gauge: 22 G  Needle length: 5 cm  Needle localization: ultrasound guidance  Assessment  Injection assessment: negative aspiration for heme, no paresthesia on injection and local visualized surrounding nerve on ultrasound  Slow fractionated injection: yes  Hemodynamics: stable  Additional Notes  Sartorius and Vastus Medialis Muscle, Femoral artery and Saphenous nerve are identified; the tip of the needle and the spread of the local anesthetic around the Saphenous nerve are visualized. The Saphenous nerve appeared to be anatomically normal and there were no abnormal pathologically findings seen.    Medications Administered  Ropivacaine (NAROPIN) injection 0.5%, 20 mL  Reason for block: post-op pain management

## 2021-12-14 NOTE — ANESTHESIA PRE PROCEDURE
Department of Anesthesiology  Preprocedure Note       Name:  Kylie Goodrich   Age:  57 y.o.  :  1960                                          MRN:  [id number]         Date:  2021      Surgeon: Candida James):  Jarvis Dubose MD    Procedure: Procedure(s):  LEFT TOTAL KNEE ARTHROPLASTY    Medications prior to admission:   Prior to Admission medications    Medication Sig Start Date End Date Taking?  Authorizing Provider   rosuvastatin (CRESTOR) 10 MG tablet TAKE 1 TABLET NIGHTLY 21  Yes Marcellus Taylor DO   irbesartan (AVAPRO) 75 MG tablet Take 1 tablet by mouth daily 21  Yes Marcellus Taylor DO   SITagliptin (JANUVIA) 50 MG tablet Take 1 tablet by mouth daily 21  Yes Marcellus Taylor DO   esomeprazole (NEXIUM) 40 MG delayed release capsule TAKE 1 CAPSULE DAILY IN THE MORNING BEFORE BREAKFAST 21  Yes Marcellus Taylor DO   levothyroxine (SYNTHROID) 150 MCG tablet TAKE 1 TABLET DAILY 21  Yes Marcellus Taylor DO   furosemide (LASIX) 20 MG tablet TAKE 1 TABLET DAILY 21  Yes Marcellus Taylor DO   rivaroxaban (XARELTO) 10 MG TABS tablet Take 10 mg by mouth   Yes Historical Provider, MD   Lancets MISC 1 each by Does not apply route 2 times daily Please dispense ONE TOUCH Lancets  Test BS BID 21   Marcellus Taylor DO   ONETOUCH ULTRA strip TEST 2 TIMES A DAY & AS NEEDED FOR SYMPTOMS OF IRREGULAR BLOOD GLUCOSE. 21   Marcellus Taylor DO   ONETOUCH DELICA LANCETS FINE MISC USE AS DIRECTED TO TEST BLOOD SUGAR ONCE EVERY DAY 19   Marcellus Taylor DO       Current medications:    Current Facility-Administered Medications   Medication Dose Route Frequency Provider Last Rate Last Admin    ceFAZolin (ANCEF) 2000 mg in dextrose 5 % 50 mL IVPB  2,000 mg IntraVENous Once Jarvis Dubose MD        lactated ringers infusion   IntraVENous Continuous Jarvis Dubose MD        acetaminophen (TYLENOL) tablet 1,000 mg  1,000 mg Oral Once Jarvis Dubose MD        dexamethasone (PF) (DECADRON) injection 10 mg  10 mg IntraVENous Once Fany Amin MD        tranexamic acid (CYKLOKAPRON) 1,000 mg in sodium chloride 0.9 % 50 mL IVPB  1,000 mg IntraVENous Once Fany Amin MD        vancomycin (VANCOCIN) 1,750 mg in dextrose 5 % 500 mL IVPB  15 mg/kg IntraVENous Once Fany Amin MD        lactated ringers infusion   IntraVENous Continuous Kendy Gonzalez DO        midazolam (VERSED) injection 2 mg  2 mg IntraVENous Once Amy Mullen MD        ropivacaine (NAROPIN) 0.5% injection 30 mL  30 mL Infiltration Once Amy Mullen MD           Allergies:     Allergies   Allergen Reactions    Codeine Other (See Comments)     hallucinations    Morphine And Related      hallucinations       Problem List:    Patient Active Problem List   Diagnosis Code    Benign essential HTN I10    Hypothyroidism E03.9    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    Venous insufficiency of both lower extremities I87.2    Steatosis of liver K76.0    Impaired fasting glucose R73.01    Hyperlipidemia E78.5    CKD (chronic kidney disease) stage 3, GFR 30-59 ml/min ([facility]) N18.30    Irritable bowel syndrome with diarrhea K58.0    Primary osteoarthritis of left knee M17.12    Hearing loss due to cerumen impaction H61.20    Sensorineural hearing loss of left ear H90.5    Gastroesophageal reflux disease without esophagitis K21.9    Arthropathy M12.9    Renal lesion N28.9    Type 2 diabetes mellitus without complication, without long-term current use of insulin ([facility]) E11.9    Edema of lower extremity R60.0    Low back pain M54.50    Lumbar radiculopathy M54.16    Overweight E66.3    Pain of right lower extremity M79.604    Personal history of other venous thrombosis and embolism Z86.718    History of COVID-19 Z86.16       Past Medical History:        Diagnosis Date    Acute superficial venous thrombosis of lower extremity, right 03/05/2019    Arthritis     Cataract, bilateral 2020    Chronic superficial venous thrombosis of left lower extremity 08/13/2015    CKD (chronic kidney disease) stage 2, GFR 60-89 ml/min     CKD (chronic kidney disease) stage 3, GFR 30-59 ml/min (HCC)     COVID-19 virus infection 11/2020    Cystic kidney disease     DVT (deep venous thrombosis) ([facility] [address] 4/2003, 5/2013    Gastroesophageal reflux disease without esophagitis 11/28/2016    H/O deep venous thrombosis     H/O simple renal cyst     Hx of blood clots     Hyperlipidemia     Hypertension     IBS (irritable bowel syndrome)     Left retinal detachment 09/2020    Osteoarthritis, knee     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    BIlateral    Squamous cell carcinoma of skin of lower limb, left     Steatosis of liver     SVT (supraventricular tachycardia) ([facility] [address] 03/05/2019    Thyroid disease     Type 2 diabetes mellitus ([facility] [address]     Venous insufficiency        Past Surgical History:        Procedure Laterality Date    BREAST SURGERY      left fibroid tumor    CHOLECYSTECTOMY  04/2003    COLONOSCOPY  02/2015    Dr. Crystal Gordon  01/1999    JOINT REPLACEMENT      KNEE ARTHROSCOPY  03/2015    right with medial meninsectomy    KNEE ARTHROSCOPY      right x 3-4     MOHS SURGERY  07/01/2019    Dr. Radha Betancourt Left 09/30/2020    X 3 LAST ONE IN 2/2021    RETINAL DETACHMENT SURGERY Left 10/24/2020    RETINAL DETACHMENT SURGERY Left 2021    TONSILLECTOMY      TOTAL KNEE ARTHROPLASTY  05/2013    right    VARICOSE VEIN SURGERY  1980's. Social History:    Social History     Tobacco Use    Smoking status: Never Smoker    Smokeless tobacco: Never Used   Substance Use Topics    Alcohol use:  No                                Counseling given: Not Answered      Vital Signs (Current):   Vitals:    12/09/21 1505   Weight: 255 lb (115.7 kg)   Height: 5' 6\" (1.676 m)                                              BP Readings from Last 3 Encounters:   12/06/21 (!) 152/96 11/24/21 138/78   09/14/21 104/60       NPO Status:                                                                                 BMI:   Wt Readings from Last 3 Encounters:   12/09/21 255 lb (115.7 kg)   11/24/21 261 lb (118.4 kg)   09/14/21 268 lb (121.6 kg)     Body mass index is 41.16 kg/m². CBC:   Lab Results   Component Value Date    WBC 9.4 11/24/2021    RBC 4.80 11/24/2021    RBC 4.95 04/17/2015    HGB 13.0 11/24/2021    HCT 41.2 11/24/2021    MCV 85.7 11/24/2021    RDW 14.8 11/24/2021     11/24/2021       CMP:   Lab Results   Component Value Date     11/24/2021    K 4.5 11/24/2021     11/24/2021    CO2 24 11/24/2021    BUN 23 11/24/2021    CREATININE 1.5 11/24/2021    GFRAA 42 11/24/2021    GFRAA >60 05/12/2013    AGRATIO 2.1 09/17/2021    LABGLOM 35 11/24/2021    GLUCOSE 177 11/24/2021    PROT 5.9 09/17/2021    CALCIUM 9.3 11/24/2021    BILITOT 0.5 09/17/2021    ALKPHOS 133 09/17/2021    AST 13 09/17/2021    ALT 15 09/17/2021       POC Tests: No results for input(s): POCGLU, POCNA, POCK, POCCL, POCBUN, POCHEMO, POCHCT in the last 72 hours.     Coags:   Lab Results   Component Value Date    PROTIME 17.7 11/24/2021    INR 1.54 11/24/2021    APTT 37.7 11/24/2021       HCG (If Applicable): No results found for: PREGTESTUR, PREGSERUM, HCG, HCGQUANT     ABGs: No results found for: PHART, PO2ART, TFQ2LUE, KVZ9XWL, BEART, X1VTLBGK     Type & Screen (If Applicable):  No results found for: LABABO, LABRH    Drug/Infectious Status (If Applicable):  No results found for: HIV, HEPCAB    COVID-19 Screening (If Applicable):   Lab Results   Component Value Date    COVID19 POSITIVE 09/17/2021    COVID19 Positive 01/19/2021           Anesthesia Evaluation    Airway: Mallampati: II  TM distance: >3 FB   Neck ROM: full  Mouth opening: > = 3 FB Dental: normal exam         Pulmonary: breath sounds clear to auscultation      (-) COPD, asthma, sleep apnea and not a current smoker Cardiovascular:    (+) hypertension:,     (-) past MI, CAD, CABG/stent, dysrhythmias,  angina,  CHF, orthopnea and PND      Rhythm: regular  Rate: normal           Beta Blocker:  Not on Beta Blocker         Neuro/Psych:   (+) neuromuscular disease (lumbar radiculopathy):,    (-) seizures, TIA and CVA           GI/Hepatic/Renal:   (+) GERD:, liver disease:, morbid obesity     (-) PUD, hepatitis and no renal disease       Endo/Other:    (+) DiabetesType II DM, , hypothyroidism::., no malignancy/cancer. (-) no electrolyte abnormalities, no malignancy/cancer               Abdominal:             Vascular:   + DVT, .  - PVD. Other Findings:             Anesthesia Plan      MAC and regional     ASA 3       Induction: intravenous. Anesthetic plan and risks discussed with patient. Plan discussed with CRNA.                 Oc Kinsey MD   12/14/2021

## 2021-12-14 NOTE — PROGRESS NOTES
called and updated. Placed on bedpan to void. Weaning O2 off, denied pain moving LE's better now.  VSS

## 2021-12-14 NOTE — PROGRESS NOTES
Passed PT/OT voided earlier but pain was \"7\" after therapy. Declined PO pain pill @ this time. Medicated with fentanyl placed back on O2 to allow patient to sleep and obtain pain relief.  called and updated.

## 2021-12-14 NOTE — PROGRESS NOTES
PACU Transfer to [facility]  Pt's Current Allergies: Codeine and Morphine and related    Pt meets criteria to transfer to next phase of care per ROCIO SCORE and ASPAN standards    Recent Labs     12/14/21  0906 12/14/21  1228   POCGLU 158* 145*       Vitals:    12/14/21 1600   BP: 134/78   Pulse: 81   Resp: 16   Temp: 97.3 °F (36.3 °C)   SpO2: 94%      BP within 20% of pt's admitting BP as per Rocio Score      Intake/Output Summary (Last 24 hours) at 12/14/2021 1604  Last data filed at 12/14/2021 1515  Gross per 24 hour   Intake 3125 ml   Output 325 ml   Net 2800 ml         Pain assessment:  receiving treatment  Pain Level: 5    Patient was assessed for unknown alterations to skin integrity. There were not unknown alterations observed. Patient transferred to care of Peter Rodriguez RN.   Family updated and directed to Peter Rodriguez    12/14/2021 4:04 PM

## 2021-12-14 NOTE — PROGRESS NOTES
LATE ENTRY:    Ambulatory Surgery/Procedure Discharge Note    Vitals:    12/14/21 1610   BP: (!) 141/93   Pulse: 97   Resp: 18   Temp: 96.6 °F (35.9 °C)   SpO2: 96%       In: 125 [P.O.:125]  Out: - Pt drank two [place] mists, ate two packs of tracey crackers; declined bathroom in SDS (pt states she went twice today already)    Restroom use offered before discharge. Yes -- declined    Pain assessment:  present - adequately treated and location, left knee, was given two percocets in SDS after eating snack; pain unchanged. Also elevated and ice pack  Pain Level: 7    Postop BP of 141/93 within 20% of preop BP. Pt returned to hospitals from PACU s/p left total knee arthroplasty. Pt fully alert and dressed. Breathing easily on RA; left leg wrapped in Ace wrap from left upper thigh through foot, and toes exposed and are pink and warm. Ice packs to left knee and this RN elevated knee for comfort as pt stated pain was 7/10 at site. Pt ate snack of two sodas and tracey crackers, and was then given 2 Percocets for pain. IV removed, and dressing applied. Karen Martinez for pt to take home provided. Discharge instructions were discussed with both pt and pt's , and both verbalized understanding. RX for oxycodone handed to  with instructions, which RX is to be filled at outside pharmacy. The time percocets were given was written on front of folder, and  is aware. Patient discharged to home/self care.  Patient discharged via wheel chair by this RN and PCA to waiting family/S.O.       12/14/2021 6:19 PM

## 2021-12-28 NOTE — DISCHARGE SUMMARY
[facility] DISCHARGE SUMMARY    Pre Operative Diagnosis  Primary osteoarthritis of left knee [M17.12]    Post Operative Diagnosis  Primary osteoarthritis of left knee [M17.12]    Discharge Diagnosis Primary osteoarthritis of left knee [M17.12]    Procedure Preformed  Procedure(s):  LEFT TOTAL KNEE ARTHROPLASTY    Surgeon  Mary Kay Booker MD     Medical course: as per medical records       The patient was taken to the operating room  where the aforementioned procedure was preformed. The patient was taken to the post operative anesthesia recovery unit in stable condition. The patient was then transferred to the orthopaedic floor for post operative pain management and convalesce. ( x )The patient was placed on anticoagulation therapy for DVT prophylaxis       The patient was discharged in stable condition. Please see medical reconciliation for discharge medications. The discharge instructions were explained to the patient and the family. The patient will follow up in the office in 3 weeks for repeat examination and xray .

## 2022-02-08 ENCOUNTER — OFFICE VISIT (OUTPATIENT)
Dept: INTERNAL MEDICINE CLINIC | Age: 62
End: 2022-02-08
Payer: COMMERCIAL

## 2022-02-08 VITALS
OXYGEN SATURATION: 97 % | DIASTOLIC BLOOD PRESSURE: 78 MMHG | HEART RATE: 100 BPM | SYSTOLIC BLOOD PRESSURE: 138 MMHG | HEIGHT: 66 IN | RESPIRATION RATE: 12 BRPM | BODY MASS INDEX: 42.11 KG/M2 | WEIGHT: 262 LBS

## 2022-02-08 DIAGNOSIS — I87.2 VENOUS INSUFFICIENCY OF BOTH LOWER EXTREMITIES: ICD-10-CM

## 2022-02-08 DIAGNOSIS — E78.5 HYPERLIPIDEMIA, UNSPECIFIED HYPERLIPIDEMIA TYPE: ICD-10-CM

## 2022-02-08 DIAGNOSIS — N18.31 STAGE 3A CHRONIC KIDNEY DISEASE (HCC): ICD-10-CM

## 2022-02-08 DIAGNOSIS — E11.65 UNCONTROLLED TYPE 2 DIABETES MELLITUS WITH HYPERGLYCEMIA (HCC): Primary | ICD-10-CM

## 2022-02-08 DIAGNOSIS — K21.9 GASTROESOPHAGEAL REFLUX DISEASE WITHOUT ESOPHAGITIS: ICD-10-CM

## 2022-02-08 DIAGNOSIS — E03.9 ACQUIRED HYPOTHYROIDISM: ICD-10-CM

## 2022-02-08 DIAGNOSIS — E66.01 CLASS 3 SEVERE OBESITY DUE TO EXCESS CALORIES WITH SERIOUS COMORBIDITY AND BODY MASS INDEX (BMI) OF 40.0 TO 44.9 IN ADULT (HCC): ICD-10-CM

## 2022-02-08 DIAGNOSIS — I10 ESSENTIAL HYPERTENSION: ICD-10-CM

## 2022-02-08 LAB — HBA1C MFR BLD: 7.1 %

## 2022-02-08 PROCEDURE — 3051F HG A1C>EQUAL 7.0%<8.0%: CPT | Performed by: INTERNAL MEDICINE

## 2022-02-08 PROCEDURE — 99214 OFFICE O/P EST MOD 30 MIN: CPT | Performed by: INTERNAL MEDICINE

## 2022-02-08 PROCEDURE — 83036 HEMOGLOBIN GLYCOSYLATED A1C: CPT | Performed by: INTERNAL MEDICINE

## 2022-02-08 ASSESSMENT — PATIENT HEALTH QUESTIONNAIRE - PHQ9
SUM OF ALL RESPONSES TO PHQ9 QUESTIONS 1 & 2: 0
SUM OF ALL RESPONSES TO PHQ QUESTIONS 1-9: 0
2. FEELING DOWN, DEPRESSED OR HOPELESS: 0
1. LITTLE INTEREST OR PLEASURE IN DOING THINGS: 0

## 2022-02-08 NOTE — PROGRESS NOTES
[facility]) Physicians  Internal Medicine  Patient Encounter  Marlene Horner D.O., [place]        Chief Complaint   Patient presents with   Gilford Abts    Medication Check       HPI: 60 y.o. female seen today requesting a routine checkup regarding the status of current chronic medical problems as to below along with a medication review and reconciliation. Patient is status post left knee arthroplasty by Dr. Sam Serrano. Surgery was performed December 2021. Pt is pleased with her progress. She is due to have left eye surgery 2/15/2022. This will be her 4th surgery. The retina has healed and she will now have the cataract removed. Type 2 DM--unfortunately, her diabetes was uncontrolled based on her November lab testing with an A1c of 8.0%. She has not been checking her sugars. Last PM after dinner 240.  1 hour after breakfast this AM-- 222. She is not adherent to a strict low carb diet. She denies blurry vision. She denies poly's. She denies N/T or burning in the feet. Lab Results   Component Value Date    LABA1C 8.0 11/24/2021     Lab Results   Component Value Date    .9 11/24/2021      CKD-- Stage 3. She sees Dr. Jany Ruby. She was last seen by his NP 11/15/2021. She denies foamy or frothy urine. Avapro had been decreased to 75 mg from 150. HTN-- She denies new problems with HA, dizziness, lightheadedness, syncope. Hypothyroidism-- She denies problems with constipation, diarrhea, cold or heat intolerance, hair loss, muscle cramps, tremor, edema or palpitations. She is compliant with the medication. Lab Results   Component Value Date    TSH 1.21 09/17/2021       GERD--Patient denies any active heartburn or dysphagia. H2 blockers ineffective. She is on PPI. Hyperlipidemia:    Lab Results   Component Value Date    LDLCALC 85 09/17/2021   . She denies myalgias or weakness from medication.         Past Medical History:   Diagnosis Date    Acute superficial venous thrombosis of lower extremity, right 03/05/2019    Arthritis     Cataract, bilateral 2020    Chronic superficial venous thrombosis of left lower extremity 08/13/2015    CKD (chronic kidney disease) stage 2, GFR 60-89 ml/min     CKD (chronic kidney disease) stage 3, GFR 30-59 ml/min (HCC)     COVID-19 virus infection 11/2020    Cystic kidney disease     DVT (deep venous thrombosis) ([facility] [address] 4/2003, 5/2013    Gastroesophageal reflux disease without esophagitis 11/28/2016    H/O deep venous thrombosis     H/O simple renal cyst     Hx of blood clots     Hyperlipidemia     Hypertension     IBS (irritable bowel syndrome)     Left retinal detachment 09/2020    Osteoarthritis, knee     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    BIlateral    Squamous cell carcinoma of skin of lower limb, left     Steatosis of liver     [facility] 03/05/2019    Thyroid disease     Type 2 diabetes mellitus (HCC)     Venous insufficiency          MEDICATIONS:  Medication Sig   rosuvastatin (CRESTOR) 10 MG tablet TAKE 1 TABLET NIGHTLY   irbesartan (AVAPRO) 75 MG tablet Take 1 tablet by mouth daily   SITagliptin (JANUVIA) 50 MG tablet Take 1 tablet by mouth daily   Lancets MISC 1 each by Does not apply route 2 times daily Please dispense ONE TOUCH Lancets  Test BS BID   ONETOUCH ULTRA strip TEST 2 TIMES A DAY & AS NEEDED FOR SYMPTOMS OF IRREGULAR BLOOD GLUCOSE.   esomeprazole (NEXIUM) 40 MG delayed release capsule TAKE 1 CAPSULE DAILY IN THE MORNING BEFORE BREAKFAST   levothyroxine (SYNTHROID) 150 MCG tablet TAKE 1 TABLET DAILY   furosemide (LASIX) 20 MG tablet TAKE 1 TABLET DAILY   ONETOUCH DELICA LANCETS FINE MISC USE AS DIRECTED TO TEST BLOOD SUGAR ONCE EVERY DAY   rivaroxaban (XARELTO) 10 MG TABS tablet Take 10 mg by mouth           Review of Systems - As per HPI  GEN: Pt denies fever, chills or night sweats. Denies weight changes.   Appetite good  HEENT: Pt denies visual and auditory changes, epistaxis, upper respiratory symptoms and dysphagia  CV: Pt denies CP, SOB, EPPS, orthopnea palpitations, LE swelling, and claudication. PULM: Pt denies cough, wheezing or sputum production  GI: Pt denies N/V/D, heart burn, rectal bleeding, or melena. NEURO: Pt denies unilateral weakness, paresthesia and dizziness. OBJECTIVE:  Vitals:    02/08/22 1620   BP: 138/78   Pulse: 100   Resp: 12   SpO2: 97%   Weight: 262 lb (118.8 kg)   Height: 5' 6\" (1.676 m)     Body mass index is 42.29 kg/m². Wt Readings from Last 3 Encounters:   02/08/22 262 lb (118.8 kg)   12/09/21 255 lb (115.7 kg)   11/24/21 261 lb (118.4 kg)     BP Readings from Last 3 Encounters:   02/08/22 138/78   12/14/21 (!) 132/58   12/14/21 (!) 141/93      GEN: NAD, A&O, Non-toxic, obese  HEENT: NC/AT, SILAS, EOMI, Oral cavity Clear,  TM's NL, Nasal cavity clear. NECK: Supple. No thyromegaly. No JVD  LYMPH: No C/SC nodes. CV: S1 S2 NL, RRR. No murmurs, clicks or rubs. PULM: CTA, symmentric air exchange  EXT: + Bilateral lower extremity edema  GI: Abdomen is soft, NT, BS+, No hepatomegaly. No masses. NEURO: No focal or lateralizing deficits. VASC:  No carotid bruits. Pulses symmetric  SKIN:  No rashes or lesions of concern    Results for POC orders placed in visit on 02/08/22   POCT glycosylated hemoglobin (Hb A1C)   Result Value Ref Range    Hemoglobin A1C 7.1 %        ASSESSMENT/PLAN:    1. Uncontrolled type 2 diabetes mellitus with hyperglycemia (HCC)  A1c is actually improved. Despite GFR at 41 we could consider using Jardiance which can be used without dose adjustment with GFR greater than 30. Consider Rybelsus or or injectable GLP-1 agonist  Continue efforts with Lifestyle modification including low calorie diet focusing on Low fat/low cholesterol and low carbohydrate intake, along with  increasing cardiovascular (aerobic) exercise. - POCT glycosylated hemoglobin (Hb A1C)    2.  Essential hypertension  Condition is fairly well controlled but would like to see the systolic blood pressure a little lower  She has a follow-up with nephrology  She may need additional antihypertensive. Her Avapro was decreased from 150.    3. Hyperlipidemia, unspecified hyperlipidemia type  Condition stability and control are uncertain. Due for lab  Continue statin therapy as part of her overall cardiovascular disease risk reduction strategy. - Lipid Panel; Future  - Hepatic Function Panel; Future    4. Stage 3a chronic kidney disease (HCC)  Condition is stable  Advised patient to avoid NSAIDs which she knows to do    5. Acquired hypothyroidism    - TSH without Reflex; Future    6. Venous insufficiency of both lower extremities  Condition is stable  Continue compression stockings    7. Gastroesophageal reflux disease without esophagitis  Condition is well controlled  Continue PPI cautiously  Continue to monitor renal function and magnesium  - Magnesium; Future    8. Class 3 severe obesity due to excess calories with serious comorbidity and body mass index (BMI) of 40.0 to 44.9 in [facility])  Condition is uncontrolled  Continue efforts with lifestyle modification. Discussed medications with patient who voiced understanding of their use, indication and potential side effects. Pt also understands the above recommendations. All questions answered. This note was generated completely or in part utilizing Dragon dictation speech recognition software. Occasionally, words are mistranscribed and despite editing, the text may contain inaccuracies due to incorrect word recognition.   If further clarification is needed please contact the office at [phone]       Electronically signed    Mario Garcia D.O.

## 2022-02-08 NOTE — PATIENT INSTRUCTIONS
To Do List:    #1  Get the COVID-19 vaccine booster    #2  Please schedule Mammogram    #3 Check blood sugar in the morning before breakfast and 2 hours after a meal (twice daily total)

## 2022-02-28 DIAGNOSIS — E11.9 TYPE 2 DIABETES MELLITUS WITHOUT COMPLICATION, WITHOUT LONG-TERM CURRENT USE OF INSULIN (HCC): ICD-10-CM

## 2022-02-28 RX ORDER — BLOOD SUGAR DIAGNOSTIC
STRIP MISCELLANEOUS
Qty: 100 STRIP | Refills: 3 | Status: SHIPPED | OUTPATIENT
Start: 2022-02-28

## 2022-03-30 DIAGNOSIS — I10 BENIGN ESSENTIAL HTN: ICD-10-CM

## 2022-03-30 DIAGNOSIS — I87.2 VENOUS INSUFFICIENCY OF BOTH LOWER EXTREMITIES: ICD-10-CM

## 2022-03-30 DIAGNOSIS — E03.9 ACQUIRED HYPOTHYROIDISM: ICD-10-CM

## 2022-03-30 RX ORDER — LEVOTHYROXINE SODIUM 0.15 MG/1
TABLET ORAL
Qty: 90 TABLET | Refills: 3 | Status: SHIPPED | OUTPATIENT
Start: 2022-03-30

## 2022-03-30 RX ORDER — FUROSEMIDE 20 MG/1
TABLET ORAL
Qty: 90 TABLET | Refills: 3 | Status: SHIPPED | OUTPATIENT
Start: 2022-03-30

## 2022-04-23 RX ORDER — ESOMEPRAZOLE MAGNESIUM 40 MG/1
CAPSULE, DELAYED RELEASE ORAL
Qty: 90 CAPSULE | Refills: 3 | Status: SHIPPED | OUTPATIENT
Start: 2022-04-23

## 2022-05-17 RX ORDER — SITAGLIPTIN 50 MG/1
TABLET, FILM COATED ORAL
Qty: 90 TABLET | Refills: 3 | Status: SHIPPED | OUTPATIENT
Start: 2022-05-17

## 2022-05-31 ENCOUNTER — TELEPHONE (OUTPATIENT)
Dept: INTERNAL MEDICINE CLINIC | Age: 62
End: 2022-05-31

## 2022-05-31 NOTE — TELEPHONE ENCOUNTER
Left message for patient informing her that there is no availability on the day she is requesting to change to and to call the office to inform us as how she wishes to proceed.

## 2022-05-31 NOTE — TELEPHONE ENCOUNTER
----- Message from [facility] sent at 5/31/2022 11:46 AM EDT -----  Subject: Appointment Request    Reason for Call: Routine Existing Condition Follow Up (Diabetes)    QUESTIONS  Type of Appointment? Established Patient  Reason for appointment request? No appointments available during search  Additional Information for Provider? rs appt 4month check up. .... no   avalabilitys. .. pt is bringing her mom on 6/28/22 can she possibly be seen   same say while she is there please call patient asap  ---------------------------------------------------------------------------  --------------  [employer]  What is the best way for the office to contact you? OK to leave message on   voicemail  Preferred Call Back Phone Number? [phone]  ---------------------------------------------------------------------------  --------------  SCRIPT ANSWERS  Relationship to Patient? Self  Have your symptoms changed? No  Have you been diagnosed with, awaiting test results for, or told that you   are suspected of having COVID-19 (Coronavirus)? (If patient has tested   negative or was tested as a requirement for work, school, or travel and   not based on symptoms, answer no)? No  Within the past 10 days have you developed any of the following symptoms   (answer no if symptoms have been present longer than 10 days or began   more than 10 days ago)? Fever or Chills, Cough, Shortness of breath or   difficulty breathing, Loss of taste or smell, Sore throat, Nasal   congestion, Sneezing or runny nose, Fatigue or generalized body aches   (answer no if pain is specific to a body part e.g. back pain), Diarrhea,   Headache? No  Have you had close contact with someone with COVID-19 in the last 7 days? No  (Service Expert  click yes below to proceed with [employer] As [employer]ual   Scheduling)?  Yes

## 2022-06-29 ENCOUNTER — OFFICE VISIT (OUTPATIENT)
Dept: INTERNAL MEDICINE CLINIC | Age: 62
End: 2022-06-29
Payer: COMMERCIAL

## 2022-06-29 VITALS
OXYGEN SATURATION: 96 % | RESPIRATION RATE: 12 BRPM | WEIGHT: 264 LBS | HEIGHT: 66 IN | DIASTOLIC BLOOD PRESSURE: 74 MMHG | BODY MASS INDEX: 42.43 KG/M2 | HEART RATE: 92 BPM | SYSTOLIC BLOOD PRESSURE: 122 MMHG

## 2022-06-29 DIAGNOSIS — Z23 NEED FOR PNEUMOCOCCAL VACCINATION: ICD-10-CM

## 2022-06-29 DIAGNOSIS — E03.9 ACQUIRED HYPOTHYROIDISM: ICD-10-CM

## 2022-06-29 DIAGNOSIS — N18.31 STAGE 3A CHRONIC KIDNEY DISEASE (HCC): ICD-10-CM

## 2022-06-29 DIAGNOSIS — K21.9 GASTROESOPHAGEAL REFLUX DISEASE WITHOUT ESOPHAGITIS: ICD-10-CM

## 2022-06-29 DIAGNOSIS — I87.2 VENOUS INSUFFICIENCY OF BOTH LOWER EXTREMITIES: ICD-10-CM

## 2022-06-29 DIAGNOSIS — E78.5 HYPERLIPIDEMIA, UNSPECIFIED HYPERLIPIDEMIA TYPE: ICD-10-CM

## 2022-06-29 DIAGNOSIS — I10 BENIGN ESSENTIAL HTN: ICD-10-CM

## 2022-06-29 DIAGNOSIS — E11.40 TYPE 2 DIABETES MELLITUS WITH DIABETIC NEUROPATHY, WITHOUT LONG-TERM CURRENT USE OF INSULIN (HCC): Primary | ICD-10-CM

## 2022-06-29 PROCEDURE — 90677 PCV20 VACCINE IM: CPT | Performed by: INTERNAL MEDICINE

## 2022-06-29 PROCEDURE — 99214 OFFICE O/P EST MOD 30 MIN: CPT | Performed by: INTERNAL MEDICINE

## 2022-06-29 PROCEDURE — 3051F HG A1C>EQUAL 7.0%<8.0%: CPT | Performed by: INTERNAL MEDICINE

## 2022-06-29 NOTE — PATIENT INSTRUCTIONS
TO Do List:    #1 Update diabetes eye exam    #2 COVID-19 vaccine booster #1 followed by booster #2 four months later-recommended to help decrease risk of new Omicron variants.

## 2022-06-29 NOTE — PROGRESS NOTES
[address] [facility] [place]  Internal Medicine  Patient Encounter  Renan Ponce D.O., Jesse Dorman        Chief Complaint   Patient presents with   Verito Roa    Medication Check       HPI: 60 y.o. female seen today requesting a routine checkup regarding the status of current chronic medical problems as to below along with a medication review and reconciliation. She states she is trying to care for herself better. S/P Left TKR 12/2021    Type 2 DM--unfortunately, her diabetes was uncontrolled based on her November lab testing with an A1c of 8.0%. She has not been checking sugars. Previously she was hitting the 200's. She does try to adhere to low carb diet. She does not exercise. She has been seeing Dr. Isaias Sesay. She is now going to see Dr. Kemar Leahy. She denies dysesthesias in the feet. Lab Results   Component Value Date    LABA1C 7.1 02/08/2022     Lab Results   Component Value Date    .9 11/24/2021      CKD-- Stage 3. She sees Dr. Tala Augustin. Patient denies any foamy or frothy urine. She denies any hematuria or dysuria. HTN--denies any new problems suggestive of accelerated blood pressures. She specifically denies any new headaches or dizziness. She denies CP. She does have  EPPS, but no more than usual.      Hypothyroidism-- She denies problems with constipation, diarrhea, cold or heat intolerance, hair loss, muscle cramps, tremor, edema or palpitations. Lab Results   Component Value Date    TSH 1.35 02/28/2022       GERD--Patient denies any active heartburn or dysphagia. H2 blockers ineffective. She is on PPI. Hyperlipidemia:    Lab Results   Component Value Date    LDLCALC 105 (H) 02/28/2022   . She denies myalgias or weakness from medication. Venous insufficiency/H/O DVT-- She wears her stockings. Swelling is well controlled. She is on Xarelto.         Past Medical History:   Diagnosis Date    Acute superficial venous thrombosis of lower extremity, right 03/05/2019    Arthritis     Cataract, bilateral 2020    Chronic superficial venous thrombosis of left lower extremity 08/13/2015    CKD (chronic kidney disease) stage 2, GFR 60-89 ml/min     CKD (chronic kidney disease) stage 3, GFR 30-59 ml/min (HCC)     COVID-19 virus infection 11/2020    Cystic kidney disease     D[facility]deep [facility] thrombosis[facility]) 4/2003, 5/2013    Gastroesophageal reflux disease without esophagitis 11/28/2016    H/O deep venous thrombosis     H/O simple renal cyst     Hx of blood clots     Hyperlipidemia     Hypertension     IBS (irritable bowel syndrome)     Left retinal detachment 09/2020    Osteoarthritis, knee     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    BIlateral    Squamous cell carcinoma of skin of lower limb, left     Steatosis of liver     SVT (supraventricular tachycardia) ([facility] 03/05/2019    Thyroid disease     Type 2 diabetes mellitus ([facility])     Venous insufficiency          MEDICATIONS:  Medication Sig   JANUVIA 50 MG tablet TAKE 1 TABLET DAILY   esomeprazole (NEXIUM) 40 MG delayed release capsule TAKE 1 CAPSULE DAILY IN THE MORNING BEFORE BREAKFAST   furosemide (LASIX) 20 MG tablet TAKE 1 TABLET DAILY   levothyroxine (SYNTHROID) 150 MCG tablet TAKE 1 TABLET DAILY   blood glucose test strips (ONETOUCH ULTRA) strip TEST 2 TIMES A DAY & AS NEEDED FOR SYMPTOMS OF IRREGULAR BLOOD GLUCOSE.   rosuvastatin (CRESTOR) 10 MG tablet TAKE 1 TABLET NIGHTLY   irbesartan (AVAPRO) 75 MG tablet Take 1 tablet by mouth daily   Lancets MISC 1 each by Does not apply route 2 times daily Please dispense ONE TOUCH Lancets  Test BS BID   ONETOUCH DELICA LANCETS FINE MISC USE AS DIRECTED TO TEST BLOOD SUGAR ONCE EVERY DAY   rivaroxaban (XARELTO) 10 MG TABS tablet Take 10 mg by mouth            Review of Systems - As per HPI          OBJECTIVE:  Vitals:    06/29/22 1058   BP: 122/74   Pulse: 92   Resp: 12   SpO2: 96%   Weight: 264 lb (119.7 kg)   Height: 5' 6\" (1.676 m)     Body mass index is 42.61 kg/m². Wt Readings from Last 3 Encounters:   06/29/22 264 lb (119.7 kg)   02/08/22 262 lb (118.8 kg)   12/09/21 255 lb (115.7 kg)     BP Readings from Last 3 Encounters:   06/29/22 122/74   02/08/22 138/78   12/14/21 (!) 132/58      GEN: NAD, A&O, Non-toxic, obese  HEENT: NC/AT, SILAS, EOMI, Oral cavity Clear,  TM's NL, Nasal cavity clear. NECK: Supple. No thyromegaly. No JVD  LYMPH: No C/SC nodes. CV: S1 S2 NL, RRR. No murmurs, clicks or rubs. PULM: CTA, symmentric air exchange  EXT: + Bilateral lower extremity edema.  + Venous stasis hyperpigmentation. GI: Abdomen is soft, NT, BS+, No hepatomegaly. No masses. NEURO: No focal or lateralizing deficits. Monofilament testing intact both feet. Diminished vibratory sensation in both feet. VASC:  No carotid bruits. Pulses symmetric  SKIN:  No rashes or lesions of concern      No results found for this visit on 06/29/22. ASSESSMENT/PLAN:    1. Type 2 diabetes mellitus with diabetic neuropathy, without long-term current use of insulin (HCC)  Condition stability is uncertain  Due for E3Z  She did have some improvement based on her last A1c level 7.1%  Strongly encouraged patient to update her diabetic retinal eye exam.  This is scheduled with a new ophthalmologist  Update Prevnar pneumococcal vaccination (Prevnar 20)  Foot exam done today  -  DIABETES FOOT EXAM  - Pneumococcal, PCV20, PREVNAR 21, (age 25 yrs+), IM, PF  - CBC with Auto Differential; Future  - Comprehensive Metabolic Panel; Future  - Lipid Panel; Future  - Hemoglobin A1C; Future  - TSH; Future    2. Need for pneumococcal vaccination    - Pneumococcal, PCV20, PREVNAR 21, (age 25 yrs+), IM, PF    3. Benign essential HTN  Blood pressure is well controlled  Continue current medication  Continue a no added salt diet  - CBC with Auto Differential; Future  - Comprehensive Metabolic Panel; Future  - Lipid Panel; Future  - TSH; Future    4.  Venous insufficiency of both lower extremities  Condition is well controlled  Continue daily usage of compression stockings    5. Acquired hypothyroidism    - TSH; Future    6. Gastroesophageal reflux disease without esophagitis  Well-controlled  Continue to monitor for signs of dysphagia  Continue PPI therapy. H2 blockers have been ineffective. 7. Hyperlipidemia, unspecified hyperlipidemia type  Cholesterol stability is uncertain  Due for lab to ensure good LDL control  Continue statin therapy as part of her overall cardiovascular disease risk reduction strategy. Continue lifestyle approach. - Comprehensive Metabolic Panel; Future  - Lipid Panel; Future    8. Stage 3a chronic kidney disease (HCC)  Condition stability is uncertain  Due for lab  Reminded patient to avoid NSAIDs and to stay well-hydrated  - CBC with Auto Differential; Future  - Comprehensive Metabolic Panel; Future        Strongly recommended COVID-19 vaccine booster #1 followed by vaccine booster #2        Discussed medications with patient who voiced understanding of their use, indication and potential side effects. Pt also understands the above recommendations. All questions answered. This note was generated completely or in part utilizing Dragon dictation speech recognition software. Occasionally, words are mistranscribed and despite editing, the text may contain inaccuracies due to incorrect word recognition.   If further clarification is needed please contact the office at [phone]       Electronically signed    Dino Lepe D.O.

## 2022-07-06 ENCOUNTER — TELEPHONE (OUTPATIENT)
Dept: INTERNAL MEDICINE CLINIC | Age: 62
End: 2022-07-06

## 2022-07-06 NOTE — TELEPHONE ENCOUNTER
Patient provided the following lab results:    Notify pt lab tests show diabetes is uncontrolled and worse.  I recommend she start on Farxiga 5 mg daily.  This should help with sugars while at the same time helping with BP and swelling.  Have to watch kidney function to ensure stable.    Recheck BMP in 2-weeks.  If OK with pt, please shanna up.  .      Patient is OK with starting [address], but wants to know if she should continue taking Januvia. Please advise.     Please call [address] in to:    [facility] [address], [address] [facility]

## 2022-07-07 ENCOUNTER — TELEPHONE (OUTPATIENT)
Dept: INTERNAL MEDICINE CLINIC | Age: 62
End: 2022-07-07

## 2022-07-07 NOTE — TELEPHONE ENCOUNTER
----- Message from [address] sent at 7/7/2022 12:03 PM EDT -----  Subject: Medication Problem    Medication: dapagliflozin (FARXIGA) 5 MG tablet  Dosage: 5 mg   Ordering Provider: abril    Question/Problem: insurance will not pay for this it will cost over 600.00   to get filled pt needs something lese please call pt asap     [facility] [address] [phone]    ---------------------------------------------------------------------------  --------------  [address] [employer]  [phone]; OK to leave message on voicemail  ---------------------------------------------------------------------------  --------------    SCRIPT ANSWERS  Relationship to Patient: Self

## 2022-07-07 NOTE — TELEPHONE ENCOUNTER
Patient has called and stated the Tasia [place] will not be covered by her insurance and wanted to know if there was something else she can take instead of this?

## 2022-07-08 ENCOUNTER — TELEPHONE (OUTPATIENT)
Dept: INTERNAL MEDICINE CLINIC | Age: 62
End: 2022-07-08

## 2022-07-08 ENCOUNTER — TELEPHONE (OUTPATIENT)
Dept: ADMINISTRATIVE | Age: 62
End: 2022-07-08

## 2022-07-08 DIAGNOSIS — N18.31 STAGE 3A CHRONIC KIDNEY DISEASE (HCC): Primary | ICD-10-CM

## 2022-07-08 NOTE — TELEPHONE ENCOUNTER
Spoke with patient, she will call and see what her insurance will cover.  She will call us back to let us know which medication will be covered later today

## 2022-07-08 NOTE — TELEPHONE ENCOUNTER
----- Message from Aubrey Drew sent at 7/8/2022 12:32 PM EDT -----  Subject: Message to Provider    QUESTIONS  Information for Provider? Pt spoken to insurance company approved for her   to take Pushpa Urrutia for 20$ a month. Will have apt for blood work in the next   two weeks. If [facility] nurse can give her a call @ [phone]. sometime today   would appreciate it.   ---------------------------------------------------------------------------  --------------  Khalida Grimm INFO  [phone]; OK to leave message on voicemail  ---------------------------------------------------------------------------  --------------  SCRIPT ANSWERS  Relationship to Patient?  Self

## 2022-07-08 NOTE — TELEPHONE ENCOUNTER
PA COVER MY MEDS  Medication:Farxiga 5MG tablets  Key:[id number] - PA Case ID: [id number] - Rx #: [id number]  Status:PENDING    Farxiga 5MG tablets  CaseId:[id number];Status:Approved; Review Type:Prior Auth; Coverage Start Date:06/08/2022; Coverage End Date:07/08/2023

## 2022-07-08 NOTE — TELEPHONE ENCOUNTER
Pt made aware of the recommendations     The pt stated that she can come into the office prior to leaving [facility] on 7/20/2022 to have any blood work completed     Please place orders     Thank you

## 2022-07-08 NOTE — TELEPHONE ENCOUNTER
Patient states she has picked up the medication and will start it tomorrow. She states that if Dr. Winkler Sample wishes her to continue this medication, she will need the refill to be filled by her mail order pharmacy.

## 2022-07-27 NOTE — TELEPHONE ENCOUNTER
----- Message from Johnetta Klinefelter sent at 7/27/2022  3:43 PM EDT -----  Subject: Refill Request    QUESTIONS  Name of Medication? dapagliflozin (FARXIGA) 5 MG tablet  Patient-reported dosage and instructions? 5mg, 1 tablet daily  How many days do you have left? 10  Preferred Pharmacy? [address] phone number (if available)? [phone]  ---------------------------------------------------------------------------  --------------  CALL BACK INFO  What is the best way for the office to contact you? OK to leave message on   voicemail  Preferred Call Back Phone Number? [phone]  ---------------------------------------------------------------------------  --------------  SCRIPT ANSWERS  Relationship to Patient?  Self

## 2022-10-10 RX ORDER — DAPAGLIFLOZIN 5 MG/1
TABLET, FILM COATED ORAL
Qty: 90 TABLET | Refills: 3 | Status: SHIPPED | OUTPATIENT
Start: 2022-10-10

## 2022-11-04 ENCOUNTER — TELEPHONE (OUTPATIENT)
Dept: INTERNAL MEDICINE CLINIC | Age: 62
End: 2022-11-04

## 2022-11-04 NOTE — TELEPHONE ENCOUNTER
Patient is calling with concerns of a cyst/boil mostly located in mostly on the inside of her vagina but also located outside on the left side. Patient states it is hard bump that is about the \"length of her pinky finger. \" Patient states it only hurts when she stands up and has been there the past few days. Patient is wondering if she can have this looked at today? If not, she is wondering if she should try to pop it? Please advise.

## 2022-11-04 NOTE — TELEPHONE ENCOUNTER
Spoke with patient, she was unable to get in with gynecologist for this today. She spoke with Dr. Tato Sanchez he stated we maybe able to to get her in with Dr. Kiersten Tesfaye today. I spoke with Vera Demarco, he is out of the office and can see her as soon as Monday morning. Patient would like to be seen Monday morning, I asked them to reach out to her to get scheduled.

## 2022-11-07 ENCOUNTER — OFFICE VISIT (OUTPATIENT)
Dept: GYNECOLOGY | Age: 62
End: 2022-11-07
Payer: COMMERCIAL

## 2022-11-07 VITALS
BODY MASS INDEX: 42.61 KG/M2 | DIASTOLIC BLOOD PRESSURE: 72 MMHG | OXYGEN SATURATION: 98 % | HEART RATE: 100 BPM | TEMPERATURE: 96.9 F | SYSTOLIC BLOOD PRESSURE: 126 MMHG | WEIGHT: 264 LBS

## 2022-11-07 DIAGNOSIS — N76.4 VULVAR ABSCESS: Primary | ICD-10-CM

## 2022-11-07 PROCEDURE — 3074F SYST BP LT 130 MM HG: CPT | Performed by: OBSTETRICS & GYNECOLOGY

## 2022-11-07 PROCEDURE — 56405 I&D VULVA/PERINEAL ABSCESS: CPT | Performed by: OBSTETRICS & GYNECOLOGY

## 2022-11-07 PROCEDURE — 99204 OFFICE O/P NEW MOD 45 MIN: CPT | Performed by: OBSTETRICS & GYNECOLOGY

## 2022-11-07 PROCEDURE — 3078F DIAST BP <80 MM HG: CPT | Performed by: OBSTETRICS & GYNECOLOGY

## 2022-11-07 RX ORDER — SULFAMETHOXAZOLE AND TRIMETHOPRIM 800; 160 MG/1; MG/1
1 TABLET ORAL 2 TIMES DAILY
Qty: 14 TABLET | Refills: 0 | Status: SHIPPED | OUTPATIENT
Start: 2022-11-07 | End: 2022-11-14

## 2022-11-07 NOTE — PROGRESS NOTES
Chief complaint: Established New Doctor (Possible cyst for the last week is painful. )      History of present illness: Cecelia Lucia 60 y.o. female No LMP recorded. Patient has had a hysterectomy. Who presents with complaint of painful bump in the vulva. She is referred from Dr. Maame Kim. The patient reports that last Wednesday she started developing a area of discomfort in her vulva. It became enlarged and more painful. She thinks it may have started to drain. She reports that there is a firm area. She denies any fevers chills nausea or vomiting. The patient has a history of diabetes. She also has a history of DVT and is on anticoagulation with Xarelto. Patient Active Problem List   Diagnosis    Benign essential HTN    Hypothyroidism    Saphenofemoral venous reflux    Venous insufficiency of both lower extremities    Steatosis of liver    Impaired fasting glucose    Hyperlipidemia    CKD (chronic kidney disease) stage 3, GFR 30-59 ml/min ([facility])    Irritable bowel syndrome with diarrhea    Primary osteoarthritis of left knee    Hearing loss due to cerumen impaction    Sensorineural hearing loss of left ear    Gastroesophageal reflux disease without esophagitis    Arthropathy    Renal lesion    Type 2 diabetes mellitus without complication, without long-term current use of insulin ([facility])    Edema of lower extremity    Low back pain    Lumbar radiculopathy    Overweight    Pain of right lower extremity    Personal history of other venous thrombosis and embolism    History of COVID-19    Arthritis of left knee     Review of systems:  No complaints of symptoms involving:  Constitutional: negative for fever, chills. Eyes: No change in vision, double vision, or scotomata. HENT: No sore throat, ear pain or nasal congestion. Respiratory: No cough, shortness of breath or hemoptysis. Cardiovascular: No chest pain, orthopnea, fainting. Skin: No pruritus or generalized rash.   Neurologic: No focal weakness or sensory changes. Past medical history, past surgical history, allergies, family history, and social history are all updated in the electronic medical record and reviewed.     Past Medical History:   Diagnosis Date    Acute superficial venous thrombosis of lower extremity, right 03/05/2019    Arthritis     Cataract, bilateral 2020    Chronic superficial venous thrombosis of left lower extremity 08/13/2015    CKD (chronic kidney disease) stage 2, GFR 60-89 ml/min     CKD (chronic kidney disease) stage 3, GFR 30-59 ml/min (HCC)     COVID-19 virus infection 11/2020    Cystic kidney disease     DVT (deep venous thrombosis) ([facility] 4/2003, 5/2013    Gastroesophageal reflux disease without esophagitis 11/28/2016    H/O deep venous thrombosis     H/O simple renal cyst     Hx of blood clots     Hyperlipidemia     Hypertension     IBS (irritable bowel syndrome)     Left retinal detachment 09/2020    Osteoarthritis, knee     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 Idalia Snellen (CERVIX STATUS UNKNOWN)  01/1999    JOINT REPLACEMENT      KNEE ARTHROSCOPY  03/2015    right with medial meninsectomy    KNEE ARTHROSCOPY      right x 3-4     MOHS SURGERY  07/01/2019    Dr. Mark Lopez Left 09/30/2020    X 3 LAST ONE IN 2/2021    RETINAL DETACHMENT SURGERY Left 10/24/2020    RETINAL DETACHMENT SURGERY Left 2021    TONSILLECTOMY      TOTAL KNEE ARTHROPLASTY  05/2013    right    TOTAL KNEE ARTHROPLASTY Left 12/14/2021    LEFT TOTAL KNEE ARTHROPLASTY performed by Radha Hansen MD at [facility]  [facility]. OB History    No obstetric history on file.        Current Outpatient Medications on File Prior to Visit   Medication Sig Dispense Refill    FARXIGA 5 MG tablet TAKE 1 TABLET EVERY MORNING 90 tablet 3    JANUVIA 50 MG tablet TAKE 1 TABLET DAILY 90 tablet 3    esomeprazole (NEXIUM) 40 MG delayed release capsule TAKE 1 CAPSULE DAILY IN THE MORNING BEFORE BREAKFAST 90 capsule 3    furosemide (LASIX) 20 MG tablet TAKE 1 TABLET DAILY 90 tablet 3    levothyroxine (SYNTHROID) 150 MCG tablet TAKE 1 TABLET DAILY 90 tablet 3    blood glucose test strips (ONETOUCH ULTRA) strip TEST 2 TIMES A DAY & AS NEEDED FOR SYMPTOMS OF IRREGULAR BLOOD GLUCOSE. 100 strip 3    rosuvastatin (CRESTOR) 10 MG tablet TAKE 1 TABLET NIGHTLY 90 tablet 3    irbesartan (AVAPRO) 75 MG tablet Take 1 tablet by mouth daily 90 tablet 1    Lancets MISC 1 each by Does not apply route 2 times daily Please dispense ONE TOUCH Lancets  Test BS  each 2    ONETOUCH DELICA LANCETS FINE MISC USE AS DIRECTED TO TEST BLOOD SUGAR ONCE EVERY  each 6    rivaroxaban (XARELTO) 10 MG TABS tablet Take 10 mg by mouth       Current Facility-Administered Medications on File Prior to Visit   Medication Dose Route Frequency Provider Last Rate Last Admin    methylPREDNISolone acetate (DEPO-MEDROL) injection 40 mg  40 mg Intra-artICUlar Once Gia Mejia MD         Allergy: Codeine and Morphine and related  Social History     Tobacco Use    Smoking status: Never    Smokeless tobacco: Never   Substance Use Topics    Alcohol use: No     Family History   Problem Relation Age of Onset    High Blood Pressure Mother     Osteoporosis Mother      Social History     Socioeconomic History    Marital status:      Spouse name: Not on file    Number of children: Not on file    Years of education: Not on file    Highest education level: Not on file   Occupational History    Not on file   Tobacco Use    Smoking status: Never    Smokeless tobacco: Never   Substance and Sexual Activity    Alcohol use: No    Drug use: No    Sexual activity: Not Currently   Other Topics Concern    Not on file   Social History Narrative    Not on file     Social Determinants of Health     Financial Resource Strain: Not on file   Food Insecurity: Not on file   Transportation Needs: Not on file   Physical Activity: Not on file   Stress: Not on file   Social Connections: Not on file   Intimate Partner Violence: Not on file   Housing Stability: Not on file       Physical exam:  /72   Pulse 100   Temp 96.9 °F (36.1 °C)   Wt 264 lb (119.7 kg)   SpO2 98%   BMI 42.61 kg/m²  Body mass index is 42.61 kg/m². No LMP recorded. Patient has had a hysterectomy. Constitutional: alert, no acute distress, non-toxic, normal respiratory effort  Eyes: Sclera are clear and nonicteric. Noninjected. Lids are grossly normal.  Pupils are round equal.  Irises are grossly normal.  Mouth/ENT: Lips, teeth, gums grossly normal.  Psychiatric: Judgment and insight are intact. Mood and affect are appropriate, calm. Skin:  no lesions,no rashes  Neck: Symmetric without gross mass effect. Trachea midline. Respiratory: Normal respiratory effort. No accessory muscles used. Gastrointestinal/Abdominal: Abdomen soft, non-tender. No rebound or guarding. Genitourinary:  Vulva: There is a 4 cm abscess involving the inferior aspect of the left vulva with associated induration. Central area of necrosis with purulent material is present. There is no inguinal lymphadenopathy present. There is no associated cellulitis. Rectum/anus:  + external hemorrhoids,no lesions  History and Examination chaperoned by Medical Assistant     Diagnosis Orders   1. Vulvar abscess  [place] I&D OF VULVA/PERINEUM ABSCESS    sulfamethoxazole-trimethoprim (BACTRIM DS) 800-160 MG per tablet          Differential diagnosis and management/testing options:  Left vulvar abscess.   Recommend proceeding with I&D and oral antibiotic therapy. Discussed post procedure wound care  Discussed bleeding risk as well as management  Discussed follow-up as needed. Consent is obtained and they wish to proceed with I&D. Medications: Bactrim. Pain medicines offered and declined. Risks & side effects, benefits, and alternatives of medications were discussed. Indications for medications are outlined in diagnoses. Benefits outweigh potential risk. Medications are prescribed as part of an overall monitoring / treatment plan with regular, scheduled follow up. The above medication counseling has been and will continue to be discussed with the patient, who agrees with the plan and voices understanding. Please note that some or all of this record was generated using voice recognition software. If there are any questions about the content of this document, please contact Dr. Wilmer Park as some errors in transcription may have occurred.

## 2022-11-07 NOTE — PROGRESS NOTES
Procedure note    Consent obtained per proceed with I&D of left vulvar abscess. See previous note. The patient is placed in a supine position in stirrups. The area is prepped with Betadine. Lidocaine is infiltrated for local anesthetic. The area is opened and purulent material is evacuated. Minimal bleeding is experienced. The area was again cleaned and 4 x 4's are placed. Post procedure wound care instructions provided  Warnings and instructions were given to Lisette Shea. Her questions were answered and Manju Finley voices understanding. Prescription for Bactrim sent to her pharmacy.

## 2022-11-08 ENCOUNTER — OFFICE VISIT (OUTPATIENT)
Dept: INTERNAL MEDICINE CLINIC | Age: 62
End: 2022-11-08
Payer: COMMERCIAL

## 2022-11-08 VITALS
RESPIRATION RATE: 12 BRPM | HEIGHT: 66 IN | BODY MASS INDEX: 41.78 KG/M2 | DIASTOLIC BLOOD PRESSURE: 78 MMHG | HEART RATE: 91 BPM | WEIGHT: 260 LBS | SYSTOLIC BLOOD PRESSURE: 118 MMHG | OXYGEN SATURATION: 98 %

## 2022-11-08 DIAGNOSIS — I10 BENIGN ESSENTIAL HTN: ICD-10-CM

## 2022-11-08 DIAGNOSIS — E11.40 TYPE 2 DIABETES MELLITUS WITH DIABETIC NEUROPATHY, WITHOUT LONG-TERM CURRENT USE OF INSULIN (HCC): Primary | ICD-10-CM

## 2022-11-08 DIAGNOSIS — E78.5 HYPERLIPIDEMIA, UNSPECIFIED HYPERLIPIDEMIA TYPE: ICD-10-CM

## 2022-11-08 DIAGNOSIS — I87.2 VENOUS INSUFFICIENCY OF BOTH LOWER EXTREMITIES: ICD-10-CM

## 2022-11-08 DIAGNOSIS — E03.9 ACQUIRED HYPOTHYROIDISM: ICD-10-CM

## 2022-11-08 DIAGNOSIS — N18.31 STAGE 3A CHRONIC KIDNEY DISEASE (HCC): ICD-10-CM

## 2022-11-08 DIAGNOSIS — K21.9 GASTROESOPHAGEAL REFLUX DISEASE WITHOUT ESOPHAGITIS: ICD-10-CM

## 2022-11-08 PROCEDURE — 3052F HG A1C>EQUAL 8.0%<EQUAL 9.0%: CPT | Performed by: INTERNAL MEDICINE

## 2022-11-08 PROCEDURE — 99214 OFFICE O/P EST MOD 30 MIN: CPT | Performed by: INTERNAL MEDICINE

## 2022-11-08 PROCEDURE — 3078F DIAST BP <80 MM HG: CPT | Performed by: INTERNAL MEDICINE

## 2022-11-08 PROCEDURE — 90674 CCIIV4 VAC NO PRSV 0.5 ML IM: CPT | Performed by: INTERNAL MEDICINE

## 2022-11-08 PROCEDURE — 3074F SYST BP LT 130 MM HG: CPT | Performed by: INTERNAL MEDICINE

## 2022-11-08 PROCEDURE — 90471 IMMUNIZATION ADMIN: CPT | Performed by: INTERNAL MEDICINE

## 2022-11-08 RX ORDER — IRBESARTAN 75 MG/1
TABLET ORAL
Qty: 90 TABLET | Refills: 3 | Status: SHIPPED | OUTPATIENT
Start: 2022-11-08

## 2022-11-08 NOTE — PROGRESS NOTES
[facility]) Physicians  Internal Medicine  Patient Encounter  Alfredo Ansari D.O., [facility]        Chief Complaint   Patient presents with    Check-Up    Medication Check       HPI: 60 y.o. female seen today requesting a routine checkup regarding the status of current chronic medical problems as to below along with a medication review and reconciliation. Patient recently referred to gynecology for a possible vulvar abscess. Patient did in fact have an abscess and she underwent incision and drainage. This was performed yesterday. She is still noticing ome swelling. She thinks there is fluid filling back up. Maintenance items overdue:  Diabetic retinal eye exam  Shingrix  COVID-19 vaccine updated booster      Type 2 DM--She states she has not been checking. When she does check her sugar has been 150. She states she does not eat much. She denies poly's. Weight is down 4#. She denies burning in feet. She has not had her Diabetic eye exam. She has a retinal eye specialist.  She had retinal detachment. She is eating out a lot with er mother who likes to eat out. Lab Results   Component Value Date    LABA1C 8.3 06/29/2022     Lab Results   Component Value Date    .5 06/29/2022      CKD-- Stage 3. She sees Dr. Gina Jensen. She was last seen 8/12/2022-- Virtually. She denies foamy or frothy urine. She denies hematuria, dysuria. HTN--denies any new problems suggestive of accelerated blood pressures. She specifically denies any new headaches or dizziness. She denies CP. She does have  EPPS, but no more than usual.      Hypothyroidism-- She denies problems with constipation, diarrhea, cold or heat intolerance, hair loss, muscle cramps, tremor, edema or palpitations. Lab Results   Component Value Date    TSH 1.52 06/29/2022       GERD--Patient denies any active heartburn or dysphagia. H2 blockers ineffective.        Hyperlipidemia:    Lab Results   Component Value Date    LDLCALC 98 2022   She remains on Crestor. She denies adverse effects-- no myalgias, no weakness. Venous insufficiency/H/O DVT-- She wears her stockings. Swelling is well controlled. She is on Xarelto. It is thought that her sister  from a PE but this was not proven. Patient has been evaluated for thrombophilic disorders which was unremarkable. Dr. Noelle Schwarz retired. She has seen Dr. Oracio Hartman.       Past Medical History:   Diagnosis Date    Acute superficial venous thrombosis of lower extremity, right 2019    Arthritis     Cataract, bilateral     Chronic superficial venous thrombosis of left lower extremity 2015    CKD (chronic kidney disease) stage 2, GFR 60-89 ml/min     CKD (chronic kidney disease) stage 3, GFR 30-59 ml/min (HCC)     COVID-19 virus infection 2020    Cystic kidney disease     DVT (deep venous thrombosis) ([facility] 2003, 2013    Gastroesophageal reflux disease without esophagitis 2016    H/O deep venous thrombosis     H/O simple renal cyst     Hx of blood clots     Hyperlipidemia     Hypertension     IBS (irritable bowel syndrome)     Left retinal detachment 2020    Osteoarthritis, knee     Saphenofemoral venous reflux 2015    BIlateral    Squamous cell carcinoma of skin of lower limb, left     Steatosis of liver     SVT (supraventricular tachycardia) [facility] 2019    Thyroid disease     Type 2 diabetes mellitus (HCC)     Venous insufficiency          MEDICATIONS:  Prior to Visit Medications    Medication Sig   sulfamethoxazole-trimethoprim (BACTRIM DS) 800-160 MG per tablet Take 1 tablet by mouth 2 times daily for 7 days   FARXIGA 5 MG tablet TAKE 1 TABLET EVERY MORNING   JANUVIA 50 MG tablet TAKE 1 TABLET DAILY   esomeprazole (NEXIUM) 40 MG delayed release capsule TAKE 1 CAPSULE DAILY IN THE MORNING BEFORE BREAKFAST   furosemide (LASIX) 20 MG tablet TAKE 1 TABLET DAILY   levothyroxine (SYNTHROID) 150 MCG tablet TAKE 1 TABLET DAILY   blood glucose test strips (ONETOUCH ULTRA) strip TEST 2 TIMES A DAY & AS NEEDED FOR SYMPTOMS OF IRREGULAR BLOOD GLUCOSE.   rosuvastatin (CRESTOR) 10 MG tablet TAKE 1 TABLET NIGHTLY   irbesartan (AVAPRO) 75 MG tablet Take 1 tablet by mouth daily   Lancets MISC 1 each by Does not apply route 2 times daily Please dispense ONE TOUCH Lancets  Test BS BID   ONETOUCH DELICA LANCETS FINE MISC USE AS DIRECTED TO TEST BLOOD SUGAR ONCE EVERY DAY   rivaroxaban (XARELTO) 10 MG TABS tablet Take 10 mg by mouth          Review of Systems - As per HPI          OBJECTIVE:  Vitals:    11/08/22 1554   BP: 118/78   Pulse: 91   Resp: 12   SpO2: 98%   Weight: 260 lb (117.9 kg)   Height: 5' 6\" (1.676 m)     Body mass index is 41.97 kg/m². Wt Readings from Last 3 Encounters:   11/08/22 260 lb (117.9 kg)   11/07/22 264 lb (119.7 kg)   06/29/22 264 lb (119.7 kg)     BP Readings from Last 3 Encounters:   11/08/22 118/78   11/07/22 126/72   06/29/22 122/74      GEN: NAD, A&O, Non-toxic, obese  HEENT: NC/AT, SILAS, EOMI, Oral cavity Clear,  TM's NL  NECK: Supple. No thyromegaly. No JVD  LYMPH: No C/SC nodes. CV: Regular rhythm. Rate normal.  No murmurs. No ectopy. PULM: CTA, symmentric air exchange  EXT: + Bilateral lower extremity edema.  + Venous stasis hyperpigmentation. GI: Abdomen is soft, NT, BS+, No hepatomegaly. No masses. NEURO: No focal or lateralizing deficits. VASC:  No carotid bruits. SKIN:  No rashes or lesions of concern          ASSESSMENT/PLAN:    1. Type 2 diabetes mellitus with diabetic neuropathy, without long-term current use of insulin ([address]  Diabetes control is uncertain  Due for lab  Recommend that she follow a carb controlled diet. Patient counseled  Consider increasing [place]  Encouraged patient to make an appointment for her diabetic retinal eye exam  - CBC with Auto Differential; Future  - Comprehensive Metabolic Panel; Future  - Lipid Panel; Future  - Hemoglobin A1C; Future  - TSH; Future    2.  Benign essential

## 2022-11-15 ENCOUNTER — TELEPHONE (OUTPATIENT)
Dept: GYNECOLOGY | Age: 62
End: 2022-11-15

## 2022-11-15 DIAGNOSIS — N76.4 VULVAR ABSCESS: Primary | ICD-10-CM

## 2022-11-15 DIAGNOSIS — N18.9 ACUTE KIDNEY INJURY SUPERIMPOSED ON CHRONIC KIDNEY DISEASE (HCC): ICD-10-CM

## 2022-11-15 DIAGNOSIS — E11.65 UNCONTROLLED TYPE 2 DIABETES MELLITUS WITH HYPERGLYCEMIA (HCC): Primary | ICD-10-CM

## 2022-11-15 DIAGNOSIS — N17.9 ACUTE KIDNEY INJURY SUPERIMPOSED ON CHRONIC KIDNEY DISEASE (HCC): ICD-10-CM

## 2022-11-15 RX ORDER — DOXYCYCLINE HYCLATE 100 MG
100 TABLET ORAL 2 TIMES DAILY
Qty: 14 TABLET | Refills: 0 | Status: SHIPPED | OUTPATIENT
Start: 2022-11-15 | End: 2022-11-22

## 2022-11-15 RX ORDER — GLIPIZIDE 5 MG/1
5 TABLET, FILM COATED, EXTENDED RELEASE ORAL DAILY
Qty: 30 TABLET | Refills: 3 | Status: SHIPPED | OUTPATIENT
Start: 2022-11-15

## 2022-11-15 NOTE — TELEPHONE ENCOUNTER
Spoke with Placido Campos. She reports that she feels that the area is still the same size but it is certainly more comfortable. She notices some blood sometimes from the area. No fevers or chills. Systemic warnings are reviewed. The patient is instructed to follow-up if she feels that she is getting worse. A prescription for doxycycline 100 mg twice a day for the next 7 days as sent to her pharmacy's and she is instructed on use. She is to call after a week if she is still having any symptoms. Warnings and instructions were given to Leslie Dickey. Her questions were answered and Placido Campos voices understanding.

## 2022-11-15 NOTE — TELEPHONE ENCOUNTER
Patient wanted to call to give update. Says that she finished the medication. Patient says she's not as in much pain but the sack is still just as full. She also still have some discharge and bleeding. Please advise.     Patient can be reached at [phone]

## 2022-11-18 ENCOUNTER — TELEPHONE (OUTPATIENT)
Dept: INTERNAL MEDICINE CLINIC | Age: 62
End: 2022-11-18

## 2022-11-18 DIAGNOSIS — E11.65 UNCONTROLLED TYPE 2 DIABETES MELLITUS WITH HYPERGLYCEMIA (HCC): Primary | ICD-10-CM

## 2022-11-18 RX ORDER — LANCETS 30 GAUGE
1 EACH MISCELLANEOUS 3 TIMES DAILY
Qty: 300 EACH | Refills: 1 | Status: SHIPPED | OUTPATIENT
Start: 2022-11-18

## 2022-11-18 NOTE — TELEPHONE ENCOUNTER
Last appointment: 11/8/2022  Next appointment: 3/7/2023  Last refill: 09/30/2019        Patient is requesting refill of Josef BEARD To be sent to [address] [place] [address], [address] [place] [address] [phone]

## 2022-11-28 ENCOUNTER — TELEPHONE (OUTPATIENT)
Dept: INTERNAL MEDICINE CLINIC | Age: 62
End: 2022-11-28

## 2022-11-28 NOTE — TELEPHONE ENCOUNTER
Spoke with patient, she has started taking her glipizide and her sugars has been under 150. She loves it. She would like to know what her sugar rage should stay between?  She accidentally lost the print off you gave her at her last visit

## 2022-11-28 NOTE — TELEPHONE ENCOUNTER
Patient should definitely be keeping a log of her blood sugars.   Before meal sugar should be less than 130 but no lower than 70-80.  2 hours after eating, blood sugar should be less than 180

## 2022-12-15 DIAGNOSIS — E78.5 HYPERLIPIDEMIA, UNSPECIFIED HYPERLIPIDEMIA TYPE: ICD-10-CM

## 2022-12-15 RX ORDER — ROSUVASTATIN CALCIUM 10 MG/1
TABLET, COATED ORAL
Qty: 90 TABLET | Refills: 3 | Status: SHIPPED | OUTPATIENT
Start: 2022-12-15

## 2023-03-07 ENCOUNTER — OFFICE VISIT (OUTPATIENT)
Dept: INTERNAL MEDICINE CLINIC | Age: 63
End: 2023-03-07
Payer: COMMERCIAL

## 2023-03-07 VITALS
HEART RATE: 86 BPM | RESPIRATION RATE: 14 BRPM | OXYGEN SATURATION: 98 % | HEIGHT: 66 IN | WEIGHT: 243 LBS | BODY MASS INDEX: 39.05 KG/M2 | DIASTOLIC BLOOD PRESSURE: 76 MMHG | SYSTOLIC BLOOD PRESSURE: 114 MMHG

## 2023-03-07 DIAGNOSIS — K21.9 GASTROESOPHAGEAL REFLUX DISEASE WITHOUT ESOPHAGITIS: ICD-10-CM

## 2023-03-07 DIAGNOSIS — N18.31 STAGE 3A CHRONIC KIDNEY DISEASE (HCC): ICD-10-CM

## 2023-03-07 DIAGNOSIS — E11.40 TYPE 2 DIABETES MELLITUS WITH DIABETIC NEUROPATHY, WITHOUT LONG-TERM CURRENT USE OF INSULIN (HCC): Primary | ICD-10-CM

## 2023-03-07 DIAGNOSIS — I10 BENIGN ESSENTIAL HTN: ICD-10-CM

## 2023-03-07 DIAGNOSIS — E66.01 CLASS 2 SEVERE OBESITY DUE TO EXCESS CALORIES WITH SERIOUS COMORBIDITY AND BODY MASS INDEX (BMI) OF 39.0 TO 39.9 IN ADULT (HCC): ICD-10-CM

## 2023-03-07 DIAGNOSIS — E78.5 HYPERLIPIDEMIA, UNSPECIFIED HYPERLIPIDEMIA TYPE: ICD-10-CM

## 2023-03-07 DIAGNOSIS — E11.65 UNCONTROLLED TYPE 2 DIABETES MELLITUS WITH HYPERGLYCEMIA (HCC): ICD-10-CM

## 2023-03-07 DIAGNOSIS — E11.40 TYPE 2 DIABETES MELLITUS WITH DIABETIC NEUROPATHY, WITHOUT LONG-TERM CURRENT USE OF INSULIN (HCC): ICD-10-CM

## 2023-03-07 DIAGNOSIS — E03.9 ACQUIRED HYPOTHYROIDISM: ICD-10-CM

## 2023-03-07 DIAGNOSIS — I87.2 VENOUS INSUFFICIENCY OF BOTH LOWER EXTREMITIES: ICD-10-CM

## 2023-03-07 PROBLEM — E66.812 CLASS 2 SEVERE OBESITY DUE TO EXCESS CALORIES WITH SERIOUS COMORBIDITY AND BODY MASS INDEX (BMI) OF 39.0 TO 39.9 IN ADULT: Status: ACTIVE | Noted: 2023-03-07

## 2023-03-07 LAB
BASOPHILS ABSOLUTE: 0.1 K/UL (ref 0–0.2)
BASOPHILS RELATIVE PERCENT: 0.9 %
EOSINOPHILS ABSOLUTE: 0.2 K/UL (ref 0–0.6)
EOSINOPHILS RELATIVE PERCENT: 2.2 %
HCT VFR BLD CALC: 43.8 % (ref 36–48)
HEMOGLOBIN: 14 G/DL (ref 12–16)
LYMPHOCYTES ABSOLUTE: 1.6 K/UL (ref 1–5.1)
LYMPHOCYTES RELATIVE PERCENT: 16.9 %
MCH RBC QN AUTO: 26.4 PG (ref 26–34)
MCHC RBC AUTO-ENTMCNC: 32 G/DL (ref 31–36)
MCV RBC AUTO: 82.3 FL (ref 80–100)
MONOCYTES ABSOLUTE: 1 K/UL (ref 0–1.3)
MONOCYTES RELATIVE PERCENT: 10 %
NEUTROPHILS ABSOLUTE: 6.8 K/UL (ref 1.7–7.7)
NEUTROPHILS RELATIVE PERCENT: 70 %
PDW BLD-RTO: 15.3 % (ref 12.4–15.4)
PLATELET # BLD: 221 K/UL (ref 135–450)
PMV BLD AUTO: 9.9 FL (ref 5–10.5)
RBC # BLD: 5.32 M/UL (ref 4–5.2)
WBC # BLD: 9.7 K/UL (ref 4–11)

## 2023-03-07 PROCEDURE — 3074F SYST BP LT 130 MM HG: CPT | Performed by: INTERNAL MEDICINE

## 2023-03-07 PROCEDURE — 99214 OFFICE O/P EST MOD 30 MIN: CPT | Performed by: INTERNAL MEDICINE

## 2023-03-07 PROCEDURE — 3078F DIAST BP <80 MM HG: CPT | Performed by: INTERNAL MEDICINE

## 2023-03-07 RX ORDER — GLIPIZIDE 5 MG/1
5 TABLET, FILM COATED, EXTENDED RELEASE ORAL DAILY
Qty: 90 TABLET | Refills: 3 | Status: SHIPPED | OUTPATIENT
Start: 2023-03-07

## 2023-03-07 SDOH — ECONOMIC STABILITY: FOOD INSECURITY: WITHIN THE PAST 12 MONTHS, YOU WORRIED THAT YOUR FOOD WOULD RUN OUT BEFORE YOU GOT MONEY TO BUY MORE.: NEVER TRUE

## 2023-03-07 SDOH — ECONOMIC STABILITY: INCOME INSECURITY: HOW HARD IS IT FOR YOU TO PAY FOR THE VERY BASICS LIKE FOOD, HOUSING, MEDICAL CARE, AND HEATING?: NOT HARD AT ALL

## 2023-03-07 SDOH — ECONOMIC STABILITY: HOUSING INSECURITY
IN THE LAST 12 MONTHS, WAS THERE A TIME WHEN YOU DID NOT HAVE A STEADY PLACE TO SLEEP OR SLEPT IN A SHELTER (INCLUDING NOW)?: NO

## 2023-03-07 SDOH — ECONOMIC STABILITY: FOOD INSECURITY: WITHIN THE PAST 12 MONTHS, THE FOOD YOU BOUGHT JUST DIDN'T LAST AND YOU DIDN'T HAVE MONEY TO GET MORE.: NEVER TRUE

## 2023-03-07 ASSESSMENT — PATIENT HEALTH QUESTIONNAIRE - PHQ9
SUM OF ALL RESPONSES TO PHQ QUESTIONS 1-9: 0
1. LITTLE INTEREST OR PLEASURE IN DOING THINGS: 0
SUM OF ALL RESPONSES TO PHQ QUESTIONS 1-9: 0
2. FEELING DOWN, DEPRESSED OR HOPELESS: 0
SUM OF ALL RESPONSES TO PHQ9 QUESTIONS 1 & 2: 0

## 2023-03-07 NOTE — PROGRESS NOTES
[address] [facility], [place], [facility] by [facility] Physicians  Internal Medicine  Patient Encounter  Ml Davila D.O., [facility]        Chief Complaint   Patient presents with    Check-Up    Medication Check       HPI: 58 y.o. female with a very complicated medical history and multiple complex medical problems seen today requesting routine checkup regarding the status of current chronic medical problems as below along with a medication review and reconciliation. Type 2 DM--her diabetes has been uncontrolled for many months. She is still on Januvia 50 mg daily, renally dosed. She was started on Farxiga 5 mg daily. She remains on glipizide ER 5 mg daily. She is not able to take metformin due to chronic kidney disease. Glipizide ER was last medication ordered. She is working at her diet. Patient is ecstatic as to how well she is doing. Have not been this good in \"forever. \"  Her home blood sugars have improved drastically. She has had a couple of readings in the 70's. She denies hypoglycemic changes. She has lost 21# since November. Due to have her eye exam at the end of March. Lab Results   Component Value Date    LABA1C 8.8 11/09/2022     Lab Results   Component Value Date    .9 11/09/2022      CKD--  She denies foamy or frothy urine. She denies hematuria, dysuria. She is under the care of nephrology. HTN--she denies any new problems with headaches, dizziness, lightheadedness, syncope    Hypothyroidism-- She denies problems with constipation, diarrhea, cold or heat intolerance, hair loss, muscle cramps, tremor, edema or palpitations. Lab Results   Component Value Date    TSH 2.40 11/09/2022      GERD--Patient denies any active heartburn or dysphagia. H2 blockers ineffective in the past.       Hyperlipidemia:    Lab Results   Component Value Date    LDLCALC 85 11/09/2022   She remains on Crestor. Denies any myalgias. Venous insufficiency/H/O DVT-- She wears her stockings. Swelling is well controlled.  She is on Xarelto.     Past Medical History:   Diagnosis Date    Acute superficial venous thrombosis of lower extremity, right 03/05/2019    Arthritis     Cataract, bilateral 2020    Chronic superficial venous thrombosis of left lower extremity 08/13/2015    CKD (chronic kidney disease) stage 2, GFR 60-89 ml/min     CKD (chronic kidney disease) stage 3, GFR 30-59 ml/min (HCC)     COVID-19 virus infection 11/2020    Cystic kidney disease     DVT (deep venous thrombosis) ([facility] 4/2003, 5/2013    Gastroesophageal reflux disease without esophagitis 11/28/2016    H/O deep venous thrombosis     H/O simple renal cyst     Hx of blood clots     Hyperlipidemia     Hypertension     IBS (irritable bowel syndrome)     Left retinal detachment 09/2020    Osteoarthritis, knee     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    BIlateral    Squamous cell carcinoma of skin of lower limb, left     Steatosis of liver     SVT (supraventricular tachycardia) ([facility]) 03/05/2019    Thyroid disease     Type 2 diabetes mellitus ([facility])     Venous insufficiency          MEDICATIONS:  Prior to Visit Medications    Medication Sig   rosuvastatin (CRESTOR) 10 MG tablet TAKE 1 TABLET NIGHTLY   Lancets MISC 1 each by Does not apply route 3 times daily   glipiZIDE (GLUCOTROL XL) 5 MG extended release tablet Take 1 tablet by mouth daily   irbesartan (AVAPRO) 75 MG tablet TAKE 1 TABLET DAILY   FARXIGA 5 MG tablet TAKE 1 TABLET EVERY MORNING   JANUVIA 50 MG tablet TAKE 1 TABLET DAILY   esomeprazole (NEXIUM) 40 MG delayed release capsule TAKE 1 CAPSULE DAILY IN THE MORNING BEFORE BREAKFAST   furosemide (LASIX) 20 MG tablet TAKE 1 TABLET DAILY   levothyroxine (SYNTHROID) 150 MCG tablet TAKE 1 TABLET DAILY   blood glucose test strips (ONETOUCH ULTRA) strip TEST 2 TIMES A DAY & AS NEEDED FOR SYMPTOMS OF IRREGULAR BLOOD GLUCOSE.   Lancets MISC 1 each by Does not apply route 2 times daily Please dispense ONE TOUCH Lancets  Test BS BID   ONETOUCH DELICA LANCETS FINE MISC USE  AS DIRECTED TO TEST BLOOD SUGAR ONCE EVERY DAY   rivaroxaban (XARELTO) 10 MG TABS tablet Take 10 mg by mouth        Review of Systems - As per HPI          OBJECTIVE:  Vitals:    03/07/23 1322   BP: 114/76   Pulse: 86   Resp: 14   SpO2: 98%   Weight: 243 lb (110.2 kg)   Height: 5' 6\" (1.676 m)     Body mass index is 39.22 kg/m². Wt Readings from Last 3 Encounters:   03/07/23 243 lb (110.2 kg)   11/08/22 260 lb (117.9 kg)   11/07/22 264 lb (119.7 kg)     BP Readings from Last 3 Encounters:   03/07/23 114/76   11/08/22 118/78   11/07/22 126/72      GEN: NAD, A&O, Non-toxic, obese, but she is visibly thinner. HEENT: NC/AT, SILAS, EOMI, Oral cavity Clear,  TM's NL  NECK: Supple. No thyromegaly. No JVD  LYMPH: No C/SC nodes. CV: Regular rhythm. Rate normal.  No murmurs. No ectopy. PULM: CTA, symmentric air exchange  EXT: + Bilateral lower extremity edema.  + Venous stasis hyperpigmentation. GI: Abdomen is soft, NT, BS+, No hepatomegaly. No masses. NEURO: No focal or lateralizing deficits. VASC:  No carotid bruits. SKIN:  No rashes or lesions of concern        ASSESSMENT/PLAN:    1. Type 2 diabetes mellitus with diabetic neuropathy, without long-term current use of insulin (HCC)  Diabetes is much improved with the addition of Farxiga and glipizide ER  Continue to monitor for hypoglycemia  Overdue for eye exam.  Patient has an appointment scheduled  Refill glipizide ER  - CBC with Auto Differential; Future  - Comprehensive Metabolic Panel; Future  - Lipid Panel; Future  - Hemoglobin A1C; Future  - TSH; Future    2. Stage 3a chronic kidney disease (HCC)  Condition control is uncertain. Due for lab  Reminded patient to avoid NSAIDs which she does already  Advised patient to stay well-hydrated  Watch blood pressure  - CBC with Auto Differential; Future  - Comprehensive Metabolic Panel; Future    3.  Benign essential HTN  Very well controlled  Monitor for hypotension with concomitant use of Farxiga  Continue Lasix cautiously  Continue irbesartan cautiously  - CBC with Auto Differential; Future  - Comprehensive Metabolic Panel; Future  - TSH; Future    4. Hyperlipidemia, unspecified hyperlipidemia type  Cholesterol control is uncertain. Due for lab  Continue Crestor as part of her overall cardiovascular disease risk reduction strategy. - Comprehensive Metabolic Panel; Future  - Lipid Panel; Future    5. Acquired hypothyroidism    - TSH; Future    6. Venous insufficiency of both lower extremities  Improved  Continue compression stockings    7. Gastroesophageal reflux disease without esophagitis  Well-controlled  Continue Nexium  Monitor magnesium yearly. 8. Class 2 severe obesity due to excess calories with serious comorbidity and body mass index (BMI) of 39.0 to 39.9 in [facility])  Improved. Her BMI is now below the morbid obesity cut off. She is excited about her success  Continue lifestyle modification    Discussed medications with patient who voiced understanding of their use and indication. All questions answered. This note was generated completely or in part utilizing Dragon dictation speech recognition software. Occasionally, words are mistranscribed and despite editing, the text may contain inaccuracies due to incorrect word recognition.   If further clarification is needed please contact the office at [phone]       Electronically signed    Mary Roque D.O.

## 2023-03-08 LAB
A/G RATIO: 1.9 (ref 1.1–2.2)
ALBUMIN SERPL-MCNC: 4.1 G/DL (ref 3.4–5)
ALP BLD-CCNC: 176 U/L (ref 40–129)
ALT SERPL-CCNC: 40 U/L (ref 10–40)
ANION GAP SERPL CALCULATED.3IONS-SCNC: 13 MMOL/L (ref 3–16)
AST SERPL-CCNC: 27 U/L (ref 15–37)
BILIRUB SERPL-MCNC: 0.5 MG/DL (ref 0–1)
BUN BLDV-MCNC: 26 MG/DL (ref 7–20)
CALCIUM SERPL-MCNC: 9.6 MG/DL (ref 8.3–10.6)
CHLORIDE BLD-SCNC: 105 MMOL/L (ref 99–110)
CHOLESTEROL, TOTAL: 157 MG/DL (ref 0–199)
CO2: 22 MMOL/L (ref 21–32)
CREAT SERPL-MCNC: 1.4 MG/DL (ref 0.6–1.2)
ESTIMATED AVERAGE GLUCOSE: 122.6 MG/DL
GFR SERPL CREATININE-BSD FRML MDRD: 42 ML/MIN/{1.73_M2}
GLUCOSE BLD-MCNC: 84 MG/DL (ref 70–99)
HBA1C MFR BLD: 5.9 %
HDLC SERPL-MCNC: 36 MG/DL (ref 40–60)
LDL CHOLESTEROL CALCULATED: 90 MG/DL
POTASSIUM SERPL-SCNC: 5 MMOL/L (ref 3.5–5.1)
SODIUM BLD-SCNC: 140 MMOL/L (ref 136–145)
TOTAL PROTEIN: 6.3 G/DL (ref 6.4–8.2)
TRIGL SERPL-MCNC: 156 MG/DL (ref 0–150)
TSH SERPL DL<=0.05 MIU/L-ACNC: 0.21 UIU/ML (ref 0.27–4.2)
VLDLC SERPL CALC-MCNC: 31 MG/DL

## 2023-03-09 ENCOUNTER — TELEPHONE (OUTPATIENT)
Dept: INTERNAL MEDICINE CLINIC | Age: 63
End: 2023-03-09

## 2023-03-09 NOTE — TELEPHONE ENCOUNTER
Patient is calling to request her lab results. Gave her the following message:  \"Notify pt lab tests shows blood sugar average is tremendously improved. Wow!! A1C is down to 5.9%. Continue to monitor for low blood sugar. Kidney tests are better. Thyroid test is a bit low. This may mean the thyroid medication may be too much. Need to recheck next visit. \"      Patient has no additional questions at this time.

## 2023-03-10 DIAGNOSIS — I87.2 VENOUS INSUFFICIENCY OF BOTH LOWER EXTREMITIES: ICD-10-CM

## 2023-03-10 DIAGNOSIS — I10 BENIGN ESSENTIAL HTN: ICD-10-CM

## 2023-03-10 DIAGNOSIS — E11.9 TYPE 2 DIABETES MELLITUS WITHOUT COMPLICATION, WITHOUT LONG-TERM CURRENT USE OF INSULIN (HCC): ICD-10-CM

## 2023-03-11 RX ORDER — BLOOD SUGAR DIAGNOSTIC
STRIP MISCELLANEOUS
Qty: 50 STRIP | Refills: 7 | Status: SHIPPED | OUTPATIENT
Start: 2023-03-11

## 2023-03-11 RX ORDER — FUROSEMIDE 20 MG/1
TABLET ORAL
Qty: 90 TABLET | Refills: 3 | Status: SHIPPED | OUTPATIENT
Start: 2023-03-11

## 2023-03-17 DIAGNOSIS — E11.65 UNCONTROLLED TYPE 2 DIABETES MELLITUS WITH HYPERGLYCEMIA (HCC): ICD-10-CM

## 2023-03-17 RX ORDER — GLIPIZIDE 5 MG/1
TABLET, FILM COATED, EXTENDED RELEASE ORAL
Qty: 30 TABLET | Refills: 3 | OUTPATIENT
Start: 2023-03-17

## 2023-04-21 PROBLEM — E11.22 TYPE 2 DIABETES MELLITUS WITH CHRONIC KIDNEY DISEASE (HCC): Status: ACTIVE | Noted: 2023-04-21

## 2023-05-22 RX ORDER — SITAGLIPTIN 50 MG/1
TABLET, FILM COATED ORAL
Qty: 90 TABLET | Refills: 3 | Status: SHIPPED | OUTPATIENT
Start: 2023-05-22

## 2023-05-22 NOTE — TELEPHONE ENCOUNTER
Medication:   Requested Prescriptions     Pending Prescriptions Disp Refills    JANUVIA 50 MG tablet [Pharmacy Med Name: Mitch Day 50MG] 90 tablet 3     Sig: TAKE 1 TABLET DAILY     Last Filled:  # 90 with 3 refills on 5/17/22    Last appt: 3/7/2023   Next appt: 7/7/2023    Last OARRS: No flowsheet data found.      Units 3/7/23 1503 11/9/22 1319 6/29/22 1155 2/8/22 1705 11/24/21 1028 9/17/21 1140 5/10/21 1335    Hemoglobin A1C See comment % 5.9  8.8 CM  8.3 CM  7.1 R  8.0 CM  7.7 CM  8.3 CM

## 2023-05-23 NOTE — LETTER
[address] [address] [place] [address]  Phone: [phone]  Fax: [phone]    Carolina Cope MD    November 7, 2022     Magy Arita, [address] [place] [address]    Patient: Racquel Kapoor   MR Number: [id number]   YOB: 1960   Date of Visit: 11/7/2022       Dear Magy Arita:    Thank you for referring Racquel Kapoor to me for evaluation/treatment. Below are the relevant portions of my assessment and plan of care. Heaven Cervantes was found to have a vulvar abscess. This was drained and a prescription for Bactrim was given to her. If you have questions, please do not hesitate to call me. I look forward to following Heaven Cervantes along with you.     Sincerely,      Carolina Cope MD nad

## 2023-06-02 ENCOUNTER — HOSPITAL ENCOUNTER (OUTPATIENT)
Dept: MRI IMAGING | Age: 63
Discharge: HOME | End: 2023-06-02
Payer: COMMERCIAL

## 2023-06-02 DIAGNOSIS — N28.89 OTHER SPECIFIED DISORDERS OF KIDNEY AND URETER: ICD-10-CM

## 2023-06-02 LAB
BUN SERPL-MCNC: 27 MG/DL (ref 7–20)
CREAT SERPL-MCNC: 1.5 MG/DL (ref 0.6–1.2)
GFR SERPLBLD CREATININE-BSD FMLA CKD-EPI: 39 ML/MIN/{1.73_M2}

## 2023-06-02 PROCEDURE — 74183 MRI ABD W/O CNTR FLWD CNTR: CPT

## 2023-06-02 PROCEDURE — 82565 ASSAY OF CREATININE: CPT

## 2023-06-02 PROCEDURE — 6360000004 HC RX CONTRAST MEDICATION: Performed by: UROLOGY

## 2023-06-02 PROCEDURE — A9577 INJ MULTIHANCE: HCPCS | Performed by: UROLOGY

## 2023-06-02 PROCEDURE — 84520 ASSAY OF UREA NITROGEN: CPT

## 2023-06-02 PROCEDURE — 36415 COLL VENOUS BLD VENIPUNCTURE: CPT

## 2023-06-02 RX ADMIN — GADOBENATE DIMEGLUMINE 15 ML: 529 INJECTION, SOLUTION INTRAVENOUS at 10:24

## 2023-07-17 ENCOUNTER — OFFICE VISIT (OUTPATIENT)
Dept: INTERNAL MEDICINE CLINIC | Age: 63
End: 2023-07-17
Payer: COMMERCIAL

## 2023-07-17 VITALS
HEART RATE: 71 BPM | HEIGHT: 66 IN | BODY MASS INDEX: 37.28 KG/M2 | RESPIRATION RATE: 14 BRPM | DIASTOLIC BLOOD PRESSURE: 70 MMHG | OXYGEN SATURATION: 98 % | WEIGHT: 232 LBS | SYSTOLIC BLOOD PRESSURE: 122 MMHG

## 2023-07-17 DIAGNOSIS — E03.9 ACQUIRED HYPOTHYROIDISM: ICD-10-CM

## 2023-07-17 DIAGNOSIS — E11.9 TYPE 2 DIABETES MELLITUS WITHOUT COMPLICATION, WITHOUT LONG-TERM CURRENT USE OF INSULIN (HCC): Primary | ICD-10-CM

## 2023-07-17 DIAGNOSIS — E66.01 CLASS 2 SEVERE OBESITY DUE TO EXCESS CALORIES WITH SERIOUS COMORBIDITY AND BODY MASS INDEX (BMI) OF 37.0 TO 37.9 IN ADULT (HCC): ICD-10-CM

## 2023-07-17 DIAGNOSIS — N18.31 STAGE 3A CHRONIC KIDNEY DISEASE (HCC): ICD-10-CM

## 2023-07-17 DIAGNOSIS — I10 BENIGN ESSENTIAL HTN: ICD-10-CM

## 2023-07-17 DIAGNOSIS — E78.5 HYPERLIPIDEMIA, UNSPECIFIED HYPERLIPIDEMIA TYPE: ICD-10-CM

## 2023-07-17 DIAGNOSIS — K21.9 GASTROESOPHAGEAL REFLUX DISEASE WITHOUT ESOPHAGITIS: ICD-10-CM

## 2023-07-17 DIAGNOSIS — I87.2 VENOUS INSUFFICIENCY OF BOTH LOWER EXTREMITIES: ICD-10-CM

## 2023-07-17 DIAGNOSIS — I87.2 CHRONIC VENOUS INSUFFICIENCY: ICD-10-CM

## 2023-07-17 PROCEDURE — 3074F SYST BP LT 130 MM HG: CPT | Performed by: INTERNAL MEDICINE

## 2023-07-17 PROCEDURE — 3044F HG A1C LEVEL LT 7.0%: CPT | Performed by: INTERNAL MEDICINE

## 2023-07-17 PROCEDURE — 3078F DIAST BP <80 MM HG: CPT | Performed by: INTERNAL MEDICINE

## 2023-07-17 PROCEDURE — 99214 OFFICE O/P EST MOD 30 MIN: CPT | Performed by: INTERNAL MEDICINE

## 2023-07-18 DIAGNOSIS — I10 BENIGN ESSENTIAL HTN: ICD-10-CM

## 2023-07-18 DIAGNOSIS — E03.9 ACQUIRED HYPOTHYROIDISM: ICD-10-CM

## 2023-07-18 DIAGNOSIS — E11.9 TYPE 2 DIABETES MELLITUS WITHOUT COMPLICATION, WITHOUT LONG-TERM CURRENT USE OF INSULIN (HCC): ICD-10-CM

## 2023-07-18 DIAGNOSIS — E78.5 HYPERLIPIDEMIA, UNSPECIFIED HYPERLIPIDEMIA TYPE: ICD-10-CM

## 2023-07-19 LAB
ALBUMIN SERPL-MCNC: 4.4 G/DL (ref 3.4–5)
ALBUMIN/GLOB SERPL: 2.2 {RATIO} (ref 1.1–2.2)
ALP SERPL-CCNC: 155 U/L (ref 40–129)
ALT SERPL-CCNC: 21 U/L (ref 10–40)
ANION GAP SERPL CALCULATED.3IONS-SCNC: 12 MMOL/L (ref 3–16)
AST SERPL-CCNC: 11 U/L (ref 15–37)
BASOPHILS # BLD: 0.1 K/UL (ref 0–0.2)
BASOPHILS NFR BLD: 0.6 %
BILIRUB SERPL-MCNC: 0.5 MG/DL (ref 0–1)
BUN SERPL-MCNC: 33 MG/DL (ref 7–20)
CALCIUM SERPL-MCNC: 9.6 MG/DL (ref 8.3–10.6)
CHLORIDE SERPL-SCNC: 106 MMOL/L (ref 99–110)
CHOLEST SERPL-MCNC: 165 MG/DL (ref 0–199)
CO2 SERPL-SCNC: 24 MMOL/L (ref 21–32)
CREAT SERPL-MCNC: 1.6 MG/DL (ref 0.6–1.2)
DEPRECATED RDW RBC AUTO: 16.4 % (ref 12.4–15.4)
EOSINOPHIL # BLD: 0.3 K/UL (ref 0–0.6)
EOSINOPHIL NFR BLD: 2.4 %
EST. AVERAGE GLUCOSE BLD GHB EST-MCNC: 111.2 MG/DL
GFR SERPLBLD CREATININE-BSD FMLA CKD-EPI: 36 ML/MIN/{1.73_M2}
GLUCOSE SERPL-MCNC: 89 MG/DL (ref 70–99)
HBA1C MFR BLD: 5.5 %
HCT VFR BLD AUTO: 45.5 % (ref 36–48)
HDLC SERPL-MCNC: 48 MG/DL (ref 40–60)
HGB BLD-MCNC: 14.4 G/DL (ref 12–16)
LDLC SERPL CALC-MCNC: 100 MG/DL
LYMPHOCYTES # BLD: 1.9 K/UL (ref 1–5.1)
LYMPHOCYTES NFR BLD: 15.7 %
MCH RBC QN AUTO: 27 PG (ref 26–34)
MCHC RBC AUTO-ENTMCNC: 31.7 G/DL (ref 31–36)
MCV RBC AUTO: 85.2 FL (ref 80–100)
MONOCYTES # BLD: 1.1 K/UL (ref 0–1.3)
MONOCYTES NFR BLD: 9.3 %
NEUTROPHILS # BLD: 8.7 K/UL (ref 1.7–7.7)
NEUTROPHILS NFR BLD: 72 %
PLATELET # BLD AUTO: 301 K/UL (ref 135–450)
PMV BLD AUTO: 8.9 FL (ref 5–10.5)
POTASSIUM SERPL-SCNC: 5 MMOL/L (ref 3.5–5.1)
PROT SERPL-MCNC: 6.4 G/DL (ref 6.4–8.2)
RBC # BLD AUTO: 5.34 M/UL (ref 4–5.2)
SODIUM SERPL-SCNC: 142 MMOL/L (ref 136–145)
TRIGL SERPL-MCNC: 87 MG/DL (ref 0–150)
TSH SERPL DL<=0.005 MIU/L-ACNC: 0.11 UIU/ML (ref 0.27–4.2)
VLDLC SERPL CALC-MCNC: 17 MG/DL
WBC # BLD AUTO: 12.1 K/UL (ref 4–11)

## 2023-09-24 DIAGNOSIS — E11.9 TYPE 2 DIABETES MELLITUS WITHOUT COMPLICATION, WITHOUT LONG-TERM CURRENT USE OF INSULIN (HCC): ICD-10-CM

## 2023-09-24 RX ORDER — BLOOD SUGAR DIAGNOSTIC
STRIP MISCELLANEOUS
Qty: 50 STRIP | Refills: 0 | Status: SHIPPED | OUTPATIENT
Start: 2023-09-24

## 2023-09-26 ENCOUNTER — TELEPHONE (OUTPATIENT)
Dept: INTERNAL MEDICINE CLINIC | Age: 63
End: 2023-09-26

## 2023-09-26 NOTE — TELEPHONE ENCOUNTER
----- Message from Aston Duran sent at 9/26/2023 12:40 PM EDT -----  Subject: Message to Provider    QUESTIONS  Information for Provider? The patient called in regarding her and her   spouses Flu shot, they are scheduled on 11/3/2023 with PCP Amairani Cochran,   and would like to know if that would be too late for a Flu shot if they   are able to get them then.  ---------------------------------------------------------------------------  --------------  [address] [place]  [phone]; OK to leave message on voicemail  ---------------------------------------------------------------------------  --------------  SCRIPT ANSWERS  Relationship to Patient? Self  Is the Adult patient requesting a Flu Shot? Yes  Does the patient have a question for their provider regarding the flu   shot?  Yes

## 2023-10-01 RX ORDER — DAPAGLIFLOZIN 5 MG/1
TABLET, FILM COATED ORAL
Qty: 90 TABLET | Refills: 3 | Status: SHIPPED | OUTPATIENT
Start: 2023-10-01

## 2023-10-10 ENCOUNTER — NURSE ONLY (OUTPATIENT)
Dept: INTERNAL MEDICINE CLINIC | Age: 63
End: 2023-10-10
Payer: COMMERCIAL

## 2023-10-10 DIAGNOSIS — N18.32 STAGE 3B CHRONIC KIDNEY DISEASE (HCC): ICD-10-CM

## 2023-10-10 PROCEDURE — 90471 IMMUNIZATION ADMIN: CPT | Performed by: INTERNAL MEDICINE

## 2023-10-10 PROCEDURE — 90674 CCIIV4 VAC NO PRSV 0.5 ML IM: CPT | Performed by: INTERNAL MEDICINE

## 2023-10-11 LAB
ALBUMIN SERPL-MCNC: 4.5 G/DL (ref 3.4–5)
ANION GAP SERPL CALCULATED.3IONS-SCNC: 12 MMOL/L (ref 3–16)
BUN SERPL-MCNC: 26 MG/DL (ref 7–20)
CALCIUM SERPL-MCNC: 9.2 MG/DL (ref 8.3–10.6)
CHLORIDE SERPL-SCNC: 106 MMOL/L (ref 99–110)
CO2 SERPL-SCNC: 25 MMOL/L (ref 21–32)
CREAT SERPL-MCNC: 1.8 MG/DL (ref 0.6–1.2)
CREAT UR-MCNC: 78.6 MG/DL (ref 28–259)
DEPRECATED RDW RBC AUTO: 15.1 % (ref 12.4–15.4)
GFR SERPLBLD CREATININE-BSD FMLA CKD-EPI: 31 ML/MIN/{1.73_M2}
GLUCOSE SERPL-MCNC: 107 MG/DL (ref 70–99)
HCT VFR BLD AUTO: 42.8 % (ref 36–48)
HGB BLD-MCNC: 14 G/DL (ref 12–16)
MCH RBC QN AUTO: 28.4 PG (ref 26–34)
MCHC RBC AUTO-ENTMCNC: 32.6 G/DL (ref 31–36)
MCV RBC AUTO: 87.2 FL (ref 80–100)
PHOSPHATE SERPL-MCNC: 3.6 MG/DL (ref 2.5–4.9)
PLATELET # BLD AUTO: 273 K/UL (ref 135–450)
PMV BLD AUTO: 9.1 FL (ref 5–10.5)
POTASSIUM SERPL-SCNC: 4.1 MMOL/L (ref 3.5–5.1)
PROT UR-MCNC: 13 MG/DL
PROT/CREAT UR-RTO: 0.2 MG/DL
RBC # BLD AUTO: 4.91 M/UL (ref 4–5.2)
SODIUM SERPL-SCNC: 143 MMOL/L (ref 136–145)
WBC # BLD AUTO: 10.4 K/UL (ref 4–11)

## 2023-10-20 DIAGNOSIS — E11.9 TYPE 2 DIABETES MELLITUS WITHOUT COMPLICATION, WITHOUT LONG-TERM CURRENT USE OF INSULIN (HCC): ICD-10-CM

## 2023-10-20 RX ORDER — BLOOD SUGAR DIAGNOSTIC
STRIP MISCELLANEOUS
Qty: 100 STRIP | Refills: 3 | Status: SHIPPED | OUTPATIENT
Start: 2023-10-20

## 2023-10-20 NOTE — TELEPHONE ENCOUNTER
Last appointment: 7/17/2023  Next appointment: 11/3/2023  Last refill: 9/24/2023  #50 x0 27-Jul-2017 02:53

## 2023-10-29 DIAGNOSIS — I10 BENIGN ESSENTIAL HTN: ICD-10-CM

## 2023-10-30 RX ORDER — IRBESARTAN 75 MG/1
TABLET ORAL
Qty: 90 TABLET | Refills: 3 | Status: SHIPPED | OUTPATIENT
Start: 2023-10-30

## 2023-11-03 ENCOUNTER — OFFICE VISIT (OUTPATIENT)
Dept: INTERNAL MEDICINE CLINIC | Age: 63
End: 2023-11-03

## 2023-11-03 VITALS
OXYGEN SATURATION: 99 % | RESPIRATION RATE: 12 BRPM | WEIGHT: 239 LBS | BODY MASS INDEX: 38.41 KG/M2 | HEART RATE: 57 BPM | DIASTOLIC BLOOD PRESSURE: 70 MMHG | HEIGHT: 66 IN | SYSTOLIC BLOOD PRESSURE: 114 MMHG

## 2023-11-03 DIAGNOSIS — G89.29 CHRONIC LEFT-SIDED THORACIC BACK PAIN: ICD-10-CM

## 2023-11-03 DIAGNOSIS — M54.6 CHRONIC LEFT-SIDED THORACIC BACK PAIN: ICD-10-CM

## 2023-11-03 DIAGNOSIS — E03.9 ACQUIRED HYPOTHYROIDISM: ICD-10-CM

## 2023-11-03 DIAGNOSIS — K21.9 GASTROESOPHAGEAL REFLUX DISEASE WITHOUT ESOPHAGITIS: ICD-10-CM

## 2023-11-03 DIAGNOSIS — I87.2 CHRONIC VENOUS INSUFFICIENCY: ICD-10-CM

## 2023-11-03 DIAGNOSIS — E78.5 HYPERLIPIDEMIA, UNSPECIFIED HYPERLIPIDEMIA TYPE: ICD-10-CM

## 2023-11-03 DIAGNOSIS — N18.31 STAGE 3A CHRONIC KIDNEY DISEASE (HCC): ICD-10-CM

## 2023-11-03 DIAGNOSIS — I10 BENIGN ESSENTIAL HTN: ICD-10-CM

## 2023-11-03 DIAGNOSIS — E11.40 TYPE 2 DIABETES MELLITUS WITH DIABETIC NEUROPATHY, WITHOUT LONG-TERM CURRENT USE OF INSULIN (HCC): Primary | ICD-10-CM

## 2023-11-03 DIAGNOSIS — E66.01 CLASS 2 SEVERE OBESITY DUE TO EXCESS CALORIES WITH SERIOUS COMORBIDITY AND BODY MASS INDEX (BMI) OF 38.0 TO 38.9 IN ADULT (HCC): ICD-10-CM

## 2023-11-03 DIAGNOSIS — M99.02 THORACIC REGION SOMATIC DYSFUNCTION: ICD-10-CM

## 2023-11-03 NOTE — PROGRESS NOTES
[address]  Physicians  Internal Medicine  Patient Encounter  Kendall Brumfield D.O., [facility]        Chief Complaint   Patient presents with    Check-Up    Medication Check       HPI: 58 y.o. female with multiple medical problems and a complicated medical history seen today requesting her checkup regarding the status of current medical problems listed below along with a medication review and reconciliation. Also, she has additional complaints of low back pain. She saw a doctor at [address] [place] [address]. He ordered an MRI. She was told she had arthritis. She saw an NP and yet another doctor. She was seen by Dr. Walton Last there. As usual, I have not received correspondence. She saw a chiropractor. She received some sort of injections. She recommended a breast reduction. He pain is located mostly in the mid back on the left. Declines COVID-19 Vaccine  Declines Hep B Vaccine     Type 2 DM--Previously----> her diabetes has been uncontrolled for many months. She is still on Januvia 50 mg daily, renally dosed. She was started on Farxiga 5 mg daily. She remains on glipizide ER 5 mg daily. She is not able to take metformin due to chronic kidney disease. Glipizide ER was last medication ordered. She is working at her diet. Patient is ecstatic as to how well she is doing. Have not been this good in \"forever. \"  Home blood sugars are still improved. AM--. Hs-- 115-144  He is adherent to a carb controlled diet other than while on vacation. Lab Results   Component Value Date    LABA1C 5.5 07/18/2023     Lab Results   Component Value Date    .2 07/18/2023      CKD--  She denies foamy or frothy urine. She denies dysuria, hematuria. No increased edema. She sees Dr. Timothy Mohan. She saw the PA via telemedicine. Her GFR decreased. She was told to push liquids.        HTN--she denies any new problems with headaches, dizziness, lightheadedness, syncope    Hypothyroidism-- She denies problems with

## 2023-11-06 RX ORDER — LEVOTHYROXINE SODIUM 125 UG/1
125 CAPSULE ORAL DAILY
Qty: 30 CAPSULE | Refills: 0 | Status: SHIPPED | OUTPATIENT
Start: 2023-11-06

## 2023-11-08 ENCOUNTER — TELEPHONE (OUTPATIENT)
Dept: INTERNAL MEDICINE CLINIC | Age: 63
End: 2023-11-08

## 2023-11-08 RX ORDER — LEVOTHYROXINE SODIUM 0.12 MG/1
125 TABLET ORAL DAILY
Qty: 90 TABLET | Refills: 1 | Status: SHIPPED | OUTPATIENT
Start: 2023-11-08

## 2023-11-08 NOTE — TELEPHONE ENCOUNTER
Patients called - Pharmacy states patients insurance did not cover the 125 MCG Levothyroxine capsul's but may cover the 125 MCG tablets.  Pt requesting that the Dr write an Rx for tablets instead    Levothyroxine Sodium 125 MCG CAPS [[id number]]         [facility]/pharmacy #[id number] Márquez Peers [phone]   [address], [address] [place] [address]   Phone:  [phone]  Fax:  [phone]

## 2023-11-10 DIAGNOSIS — N18.32 STAGE 3B CHRONIC KIDNEY DISEASE (HCC): ICD-10-CM

## 2023-11-11 LAB
ANION GAP SERPL CALCULATED.3IONS-SCNC: 8 MMOL/L (ref 3–16)
BUN SERPL-MCNC: 37 MG/DL (ref 7–20)
CALCIUM SERPL-MCNC: 9.3 MG/DL (ref 8.3–10.6)
CHLORIDE SERPL-SCNC: 109 MMOL/L (ref 99–110)
CO2 SERPL-SCNC: 26 MMOL/L (ref 21–32)
CREAT SERPL-MCNC: 1.8 MG/DL (ref 0.6–1.2)
CREAT UR-MCNC: 64.1 MG/DL (ref 28–259)
GFR SERPLBLD CREATININE-BSD FMLA CKD-EPI: 31 ML/MIN/{1.73_M2}
GLUCOSE SERPL-MCNC: 83 MG/DL (ref 70–99)
MICROALBUMIN UR DL<=1MG/L-MCNC: <1.2 MG/DL
MICROALBUMIN/CREAT UR: NORMAL MG/G (ref 0–30)
POTASSIUM SERPL-SCNC: 4.4 MMOL/L (ref 3.5–5.1)
SODIUM SERPL-SCNC: 143 MMOL/L (ref 136–145)

## 2023-11-13 NOTE — RESULT ENCOUNTER NOTE
Janell,  Please call patient to inform kidney function is the same as last office visit  Encourage to maintain proper hydration  Schedule pt an appt in 2 months to check in again, and we may possible adjust her lasix to help with her kidney function if not improving by then

## 2023-12-11 ENCOUNTER — HOSPITAL ENCOUNTER (INPATIENT)
Age: 63
LOS: 1 days | Discharge: HOME | End: 2023-12-13
Attending: EMERGENCY MEDICINE | Admitting: HOSPITALIST
Payer: COMMERCIAL

## 2023-12-11 ENCOUNTER — APPOINTMENT (OUTPATIENT)
Dept: GENERAL RADIOLOGY | Age: 63
End: 2023-12-11
Payer: COMMERCIAL

## 2023-12-11 DIAGNOSIS — I10 BENIGN ESSENTIAL HTN: ICD-10-CM

## 2023-12-11 DIAGNOSIS — R79.89 ELEVATED TROPONIN: ICD-10-CM

## 2023-12-11 DIAGNOSIS — R07.9 CHEST PAIN, UNSPECIFIED TYPE: Primary | ICD-10-CM

## 2023-12-11 LAB
ALBUMIN SERPL-MCNC: 4 G/DL (ref 3.4–5)
ALBUMIN/GLOB SERPL: 1.7 {RATIO} (ref 1.1–2.2)
ALP SERPL-CCNC: 114 U/L (ref 40–129)
ALT SERPL-CCNC: 23 U/L (ref 10–40)
ANION GAP SERPL CALCULATED.3IONS-SCNC: 7 MMOL/L (ref 3–16)
AST SERPL-CCNC: 23 U/L (ref 15–37)
BASOPHILS # BLD: 0.1 K/UL (ref 0–0.2)
BASOPHILS NFR BLD: 0.7 %
BILIRUB SERPL-MCNC: 0.3 MG/DL (ref 0–1)
BUN SERPL-MCNC: 26 MG/DL (ref 7–20)
CALCIUM SERPL-MCNC: 9.3 MG/DL (ref 8.3–10.6)
CHLORIDE SERPL-SCNC: 108 MMOL/L (ref 99–110)
CHOLEST SERPL-MCNC: 133 MG/DL (ref 0–199)
CO2 SERPL-SCNC: 26 MMOL/L (ref 21–32)
CREAT SERPL-MCNC: 1.7 MG/DL (ref 0.6–1.2)
DEPRECATED RDW RBC AUTO: 14.1 % (ref 12.4–15.4)
EKG ATRIAL RATE: 83 BPM
EKG DIAGNOSIS: NORMAL
EKG P AXIS: 110 DEGREES
EKG P-R INTERVAL: 134 MS
EKG Q-T INTERVAL: 404 MS
EKG QRS DURATION: 86 MS
EKG QTC CALCULATION (BAZETT): 474 MS
EKG R AXIS: 103 DEGREES
EKG T AXIS: 194 DEGREES
EKG VENTRICULAR RATE: 83 BPM
EOSINOPHIL # BLD: 0.2 K/UL (ref 0–0.6)
EOSINOPHIL NFR BLD: 2.7 %
GFR SERPLBLD CREATININE-BSD FMLA CKD-EPI: 33 ML/MIN/{1.73_M2}
GLUCOSE BLD-MCNC: 129 MG/DL (ref 70–99)
GLUCOSE BLD-MCNC: 69 MG/DL (ref 70–99)
GLUCOSE BLD-MCNC: 92 MG/DL (ref 70–99)
GLUCOSE SERPL-MCNC: 120 MG/DL (ref 70–99)
HCT VFR BLD AUTO: 39.5 % (ref 36–48)
HDLC SERPL-MCNC: 41 MG/DL (ref 40–60)
HGB BLD-MCNC: 12.8 G/DL (ref 12–16)
LDLC SERPL CALC-MCNC: 70 MG/DL
LIPASE SERPL-CCNC: 40 U/L (ref 13–60)
LYMPHOCYTES # BLD: 1.6 K/UL (ref 1–5.1)
LYMPHOCYTES NFR BLD: 19.5 %
MCH RBC QN AUTO: 28.1 PG (ref 26–34)
MCHC RBC AUTO-ENTMCNC: 32.4 G/DL (ref 31–36)
MCV RBC AUTO: 86.7 FL (ref 80–100)
MONOCYTES # BLD: 0.9 K/UL (ref 0–1.3)
MONOCYTES NFR BLD: 10.3 %
NEUTROPHILS # BLD: 5.6 K/UL (ref 1.7–7.7)
NEUTROPHILS NFR BLD: 66.8 %
NT-PROBNP SERPL-MCNC: 1245 PG/ML (ref 0–124)
PERFORMED ON: ABNORMAL
PERFORMED ON: ABNORMAL
PERFORMED ON: NORMAL
PLATELET # BLD AUTO: 206 K/UL (ref 135–450)
PMV BLD AUTO: 8.6 FL (ref 5–10.5)
POTASSIUM SERPL-SCNC: 4 MMOL/L (ref 3.5–5.1)
PROT SERPL-MCNC: 6.3 G/DL (ref 6.4–8.2)
RBC # BLD AUTO: 4.56 M/UL (ref 4–5.2)
SODIUM SERPL-SCNC: 141 MMOL/L (ref 136–145)
TRIGL SERPL-MCNC: 108 MG/DL (ref 0–150)
TROPONIN, HIGH SENSITIVITY: 88 NG/L (ref 0–14)
TROPONIN, HIGH SENSITIVITY: 94 NG/L (ref 0–14)
VLDLC SERPL CALC-MCNC: 22 MG/DL
WBC # BLD AUTO: 8.3 K/UL (ref 4–11)

## 2023-12-11 PROCEDURE — 71045 X-RAY EXAM CHEST 1 VIEW: CPT

## 2023-12-11 PROCEDURE — 94760 N-INVAS EAR/PLS OXIMETRY 1: CPT

## 2023-12-11 PROCEDURE — 85025 COMPLETE CBC W/AUTO DIFF WBC: CPT

## 2023-12-11 PROCEDURE — 99203 OFFICE O/P NEW LOW 30 MIN: CPT | Performed by: INTERNAL MEDICINE

## 2023-12-11 PROCEDURE — 6370000000 HC RX 637 (ALT 250 FOR IP): Performed by: EMERGENCY MEDICINE

## 2023-12-11 PROCEDURE — 6370000000 HC RX 637 (ALT 250 FOR IP): Performed by: HOSPITALIST

## 2023-12-11 PROCEDURE — 80061 LIPID PANEL: CPT

## 2023-12-11 PROCEDURE — G0378 HOSPITAL OBSERVATION PER HR: HCPCS

## 2023-12-11 PROCEDURE — 99223 1ST HOSP IP/OBS HIGH 75: CPT | Performed by: HOSPITALIST

## 2023-12-11 PROCEDURE — 83880 ASSAY OF NATRIURETIC PEPTIDE: CPT

## 2023-12-11 PROCEDURE — 80053 COMPREHEN METABOLIC PANEL: CPT

## 2023-12-11 PROCEDURE — 2580000003 HC RX 258: Performed by: HOSPITALIST

## 2023-12-11 PROCEDURE — 93005 ELECTROCARDIOGRAM TRACING: CPT | Performed by: EMERGENCY MEDICINE

## 2023-12-11 PROCEDURE — 83690 ASSAY OF LIPASE: CPT

## 2023-12-11 PROCEDURE — 84484 ASSAY OF TROPONIN QUANT: CPT

## 2023-12-11 PROCEDURE — 93010 ELECTROCARDIOGRAM REPORT: CPT | Performed by: INTERNAL MEDICINE

## 2023-12-11 PROCEDURE — 36415 COLL VENOUS BLD VENIPUNCTURE: CPT

## 2023-12-11 RX ORDER — ATORVASTATIN CALCIUM 40 MG/1
40 TABLET, FILM COATED ORAL NIGHTLY
Status: DISCONTINUED | OUTPATIENT
Start: 2023-12-11 | End: 2023-12-13 | Stop reason: HOSPADM

## 2023-12-11 RX ORDER — LEVOTHYROXINE SODIUM 0.12 MG/1
125 TABLET ORAL DAILY
Status: DISCONTINUED | OUTPATIENT
Start: 2023-12-11 | End: 2023-12-13 | Stop reason: HOSPADM

## 2023-12-11 RX ORDER — POTASSIUM CHLORIDE 7.45 MG/ML
10 INJECTION INTRAVENOUS PRN
Status: DISCONTINUED | OUTPATIENT
Start: 2023-12-11 | End: 2023-12-13 | Stop reason: HOSPADM

## 2023-12-11 RX ORDER — ONDANSETRON 4 MG/1
4 TABLET, ORALLY DISINTEGRATING ORAL EVERY 8 HOURS PRN
Status: DISCONTINUED | OUTPATIENT
Start: 2023-12-11 | End: 2023-12-13 | Stop reason: HOSPADM

## 2023-12-11 RX ORDER — INSULIN LISPRO 100 [IU]/ML
0-4 INJECTION, SOLUTION INTRAVENOUS; SUBCUTANEOUS NIGHTLY
Status: DISCONTINUED | OUTPATIENT
Start: 2023-12-11 | End: 2023-12-13 | Stop reason: HOSPADM

## 2023-12-11 RX ORDER — ONDANSETRON 2 MG/ML
4 INJECTION INTRAMUSCULAR; INTRAVENOUS EVERY 6 HOURS PRN
Status: DISCONTINUED | OUTPATIENT
Start: 2023-12-11 | End: 2023-12-13 | Stop reason: HOSPADM

## 2023-12-11 RX ORDER — ACETAMINOPHEN 325 MG/1
650 TABLET ORAL EVERY 6 HOURS PRN
Status: DISCONTINUED | OUTPATIENT
Start: 2023-12-11 | End: 2023-12-12 | Stop reason: SDUPTHER

## 2023-12-11 RX ORDER — POLYETHYLENE GLYCOL 3350 17 G/17G
17 POWDER, FOR SOLUTION ORAL DAILY PRN
Status: DISCONTINUED | OUTPATIENT
Start: 2023-12-11 | End: 2023-12-13 | Stop reason: HOSPADM

## 2023-12-11 RX ORDER — LOSARTAN POTASSIUM 25 MG/1
25 TABLET ORAL DAILY
Status: DISCONTINUED | OUTPATIENT
Start: 2023-12-11 | End: 2023-12-13 | Stop reason: HOSPADM

## 2023-12-11 RX ORDER — POTASSIUM CHLORIDE 20 MEQ/1
40 TABLET, EXTENDED RELEASE ORAL PRN
Status: DISCONTINUED | OUTPATIENT
Start: 2023-12-11 | End: 2023-12-13 | Stop reason: HOSPADM

## 2023-12-11 RX ORDER — ACETAMINOPHEN 650 MG/1
650 SUPPOSITORY RECTAL EVERY 6 HOURS PRN
Status: DISCONTINUED | OUTPATIENT
Start: 2023-12-11 | End: 2023-12-13 | Stop reason: HOSPADM

## 2023-12-11 RX ORDER — GLUCAGON 1 MG/ML
1 KIT INJECTION PRN
Status: DISCONTINUED | OUTPATIENT
Start: 2023-12-11 | End: 2023-12-13 | Stop reason: HOSPADM

## 2023-12-11 RX ORDER — PANTOPRAZOLE SODIUM 40 MG/1
40 TABLET, DELAYED RELEASE ORAL
Status: DISCONTINUED | OUTPATIENT
Start: 2023-12-12 | End: 2023-12-13 | Stop reason: HOSPADM

## 2023-12-11 RX ORDER — ENOXAPARIN SODIUM 100 MG/ML
40 INJECTION SUBCUTANEOUS DAILY
Status: DISCONTINUED | OUTPATIENT
Start: 2023-12-11 | End: 2023-12-13 | Stop reason: HOSPADM

## 2023-12-11 RX ORDER — DEXTROSE MONOHYDRATE 100 MG/ML
INJECTION, SOLUTION INTRAVENOUS CONTINUOUS PRN
Status: DISCONTINUED | OUTPATIENT
Start: 2023-12-11 | End: 2023-12-13 | Stop reason: HOSPADM

## 2023-12-11 RX ORDER — ASPIRIN 325 MG
325 TABLET ORAL ONCE
Status: COMPLETED | OUTPATIENT
Start: 2023-12-11 | End: 2023-12-11

## 2023-12-11 RX ORDER — INSULIN LISPRO 100 [IU]/ML
0-4 INJECTION, SOLUTION INTRAVENOUS; SUBCUTANEOUS
Status: DISCONTINUED | OUTPATIENT
Start: 2023-12-11 | End: 2023-12-13 | Stop reason: HOSPADM

## 2023-12-11 RX ORDER — MAGNESIUM SULFATE IN WATER 40 MG/ML
2000 INJECTION, SOLUTION INTRAVENOUS PRN
Status: DISCONTINUED | OUTPATIENT
Start: 2023-12-11 | End: 2023-12-13 | Stop reason: HOSPADM

## 2023-12-11 RX ORDER — SODIUM CHLORIDE 0.9 % (FLUSH) 0.9 %
5-40 SYRINGE (ML) INJECTION EVERY 12 HOURS SCHEDULED
Status: DISCONTINUED | OUTPATIENT
Start: 2023-12-11 | End: 2023-12-13 | Stop reason: HOSPADM

## 2023-12-11 RX ORDER — REGADENOSON 0.08 MG/ML
0.4 INJECTION, SOLUTION INTRAVENOUS
OUTPATIENT
Start: 2023-12-11

## 2023-12-11 RX ORDER — SODIUM CHLORIDE 9 MG/ML
INJECTION, SOLUTION INTRAVENOUS PRN
Status: DISCONTINUED | OUTPATIENT
Start: 2023-12-11 | End: 2023-12-12 | Stop reason: SDUPTHER

## 2023-12-11 RX ORDER — ASPIRIN 81 MG/1
81 TABLET, CHEWABLE ORAL DAILY
Status: DISCONTINUED | OUTPATIENT
Start: 2023-12-12 | End: 2023-12-12 | Stop reason: SDUPTHER

## 2023-12-11 RX ORDER — SODIUM CHLORIDE 0.9 % (FLUSH) 0.9 %
5-40 SYRINGE (ML) INJECTION PRN
Status: DISCONTINUED | OUTPATIENT
Start: 2023-12-11 | End: 2023-12-13 | Stop reason: HOSPADM

## 2023-12-11 RX ORDER — SODIUM CHLORIDE 9 MG/ML
INJECTION, SOLUTION INTRAVENOUS CONTINUOUS
Status: ACTIVE | OUTPATIENT
Start: 2023-12-11 | End: 2023-12-13

## 2023-12-11 RX ADMIN — NITROGLYCERIN 1 INCH: 20 OINTMENT TOPICAL at 16:09

## 2023-12-11 RX ADMIN — ATORVASTATIN CALCIUM 40 MG: 40 TABLET, FILM COATED ORAL at 21:11

## 2023-12-11 RX ADMIN — ASPIRIN 325 MG: 325 TABLET ORAL at 04:45

## 2023-12-11 RX ADMIN — NITROGLYCERIN 1 INCH: 20 OINTMENT TOPICAL at 06:34

## 2023-12-11 RX ADMIN — SODIUM CHLORIDE: 9 INJECTION, SOLUTION INTRAVENOUS at 06:35

## 2023-12-11 ASSESSMENT — LIFESTYLE VARIABLES
HOW MANY STANDARD DRINKS CONTAINING ALCOHOL DO YOU HAVE ON A TYPICAL DAY: PATIENT DOES NOT DRINK
HOW OFTEN DO YOU HAVE A DRINK CONTAINING ALCOHOL: NEVER

## 2023-12-11 NOTE — CONSULTS
[facility]  H+P  Consult  OP Visit  FU Visit   CC/HPI   CC New patient visit for chest pain. HPI 58 y.o., female  presents with cp for several days. CP substernal, pressure, intermittent, with and without exertion. Risks - DM, HTN, CHOL   Cardiac Hx None   EKG NSR, subtle/nondiagnostic lateral ST depression and BA   Troponin Lab Results   Component Value Date    TROPHS 80 (H) 12/11/2023    TROPHS 94 (H) 12/11/2023      HISTORY/ALLERGY/ROS   MEDHx  has a past medical history of Acute superficial venous thrombosis of lower extremity, right, Arthritis, Cataract, bilateral, Chronic superficial venous thrombosis of left lower extremity, CKD (chronic kidney disease) stage 2, GFR 60-89 ml/min, CKD (chronic kidney disease) stage 3, GFR 30-59 ml/min ([facility]), COVID-19 virus infection, Cystic kidney disease, DVT (deep venous thrombosis) ([facility]), Gastroesophageal reflux disease without esophagitis, H/O deep venous thrombosis, H/O simple renal cyst, Hx of blood clots, Hyperlipidemia, Hypertension, IBS (irritable bowel syndrome), Left retinal detachment, Osteoarthritis, knee, Saphenofemoral venous reflux, Squamous cell carcinoma of skin of lower limb, left, Steatosis of liver, SVT (supraventricular tachycardia), Thyroid disease, Type 2 diabetes mellitus ([address]), and Venous insufficiency. SURGRadha  has a past surgical history that includes Cholecystectomy (04/2003); Hysterectomy (01/1999); Breast surgery; Knee arthroscopy (03/2015); Total knee arthroplasty (05/2013); Knee arthroscopy; Tonsillectomy; Varicose vein surgery (1980's.  ); Colonoscopy (02/2015); Mohs surgery (07/01/2019); Retinal detachment surgery (Left, 09/30/2020); Retinal detachment surgery (Left, 10/24/2020); Retinal detachment surgery (Left, 2021); joint replacement; and Total knee arthroplasty (Left, 12/14/2021). SOCHeliezer  reports that she has never smoked.  She has never used smokeless tobacco. She reports that she does not drink alcohol and does not

## 2023-12-11 NOTE — ED PROVIDER NOTES
TempSrc: Oral     SpO2: 96% 96% 93%       Patient was given the following medications:  Medications   aspirin tablet 325 mg (325 mg Oral Given 12/11/23 7915)             Is this patient to be included in the SEP-1 Core Measure due to severe sepsis or septic shock? No   Exclusion criteria - the patient is NOT to be included for SEP-1 Core Measure due to: Infection is not suspected    CC/HPI Summary, DDx, ED Course, and Reassessment: Differential Diagnosis: Acute Coronary Syndrome, Congestive Heart Failure, Thoracic Dissection, Pericarditis, Pericardial Effusion, Pulmonary Embolism, Pneumonia, Pneumothorax, Ischemic Bowel, Bowel Obstruction, PUD, GERD, Acute Cholecystitis, Pancreatitis, Hepatitis, Colitis, other    74-year-old female with multiple factors for CAD presents ED for evaluation of chest pain. Reports intermittent episodes of chest pain which become more frequent and more intense over the past few days. She has no previous history of provocative testing. EKG is nondiagnostic for ACS. Troponin is elevated. Patient has an elevated heart score would benefit from admission to the hospital for further medical management evaluation. CONSULTS: (Who and What was discussed)  Hospitalist-history presentation and results.   Discussed admission to the hospital.         Chronic Conditions:   Past Medical History:   Diagnosis Date    Acute superficial venous thrombosis of lower extremity, right 03/05/2019    Arthritis     Cataract, bilateral 2020    Chronic superficial venous thrombosis of left lower extremity 08/13/2015    CKD (chronic kidney disease) stage 2, GFR 60-89 ml/min     CKD (chronic kidney disease) stage 3, GFR 30-59 ml/min ([facility])     COVID-19 virus infection 11/2020    Cystic kidney disease     DVT (deep venous thrombosis) ([address]) 4/2003, 5/2013    Gastroesophageal reflux disease without esophagitis 11/28/2016    H/O deep venous thrombosis     H/O simple renal cyst     Hx of blood clots Hyperlipidemia     Hypertension     IBS (irritable bowel syndrome)     Left retinal detachment 09/2020    Osteoarthritis, knee     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    BIlateral    Squamous cell carcinoma of skin of lower limb, left     Steatosis of liver     SVT (supraventricular tachycardia) 03/05/2019    Thyroid disease     Type 2 diabetes mellitus ([address])     Venous insufficiency          Records Reviewed (External and source): Reviewed patient's most recent PCP note. Disposition Considerations (include 1 Tests not done, Admit vs D/C, Shared Decision Making, Pt Expectation of Test or Tx.): Patient is an elevated heart score and benefit from admission the hospital for further medical management evaluation. I am the Primary Clinician of Record. FINAL IMPRESSION      1. Chest pain, unspecified type    2. Elevated troponin          DISPOSITION/PLAN     DISPOSITION        PATIENT REFERRED TO:  No follow-up provider specified.     DISCHARGE MEDICATIONS:  New Prescriptions    No medications on file       DISCONTINUED MEDICATIONS:  Discontinued Medications    No medications on file              (Please note that portions of this note were completed with a voice recognition program.  Efforts were made to edit the dictations but occasionally words are mis-transcribed.)    Leeanna Escalona MD (electronically signed)            Leeanna Escalona MD  12/17/23 7022

## 2023-12-11 NOTE — CARE COORDINATION
Case Management Note:    Patient admitted as Observation with an anticipated short hospitalization length of stay. Chart reviewed and it appears that patient has minimal needs for discharge at this time. Medical staff to inform case management team if discharge needs are identified. Case management will continue to follow progress and update discharge plan as needed.      Electronically signed by Raymona Scheuermann on 12/11/2023 at 9:03 AM

## 2023-12-11 NOTE — H&P
V2.0  History and Physical      Name:  Aggie Donaldson /Age/Sex: 1960  (58 y.o. female)   MRN & CSN:  [id number] & [id number] Encounter Date/Time: 2023 6:04 AM EST   Location:  [facility] PCP: Cleo Turner, [address] Day: 1    Assessment and Plan:   Aggie Donaldson is a 58 y.o. female with a pmh of DVT on Xarelto, type 2 diabetes, SVT, CKD and hypertension who presents with Chest pain        Plan:  Chest pain: Some concerning features. Left-sided sore aching pain. Better with nitroglycerin. Continue aspirin. Continue nitroglycerin as needed. Lexiscan this morning. Well-controlled type 2 diabetes: Held Januvia, glipizide and Deneen for now. DVT: Continue Xarelto. Strong family history of clots. Sister  of a PE 2 years ago. Hypothyroidism: Continue levothyroxine  Hypertension: Continue irbesartan. GERD: With history of peptic stricture and dilation about a decade ago. Continue PPI    Disposition:   Current Living situation: Home  Expected Disposition: Home  Estimated D/C: Today or tomorrow    Diet Diet NPO  Diet NPO   DVT Prophylaxis [] Lovenox, []  Heparin, [] SCDs, [] Ambulation,  [] Eliquis, [x] Xarelto, [] Coumadin   Code Status Full Code   Surrogate Decision Maker/ POA        History from:     patient    History of Present Illness:     Chief Complaint: chest pain    Aggie Donaldson is a 58 y.o. female with pmh of well-controlled type II by diabetes, hypertension, obesity, DVT and hypothyroidism who presents with chest pain. Patient states she has been having intermittent sore aching chest pain with left for the past few days. She denies any associated shortness of breath, diaphoresis nausea or vomiting. Pain is not pleuritic positional or tender. She has never had a stress test.  The episode brought her to the ED woke her from her sleep. It resolved with aspirin and nitro.   At present she is pain-free     Review of Systems:        Pertinent

## 2023-12-11 NOTE — ED NOTES
[facility] med called to request a cardiology consult to be able to do the stress test.      Katalina Moss RN  12/11/23 8499

## 2023-12-12 ENCOUNTER — TELEPHONE (OUTPATIENT)
Dept: CARDIOLOGY CLINIC | Age: 63
End: 2023-12-12

## 2023-12-12 DIAGNOSIS — I25.10 CORONARY ARTERY DISEASE DUE TO LIPID RICH PLAQUE: Primary | ICD-10-CM

## 2023-12-12 DIAGNOSIS — I25.83 CORONARY ARTERY DISEASE DUE TO LIPID RICH PLAQUE: Primary | ICD-10-CM

## 2023-12-12 PROBLEM — I21.4 NSTEMI (NON-ST ELEVATED MYOCARDIAL INFARCTION) (HCC): Status: ACTIVE | Noted: 2023-12-12

## 2023-12-12 LAB
ALBUMIN SERPL-MCNC: 3.8 G/DL (ref 3.4–5)
ANION GAP SERPL CALCULATED.3IONS-SCNC: 7 MMOL/L (ref 3–16)
BASOPHILS # BLD: 0.1 K/UL (ref 0–0.2)
BASOPHILS NFR BLD: 0.8 %
BUN SERPL-MCNC: 24 MG/DL (ref 7–20)
CALCIUM SERPL-MCNC: 9.1 MG/DL (ref 8.3–10.6)
CHLORIDE SERPL-SCNC: 107 MMOL/L (ref 99–110)
CO2 SERPL-SCNC: 23 MMOL/L (ref 21–32)
CREAT SERPL-MCNC: 1.6 MG/DL (ref 0.6–1.2)
DEPRECATED RDW RBC AUTO: 13.9 % (ref 12.4–15.4)
EKG ATRIAL RATE: 83 BPM
EKG ATRIAL RATE: 86 BPM
EKG DIAGNOSIS: NORMAL
EKG DIAGNOSIS: NORMAL
EKG P AXIS: 60 DEGREES
EKG P AXIS: 66 DEGREES
EKG P-R INTERVAL: 130 MS
EKG P-R INTERVAL: 138 MS
EKG Q-T INTERVAL: 388 MS
EKG Q-T INTERVAL: 394 MS
EKG QRS DURATION: 86 MS
EKG QRS DURATION: 88 MS
EKG QTC CALCULATION (BAZETT): 455 MS
EKG QTC CALCULATION (BAZETT): 471 MS
EKG R AXIS: 31 DEGREES
EKG R AXIS: 36 DEGREES
EKG T AXIS: -18 DEGREES
EKG T AXIS: 10 DEGREES
EKG VENTRICULAR RATE: 83 BPM
EKG VENTRICULAR RATE: 86 BPM
EOSINOPHIL # BLD: 0.3 K/UL (ref 0–0.6)
EOSINOPHIL NFR BLD: 3.6 %
GFR SERPLBLD CREATININE-BSD FMLA CKD-EPI: 36 ML/MIN/{1.73_M2}
GLUCOSE BLD-MCNC: 100 MG/DL (ref 70–99)
GLUCOSE BLD-MCNC: 107 MG/DL (ref 70–99)
GLUCOSE BLD-MCNC: 89 MG/DL (ref 70–99)
GLUCOSE BLD-MCNC: 99 MG/DL (ref 70–99)
GLUCOSE SERPL-MCNC: 96 MG/DL (ref 70–99)
HCT VFR BLD AUTO: 37.2 % (ref 36–48)
HGB BLD-MCNC: 12.3 G/DL (ref 12–16)
LYMPHOCYTES # BLD: 1.8 K/UL (ref 1–5.1)
LYMPHOCYTES NFR BLD: 23.3 %
MCH RBC QN AUTO: 28.5 PG (ref 26–34)
MCHC RBC AUTO-ENTMCNC: 33 G/DL (ref 31–36)
MCV RBC AUTO: 86.5 FL (ref 80–100)
MONOCYTES # BLD: 0.9 K/UL (ref 0–1.3)
MONOCYTES NFR BLD: 11 %
NEUTROPHILS # BLD: 4.8 K/UL (ref 1.7–7.7)
NEUTROPHILS NFR BLD: 61.3 %
PERFORMED ON: ABNORMAL
PERFORMED ON: ABNORMAL
PERFORMED ON: NORMAL
PERFORMED ON: NORMAL
PHOSPHATE SERPL-MCNC: 3.3 MG/DL (ref 2.5–4.9)
PLATELET # BLD AUTO: 194 K/UL (ref 135–450)
PMV BLD AUTO: 8.3 FL (ref 5–10.5)
POC ACT LR: 160 SEC
POTASSIUM SERPL-SCNC: 4.2 MMOL/L (ref 3.5–5.1)
RBC # BLD AUTO: 4.3 M/UL (ref 4–5.2)
SODIUM SERPL-SCNC: 137 MMOL/L (ref 136–145)
TROPONIN, HIGH SENSITIVITY: 158 NG/L (ref 0–14)
WBC # BLD AUTO: 7.9 K/UL (ref 4–11)

## 2023-12-12 PROCEDURE — C1725 CATH, TRANSLUMIN NON-LASER: HCPCS

## 2023-12-12 PROCEDURE — 92978 ENDOLUMINL IVUS OCT C 1ST: CPT

## 2023-12-12 PROCEDURE — C1753 CATH, INTRAVAS ULTRASOUND: HCPCS

## 2023-12-12 PROCEDURE — 2580000003 HC RX 258: Performed by: INTERNAL MEDICINE

## 2023-12-12 PROCEDURE — 93010 ELECTROCARDIOGRAM REPORT: CPT | Performed by: INTERNAL MEDICINE

## 2023-12-12 PROCEDURE — 51701 INSERT BLADDER CATHETER: CPT

## 2023-12-12 PROCEDURE — 2580000003 HC RX 258: Performed by: HOSPITALIST

## 2023-12-12 PROCEDURE — 93005 ELECTROCARDIOGRAM TRACING: CPT | Performed by: HOSPITALIST

## 2023-12-12 PROCEDURE — 99153 MOD SED SAME PHYS/QHP EA: CPT

## 2023-12-12 PROCEDURE — 51798 US URINE CAPACITY MEASURE: CPT

## 2023-12-12 PROCEDURE — 1200000000 HC SEMI PRIVATE

## 2023-12-12 PROCEDURE — 84484 ASSAY OF TROPONIN QUANT: CPT

## 2023-12-12 PROCEDURE — 2500000003 HC RX 250 WO HCPCS

## 2023-12-12 PROCEDURE — 85347 COAGULATION TIME ACTIVATED: CPT

## 2023-12-12 PROCEDURE — 6370000000 HC RX 637 (ALT 250 FOR IP): Performed by: HOSPITALIST

## 2023-12-12 PROCEDURE — 99232 SBSQ HOSP IP/OBS MODERATE 35: CPT | Performed by: INTERNAL MEDICINE

## 2023-12-12 PROCEDURE — C1894 INTRO/SHEATH, NON-LASER: HCPCS

## 2023-12-12 PROCEDURE — 36415 COLL VENOUS BLD VENIPUNCTURE: CPT

## 2023-12-12 PROCEDURE — C1887 CATHETER, GUIDING: HCPCS

## 2023-12-12 PROCEDURE — 6370000000 HC RX 637 (ALT 250 FOR IP): Performed by: INTERNAL MEDICINE

## 2023-12-12 PROCEDURE — 85025 COMPLETE CBC W/AUTO DIFF WBC: CPT

## 2023-12-12 PROCEDURE — 99152 MOD SED SAME PHYS/QHP 5/>YRS: CPT

## 2023-12-12 PROCEDURE — C1874 STENT, COATED/COV W/DEL SYS: HCPCS

## 2023-12-12 PROCEDURE — 6370000000 HC RX 637 (ALT 250 FOR IP)

## 2023-12-12 PROCEDURE — 6360000004 HC RX CONTRAST MEDICATION: Performed by: INTERNAL MEDICINE

## 2023-12-12 PROCEDURE — 80069 RENAL FUNCTION PANEL: CPT

## 2023-12-12 PROCEDURE — C1769 GUIDE WIRE: HCPCS

## 2023-12-12 PROCEDURE — 6360000002 HC RX W HCPCS

## 2023-12-12 PROCEDURE — 92928 PRQ TCAT PLMT NTRAC ST 1 LES: CPT

## 2023-12-12 PROCEDURE — 93458 L HRT ARTERY/VENTRICLE ANGIO: CPT

## 2023-12-12 PROCEDURE — 94760 N-INVAS EAR/PLS OXIMETRY 1: CPT

## 2023-12-12 PROCEDURE — 6360000002 HC RX W HCPCS: Performed by: INTERNAL MEDICINE

## 2023-12-12 PROCEDURE — 2580000003 HC RX 258

## 2023-12-12 PROCEDURE — C1760 CLOSURE DEV, VASC: HCPCS

## 2023-12-12 PROCEDURE — 2709999900 HC NON-CHARGEABLE SUPPLY

## 2023-12-12 RX ORDER — ACETAMINOPHEN 325 MG/1
650 TABLET ORAL EVERY 4 HOURS PRN
Status: DISCONTINUED | OUTPATIENT
Start: 2023-12-12 | End: 2023-12-13 | Stop reason: HOSPADM

## 2023-12-12 RX ORDER — PANTOPRAZOLE SODIUM 40 MG/1
TABLET, DELAYED RELEASE ORAL
Status: COMPLETED
Start: 2023-12-12 | End: 2023-12-12

## 2023-12-12 RX ORDER — ONDANSETRON 2 MG/ML
4 INJECTION INTRAMUSCULAR; INTRAVENOUS EVERY 6 HOURS PRN
Status: DISCONTINUED | OUTPATIENT
Start: 2023-12-12 | End: 2023-12-12 | Stop reason: SDUPTHER

## 2023-12-12 RX ORDER — ASCORBIC ACID 500 MG
500 TABLET ORAL DAILY
Status: DISCONTINUED | OUTPATIENT
Start: 2023-12-12 | End: 2023-12-13 | Stop reason: HOSPADM

## 2023-12-12 RX ORDER — ASPIRIN 81 MG/1
81 TABLET, CHEWABLE ORAL DAILY
Status: DISCONTINUED | OUTPATIENT
Start: 2023-12-13 | End: 2023-12-13 | Stop reason: HOSPADM

## 2023-12-12 RX ORDER — SODIUM CHLORIDE 0.9 % (FLUSH) 0.9 %
5-40 SYRINGE (ML) INJECTION EVERY 12 HOURS SCHEDULED
Status: DISCONTINUED | OUTPATIENT
Start: 2023-12-12 | End: 2023-12-13

## 2023-12-12 RX ORDER — SODIUM CHLORIDE 0.9 % (FLUSH) 0.9 %
5-40 SYRINGE (ML) INJECTION PRN
Status: DISCONTINUED | OUTPATIENT
Start: 2023-12-12 | End: 2023-12-13

## 2023-12-12 RX ORDER — SODIUM CHLORIDE 9 MG/ML
INJECTION, SOLUTION INTRAVENOUS PRN
Status: DISCONTINUED | OUTPATIENT
Start: 2023-12-12 | End: 2023-12-13 | Stop reason: HOSPADM

## 2023-12-12 RX ADMIN — SODIUM CHLORIDE: 9 INJECTION, SOLUTION INTRAVENOUS at 17:45

## 2023-12-12 RX ADMIN — OXYCODONE HYDROCHLORIDE AND ACETAMINOPHEN 500 MG: 500 TABLET ORAL at 12:48

## 2023-12-12 RX ADMIN — SODIUM CHLORIDE: 9 INJECTION, SOLUTION INTRAVENOUS at 00:30

## 2023-12-12 RX ADMIN — IOPAMIDOL 89 ML: 755 INJECTION, SOLUTION INTRAVENOUS at 16:12

## 2023-12-12 RX ADMIN — ACETAMINOPHEN 650 MG: 325 TABLET ORAL at 21:59

## 2023-12-12 RX ADMIN — ATORVASTATIN CALCIUM 40 MG: 40 TABLET, FILM COATED ORAL at 22:00

## 2023-12-12 RX ADMIN — SODIUM CHLORIDE: 9 INJECTION, SOLUTION INTRAVENOUS at 10:10

## 2023-12-12 RX ADMIN — PANTOPRAZOLE SODIUM 40 MG: 40 TABLET, DELAYED RELEASE ORAL at 06:58

## 2023-12-12 RX ADMIN — NITROGLYCERIN 1 INCH: 20 OINTMENT TOPICAL at 12:48

## 2023-12-12 RX ADMIN — BIVALIRUDIN 1.75 MG/KG/HR: 250 INJECTION, POWDER, LYOPHILIZED, FOR SOLUTION INTRAVENOUS at 17:39

## 2023-12-12 RX ADMIN — ASPIRIN 81 MG: 81 TABLET, CHEWABLE ORAL at 09:13

## 2023-12-12 RX ADMIN — NITROGLYCERIN 1 INCH: 20 OINTMENT TOPICAL at 00:31

## 2023-12-12 RX ADMIN — NITROGLYCERIN 1 INCH: 20 OINTMENT TOPICAL at 06:56

## 2023-12-12 RX ADMIN — LEVOTHYROXINE SODIUM 125 MCG: 0.12 TABLET ORAL at 09:13

## 2023-12-12 RX ADMIN — SODIUM CHLORIDE: 9 INJECTION, SOLUTION INTRAVENOUS at 19:49

## 2023-12-12 ASSESSMENT — PAIN DESCRIPTION - DESCRIPTORS
DESCRIPTORS: DULL;SHARP
DESCRIPTORS: DULL;SHARP

## 2023-12-12 ASSESSMENT — PAIN DESCRIPTION - PAIN TYPE
TYPE: ACUTE PAIN
TYPE: ACUTE PAIN

## 2023-12-12 ASSESSMENT — PAIN DESCRIPTION - ORIENTATION
ORIENTATION: LEFT
ORIENTATION: LEFT

## 2023-12-12 ASSESSMENT — PAIN DESCRIPTION - LOCATION
LOCATION: CHEST
LOCATION: CHEST
LOCATION: BACK

## 2023-12-12 ASSESSMENT — PAIN DESCRIPTION - FREQUENCY: FREQUENCY: CONTINUOUS

## 2023-12-12 ASSESSMENT — PAIN SCALES - GENERAL
PAINLEVEL_OUTOF10: 6
PAINLEVEL_OUTOF10: 7
PAINLEVEL_OUTOF10: 3

## 2023-12-12 ASSESSMENT — PAIN DESCRIPTION - ONSET: ONSET: ON-GOING

## 2023-12-12 ASSESSMENT — PAIN - FUNCTIONAL ASSESSMENT: PAIN_FUNCTIONAL_ASSESSMENT: ACTIVITIES ARE NOT PREVENTED

## 2023-12-12 NOTE — CONSULTS
[facility]  Consultation Note    Reason for Consult:  CKD 3b, plans for cardiac cath  Requesting Physician:  Dr. Edgar Cowart:  Chest pain    History obtained from records and patient. HISTORY OF PRESENT ILLNESS:                Alo Mejia  is 58 y.o. y.o. female with significant past medical history of CKD 3b, baseline crea 1.6-1.8, follows with my partner, Dr. Bethany Caballero, DVT, GERD, HLD, HTN, IBS, DM 2  who presents with chest pain. HS troponin 158. I am asked to see the patient with regards to her CKD and risk for contrast nephropathy. Good urine output. -160's. Past Medical History:     has a past medical history of Acute superficial venous thrombosis of lower extremity, right, Arthritis, Cataract, bilateral, Chronic superficial venous thrombosis of left lower extremity, CKD (chronic kidney disease) stage 2, GFR 60-89 ml/min, CKD (chronic kidney disease) stage 3, GFR 30-59 ml/min (HCC), COVID-19 virus infection, Cystic kidney disease, DVT (deep venous thrombosis) (HCC), Gastroesophageal reflux disease without esophagitis, H/O deep venous thrombosis, H/O simple renal cyst, Hx of blood clots, Hyperlipidemia, Hypertension, IBS (irritable bowel syndrome), Left retinal detachment, Osteoarthritis, knee, Saphenofemoral venous reflux, Squamous cell carcinoma of skin of lower limb, left, Steatosis of liver, SVT (supraventricular tachycardia), Thyroid disease, Type 2 diabetes mellitus ([address]), and Venous insufficiency. Past Surgical History:     has a past surgical history that includes Cholecystectomy (04/2003); Hysterectomy (01/1999); Breast surgery; Knee arthroscopy (03/2015); Total knee arthroplasty (05/2013); Knee arthroscopy; Tonsillectomy; Varicose vein surgery (1980's.  ); Colonoscopy (02/2015); Mohs surgery (07/01/2019); Retinal detachment surgery (Left, 09/30/2020); Retinal detachment surgery (Left, 10/24/2020);  Retinal detachment

## 2023-12-12 NOTE — PLAN OF CARE
Problem: Discharge Planning  Goal: Discharge to home or other facility with appropriate resources  12/12/2023 0806 by Herbie Granados RN  Outcome: Progressing     Problem: ABCDS Injury Assessment  Goal: Absence of physical injury  12/12/2023 0806 by Herbie Granados RN  Outcome: Progressing     Problem: Chronic Conditions and Co-morbidities  Goal: Patient's chronic conditions and co-morbidity symptoms are monitored and maintained or improved  12/12/2023 0806 by Herbie Granados RN  Outcome: Progressing

## 2023-12-12 NOTE — OP NOTE
[facility] Operative Note     PROCEDURE SUMMARY   Procedure [facility], PCI of RCA   Indication NSTEMI   Consent Obtained   Access RCFA (RRA has loop and unable to pass wire)   US US guidance used to determine artery patency, size (>2mm), anatomic variations and ideal puncture location. Real-time US utilized concurrent with vascular needle entry into artery. Image(s) permanently recorded and reported in chart. Bleed Risk Low   Sedation Minimal conscious sedation for comfort. Independent trained observer pushed medications at my direction. Level of consciousness and vital signs/physiologic status monitored throughout the procedure (see start and stop times above, as well as medication dosages). Start Time 1458   Stop Time 1612   Versed 3mg   Fentanyl 150mcg   Contrast 89cc   Flouro 11. 5min   EBL <39HL   Complicat None   Specimens None   Procedure Detail Patient taken to cath lab in postabsorptive state, informed consent obtained. RCFA prepped and draped in normal sterile fashion  18G needle used to access artery   J wire advanced into artery and 5F sheath advanced over wire   JL 3.5 advanced over wire to engage LM coronary artery and image in multiple views  JL 3.5 exchanged over a wire for JR4 to engage RCA and image in multiple views  JR4 removed over J wire  Arterial hemostasis obtained with Perclose and Manual pressure     FINDINGS   Artery Findings   LM Normal   LAD Mid 50%   Cx OM1 80% prox   RI N/A   RCA 99% prox with ELIZABETH 2 flow, 80% mid   LVEDP 11mmHg Normal 3-12mmHg   LVG N/A due to renal failure Normal >/= 55%   AVG Normal     INTERVENTION(S)     Artery Location Intervention Result   RCA All IVUS with distal reference 4.5 and proximal reference 5.0    RCA Prox Synergy 4.0X38 opw below (PD 4.5NC and 5. 0NC) No Residual     POST CATH  RECOMMENDATIONS   DAPT for 12 months  Staged FFR and likely PCI of Cx and LAD in 30days - deferred due to renal function  Consult hematology for [facility] needs MEDICATION RECOMMENDATIONS   Recommendation Rx Detail Reason Not Prescribed   ASA ASA 81mg PO QD    P2Y12 Brilinta 90mg PO BID    HI-STATIN Atorvastatin 40mg PO QD    BETA BLOCKER Carvedilol     ACE/ARB/ARNI None Renal failure   ALDACTONE None Low BP     NON-MEDICATION RECOMMENDATIONS   Category Recommendation   EF ASSESSMENT Noninvasive assessment of EF as IP/OP as appropriate if LVG not performed. TOBACCO Avoidance of first and second hand smoke. Counseling provided. DIET/EXERCISE Maintain healthy diet and exercise program.  Counseling provided. CARDIAC REHAB Refer to OP cardiac rehab phase II. Deferred if clinically inappropriate.

## 2023-12-13 ENCOUNTER — TELEPHONE (OUTPATIENT)
Dept: CARDIOLOGY CLINIC | Age: 63
End: 2023-12-13

## 2023-12-13 VITALS
WEIGHT: 248.8 LBS | RESPIRATION RATE: 18 BRPM | SYSTOLIC BLOOD PRESSURE: 134 MMHG | HEART RATE: 89 BPM | OXYGEN SATURATION: 96 % | HEIGHT: 65 IN | DIASTOLIC BLOOD PRESSURE: 75 MMHG | BODY MASS INDEX: 41.45 KG/M2 | TEMPERATURE: 98 F

## 2023-12-13 DIAGNOSIS — E78.5 HYPERLIPIDEMIA, UNSPECIFIED HYPERLIPIDEMIA TYPE: ICD-10-CM

## 2023-12-13 LAB
ANION GAP SERPL CALCULATED.3IONS-SCNC: 5 MMOL/L (ref 3–16)
BUN SERPL-MCNC: 19 MG/DL (ref 7–20)
CALCIUM SERPL-MCNC: 9.1 MG/DL (ref 8.3–10.6)
CHLORIDE SERPL-SCNC: 109 MMOL/L (ref 99–110)
CO2 SERPL-SCNC: 26 MMOL/L (ref 21–32)
CREAT SERPL-MCNC: 1.4 MG/DL (ref 0.6–1.2)
DEPRECATED RDW RBC AUTO: 13.9 % (ref 12.4–15.4)
EKG ATRIAL RATE: 83 BPM
EKG DIAGNOSIS: NORMAL
EKG P AXIS: 63 DEGREES
EKG P-R INTERVAL: 132 MS
EKG Q-T INTERVAL: 420 MS
EKG QRS DURATION: 82 MS
EKG QTC CALCULATION (BAZETT): 493 MS
EKG R AXIS: -30 DEGREES
EKG T AXIS: -69 DEGREES
EKG VENTRICULAR RATE: 83 BPM
GFR SERPLBLD CREATININE-BSD FMLA CKD-EPI: 42 ML/MIN/{1.73_M2}
GLUCOSE BLD-MCNC: 100 MG/DL (ref 70–99)
GLUCOSE BLD-MCNC: 95 MG/DL (ref 70–99)
GLUCOSE SERPL-MCNC: 91 MG/DL (ref 70–99)
HCT VFR BLD AUTO: 38.6 % (ref 36–48)
HGB BLD-MCNC: 12.4 G/DL (ref 12–16)
MCH RBC QN AUTO: 28 PG (ref 26–34)
MCHC RBC AUTO-ENTMCNC: 32.1 G/DL (ref 31–36)
MCV RBC AUTO: 87.2 FL (ref 80–100)
PERFORMED ON: ABNORMAL
PERFORMED ON: NORMAL
PLATELET # BLD AUTO: 196 K/UL (ref 135–450)
PMV BLD AUTO: 8.6 FL (ref 5–10.5)
POTASSIUM SERPL-SCNC: 4.4 MMOL/L (ref 3.5–5.1)
RBC # BLD AUTO: 4.43 M/UL (ref 4–5.2)
SODIUM SERPL-SCNC: 140 MMOL/L (ref 136–145)
WBC # BLD AUTO: 7.3 K/UL (ref 4–11)

## 2023-12-13 PROCEDURE — 2580000003 HC RX 258: Performed by: HOSPITALIST

## 2023-12-13 PROCEDURE — 2580000003 HC RX 258: Performed by: INTERNAL MEDICINE

## 2023-12-13 PROCEDURE — 93010 ELECTROCARDIOGRAM REPORT: CPT | Performed by: INTERNAL MEDICINE

## 2023-12-13 PROCEDURE — 80048 BASIC METABOLIC PNL TOTAL CA: CPT

## 2023-12-13 PROCEDURE — 93005 ELECTROCARDIOGRAM TRACING: CPT | Performed by: INTERNAL MEDICINE

## 2023-12-13 PROCEDURE — 6370000000 HC RX 637 (ALT 250 FOR IP): Performed by: INTERNAL MEDICINE

## 2023-12-13 PROCEDURE — 85027 COMPLETE CBC AUTOMATED: CPT

## 2023-12-13 PROCEDURE — 36415 COLL VENOUS BLD VENIPUNCTURE: CPT

## 2023-12-13 PROCEDURE — 93306 TTE W/DOPPLER COMPLETE: CPT

## 2023-12-13 PROCEDURE — 6370000000 HC RX 637 (ALT 250 FOR IP): Performed by: HOSPITALIST

## 2023-12-13 RX ORDER — IRBESARTAN 75 MG/1
75 TABLET ORAL DAILY
Qty: 90 TABLET | Refills: 3 | COMMUNITY
Start: 2023-12-14

## 2023-12-13 RX ORDER — FUROSEMIDE 20 MG/1
20 TABLET ORAL DAILY
Status: DISCONTINUED | OUTPATIENT
Start: 2023-12-13 | End: 2023-12-13 | Stop reason: HOSPADM

## 2023-12-13 RX ADMIN — SODIUM CHLORIDE, PRESERVATIVE FREE 5 ML: 5 INJECTION INTRAVENOUS at 08:32

## 2023-12-13 RX ADMIN — FUROSEMIDE 20 MG: 20 TABLET ORAL at 11:28

## 2023-12-13 RX ADMIN — PANTOPRAZOLE SODIUM 40 MG: 40 TABLET, DELAYED RELEASE ORAL at 06:06

## 2023-12-13 RX ADMIN — Medication 10 ML: at 00:10

## 2023-12-13 RX ADMIN — OXYCODONE HYDROCHLORIDE AND ACETAMINOPHEN 500 MG: 500 TABLET ORAL at 08:32

## 2023-12-13 RX ADMIN — TICAGRELOR 90 MG: 90 TABLET ORAL at 02:04

## 2023-12-13 RX ADMIN — LEVOTHYROXINE SODIUM 125 MCG: 0.12 TABLET ORAL at 08:32

## 2023-12-13 RX ADMIN — TICAGRELOR 90 MG: 90 TABLET ORAL at 08:32

## 2023-12-13 RX ADMIN — ASPIRIN 81 MG: 81 TABLET, CHEWABLE ORAL at 08:32

## 2023-12-13 ASSESSMENT — ENCOUNTER SYMPTOMS
PHOTOPHOBIA: 0
EYE DISCHARGE: 0
ABDOMINAL DISTENTION: 0
ABDOMINAL PAIN: 0
BLOOD IN STOOL: 0
DIARRHEA: 0
FACIAL SWELLING: 0
CHEST TIGHTNESS: 0
EYE REDNESS: 0
SHORTNESS OF BREATH: 0
CONSTIPATION: 0
VOMITING: 0
COUGH: 0
NAUSEA: 0

## 2023-12-13 ASSESSMENT — PAIN SCALES - GENERAL: PAINLEVEL_OUTOF10: 0

## 2023-12-13 NOTE — DISCHARGE SUMMARY
rivaroxaban 10 MG Tabs tablet  Commonly known as: Cherri Cardoza               Where to Get Your Medications        These medications were sent to [facility] [address] [place] [address], [address] [place] [address]      Hours: 24-hours Phone: [phone]   apixaban 2.5 MG Tabs tablet  ticagrelor 90 MG Tabs tablet         Discharge recommendations given to patient. Follow Up. PCP in 1 week, cardiology in 2 weeks   Disposition. home  Activity. activity as tolerated  Diet: ADULT DIET; Regular; Low Fat/Low Chol/High Fiber/2 gm Na      Spent 40 minutes in discharge process.     Signed:  CLAUS Acosta CNP     12/13/2023 1:09 PM

## 2023-12-13 NOTE — CONSULTS
[facility]   CONSULT NOTE    12/12/2023 7:02 PM    Patient Information: Saeid Paul   Date of Admit:  12/11/2023  Primary Care Physician:  Andrew Ramos DO  Requesting Physician:  Zaira Bailey MD    Reason for consult:    History of DVT on long-term Xarelto    Chief complaint:    History of DVT on long-term Xarelto     History of Present Illness:    39-year-old female with a past medical history of DVT, type 2 diabetes, CKD, hypertension presents to the hospital for chest pain. Hematology/oncology was consulted for anticoagulation recommendations. Past Medical History:     has a past medical history of Acute superficial venous thrombosis of lower extremity, right, Arthritis, Cataract, bilateral, Chronic superficial venous thrombosis of left lower extremity, CKD (chronic kidney disease) stage 2, GFR 60-89 ml/min, CKD (chronic kidney disease) stage 3, GFR 30-59 ml/min (HCC), COVID-19 virus infection, Cystic kidney disease, DVT (deep venous thrombosis) (HCC), Gastroesophageal reflux disease without esophagitis, H/O deep venous thrombosis, H/O simple renal cyst, Hx of blood clots, Hyperlipidemia, Hypertension, IBS (irritable bowel syndrome), Left retinal detachment, Osteoarthritis, knee, Saphenofemoral venous reflux, Squamous cell carcinoma of skin of lower limb, left, Steatosis of liver, SVT (supraventricular tachycardia), Thyroid disease, Type 2 diabetes mellitus ([address]), and Venous insufficiency.          Past Surgical History:    Past Surgical History:   Procedure Laterality Date    BREAST SURGERY      left fibroid tumor    CHOLECYSTECTOMY  04/2003    COLONOSCOPY  02/2015    Dr. Tom Rodríguez (CERVIX STATUS UNKNOWN)  01/1999    JOINT REPLACEMENT      KNEE ARTHROSCOPY  03/2015    right with medial meninsectomy    KNEE ARTHROSCOPY      right x 3-4     MOHS SURGERY  07/01/2019    Dr. Thais Guajardo Left 09/30/2020    X 3 LAST ONE IN 2/2021 recurrent superficial venous thrombophlebitis  # Chronic venous insufficiency  -Known history of DVT previously treated with full dose Xarelto and subsequently switched to prophylactic dosing of Xarelto 10 mg daily   -She also has a history of recurrent superficial thrombophlebitis That has not recurred while on Xarelto for which it was continued  -She also has chronic venous insufficiency for which she is at higher risk for recurrent DVT for which xarelto 10mg was continued  -At this time being while inpatient continue prophylactic dosing of lovenox at D/C she can resume xarelto 10mg daily to follow up with me in clinic      Cyndie Merlin MD  [facility]

## 2023-12-13 NOTE — DISCHARGE INSTRUCTIONS
Coronary Angiogram: About This Test  What is a coronary angiogram?     A coronary angiogram is a test to look at the large blood vessels of your heart (coronary arteries). These blood vessels feed blood, oxygen, and nutrients to your heart. Why is this test done? This test is done to check blood flow in your coronary arteries. It can show the size and location of narrowed or blocked sections of an artery. It's done for people who have coronary artery disease, also known as heart disease. The test can show how serious the disease is and how best to treat it. Or it can be done for people who have symptoms of heart disease to find out if there is a problem with the artery. The Doctor can look at the shape of your heart, the motion of the heart, and valves in the heart. What happens during the test?  You will get medicine to help you relax. A thin tube called a catheter is put into a blood vessel in your groin or arm. You will get a shot to numb the skin where the catheter goes in. You may feel pressure when the doctor moves the catheter through your blood vessel into your heart. Dye is put into your coronary arteries through the catheter. Your doctor can see the dye as it moves through the arteries. This lets your doctor look for areas that are narrowed or blocked. You may feel hot or flushed for several seconds when the dye is put in. How long does it take? The test will take about 30 minutes to an hour. But you need time to get ready for it and time to recover. If a problem is found and the doctor treats it, it can take a few hours longer. Care of your puncture site:  Remove bandage 24 hours after the procedure. May shower in 24 hours but do not sit in a bathtub/pool of water for 5 days or until the wound is healed. Inspect the site daily and gently clean using soap and water while standing in the shower. Dry thoroughly and apply a Band-Aid that covers the entire site.  Do not apply powder or

## 2023-12-13 NOTE — PLAN OF CARE
Problem: Discharge Planning  Goal: Discharge to home or other facility with appropriate resources  12/13/2023 0802 by Sean Pino RN  Outcome: Progressing     Problem: ABCDS Injury Assessment  Goal: Absence of physical injury  12/13/2023 0802 by Sean Pino RN  Outcome: Progressing     Problem: Chronic Conditions and Co-morbidities  Goal: Patient's chronic conditions and co-morbidity symptoms are monitored and maintained or improved  12/13/2023 0802 by Sean Pino RN  Outcome: Progressing     Problem: Pain  Goal: Verbalizes/displays adequate comfort level or baseline comfort level  12/13/2023 0802 by Sean Pino RN  Outcome: Progressing

## 2023-12-13 NOTE — TELEPHONE ENCOUNTER
Called and spoke with patient. I will get samples of brilinta for her to take while she waits on pharmacy.  Instructed patient to meet me at cath lab to  samples

## 2023-12-13 NOTE — TELEPHONE ENCOUNTER
Pt states she was just released from the hospital today after PCI and [id number] [place] [address] did not have Brilinta. States they will have it tomorrow 12/14/23, but pt states she was to take a dose tonight and not sure what to do.  Please call to advise

## 2023-12-13 NOTE — PLAN OF CARE
Problem: Discharge Planning  Goal: Discharge to home or other facility with appropriate resources  Outcome: Progressing     Problem: ABCDS Injury Assessment  Goal: Absence of physical injury  Outcome: Progressing     Problem: Chronic Conditions and Co-morbidities  Goal: Patient's chronic conditions and co-morbidity symptoms are monitored and maintained or improved  Outcome: Progressing     Problem: Pain  Goal: Verbalizes/displays adequate comfort level or baseline comfort level  Outcome: Progressing

## 2023-12-14 ENCOUNTER — CARE COORDINATION (OUTPATIENT)
Dept: CASE MANAGEMENT | Age: 63
End: 2023-12-14

## 2023-12-14 ENCOUNTER — TELEPHONE (OUTPATIENT)
Dept: CARDIOLOGY CLINIC | Age: 63
End: 2023-12-14

## 2023-12-14 DIAGNOSIS — R07.89 OTHER CHEST PAIN: Primary | ICD-10-CM

## 2023-12-14 RX ORDER — ROSUVASTATIN CALCIUM 10 MG/1
TABLET, COATED ORAL
Qty: 90 TABLET | Refills: 5 | Status: SHIPPED | OUTPATIENT
Start: 2023-12-14

## 2023-12-14 RX ORDER — LEVOTHYROXINE SODIUM 0.12 MG/1
125 TABLET ORAL DAILY
Qty: 90 TABLET | Refills: 1 | Status: SHIPPED | OUTPATIENT
Start: 2023-12-14

## 2023-12-14 NOTE — TELEPHONE ENCOUNTER
Rene Benavides did want to know when she can remove her bandage from her wrist and groin. She said her procedure was this past Tuesday 12/12.    I told her I would put a message in and send it to Dr. Marilee Suarez and his RN, she v/u    You can reach Rene Benavides at # [phone]

## 2023-12-14 NOTE — TELEPHONE ENCOUNTER
Last appointment: 11/3/2023  Next appointment: Visit date not found  Last refill: 12/15/2022  Sent [employer] message to schedule due/overdue appointment.

## 2023-12-14 NOTE — TELEPHONE ENCOUNTER
Date of Procedure: Wednesday 1/17/24 @ [facility] with Dr. Rapp     Time of arrival: 6:45 am     Procedure time: 8:00 am     Spoke to Alicia and she is agreeable to date and time. Encouraged to call with any questions or concerns.     Alicia did ask that all her medication instructions for her procedure be reviewed at her office visit with the NP on 12/21.    Published on [employer], scheduled in Epic and e-mailed to cath lab.

## 2023-12-14 NOTE — TELEPHONE ENCOUNTER
Patient is calling in for Elvis Seek in regards to medication refill for:    levothyroxine (SYNTHROID) 125 MCG tablet  Last appointment: 11/3/2023  Next appointment: 12/21/2023  Last refill: 11/08/23      Please send medication refill to :    [facility] [id number] Ken Kaplan, [facility],[place] [address]  Critical access hospital, [address] [place] [address]   Phone:  [phone]  Fax:  [phone]

## 2023-12-21 ENCOUNTER — OFFICE VISIT (OUTPATIENT)
Dept: CARDIOLOGY CLINIC | Age: 63
End: 2023-12-21
Payer: COMMERCIAL

## 2023-12-21 VITALS
SYSTOLIC BLOOD PRESSURE: 108 MMHG | BODY MASS INDEX: 42.34 KG/M2 | HEART RATE: 73 BPM | WEIGHT: 248 LBS | HEIGHT: 64 IN | DIASTOLIC BLOOD PRESSURE: 60 MMHG | OXYGEN SATURATION: 97 %

## 2023-12-21 DIAGNOSIS — I10 HYPERTENSION, UNSPECIFIED TYPE: ICD-10-CM

## 2023-12-21 DIAGNOSIS — I25.10 CORONARY ARTERY DISEASE INVOLVING NATIVE HEART WITHOUT ANGINA PECTORIS, UNSPECIFIED VESSEL OR LESION TYPE: Primary | ICD-10-CM

## 2023-12-21 DIAGNOSIS — E78.5 HYPERLIPIDEMIA, UNSPECIFIED HYPERLIPIDEMIA TYPE: ICD-10-CM

## 2023-12-21 PROCEDURE — 99214 OFFICE O/P EST MOD 30 MIN: CPT | Performed by: NURSE PRACTITIONER

## 2023-12-21 PROCEDURE — 3074F SYST BP LT 130 MM HG: CPT | Performed by: NURSE PRACTITIONER

## 2023-12-21 PROCEDURE — 3078F DIAST BP <80 MM HG: CPT | Performed by: NURSE PRACTITIONER

## 2023-12-21 RX ORDER — ROSUVASTATIN CALCIUM 20 MG/1
20 TABLET, COATED ORAL NIGHTLY
Qty: 90 TABLET | Refills: 0 | Status: SHIPPED | OUTPATIENT
Start: 2023-12-21

## 2023-12-21 NOTE — TELEPHONE ENCOUNTER
Reviewed with DCE. Per DCE no extra prehydration needed. Stop eliquis 48 hours before. Hold lasix morning of. Called patient. vu

## 2023-12-21 NOTE — PATIENT INSTRUCTIONS
Increase crestor to 20mg daily. Ok to take two 10mg tabs until you run out.     Complete fasting blood work in 2-3 months after increase in statin. Fast for 12 hours prior, may have black coffee or water.     I will talk with Dr. Rapp about preference on holding medications prior to LHC. Nothing to eat or drink after midnight the night before procedure.     Follow up after procedure

## 2023-12-21 NOTE — PROGRESS NOTES
LABVLDL 22 2023    LABVLDL 24 2023    LABVLDL 17 2023     Lab Results   Component Value Date/Time    PROBNP 1,245 2023 04:50 AM    PROBNP 458 2021 11:52 AM     Radiology Review:  Pertinent images / reports were reviewed as a part of this visit and reveals the following:    Last Echo: 23  Summary   *Left ventricle - normal size, thickness, function EF 60%, possible mild   inferior hypokinesis   *Tricuspid valve - mild regurgitation    Last Angiogram: 23  FINDINGS   Artery Findings   LM Normal   LAD Mid 50%   Cx OM1 80% prox   RI N/A   RCA 99% prox with ELIZABETH 2 flow, 80% mid   LVEDP 11mmHg Normal 3-12mmHg   LVG N/A due to renal failure Normal >/= 55%   AVG Normal      INTERVENTION(S)      Artery Location Intervention Result   RCA All IVUS with distal reference 4.5 and proximal reference 5.0     RCA Prox Synergy 4.0X38 opw below (PD 4.5NC and 5.0NC) No Residual      POST CATH  RECOMMENDATIONS   DAPT for 12 months  Staged FFR and likely PCI of Cx and LAD in 30days - deferred due to renal function  Consult hematology for AC needs       Assessment:     Coronary artery disease   ~ NSTEMI; [facility] 23 s/p PCI of RCA.   ~ plan for Staged FFR and likely PCI of Cx and LAD in 30days - deferred due to renal function  ~ on brilinta (no asa, on eliquis) and statin. No bb d/t low BP.   ~  stable; no c/o angina     Hypertension   ~ controlled on current meds     Hyperlipidemia   ~ LDL 70 (2023); on crestor 10     CKD  ~ follows with nephrology     Hx DVT   ~ strong fam hx of clotting (sister  of PE). On eliquis 2.5mg BID per hem/onc recs     DM- A1C 5.2    Patient  is stable since hospital discharge.  Plan:     Increase crestor to high intensity, 20mg daily  Complete fasting blood work in 2-3 months after increase in statin.  Staged PCI 24 as planned   Follow up after procedure     I have addressed the patient's cardiac risk factors and adjusted pharmacologic treatment as needed.

## 2023-12-27 ENCOUNTER — CARE COORDINATION (OUTPATIENT)
Dept: CARE COORDINATION | Age: 63
End: 2023-12-27

## 2023-12-27 NOTE — CARE COORDINATION
Care Transitions Follow Up Call    Patient Current Location: [address] [place] [address] Transition Nurse contacted the patient by telephone to follow up after admission. Verified name and  with patient as identifiers. Patient: Saeid Paul  Patient : 1960   MRN: [id number]  Reason for Admission: NSTEMI  Discharge Date: 23 RARS: Readmission Risk Score: 8.6      Needs to be reviewed by the provider   Additional needs identified to be addressed with provider: No  none             Method of communication with provider: none. States she's doing well. Denies CP, SOB, weight gain, swelling, palpitations, dizziness, or other symptoms or concerns. Cardiology f/u appt completed. Reviewed medication changes. She reports taking medications as prescribed and denies questions or concerns at this time. Addressed changes since last contact:  none  Discussed follow-up appointments. If no appointment was previously scheduled, appointment scheduling offered: Yes. Is follow up appointment scheduled within 7 days of discharge? Yes. Follow Up  Future Appointments   Date Time Provider [address] [place] [address]   2024  8:00 AM [facility] CARDIAC CATH LAB ROOM 1 [facility] CATH [facility]   2024  3:00 PM GRIS [place] NASO VLAD AFL NASO     External follow up appointment(s):      Care Transition Nurse reviewed discharge instructions, medical action plan, and red flags with patient and discussed any barriers to care and/or understanding of plan of care after discharge. Discussed appropriate site of care based on symptoms and resources available to patient including: PCP  Specialist  When to call 911. The patient agrees to contact the PCP office for questions related to their healthcare.      Patients top risk factors for readmission: medical condition-   Interventions to address risk factors: Obtained and reviewed discharge summary and/or continuity of care documents and Assessment and support for

## 2024-01-03 ENCOUNTER — CARE COORDINATION (OUTPATIENT)
Dept: CASE MANAGEMENT | Age: 64
End: 2024-01-03

## 2024-01-03 NOTE — CARE COORDINATION
Follow up outreach call attempt, no answer.  CTN left VM with contact information and request for return call.  CTN will continue with outreach call attempts.

## 2024-01-04 ENCOUNTER — CARE COORDINATION (OUTPATIENT)
Dept: CASE MANAGEMENT | Age: 64
End: 2024-01-04

## 2024-01-04 NOTE — CARE COORDINATION
Patient returned this CTN's call last evening and left .    CTN attempted outreach today.  Second attempt at follow up outreach call attempt, no answer.  CTN left  with contact information and request for return call.  CTN will continue with outreach call attempts.

## 2024-01-11 ENCOUNTER — CARE COORDINATION (OUTPATIENT)
Dept: CASE MANAGEMENT | Age: 64
End: 2024-01-11

## 2024-01-11 NOTE — CARE COORDINATION
Care Transitions Follow Up Call    Patient Current Location:  Home[place] [address] [place] [address] [place] [address]    [facility] Care Coordinator contacted the patient by telephone to follow up after admission on 2023.  Verified name and  with patient as identifiers.    Patient: Alicia Cervantes  Patient : 1960   MRN: <[id number]>  Reason for Admission: NSTEMI, Chest pain  Discharge Date: 23 RARS: Readmission Risk Score: 8.6      Needs to be reviewed by the provider   Additional needs identified to be addressed with provider: No  none             Method of communication with provider: none.  Spoke with patient. She is feeling fine. She is scheduled for a stent on Wednesday. Appetite and fluid intake is good. No urinary or bowel elimination issues.taking medication as prescribed. Denies sob, cp, palpitations, swelling, lh, ha, fever, chills or dizziness. Patient informed this is the final CTN call. Expect a call from [facility] for further f/u. No questions, needs or concerns.       Addressed changes since last contact:  none  Discussed follow-up appointments. If no appointment was previously scheduled, appointment scheduling offered: No.   Is follow up appointment scheduled within 7 days of discharge? Yes.    Follow Up  Future Appointments   Date Time Provider Department Center   2024  8:00 AM [facility] CARDIAC CATH LAB ROOM 1 [facility] CATH [facility]   2024  3:00 PM Katie Hernandez PA-C AFL NASO VLAD AFL NASO     External follow up appointment(s):     Advance Care Planning:   not on file.     Patients top risk factors for readmission: ineffective coping and medical condition-NSTEMI, CP  Interventions to address risk factors: Education of patient/family/caregiver/guardian to support self-management-     Offered patient enrollment in the Remote Patient Monitoring (RPM) program for in-home monitoring: Patient is not eligible for RPM program.     Care Transitions Subsequent and Final Call    Subsequent and

## 2024-01-12 ENCOUNTER — TELEPHONE (OUTPATIENT)
Dept: CARDIOLOGY CLINIC | Age: 64
End: 2024-01-12

## 2024-01-12 NOTE — TELEPHONE ENCOUNTER
Pt calling in stating when she went to  her crystalquis the cost was $600 and it is usually $20 pt is contacting insurance as well pt is wondering if she can have some samples until she gets her situation figured out   Please advise   Thank you

## 2024-01-12 NOTE — TELEPHONE ENCOUNTER
Pt called back stating she had talked to her insurance and the reason was they need to order a 90 day supply and then her cost would be $40  Please advise

## 2024-01-12 NOTE — TELEPHONE ENCOUNTER
Certainly ok for samples. This medication should be filled by PCP or hematology.  She is on it for hx of DVTs.

## 2024-01-15 NOTE — TELEPHONE ENCOUNTER
I spoke with pt. I told her that samples are ready for . I also relayed message per NPKL. Py verbalized understanding.

## 2024-01-17 ENCOUNTER — HOSPITAL ENCOUNTER (OUTPATIENT)
Dept: CARDIAC CATH/INVASIVE PROCEDURES | Age: 64
Discharge: HOME | End: 2024-01-17
Attending: INTERNAL MEDICINE | Admitting: INTERNAL MEDICINE
Payer: COMMERCIAL

## 2024-01-17 VITALS
WEIGHT: 248 LBS | OXYGEN SATURATION: 100 % | HEART RATE: 78 BPM | SYSTOLIC BLOOD PRESSURE: 96 MMHG | DIASTOLIC BLOOD PRESSURE: 69 MMHG | RESPIRATION RATE: 16 BRPM | HEIGHT: 65 IN | BODY MASS INDEX: 41.32 KG/M2

## 2024-01-17 PROBLEM — I20.0 UNSTABLE ANGINA (HCC): Status: ACTIVE | Noted: 2024-01-17

## 2024-01-17 LAB
ANION GAP SERPL CALCULATED.3IONS-SCNC: 10 MMOL/L (ref 3–16)
BUN SERPL-MCNC: 27 MG/DL (ref 7–20)
CALCIUM SERPL-MCNC: 9.1 MG/DL (ref 8.3–10.6)
CHLORIDE SERPL-SCNC: 108 MMOL/L (ref 99–110)
CO2 SERPL-SCNC: 24 MMOL/L (ref 21–32)
CREAT SERPL-MCNC: 2 MG/DL (ref 0.6–1.2)
DEPRECATED RDW RBC AUTO: 14.2 % (ref 12.4–15.4)
EKG ATRIAL RATE: 97 BPM
EKG DIAGNOSIS: NORMAL
EKG P AXIS: 68 DEGREES
EKG P-R INTERVAL: 138 MS
EKG Q-T INTERVAL: 366 MS
EKG QRS DURATION: 78 MS
EKG QTC CALCULATION (BAZETT): 464 MS
EKG R AXIS: -1 DEGREES
EKG T AXIS: 11 DEGREES
EKG VENTRICULAR RATE: 97 BPM
GFR SERPLBLD CREATININE-BSD FMLA CKD-EPI: 27 ML/MIN/{1.73_M2}
GLUCOSE SERPL-MCNC: 95 MG/DL (ref 70–99)
HCT VFR BLD AUTO: 40.1 % (ref 36–48)
HGB BLD-MCNC: 13.1 G/DL (ref 12–16)
MCH RBC QN AUTO: 27.8 PG (ref 26–34)
MCHC RBC AUTO-ENTMCNC: 32.7 G/DL (ref 31–36)
MCV RBC AUTO: 85.1 FL (ref 80–100)
PLATELET # BLD AUTO: 254 K/UL (ref 135–450)
PMV BLD AUTO: 9.1 FL (ref 5–10.5)
POC ACT LR: 190 SEC
POTASSIUM SERPL-SCNC: 4.3 MMOL/L (ref 3.5–5.1)
RBC # BLD AUTO: 4.71 M/UL (ref 4–5.2)
SODIUM SERPL-SCNC: 142 MMOL/L (ref 136–145)
WBC # BLD AUTO: 7.6 K/UL (ref 4–11)

## 2024-01-17 PROCEDURE — C1894 INTRO/SHEATH, NON-LASER: HCPCS

## 2024-01-17 PROCEDURE — 85347 COAGULATION TIME ACTIVATED: CPT

## 2024-01-17 PROCEDURE — 93799 UNLISTED CV SVC/PROCEDURE: CPT

## 2024-01-17 PROCEDURE — 93571 IV DOP VEL&/PRESS C FLO 1ST: CPT

## 2024-01-17 PROCEDURE — 99152 MOD SED SAME PHYS/QHP 5/>YRS: CPT

## 2024-01-17 PROCEDURE — 6360000004 HC RX CONTRAST MEDICATION: Performed by: INTERNAL MEDICINE

## 2024-01-17 PROCEDURE — 85027 COMPLETE CBC AUTOMATED: CPT

## 2024-01-17 PROCEDURE — 6360000002 HC RX W HCPCS

## 2024-01-17 PROCEDURE — 2500000003 HC RX 250 WO HCPCS

## 2024-01-17 PROCEDURE — 93005 ELECTROCARDIOGRAM TRACING: CPT | Performed by: INTERNAL MEDICINE

## 2024-01-17 PROCEDURE — C1887 CATHETER, GUIDING: HCPCS

## 2024-01-17 PROCEDURE — 36415 COLL VENOUS BLD VENIPUNCTURE: CPT

## 2024-01-17 PROCEDURE — 80048 BASIC METABOLIC PNL TOTAL CA: CPT

## 2024-01-17 PROCEDURE — 93454 CORONARY ARTERY ANGIO S&I: CPT

## 2024-01-17 PROCEDURE — 99153 MOD SED SAME PHYS/QHP EA: CPT

## 2024-01-17 PROCEDURE — 2709999900 HC NON-CHARGEABLE SUPPLY

## 2024-01-17 PROCEDURE — C1769 GUIDE WIRE: HCPCS

## 2024-01-17 PROCEDURE — C1760 CLOSURE DEV, VASC: HCPCS

## 2024-01-17 RX ORDER — SODIUM CHLORIDE 0.9 % (FLUSH) 0.9 %
5-40 SYRINGE (ML) INJECTION PRN
Status: DISCONTINUED | OUTPATIENT
Start: 2024-01-17 | End: 2024-01-17 | Stop reason: HOSPADM

## 2024-01-17 RX ORDER — SODIUM CHLORIDE 9 MG/ML
INJECTION, SOLUTION INTRAVENOUS PRN
Status: DISCONTINUED | OUTPATIENT
Start: 2024-01-17 | End: 2024-01-17 | Stop reason: HOSPADM

## 2024-01-17 RX ORDER — SODIUM CHLORIDE 0.9 % (FLUSH) 0.9 %
5-40 SYRINGE (ML) INJECTION EVERY 12 HOURS SCHEDULED
Status: DISCONTINUED | OUTPATIENT
Start: 2024-01-17 | End: 2024-01-17 | Stop reason: HOSPADM

## 2024-01-17 RX ADMIN — IOPAMIDOL 31 ML: 755 INJECTION, SOLUTION INTRAVENOUS at 09:33

## 2024-01-17 NOTE — OP NOTE
[facility] Operative Note     PROCEDURE SUMMARY   Procedure CORS ONLY   Indication UA   Consent Obtained   Access RC   US US guidance used to determine artery patency, size (>2mm), anatomic variations and ideal puncture location.  Real-time US utilized concurrent with vascular needle entry into artery.  Image(s) permanently recorded and reported in chart.   Bleed Risk Low   Sedation Minimal conscious sedation for comfort.  Independent trained observer pushed medications at my direction. Level of consciousness and vital signs/physiologic status monitored throughout the procedure (see start and stop times above, as well as medication dosages).   Start Time 0851   Stop Time 0923   Versed 2mg   Fentanyl 100mcg   Contrast 31cc   Flouro 3.7min   EBL <20mL   Complicat None   Specimens None   Procedure Detail Patient taken to cath lab in postabsorptive state, informed consent obtained.  RCFA prepped and draped in normal sterile fashion  18G needle used to access artery Without US guidance  J wire advanced into artery and 6F sheath advanced over wire   JL 3.5 advanced over wire to engage LM coronary artery and image in multiple views  JL 3.5 exchanged over a wire for JR4 to engage RCA and image in multiple views  JR4 removed over J wire  Arterial hemostasis obtained with Perclose and Manual pressure     FINDINGS   Artery Findings   LM Normal   LAD 50% mid   Cx OM1 50% prox, improved appearance from last angio   RCA Widely patent stents.     INTERVENTION(S)     Artery Location Intervention Result   LAD Mid DFR = 0.91, FFR = 0.86; All nonsignificant No Residual   OM1 Prox DFR = 1.0 No Residual     POST CATH  RECOMMENDATIONS   Continued aggressive medical therapy and risk factor modification  Insignificant lesions in OM1 and LAD  No change to outpatient med regimen

## 2024-01-17 NOTE — PROGRESS NOTES
Up in chair. Steady on feet. Awake and alert        Patient/family given discharge instructions. Patient/family verbalize understanding of discharge instructions, all questions addressed, copy given to patient/family. PIV removed. No complications noted. Pt transferred to vehicle via wheelchair to be discharged home with family.

## 2024-01-17 NOTE — PROGRESS NOTES
PRE-PROCEDURE    DATE: 1/17/2024 ARRIVAL TO CATH LAB: 6:48 AM    ADMIT SOURCE: Outpatient    ID & ALLERGY BAND: On    CONSENT: Yes    NPO SINCE: Midnight    LABS/PREGNANCY TEST: N/A    PULSES:  Right DP 2+  Right PT 2+  Left DP 2+  Left PT 2+    IV SITE : Started in Left A/C.  with fluids infusing at kvo 6:48 AM     SURGERIES PLANNED: No    BLEEDING PROBLEMS: No    BLEEDING RISK: Low Risk <2%    COMPLIANCE: Yes      PATIENT HISTORY    CHIEF COMPLAINT: Dyspnea on Exertion    EKG:  Yes    Pre CATH Rhythm: Normal Sinus Rhythm    STRESS TEST PREFORMED:  No    CARDIAC CTA PREFORMED:  No    AG[place] CORONARY CALCIUM SCORE:   Assessed: No    ECHO: DATE:  Yes.  Most recent date: 12/13/2023      EF:  60    HYPERTENSION: Yes    DYSLIPIDEMIA: Yes    FAMILY HX OF CAD: Yes    PRIOR MI: Yes.  Most recent date: 12/12/2023    PRIOR PCI: Yes.  Most recent date: 12/13/2023    PRIOR CABG: No    CEREBRALVASCULAR DX: No    PERIPHERAL ARTERIAL DISEASE: No    CHRONIC LUNG DISEASE: No    TOBACCO: Never    DIABETIC: Yes, Oral Treatment    CARDIAC ARREST: No    DIALYSIS: No    FRAILTY SCORE: 3 MANAGING WELL (medical problems are well controlled, not regularly active beyond routine walking)

## 2024-01-17 NOTE — DISCHARGE INSTRUCTIONS
LEFT HEART CATHETERIZATION    Care of your puncture site:  Remove bandage 24 hours after the procedure.  May shower in 24 hours but do not sit in a bathtub/pool of water for 5 days or until the wound is healed.  Inspect the site daily and gently clean using soap and water while standing in the shower.  Dry thoroughly and apply a Band-Aid that covers the entire site. Do not apply powder or lotion.    Normal Observations:  Soreness or tenderness which may last one week.  Mild oozing from the incision site.  Possible bruising that could last 2 weeks.    Activity:  You may resume driving 24 hours following the procedure.  You may resume normal activity in 5 days or after the wound heals.  Avoid lifting more than 10 pounds for 5 days or until the wound heals.  Avoid strenuous exercise or activity for 1 week.  You may return to work in {NUMBER 1-10:[id number]} {Time; day/wk/mo/yr(s):0859}, if applicable.    Nutrition:  Regular diet.  Drink at least 8 to 10 glasses of decaffeinated, non-alcoholic fluid for the next 24 hours to flush the x-ray dye used for your angiogram out of your body.    Call your doctor immediately if your condition worsens, for any other concerns, for a follow-up appointment or if you experience any of the following:  Significant bleeding that does not stop after 10 minutes of applying firm pressure on the puncture site.  Increased swelling on the groin or leg.  Unusual pain, numbness, or tingling of the groin or down the leg.  Any signs of infection such as: redness, yellow drainage at the site, swelling or pain.

## 2024-01-17 NOTE — H&P
stent (2023).   SOCHx  reports that she has never smoked. She has never used smokeless tobacco. She reports that she does not drink alcohol and does not use drugs.   FAMHx family history includes High Blood Pressure in her mother; Osteoporosis in her mother.   ALLERG Codeine and Morphine and related   ROS Full ROS obtained and negative except as mentioned in HPI   MEDICATIONS   Medications reviewed in Epic.   PHYSICAL EXAM   Vitals There were no vitals taken for this visit.   Gen Alert, coop, no distress Heart  RRR, no MRG   Head NC, AT, no abnorm Abd  Soft, NT, +BS, no mass, no OM   Eyes PER, conj/corn clear Ext  Ext nl, AT, no C/C/E   Nose Nares nl, no drain, NT Pulse 2+ and symmetric   Throat Lips, mucosa, tongue nl Skin Col/text/turg nl, no vis rash/les   Neck S/S, TM, NT, no bruit/JVD Psych Nl mood and affect   Lung CTA-B, unlabored, no DTP Lymph   No cervical or axillary LA   Ch wall NT, no deform Neuro  Nl gross M/S exam      ASSESSMENT AND PLAN   *CAD   Date EF Results   Sx   Unstable, as detailed above   [facility]   PCI RCA (Tamela)   MPI      TTE  55%  60%    Plan   Staged FFR/PCI today of Cx and LAD    *Hx DVT  Status Plan   On xarelto, strong family hx of clots, sister  of PE Hold for [facility]   *HTN  Status Advice Plan   Controlled Diet/salt/xcise/wt counseling Continue antihypertensives at current dosages   *CHOL  LDL Advice Plan   ,  Diet/salt/xcise/wt counseling Continue MT/HI statin

## 2024-01-17 NOTE — PRE SEDATION
[facility]  Pre-sedation Assessment   PROCEDURE   Planned Procedure Cardiac catheterization   Consent I have discussed with the patient and/or the patient representative the indication, alternatives, and the possible risks and/or complications of the planned procedure and the anesthesia methods. The patient and/or patient representative appear to understand and agree to proceed.   INDICATION   Chief Complaint Chest Pain/Pressure  Anginal Equivalent   Presentation New Onset Angina <= 2 months, Worsening Angina, and Stable Known CAD   Anginal Class (2 wks) CCS III - Symptoms with everyday living activities, i.e., moderate limitation   Anginal Symptoms Typical chest pain or anginal equivalent   CHF Class (2 wks) No symptoms   CV Instability Yes   ISCHEMIC WORKUP/MANAGEMENT/EVALUTION   Stress Test No   Anginal Meds Yes: ACE/ARB/ARNI and STATIN   UPCOMING SURGERY   Valve surgery No   MEDICAL HISTORY   Vital Signs There were no vitals taken for this visit.   Allergies Reviewed in EMR   Medications Reviewed in EMR   Medical History Reviewed in EMR   Surgical History Reviewed in EMR   PRE-SEDATION   Exam I have personally completed a history, physical exam & review of systems for this patient (see notes).   Hx Anesthesia Issue None   Mod Mallampati II   ASA Class Class 2 - A normal healthy patient with mild systemic disease   ROCIO SCALE   Activity 2 - Able to move 4 extrem on command   Respiration 2 - Able to breath deeply and cough freely   Circulation 2 - BP +/- 20mmHg of normal   Consciousness 2 - Fully awake   Oxygen Saturation 2 - Able to maintain oxygen sat >92% on room air   SEDATION/ANESTHESIA PLAN   Goal Guard patient safety and welfare. Minimize physical discomfort and pain. Minimize negative psychological responses to treatment by providing sedation and analgesia and maximize the potential amnesia.  Patient to meet pre-procedure discharge plan.   Medications Midazolam intravenously and fentanyl

## 2024-01-18 DIAGNOSIS — E55.9 VITAMIN D DEFICIENCY: ICD-10-CM

## 2024-01-18 DIAGNOSIS — N18.32 STAGE 3B CHRONIC KIDNEY DISEASE (HCC): ICD-10-CM

## 2024-01-19 LAB
25(OH)D3 SERPL-MCNC: 10.2 NG/ML
ALBUMIN SERPL-MCNC: 4.1 G/DL (ref 3.4–5)
ANION GAP SERPL CALCULATED.3IONS-SCNC: 13 MMOL/L (ref 3–16)
BACTERIA URNS QL MICRO: ABNORMAL /HPF
BILIRUB UR QL STRIP.AUTO: NEGATIVE
BUN SERPL-MCNC: 19 MG/DL (ref 7–20)
CALCIUM SERPL-MCNC: 9.2 MG/DL (ref 8.3–10.6)
CHLORIDE SERPL-SCNC: 109 MMOL/L (ref 99–110)
CLARITY UR: CLEAR
CO2 SERPL-SCNC: 23 MMOL/L (ref 21–32)
COLOR UR: YELLOW
CREAT SERPL-MCNC: 1.8 MG/DL (ref 0.6–1.2)
CREAT UR-MCNC: 25.6 MG/DL (ref 28–259)
DEPRECATED RDW RBC AUTO: 14.3 % (ref 12.4–15.4)
EPI CELLS #/AREA URNS AUTO: 7 /HPF (ref 0–5)
GFR SERPLBLD CREATININE-BSD FMLA CKD-EPI: 31 ML/MIN/{1.73_M2}
GLUCOSE SERPL-MCNC: 100 MG/DL (ref 70–99)
GLUCOSE UR STRIP.AUTO-MCNC: 250 MG/DL
HCT VFR BLD AUTO: 40.6 % (ref 36–48)
HGB BLD-MCNC: 13.3 G/DL (ref 12–16)
HGB UR QL STRIP.AUTO: NEGATIVE
HYALINE CASTS #/AREA URNS AUTO: 1 /LPF (ref 0–8)
KETONES UR STRIP.AUTO-MCNC: NEGATIVE MG/DL
LEUKOCYTE ESTERASE UR QL STRIP.AUTO: ABNORMAL
MCH RBC QN AUTO: 28.1 PG (ref 26–34)
MCHC RBC AUTO-ENTMCNC: 32.7 G/DL (ref 31–36)
MCV RBC AUTO: 85.9 FL (ref 80–100)
NITRITE UR QL STRIP.AUTO: NEGATIVE
PH UR STRIP.AUTO: 6.5 [PH] (ref 5–8)
PHOSPHATE SERPL-MCNC: 3.4 MG/DL (ref 2.5–4.9)
PLATELET # BLD AUTO: 267 K/UL (ref 135–450)
PMV BLD AUTO: 9.4 FL (ref 5–10.5)
POTASSIUM SERPL-SCNC: 4.4 MMOL/L (ref 3.5–5.1)
PROT UR STRIP.AUTO-MCNC: NEGATIVE MG/DL
PROT UR-MCNC: 6 MG/DL
PROT/CREAT UR-RTO: 0.2 MG/DL
PTH-INTACT SERPL-MCNC: 94.6 PG/ML (ref 14–72)
RBC # BLD AUTO: 4.72 M/UL (ref 4–5.2)
RBC CLUMPS #/AREA URNS AUTO: 0 /HPF (ref 0–4)
SODIUM SERPL-SCNC: 145 MMOL/L (ref 136–145)
SP GR UR STRIP.AUTO: 1.01 (ref 1–1.03)
UA DIPSTICK W REFLEX MICRO PNL UR: YES
URN SPEC COLLECT METH UR: ABNORMAL
UROBILINOGEN UR STRIP-ACNC: 0.2 E.U./DL
WBC # BLD AUTO: 8.1 K/UL (ref 4–11)
WBC #/AREA URNS AUTO: 2 /HPF (ref 0–5)

## 2024-01-22 ENCOUNTER — CARE COORDINATION (OUTPATIENT)
Dept: CARE COORDINATION | Age: 64
End: 2024-01-22

## 2024-01-22 NOTE — CARE COORDINATION
Call to patient, introduced ACM/ role   Pt reports Dr samayoa ended up not having to place staent last week which was good news.   Pt working diligently on diet for good DM control.    Hemoglobin A1C   Date Value Ref Range Status   11/03/2023 5.2 See comment % Final     Comment:     Comment:  Diagnosis of Diabetes: > or = 6.5%  Increased risk of diabetes (Prediabetes): 5.7-6.4%  Glycemic Control: Nonpregnant Adults: <7.0%                    Pregnant: <6.0%         Future Appointments   Date Time Provider Department Center   2/15/2024  9:30 AM Tish Perez, APRN - CNP [facility]       Declined need for ACM at this time. Provided number and name to call if needs arise

## 2024-01-22 NOTE — TELEPHONE ENCOUNTER
Medication Refill  *new pharmacy* [facility] was too high for pt  Medication needing refilled:  ticagrelor (BRILINTA)   Dosage of the medication:  90 MG TABS tablet   How are you taking this medication (QD, BID, TID, QID, PRN):  Take 1 tablet by mouth 2 times daily   30 or 90 day supply called in:  90 day  When will you run out of your medication:  Saturday   Which Pharmacy are we sending the medication to?:  [facility] - [address] [phone] - F [phone]

## 2024-02-09 DIAGNOSIS — N18.9 ACUTE KIDNEY INJURY SUPERIMPOSED ON CKD (HCC): ICD-10-CM

## 2024-02-09 DIAGNOSIS — N17.9 ACUTE KIDNEY INJURY SUPERIMPOSED ON CKD (HCC): ICD-10-CM

## 2024-02-10 LAB
ANION GAP SERPL CALCULATED.3IONS-SCNC: 11 MMOL/L (ref 3–16)
BUN SERPL-MCNC: 32 MG/DL (ref 7–20)
CALCIUM SERPL-MCNC: 9 MG/DL (ref 8.3–10.6)
CHLORIDE SERPL-SCNC: 104 MMOL/L (ref 99–110)
CO2 SERPL-SCNC: 26 MMOL/L (ref 21–32)
CREAT SERPL-MCNC: 1.6 MG/DL (ref 0.6–1.2)
GFR SERPLBLD CREATININE-BSD FMLA CKD-EPI: 36 ML/MIN/{1.73_M2}
GLUCOSE SERPL-MCNC: 77 MG/DL (ref 70–99)
POTASSIUM SERPL-SCNC: 5.1 MMOL/L (ref 3.5–5.1)
SODIUM SERPL-SCNC: 141 MMOL/L (ref 136–145)

## 2024-02-15 ENCOUNTER — OFFICE VISIT (OUTPATIENT)
Dept: CARDIOLOGY CLINIC | Age: 64
End: 2024-02-15
Payer: COMMERCIAL

## 2024-02-15 VITALS
HEART RATE: 88 BPM | BODY MASS INDEX: 39.44 KG/M2 | DIASTOLIC BLOOD PRESSURE: 60 MMHG | HEIGHT: 64 IN | OXYGEN SATURATION: 97 % | SYSTOLIC BLOOD PRESSURE: 92 MMHG | WEIGHT: 231 LBS

## 2024-02-15 DIAGNOSIS — E78.5 HYPERLIPIDEMIA, UNSPECIFIED HYPERLIPIDEMIA TYPE: ICD-10-CM

## 2024-02-15 DIAGNOSIS — I25.10 CORONARY ARTERY DISEASE INVOLVING NATIVE HEART WITHOUT ANGINA PECTORIS, UNSPECIFIED VESSEL OR LESION TYPE: Primary | ICD-10-CM

## 2024-02-15 DIAGNOSIS — R53.83 FATIGUE, UNSPECIFIED TYPE: ICD-10-CM

## 2024-02-15 DIAGNOSIS — I10 HYPERTENSION, UNSPECIFIED TYPE: ICD-10-CM

## 2024-02-15 PROCEDURE — 99214 OFFICE O/P EST MOD 30 MIN: CPT | Performed by: NURSE PRACTITIONER

## 2024-02-15 PROCEDURE — 1123F ACP DISCUSS/DSCN MKR DOCD: CPT | Performed by: NURSE PRACTITIONER

## 2024-02-15 PROCEDURE — 3074F SYST BP LT 130 MM HG: CPT | Performed by: NURSE PRACTITIONER

## 2024-02-15 PROCEDURE — 3078F DIAST BP <80 MM HG: CPT | Performed by: NURSE PRACTITIONER

## 2024-02-15 NOTE — PROGRESS NOTES
or rubs. No elevation of JVP.    GI:  Normal bowel sounds. Non-distended. Non-tender to palpation  EXT: No edema. No calf tenderness. Pulses are present bilaterally.    DATA:    Lab Results   Component Value Date    ALT 23 12/11/2023    AST 23 12/11/2023    ALKPHOS 114 12/11/2023    BILITOT 0.3 12/11/2023     Lab Results   Component Value Date    CREATININE 1.6 (H) 02/09/2024    BUN 32 (H) 02/09/2024     02/09/2024    K 5.1 02/09/2024     02/09/2024    CO2 26 02/09/2024     Lab Results   Component Value Date    TSH 0.17 (L) 11/03/2023     Lab Results   Component Value Date    WBC 8.1 01/18/2024    HGB 13.3 01/18/2024    HCT 40.6 01/18/2024    MCV 85.9 01/18/2024     01/18/2024     No components found for: \"CHLPL\"  Lab Results   Component Value Date    TRIG 108 12/11/2023    TRIG 118 11/03/2023    TRIG 87 07/18/2023     Lab Results   Component Value Date    HDL 41 12/11/2023    HDL 47 11/03/2023    HDL 48 07/18/2023     Lab Results   Component Value Date    LDLCALC 70 12/11/2023    LDLCALC 84 11/03/2023    LDLCALC 100 (H) 07/18/2023     No components found for: \"LABVLDL\"    Lab Results   Component Value Date/Time    PROBNP 1,245 12/11/2023 04:50 AM    PROBNP 458 01/19/2021 11:52 AM     Radiology Review:  Pertinent images / reports were reviewed as a part of this visit and reveals the following:    Last Echo: 12/13/23  Summary   *Left ventricle - normal size, thickness, function EF 60%, possible mild   inferior hypokinesis   *Tricuspid valve - mild regurgitation    Last Angiogram: 12/11/23  FINDINGS   Artery Findings   LM Normal   LAD Mid 50%   Cx OM1 80% prox   RI N/A   RCA 99% prox with ELIZABETH 2 flow, 80% mid   LVEDP 11mmHg Normal 3-12mmHg   LVG N/A due to renal failure Normal >/= 55%   AVG Normal      INTERVENTION(S)      Artery Location Intervention Result   RCA All IVUS with distal reference 4.5 and proximal reference 5.0     RCA Prox Synergy 4.0X38 opw below (PD 4.5NC and 5.0NC) No Residual

## 2024-02-15 NOTE — PATIENT INSTRUCTIONS
Try decreasing lasix to 20mg every other day     Weigh yourself daily in the Morning. Record weights and date in a diary. Call if you have a weight gain of 3lbs in a day and/or 5lbs in 1 week. Call if you start noticing increased shortness of breath and/or swelling. Limit salt intake to 2g (2,000mg) / day.    Monitor home blood pressure. Goal is less than 140/80 but to remain greater than 100/50.     Complete fasting blood work end of Feb- beginning of March. Fast for 12 hours prior, may have black coffee or water.     [facility] [place]  [address] [place]  [address]  [phone]    Call me in 1-2 weeks to discuss BP, wts, and swelling after decreasing lasix.       Eat less of these foods  Potato chips, french fries, and other “junk” foods  Vegetables cooked in butter, cheese, or cream sauces  Fried foods  Full fat dairy products   Poe, sausage, and organ meats (like liver)  Egg yolks  Cheesecake, pastries, doughnuts, ice cream  Butter and margarine  Sweetened drinks   Tropical oils such as coconut and palm oil  Mediterranean-like diet    Eat more of these foods  Whole-grain breads and pasta, brown rice, whole-grain bagels  Fresh, frozen, baked, or steamed fruits and vegetables  Steamed, baked, or fresh foods  Fat-free dairy products   Fish, skinless poultry, lean cuts of meat (with fat trimmed away), soy products  Egg whites, egg substitutes  Dessert examples: Kaden food cake, fig bars, animal crackers, tracey crackers, air-popped popcorn, low-fat frozen desserts (yogurt, sherbet, ice milk)  Olive oil or canola oil (in small amounts)    The American Heart Association offers these guidelines for how much fat to include in a heart-healthy diet:  Type of fat Recommendation   Saturated fat Less than 6% of total daily calories.* If you're eating 2,000 calories a day, that's about 11 to 13 grams.   Trans fat Avoid       Exercise Recommendations for Adults  Get at least 150 minutes per week of

## 2024-02-18 DIAGNOSIS — E11.65 UNCONTROLLED TYPE 2 DIABETES MELLITUS WITH HYPERGLYCEMIA (HCC): ICD-10-CM

## 2024-02-19 RX ORDER — GLIPIZIDE 5 MG/1
5 TABLET, FILM COATED, EXTENDED RELEASE ORAL DAILY
Qty: 90 TABLET | Refills: 3 | Status: SHIPPED | OUTPATIENT
Start: 2024-02-19

## 2024-02-19 NOTE — TELEPHONE ENCOUNTER
Medication:   Requested Prescriptions     Pending Prescriptions Disp Refills    glipiZIDE (GLUCOTROL XL) 5 MG extended release tablet [Pharmacy Med Name: GLIPIZIDE ER TABS 5MG] 90 tablet 3     Sig: TAKE 1 TABLET DAILY     Last Filled:  3/7/23    Last appt: 12/21/2023   Next appt: 3/15/2024    Last Labs DM:   Lab Results   Component Value Date/Time    LABA1C 5.2 11/03/2023 03:08 PM

## 2024-03-03 DIAGNOSIS — E78.5 HYPERLIPIDEMIA, UNSPECIFIED HYPERLIPIDEMIA TYPE: ICD-10-CM

## 2024-03-04 ENCOUNTER — TELEPHONE (OUTPATIENT)
Dept: CARDIOLOGY CLINIC | Age: 64
End: 2024-03-04

## 2024-03-04 NOTE — TELEPHONE ENCOUNTER
Pt is calling NPKL with BP Readings    2/16/24   129/78  p 91  114/68  p 80  130/84  p 88    2/17  124/74  p 86  122/82  p 88  121/84  p 88    2/18  131/87 p 95  99/69  p 89   132/87  p 88    2/19  128/86  p 85    2/20  123/73  p 95  110/74  p 92      2/21  112/79  p 92  106/72  p 93  118/74  88    2/22   117/17  p 82  121/62  p 93      2/23   122/77  p 106  125/71  p 86  122/76  p 77    2/24  129/76  p 86  129/77  p 87  115/74  p 79    2/25 126/78  103  102/74  p 90  118/70  p 91    2/26  134/70  p 90  122/79  p 93      2/27  120/78  p 88    2/28 110/66  p 91 128/86  p 83      2/29  116/69  p 83 134/86  p 83      3/1  107/73 p 90 114/74  p  80      3/2  118/65  p76  124/74 p 80      3/3  114/74  p 90  129/78  p 76      3/4   125/90  p89

## 2024-03-04 NOTE — TELEPHONE ENCOUNTER
Lov 2/15/2024   Labs 1/17/2024  Lrf Crestor 20 mg disp 90+0 12/21/2023  Appt 5/17/2024 Dr Rapp please advise thanks

## 2024-03-05 RX ORDER — ROSUVASTATIN CALCIUM 20 MG/1
20 TABLET, COATED ORAL NIGHTLY
Qty: 30 TABLET | Refills: 1 | Status: SHIPPED | OUTPATIENT
Start: 2024-03-05

## 2024-03-15 ENCOUNTER — OFFICE VISIT (OUTPATIENT)
Dept: INTERNAL MEDICINE CLINIC | Age: 64
End: 2024-03-15
Payer: COMMERCIAL

## 2024-03-15 VITALS
OXYGEN SATURATION: 97 % | SYSTOLIC BLOOD PRESSURE: 124 MMHG | TEMPERATURE: 98.5 F | HEART RATE: 92 BPM | BODY MASS INDEX: 42.23 KG/M2 | DIASTOLIC BLOOD PRESSURE: 66 MMHG | WEIGHT: 246 LBS

## 2024-03-15 DIAGNOSIS — M70.50 PES ANSERINE BURSITIS: ICD-10-CM

## 2024-03-15 DIAGNOSIS — E03.9 ACQUIRED HYPOTHYROIDISM: ICD-10-CM

## 2024-03-15 DIAGNOSIS — K21.9 GASTROESOPHAGEAL REFLUX DISEASE WITHOUT ESOPHAGITIS: ICD-10-CM

## 2024-03-15 DIAGNOSIS — G89.29 CHRONIC LEFT-SIDED THORACIC BACK PAIN: ICD-10-CM

## 2024-03-15 DIAGNOSIS — E78.5 HYPERLIPIDEMIA, UNSPECIFIED HYPERLIPIDEMIA TYPE: ICD-10-CM

## 2024-03-15 DIAGNOSIS — I87.2 CHRONIC VENOUS INSUFFICIENCY: ICD-10-CM

## 2024-03-15 DIAGNOSIS — E11.40 TYPE 2 DIABETES MELLITUS WITH DIABETIC NEUROPATHY, WITHOUT LONG-TERM CURRENT USE OF INSULIN (HCC): Primary | ICD-10-CM

## 2024-03-15 DIAGNOSIS — I10 BENIGN ESSENTIAL HTN: ICD-10-CM

## 2024-03-15 DIAGNOSIS — N18.31 STAGE 3A CHRONIC KIDNEY DISEASE (HCC): ICD-10-CM

## 2024-03-15 DIAGNOSIS — G89.29 CHRONIC PAIN OF LEFT KNEE: ICD-10-CM

## 2024-03-15 DIAGNOSIS — M25.562 CHRONIC PAIN OF LEFT KNEE: ICD-10-CM

## 2024-03-15 DIAGNOSIS — I25.10 CORONARY ARTERY DISEASE INVOLVING NATIVE CORONARY ARTERY OF NATIVE HEART WITHOUT ANGINA PECTORIS: ICD-10-CM

## 2024-03-15 DIAGNOSIS — M54.6 CHRONIC LEFT-SIDED THORACIC BACK PAIN: ICD-10-CM

## 2024-03-15 LAB
HBA1C MFR BLD: 6.4 %
HBA1C MFR BLD: NORMAL %

## 2024-03-15 PROCEDURE — 83036 HEMOGLOBIN GLYCOSYLATED A1C: CPT | Performed by: INTERNAL MEDICINE

## 2024-03-15 PROCEDURE — 3074F SYST BP LT 130 MM HG: CPT | Performed by: INTERNAL MEDICINE

## 2024-03-15 PROCEDURE — 99214 OFFICE O/P EST MOD 30 MIN: CPT | Performed by: INTERNAL MEDICINE

## 2024-03-15 PROCEDURE — 1123F ACP DISCUSS/DSCN MKR DOCD: CPT | Performed by: INTERNAL MEDICINE

## 2024-03-15 PROCEDURE — 3078F DIAST BP <80 MM HG: CPT | Performed by: INTERNAL MEDICINE

## 2024-03-15 RX ORDER — TIZANIDINE 4 MG/1
4 TABLET ORAL 2 TIMES DAILY PRN
Qty: 30 TABLET | Refills: 0 | Status: SHIPPED | OUTPATIENT
Start: 2024-03-15

## 2024-03-15 SDOH — ECONOMIC STABILITY: FOOD INSECURITY: WITHIN THE PAST 12 MONTHS, YOU WORRIED THAT YOUR FOOD WOULD RUN OUT BEFORE YOU GOT MONEY TO BUY MORE.: NEVER TRUE

## 2024-03-15 SDOH — ECONOMIC STABILITY: FOOD INSECURITY: WITHIN THE PAST 12 MONTHS, THE FOOD YOU BOUGHT JUST DIDN'T LAST AND YOU DIDN'T HAVE MONEY TO GET MORE.: NEVER TRUE

## 2024-03-15 SDOH — ECONOMIC STABILITY: INCOME INSECURITY: HOW HARD IS IT FOR YOU TO PAY FOR THE VERY BASICS LIKE FOOD, HOUSING, MEDICAL CARE, AND HEATING?: NOT HARD AT ALL

## 2024-03-15 ASSESSMENT — PATIENT HEALTH QUESTIONNAIRE - PHQ9
SUM OF ALL RESPONSES TO PHQ9 QUESTIONS 1 & 2: 0
SUM OF ALL RESPONSES TO PHQ QUESTIONS 1-9: 0
SUM OF ALL RESPONSES TO PHQ QUESTIONS 1-9: 0
1. LITTLE INTEREST OR PLEASURE IN DOING THINGS: 0
SUM OF ALL RESPONSES TO PHQ QUESTIONS 1-9: 0
SUM OF ALL RESPONSES TO PHQ QUESTIONS 1-9: 0
2. FEELING DOWN, DEPRESSED OR HOPELESS: 0

## 2024-03-15 NOTE — PROGRESS NOTES
She denies dysuria, hematuria.  No increased edema.  She sees Dr. Causey.     HTN--she denies any new problems with headaches, dizziness, lightheadedness, syncope.  She was seen by cardiology NP 2/15/2024.  BP was low.  Her Irbesartan was decreased.  She is doing well-- 120's/?    Hypothyroidism-- She denies problems with constipation, diarrhea, cold or heat intolerance, hair loss, muscle cramps, tremor, edema or palpitations.  Patient due for repeat lab  Lab Results   Component Value Date    TSH 0.17 (L) 11/03/2023      GERD--Patient denies any active heartburn or dysphagia. H2 blockers have been ineffective in the past.       Hyperlipidemia:    Lab Results   Component Value Date    LDLCALC 70 12/11/2023   She remains on Crestor.  Denies any muscle aches or weakness     Venous insufficiency/H/O DVT-- She wears her stockings.  Swelling is well controlled. She is on Xarelto. Swelling is better since starting Farxiga.      Past Medical History:   Diagnosis Date    Acute superficial venous thrombosis of lower extremity, right 03/05/2019    Arthritis     CAD (coronary artery disease), native coronary artery 12/11/2023    Dr. Rapp    Cataract, bilateral 2020    Chronic superficial venous thrombosis of left lower extremity 08/13/2015    CKD (chronic kidney disease) stage 2, GFR 60-89 ml/min     CKD (chronic kidney disease) stage 3, GFR 30-59 ml/min ([facility])     COVID-19 virus infection 11/2020    Cystic kidney disease     DVT (deep venous thrombosis) ([facility]) 4/2003, 5/2013    Gastroesophageal reflux disease without esophagitis 11/28/2016    H/O deep venous thrombosis     H/O simple renal cyst     Hx of blood clots     Hyperlipidemia     Hypertension     IBS (irritable bowel syndrome)     Left retinal detachment 09/2020    NSTEMI (non-ST elevated myocardial infarction) ([facility]) 12/11/2023    Dr. 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

## 2024-03-19 DIAGNOSIS — N18.32 STAGE 3B CHRONIC KIDNEY DISEASE (HCC): ICD-10-CM

## 2024-03-19 DIAGNOSIS — E78.5 HYPERLIPIDEMIA, UNSPECIFIED HYPERLIPIDEMIA TYPE: ICD-10-CM

## 2024-03-19 DIAGNOSIS — E03.9 ACQUIRED HYPOTHYROIDISM: ICD-10-CM

## 2024-03-19 LAB
DEPRECATED RDW RBC AUTO: 14.6 % (ref 12.4–15.4)
HCT VFR BLD AUTO: 40.7 % (ref 36–48)
HGB BLD-MCNC: 13.5 G/DL (ref 12–16)
MCH RBC QN AUTO: 28.1 PG (ref 26–34)
MCHC RBC AUTO-ENTMCNC: 33.1 G/DL (ref 31–36)
MCV RBC AUTO: 85.2 FL (ref 80–100)
PLATELET # BLD AUTO: 242 K/UL (ref 135–450)
PMV BLD AUTO: 9.3 FL (ref 5–10.5)
RBC # BLD AUTO: 4.78 M/UL (ref 4–5.2)
WBC # BLD AUTO: 8.7 K/UL (ref 4–11)

## 2024-03-20 DIAGNOSIS — I10 BENIGN ESSENTIAL HTN: ICD-10-CM

## 2024-03-20 DIAGNOSIS — E03.9 ACQUIRED HYPOTHYROIDISM: Primary | ICD-10-CM

## 2024-03-20 DIAGNOSIS — I87.2 VENOUS INSUFFICIENCY OF BOTH LOWER EXTREMITIES: ICD-10-CM

## 2024-03-20 LAB
ALBUMIN SERPL-MCNC: 4.5 G/DL (ref 3.4–5)
ALT SERPL-CCNC: 14 U/L (ref 10–40)
ANION GAP SERPL CALCULATED.3IONS-SCNC: 12 MMOL/L (ref 3–16)
AST SERPL-CCNC: 16 U/L (ref 15–37)
BUN SERPL-MCNC: 30 MG/DL (ref 7–20)
CALCIUM SERPL-MCNC: 9.8 MG/DL (ref 8.3–10.6)
CHLORIDE SERPL-SCNC: 106 MMOL/L (ref 99–110)
CHOLEST SERPL-MCNC: 133 MG/DL (ref 0–199)
CO2 SERPL-SCNC: 23 MMOL/L (ref 21–32)
CREAT SERPL-MCNC: 1.8 MG/DL (ref 0.6–1.2)
CREAT UR-MCNC: 29.8 MG/DL (ref 28–259)
CREAT UR-MCNC: 33 MG/DL (ref 28–259)
GFR SERPLBLD CREATININE-BSD FMLA CKD-EPI: 31 ML/MIN/{1.73_M2}
GLUCOSE SERPL-MCNC: 99 MG/DL (ref 70–99)
HDLC SERPL-MCNC: 38 MG/DL (ref 40–60)
LDL CHOLESTEROL CALCULATED: 63 MG/DL
MICROALBUMIN UR DL<=1MG/L-MCNC: <1.2 MG/DL
MICROALBUMIN/CREAT UR: NORMAL MG/G (ref 0–30)
PHOSPHATE SERPL-MCNC: 3.9 MG/DL (ref 2.5–4.9)
POTASSIUM SERPL-SCNC: 4.6 MMOL/L (ref 3.5–5.1)
PROT UR-MCNC: 5 MG/DL
PROT/CREAT UR-RTO: 0.2 MG/DL
SODIUM SERPL-SCNC: 141 MMOL/L (ref 136–145)
TRIGL SERPL-MCNC: 162 MG/DL (ref 0–150)
TSH SERPL DL<=0.005 MIU/L-ACNC: 0.12 UIU/ML (ref 0.27–4.2)
VLDLC SERPL CALC-MCNC: 32 MG/DL

## 2024-03-20 RX ORDER — LEVOTHYROXINE SODIUM 112 UG/1
112 TABLET ORAL DAILY
Qty: 90 TABLET | Refills: 1 | Status: SHIPPED | OUTPATIENT
Start: 2024-03-20

## 2024-03-21 ENCOUNTER — TELEPHONE (OUTPATIENT)
Dept: CARDIOLOGY CLINIC | Age: 64
End: 2024-03-21

## 2024-03-21 RX ORDER — FUROSEMIDE 20 MG/1
TABLET ORAL
Qty: 90 TABLET | Refills: 3 | Status: SHIPPED | OUTPATIENT
Start: 2024-03-21

## 2024-03-21 NOTE — TELEPHONE ENCOUNTER
----- Message from CLAUS Houston - CNP sent at 3/21/2024  3:02 PM EDT -----  LDL improved, at goal. Triglycerides elevated. Limit simple sugars and carbs. Cont statin. F/u as planned.

## 2024-03-21 NOTE — TELEPHONE ENCOUNTER
Medication:   Requested Prescriptions     Pending Prescriptions Disp Refills    furosemide (LASIX) 20 MG tablet [Pharmacy Med Name: FUROSEMIDE TABS 20MG] 90 tablet 3     Sig: TAKE 1 TABLET DAILY     Last Filled:  # 90 with 3 refills on 3/11/23    Last appt: 3/15/2024   Next appt: 7/15/2024    Last OARRS:        No data to display                Detail Level: Simple Size Of Lesion In Cm (Optional): 0 Detail Level: Detailed Morphology Per Location (Optional): 4mm medium brown macule Morphology Per Location (Optional): 4mm brown macule Morphology Per Location (Optional): 3mm medium brown Morphology Per Location (Optional): 9mm speckled brown macule Morphology Per Location (Optional): 5mm medium brown slight IGB Morphology Per Location (Optional): 2mm medium brown Morphology Per Location (Optional): 4mm medium brown Morphology Per Location (Optional): 4mm medium brown slight IGB Detail Level: Generalized Detail Level: Detailed Include Pregnancy/Lactation Warning?: No Azithromycin Counseling:  I discussed with the patient the risks of azithromycin including but not limited to GI upset, allergic reaction, drug rash, diarrhea, and yeast infections. Azithromycin Pregnancy And Lactation Text: This medication is considered safe during pregnancy and is also secreted in breast milk. Bactrim Counseling:  I discussed with the patient the risks of sulfa antibiotics including but not limited to GI upset, allergic reaction, drug rash, diarrhea, dizziness, photosensitivity, and yeast infections.  Rarely, more serious reactions can occur including but not limited to aplastic anemia, agranulocytosis, methemoglobinemia, blood dyscrasias, liver or kidney failure, lung infiltrates or desquamative/blistering drug rashes. Bactrim Pregnancy And Lactation Text: This medication is Pregnancy Category D and is known to cause fetal risk.  It is also excreted in breast milk. Birth Control Pills Counseling: Birth Control Pill Counseling: I discussed with the patient the potential side effects of OCPs including but not limited to increased risk of stroke, heart attack, thrombophlebitis, deep venous thrombosis, hepatic adenomas, breast changes, GI upset, headaches, and depression.  The patient verbalized understanding of the proper use and possible adverse effects of OCPs. All of the patient's questions and concerns were addressed. Birth Control Pills Pregnancy And Lactation Text: This medication should be avoided if pregnant and for the first 30 days post-partum. Dapsone Counseling: I discussed with the patient the risks of dapsone including but not limited to hemolytic anemia, agranulocytosis, rashes, methemoglobinemia, kidney failure, peripheral neuropathy, headaches, GI upset, and liver toxicity.  Patients who start dapsone require monitoring including baseline LFTs and weekly CBCs for the first month, then every month thereafter.  The patient verbalized understanding of the proper use and possible adverse effects of dapsone.  All of the patient's questions and concerns were addressed. Dapsone Pregnancy And Lactation Text: This medication is Pregnancy Category C and is not considered safe during pregnancy or breast feeding. Doxycycline Counseling:  Patient counseled regarding possible photosensitivity and increased risk for sunburn.  Patient instructed to avoid sunlight, if possible.  When exposed to sunlight, patients should wear protective clothing, sunglasses, and sunscreen.  The patient was instructed to call the office immediately if the following severe adverse effects occur:  hearing changes, easy bruising/bleeding, severe headache, or vision changes.  The patient verbalized understanding of the proper use and possible adverse effects of doxycycline.  All of the patient's questions and concerns were addressed. Doxycycline Pregnancy And Lactation Text: This medication is Pregnancy Category D and not consider safe during pregnancy. It is also excreted in breast milk but is considered safe for shorter treatment courses. Erythromycin Counseling:  I discussed with the patient the risks of erythromycin including but not limited to GI upset, allergic reaction, drug rash, diarrhea, increase in liver enzymes, and yeast infections. Erythromycin Pregnancy And Lactation Text: This medication is Pregnancy Category B and is considered safe during pregnancy. It is also excreted in breast milk. Isotretinoin Counseling: Patient should get monthly blood tests, not donate blood, not drive at night if vision affected, not share medication, and not undergo elective surgery for 6 months after tx completed. Side effects reviewed, pt to contact office should one occur. Isotretinoin Pregnancy And Lactation Text: This medication is Pregnancy Category X and is considered extremely dangerous during pregnancy. It is unknown if it is excreted in breast milk. High Dose Vitamin A Counseling: Side effects reviewed, pt to contact office should one occur. High Dose Vitamin A Pregnancy And Lactation Text: High dose vitamin A therapy is contraindicated during pregnancy and breast feeding. Minocycline Counseling: Patient advised regarding possible photosensitivity and discoloration of the teeth, skin, lips, tongue and gums.  Patient instructed to avoid sunlight, if possible.  When exposed to sunlight, patients should wear protective clothing, sunglasses, and sunscreen.  The patient was instructed to call the office immediately if the following severe adverse effects occur:  hearing changes, easy bruising/bleeding, severe headache, or vision changes.  The patient verbalized understanding of the proper use and possible adverse effects of minocycline.  All of the patient's questions and concerns were addressed. Minocycline Pregnancy And Lactation Text: This medication is Pregnancy Category D and not consider safe during pregnancy. It is also excreted in breast milk. Sarecycline Counseling: Patient advised regarding possible photosensitivity and discoloration of the teeth, skin, lips, tongue and gums.  Patient instructed to avoid sunlight, if possible.  When exposed to sunlight, patients should wear protective clothing, sunglasses, and sunscreen.  The patient was instructed to call the office immediately if the following severe adverse effects occur:  hearing changes, easy bruising/bleeding, severe headache, or vision changes.  The patient verbalized understanding of the proper use and possible adverse effects of sarecycline.  All of the patient's questions and concerns were addressed. Spironolactone Counseling: Patient advised regarding risks of diarrhea, abdominal pain, hyperkalemia, birth defects (for female patients), liver toxicity and renal toxicity. The patient may need blood work to monitor liver and kidney function and potassium levels while on therapy. The patient verbalized understanding of the proper use and possible adverse effects of spironolactone.  All of the patient's questions and concerns were addressed. Spironolactone Pregnancy And Lactation Text: This medication can cause feminization of the male fetus and should be avoided during pregnancy. The active metabolite is also found in breast milk. Tetracycline Counseling: Patient counseled regarding possible photosensitivity and increased risk for sunburn.  Patient instructed to avoid sunlight, if possible.  When exposed to sunlight, patients should wear protective clothing, sunglasses, and sunscreen.  The patient was instructed to call the office immediately if the following severe adverse effects occur:  hearing changes, easy bruising/bleeding, severe headache, or vision changes.  The patient verbalized understanding of the proper use and possible adverse effects of tetracycline.  All of the patient's questions and concerns were addressed. Patient understands to avoid pregnancy while on therapy due to potential birth defects. Benzoyl Peroxide Counseling: Patient counseled that medicine may cause skin irritation and bleach clothing.  In the event of skin irritation, the patient was advised to reduce the amount of the drug applied or use it less frequently.   The patient verbalized understanding of the proper use and possible adverse effects of benzoyl peroxide.  All of the patient's questions and concerns were addressed. Benzoyl Peroxide Pregnancy And Lactation Text: This medication is Pregnancy Category C. It is unknown if benzoyl peroxide is excreted in breast milk. Topical Retinoid counseling:  Patient advised to apply a pea-sized amount only at bedtime and wait 30 minutes after washing their face before applying.  If too drying, patient may add a non-comedogenic moisturizer. The patient verbalized understanding of the proper use and possible adverse effects of retinoids.  All of the patient's questions and concerns were addressed. Topical Retinoid Pregnancy And Lactation Text: This medication is Pregnancy Category C. It is unknown if this medication is excreted in breast milk. Tazorac Counseling:  Patient advised that medication is irritating and drying.  Patient may need to apply sparingly and wash off after an hour before eventually leaving it on overnight.  The patient verbalized understanding of the proper use and possible adverse effects of tazorac.  All of the patient's questions and concerns were addressed. Tazorac Pregnancy And Lactation Text: This medication is not safe during pregnancy. It is unknown if this medication is excreted in breast milk. Topical Clindamycin Counseling: Patient counseled that this medication may cause skin irritation or allergic reactions.  In the event of skin irritation, the patient was advised to reduce the amount of the drug applied or use it less frequently.   The patient verbalized understanding of the proper use and possible adverse effects of clindamycin.  All of the patient's questions and concerns were addressed. Topical Clindamycin Pregnancy And Lactation Text: This medication is Pregnancy Category B and is considered safe during pregnancy. It is unknown if it is excreted in breast milk. Topical Sulfur Applications Counseling: Topical Sulfur Counseling: Patient counseled that this medication may cause skin irritation or allergic reactions.  In the event of skin irritation, the patient was advised to reduce the amount of the drug applied or use it less frequently.   The patient verbalized understanding of the proper use and possible adverse effects of topical sulfur application.  All of the patient's questions and concerns were addressed. Topical Sulfur Applications Pregnancy And Lactation Text: This medication is Pregnancy Category C and has an unknown safety profile during pregnancy. It is unknown if this topical medication is excreted in breast milk.

## 2024-03-25 DIAGNOSIS — M54.6 CHRONIC LEFT-SIDED THORACIC BACK PAIN: ICD-10-CM

## 2024-03-25 DIAGNOSIS — G89.29 CHRONIC LEFT-SIDED THORACIC BACK PAIN: ICD-10-CM

## 2024-03-25 RX ORDER — TIZANIDINE 4 MG/1
4 TABLET ORAL NIGHTLY
Qty: 30 TABLET | Refills: 5 | Status: SHIPPED | OUTPATIENT
Start: 2024-03-25

## 2024-03-25 NOTE — TELEPHONE ENCOUNTER
Requested Prescriptions     Pending Prescriptions Disp Refills    tiZANidine (ZANAFLEX) 4 MG tablet [Pharmacy Med Name: TIZANIDINE 4MG TABLETS] 30 tablet 0     Sig: TAKE 1 TABLET BY MOUTH EVERY NIGHT     Last OV - 3/15/24  Next OV - 7/15/24  Last refill - 3/15/24  Last labs - 3/19/24

## 2024-04-03 RX ORDER — ESOMEPRAZOLE MAGNESIUM 40 MG/1
CAPSULE, DELAYED RELEASE ORAL
Qty: 90 CAPSULE | Refills: 3 | Status: SHIPPED | OUTPATIENT
Start: 2024-04-03

## 2024-04-03 NOTE — TELEPHONE ENCOUNTER
Medication:   Requested Prescriptions     Pending Prescriptions Disp Refills    esomeprazole (NEXIUM) 40 MG delayed release capsule 90 capsule 3     Last Filled:  # 90 with 3 refills on 4/13/2023    Last appt: 3/15/2024   Next appt: 7/15/2024    Last OARRS:        No data to display

## 2024-04-23 NOTE — TELEPHONE ENCOUNTER
Pt is checking on script, she has about 2 days left.  Asked if she can have samples to hold her over until [facility] sends her medication to her.    Please advise.    Medication Samples  Medication:  ticagrelor (BRILINTA)   Dosage of the medication:  90 MG TABS tablet   How are you taking this medication (QD, BID, TID, QID, PRN):  Take 1 tablet by mouth 2 times daily    in the office or Mail to your home?  Pt will .

## 2024-04-23 NOTE — TELEPHONE ENCOUNTER
Received refill request for Ticagrelor (Brilinta) 90mg tablets from [facility] pharmacy.     Last OV: 2- NPKL    Next OV: 5- DCE    Last Labs: 3-     Last Filled: 1- DCE

## 2024-05-13 NOTE — PROGRESS NOTES
[facility]  H+P  Consult  OP Visit  FU Visit   CC/HPI   CC Followup visit for cardiac conditions detailed in assessment and plan below.   Intervention PCI   General Doing well.  No new concerns.     Cardiac Sx -CP, -SOB, -dizziness, -syncope, -edema, -orthopnea, -pnd, -fatigue   HISTORY/ALLERGY/ROS   M/S/S/F Hx Reviewed in Epic and updated as appropriate   ALLERG Codeine and Morphine and related   ROS Full ROS obtained and negative except as mentioned in HPI   MEDICATIONS   Cardiac medications reviewed in Epic during visit with patient.   PHYSICAL EXAM   Vitals /70 (Site: Left Upper Arm, Position: Sitting, Cuff Size: Medium Adult)   Pulse 60   Ht 1.626 m (5' 4.02\")   Wt 111.6 kg (246 lb 0.5 oz)   SpO2 98%   BMI 42.21 kg/m²    Gen Alert, coop, no distress Heart  RRR, no MRG   Lung CTA-B, unlabored, no DTP Extrem Edema -Grade 0 (0mm)      COMPLIANCE   Discussed and counseled on diet, exercise, weight loss, smoking, alcohol, drugs.  All questions answered.   CODING   SCI (53583) - 30-39 mins spent reviewing hx/tests/consults, performing exam, counseling/educating, ordering meds/tests/procedures, referring/communicating w/PCP/consultants, documenting in EMR, interpreting results, communicating medical information and plan with family.   SCRIBE ATTESTATION   Nurse I, Lana Jay, RN, am scribing for and in the presence of Armaan Rapp MD. 5/13/2024 3:54 PM EDT   Doctor Lana Jay is working as scribe for and in presence of me and may have prepopulated components of note with my historical intellectual property (IP) under my supervision.  Any additions to this IP performed in my presence and at my direction. Content and accuracy of this note reviewed by me (Armaan Rapp MD).   NEW MEDICATIONS   Pt verbalizes understanding of the need for treatment and education provided at today's visit.  Additional education provided in AVS.     ASSESSMENT AND PLAN   *CAD    Date EF Results   Sx     Unstable,

## 2024-05-13 NOTE — PATIENT INSTRUCTIONS
We are okay with you temporarily holding your Brilinta and eliquis AFTER 6 months for a pain injection or knee surgery.    When you hold your brilinta you MUST take 81mg of aspirin in its place    From a cardiac perspective you would be low risk for a procedure

## 2024-05-16 DIAGNOSIS — E78.5 HYPERLIPIDEMIA, UNSPECIFIED HYPERLIPIDEMIA TYPE: ICD-10-CM

## 2024-05-17 ENCOUNTER — OFFICE VISIT (OUTPATIENT)
Age: 64
End: 2024-05-17
Payer: COMMERCIAL

## 2024-05-17 VITALS
OXYGEN SATURATION: 98 % | WEIGHT: 246.03 LBS | BODY MASS INDEX: 42 KG/M2 | DIASTOLIC BLOOD PRESSURE: 70 MMHG | SYSTOLIC BLOOD PRESSURE: 116 MMHG | HEIGHT: 64 IN | HEART RATE: 60 BPM

## 2024-05-17 DIAGNOSIS — I25.83 CORONARY ARTERY DISEASE DUE TO LIPID RICH PLAQUE: Primary | ICD-10-CM

## 2024-05-17 DIAGNOSIS — I10 ESSENTIAL HYPERTENSION: ICD-10-CM

## 2024-05-17 DIAGNOSIS — E78.00 HYPERCHOLESTEROLEMIA: ICD-10-CM

## 2024-05-17 DIAGNOSIS — I25.10 CORONARY ARTERY DISEASE DUE TO LIPID RICH PLAQUE: Primary | ICD-10-CM

## 2024-05-17 PROCEDURE — 3074F SYST BP LT 130 MM HG: CPT | Performed by: INTERNAL MEDICINE

## 2024-05-17 PROCEDURE — 3078F DIAST BP <80 MM HG: CPT | Performed by: INTERNAL MEDICINE

## 2024-05-17 PROCEDURE — 1123F ACP DISCUSS/DSCN MKR DOCD: CPT | Performed by: INTERNAL MEDICINE

## 2024-05-17 PROCEDURE — 99214 OFFICE O/P EST MOD 30 MIN: CPT | Performed by: INTERNAL MEDICINE

## 2024-05-17 RX ORDER — SITAGLIPTIN 50 MG/1
50 TABLET, FILM COATED ORAL DAILY
Qty: 90 TABLET | Refills: 3 | Status: SHIPPED | OUTPATIENT
Start: 2024-05-17

## 2024-05-17 RX ORDER — ROSUVASTATIN CALCIUM 20 MG/1
20 TABLET, COATED ORAL NIGHTLY
Qty: 90 TABLET | Refills: 1 | Status: SHIPPED | OUTPATIENT
Start: 2024-05-17

## 2024-05-17 NOTE — TELEPHONE ENCOUNTER
Received refill request for rosuvastatin from [facility] pharmacy.    Last ov: 02/15/2024 NPKL    Last labs: 03/19/2024    Last Refill: 03/05/2024 #30 w/ 1 refill    Next appointment: 05/17/2024 DCE

## 2024-05-20 ENCOUNTER — OFFICE VISIT (OUTPATIENT)
Dept: ENT CLINIC | Age: 64
End: 2024-05-20
Payer: COMMERCIAL

## 2024-05-20 ENCOUNTER — TELEPHONE (OUTPATIENT)
Dept: CARDIOLOGY CLINIC | Age: 64
End: 2024-05-20

## 2024-05-20 VITALS
WEIGHT: 246 LBS | HEIGHT: 64 IN | TEMPERATURE: 97.8 F | BODY MASS INDEX: 42 KG/M2 | HEART RATE: 84 BPM | SYSTOLIC BLOOD PRESSURE: 132 MMHG | OXYGEN SATURATION: 97 % | DIASTOLIC BLOOD PRESSURE: 80 MMHG

## 2024-05-20 DIAGNOSIS — H61.23 BILATERAL IMPACTED CERUMEN: Primary | ICD-10-CM

## 2024-05-20 DIAGNOSIS — H90.0 CONDUCTIVE HEARING LOSS, BILATERAL: ICD-10-CM

## 2024-05-20 PROCEDURE — 69210 REMOVE IMPACTED EAR WAX UNI: CPT | Performed by: OTOLARYNGOLOGY

## 2024-05-20 NOTE — PROGRESS NOTES
CHIEF COMPLAINT:  Chief Complaint   Patient presents with    Cerumen Impaction    Hearing Loss       HISTORY:    Alicia stated that the hearing is decreased in both ears, which are plugged up with ear wax.      EXAMINATION:    Both external ear canals were occluded by cerumen impaction with squamous debris, obscuring visualization of the TMs.        PROCEDURE - REMOVAL OF BILATERAL CERUMEN IMPACTION:   The cerumen impaction with squamous debris was removed from both of the EACs, under otomicroscopy visualization, with instrumentation, using a Billeau wire loop.  After successful cerumen removal, the EACs appeared to be normal and clear bilaterally without mass, exudate, or edema.  The tympanic membranes appeared to be normal, including normal pneumatic mobility bilaterally.  There was no evidence of acute disease.  Alicia reported improved hearing, back to usual level, after cerumen removal.          HEARING ASSESSMENT (after ear cleaning):  Finger rub:  Able to hear finger rub bilaterally. Tuning fork tests, 512 [address]z tuning fork:  Larry was heard in both ears and Rinne air > bone bilaterally.          IMPRESSION / DIAGNOSES / ORDERS / PROCEDURES:       Alicia was seen today for cerumen impaction and hearing loss.    Diagnoses and all orders for this visit:    Bilateral impacted cerumen    Conductive hearing loss, bilateral        RECOMMENDATIONS / PLAN:   See Patient Instructions on file for this visit.  Return for recurrence of symptoms of excessive ear wax, or any other ear, nose, throat, or sinus problems.

## 2024-05-20 NOTE — TELEPHONE ENCOUNTER
Pt is asking if she can ride smaller rides at [facility]. Please call to advise. Ok to leave message if not available

## 2024-05-20 NOTE — PATIENT INSTRUCTIONS
Schedule an audiogram (hearing test), if your hearing is not back to your usual ability, or if hearing loss or tinnitus (ringing or other noise) persists, despite removal of ear wax,.  Schedule an appointment for ear recheck and possible cleaning in the future if your hearing is decreased, or you have a sensation of ear wax build up or are told you have ear wax build up by your primary physician or other health care provider.    You may use an over the counter ear wax removal kit (such as Murine, Bausch and Lomb, NeilMed, or Debrox wax removal system) for ear wax removal, as needed.  It may help to use Debrox (OTC) for 4 days prior to future visits for ear cleaning.  This may soften your ear wax and facilitate removal of the wax.        NO Q-TIPS OR OTHER INSTRUMENTS/OBJECTS IN THE EARS   You should never clean your ears with a Q-tip, cotton tipped applicator, Bernarda pin, paper clip, safety pin, pen cap, or any other instrument.  This will tend to push wax in deeper and pack the ear canal with wax.  There is a high risk and danger of this practice, especially rupture of ear drum, dislocation or other damage to ossicles, and permanent, irreversible, and irreparable hearing loss.  It may cause inflammation and irritation of the ear canal and cause itching or pain.  I recommend only use of one the several ear wax removal kits available \"over the counter\" if you feel a need to try to remove ear wax.  For example, Murine, Bausch and Lomb, NeilMed, or Debrox ear wax removal kits may be used for ear wax removal, as needed.  No other methods should be self used for cleaning your ears.

## 2024-05-28 DIAGNOSIS — E11.9 TYPE 2 DIABETES MELLITUS WITHOUT COMPLICATION, WITHOUT LONG-TERM CURRENT USE OF INSULIN (HCC): ICD-10-CM

## 2024-05-28 DIAGNOSIS — E11.65 UNCONTROLLED TYPE 2 DIABETES MELLITUS WITH HYPERGLYCEMIA (HCC): ICD-10-CM

## 2024-05-28 NOTE — TELEPHONE ENCOUNTER
Patient is calling in for  in regards to medication refill for:    ONETOUCH ULTRA strip   Last appointment: 3/15/2024  Next appointment: 7/15/2024  Last refill: 10/20/23        ONE TOUCH ULTRASOFT LANCETS MISC   Last appointment: 3/15/2024  Next appointment: 7/15/2024  Last refill: 6/13/23        Please send medication to :    [id number] - [place], [place] - [address] [place], [place] [address] - P [phone] - F [phone]

## 2024-05-29 RX ORDER — BLOOD SUGAR DIAGNOSTIC
STRIP MISCELLANEOUS
Qty: 100 STRIP | Refills: 3 | Status: SHIPPED | OUTPATIENT
Start: 2024-05-29

## 2024-05-29 RX ORDER — LANCETS 33 GAUGE
EACH MISCELLANEOUS
Qty: 300 EACH | Refills: 3 | Status: SHIPPED | OUTPATIENT
Start: 2024-05-29

## 2024-05-29 NOTE — TELEPHONE ENCOUNTER
ONETOUCH ULTRA strip   Last appointment: 3/15/2024  Next appointment: 7/15/2024  Last refill: 10/20/23     ONE TOUCH ULTRASOFT LANCETS MISC   Last appointment: 3/15/2024  Next appointment: 7/15/2024  Last refill: 6/13/23

## 2024-06-24 ENCOUNTER — APPOINTMENT (OUTPATIENT)
Dept: GENERAL RADIOLOGY | Age: 64
DRG: 871 | End: 2024-06-24
Payer: COMMERCIAL

## 2024-06-24 ENCOUNTER — APPOINTMENT (OUTPATIENT)
Dept: CT IMAGING | Age: 64
DRG: 871 | End: 2024-06-24
Payer: COMMERCIAL

## 2024-06-24 ENCOUNTER — HOSPITAL ENCOUNTER (INPATIENT)
Age: 64
LOS: 3 days | Discharge: HOME | DRG: 871 | End: 2024-06-27
Attending: EMERGENCY MEDICINE | Admitting: HOSPITALIST
Payer: COMMERCIAL

## 2024-06-24 DIAGNOSIS — N28.9 LESION OF RIGHT NATIVE KIDNEY: ICD-10-CM

## 2024-06-24 DIAGNOSIS — N18.9 ACUTE KIDNEY INJURY SUPERIMPOSED ON CKD (HCC): ICD-10-CM

## 2024-06-24 DIAGNOSIS — N17.9 ACUTE KIDNEY INJURY SUPERIMPOSED ON CKD (HCC): ICD-10-CM

## 2024-06-24 DIAGNOSIS — R79.89 ELEVATED TROPONIN: ICD-10-CM

## 2024-06-24 DIAGNOSIS — R65.21 SEPTIC SHOCK (HCC): Primary | ICD-10-CM

## 2024-06-24 DIAGNOSIS — I10 BENIGN ESSENTIAL HTN: ICD-10-CM

## 2024-06-24 DIAGNOSIS — I87.2 VENOUS INSUFFICIENCY OF BOTH LOWER EXTREMITIES: ICD-10-CM

## 2024-06-24 DIAGNOSIS — A41.9 SEPTIC SHOCK (HCC): Primary | ICD-10-CM

## 2024-06-24 LAB
ABO + RH BLD: NORMAL
ALBUMIN SERPL-MCNC: 3.9 G/DL (ref 3.4–5)
ALBUMIN/GLOB SERPL: 1.4 {RATIO} (ref 1.1–2.2)
ALP SERPL-CCNC: 98 U/L (ref 40–129)
ALT SERPL-CCNC: 16 U/L (ref 10–40)
ANION GAP SERPL CALCULATED.3IONS-SCNC: 24 MMOL/L (ref 3–16)
APTT BLD: 24.7 SEC (ref 22.1–36.4)
AST SERPL-CCNC: 24 U/L (ref 15–37)
BACTERIA URNS QL MICRO: ABNORMAL /HPF
BASE EXCESS BLDV CALC-SCNC: -11.7 MMOL/L (ref -3–3)
BASOPHILS # BLD: 0.1 K/UL (ref 0–0.2)
BASOPHILS NFR BLD: 0.4 %
BETA-HYDROXYBUTYRATE: 0.2 MMOL/L (ref 0–0.27)
BILIRUB SERPL-MCNC: 0.8 MG/DL (ref 0–1)
BILIRUB UR QL STRIP.AUTO: NEGATIVE
BLD GP AB SCN SERPL QL: NORMAL
BUN SERPL-MCNC: 52 MG/DL (ref 7–20)
C DIFF TOX A+B STL QL IA: NORMAL
CALCIUM SERPL-MCNC: 9.2 MG/DL (ref 8.3–10.6)
CHLORIDE SERPL-SCNC: 104 MMOL/L (ref 99–110)
CLARITY UR: CLEAR
CO2 BLDV-SCNC: 38 MMOL/L
CO2 SERPL-SCNC: 15 MMOL/L (ref 21–32)
COHGB MFR BLDV: 2.8 % (ref 0–1.5)
COLOR UR: YELLOW
CREAT SERPL-MCNC: 2.9 MG/DL (ref 0.6–1.2)
DEPRECATED RDW RBC AUTO: 16 % (ref 12.4–15.4)
DO-HGB MFR BLDV: 26 %
EOSINOPHIL # BLD: 0 K/UL (ref 0–0.6)
EOSINOPHIL NFR BLD: 0 %
EPI CELLS #/AREA URNS AUTO: 7 /HPF (ref 0–5)
FLUAV RNA RESP QL NAA+PROBE: NOT DETECTED
FLUBV RNA RESP QL NAA+PROBE: NOT DETECTED
GFR SERPLBLD CREATININE-BSD FMLA CKD-EPI: 17 ML/MIN/{1.73_M2}
GLUCOSE BLD-MCNC: 171 MG/DL (ref 70–99)
GLUCOSE BLD-MCNC: 53 MG/DL (ref 70–99)
GLUCOSE BLD-MCNC: 54 MG/DL (ref 70–99)
GLUCOSE BLD-MCNC: 75 MG/DL (ref 70–99)
GLUCOSE BLD-MCNC: 94 MG/DL (ref 70–99)
GLUCOSE SERPL-MCNC: 292 MG/DL (ref 70–99)
GLUCOSE UR STRIP.AUTO-MCNC: 250 MG/DL
HCO3 BLDV-SCNC: 15.9 MMOL/L (ref 23–29)
HCT VFR BLD AUTO: 52.3 % (ref 36–48)
HGB BLD-MCNC: 16.4 G/DL (ref 12–16)
HGB UR QL STRIP.AUTO: NEGATIVE
HYALINE CASTS #/AREA URNS AUTO: 31 /LPF (ref 0–8)
INR PPP: 1.21 (ref 0.85–1.15)
KETONES UR STRIP.AUTO-MCNC: NEGATIVE MG/DL
LACTATE BLDV-SCNC: 6.4 MMOL/L (ref 0.4–1.9)
LACTATE BLDV-SCNC: 7.6 MMOL/L (ref 0.4–1.9)
LEUKOCYTE ESTERASE UR QL STRIP.AUTO: NEGATIVE
LIPASE SERPL-CCNC: 16 U/L (ref 13–60)
LYMPHOCYTES # BLD: 0.6 K/UL (ref 1–5.1)
LYMPHOCYTES NFR BLD: 2.6 %
MAGNESIUM SERPL-MCNC: 2 MG/DL (ref 1.8–2.4)
MCH RBC QN AUTO: 27.1 PG (ref 26–34)
MCHC RBC AUTO-ENTMCNC: 31.4 G/DL (ref 31–36)
MCV RBC AUTO: 86.4 FL (ref 80–100)
METHGB MFR BLDV: 0.5 %
MONOCYTES # BLD: 1.3 K/UL (ref 0–1.3)
MONOCYTES NFR BLD: 5.5 %
NEUTROPHILS # BLD: 21.4 K/UL (ref 1.7–7.7)
NEUTROPHILS NFR BLD: 91.5 %
NITRITE UR QL STRIP.AUTO: NEGATIVE
NT-PROBNP SERPL-MCNC: 1137 PG/ML (ref 0–124)
O2 CT VFR BLDV CALC: 17 VOL %
O2 THERAPY: ABNORMAL
PCO2 BLDV: 40.7 MMHG (ref 40–50)
PERFORMED ON: ABNORMAL
PERFORMED ON: NORMAL
PERFORMED ON: NORMAL
PH BLDV: 7.2 [PH] (ref 7.35–7.45)
PH UR STRIP.AUTO: 5 [PH] (ref 5–8)
PLATELET # BLD AUTO: 341 K/UL (ref 135–450)
PMV BLD AUTO: 9.8 FL (ref 5–10.5)
PO2 BLDV: 46.1 MMHG (ref 25–40)
POTASSIUM SERPL-SCNC: 4.3 MMOL/L (ref 3.5–5.1)
PROCALCITONIN SERPL IA-MCNC: 57.2 NG/ML (ref 0–0.15)
PROT SERPL-MCNC: 6.6 G/DL (ref 6.4–8.2)
PROT UR STRIP.AUTO-MCNC: 30 MG/DL
PROTHROMBIN TIME: 15.5 SEC (ref 11.9–14.9)
RBC # BLD AUTO: 6.06 M/UL (ref 4–5.2)
RBC CLUMPS #/AREA URNS AUTO: 1 /HPF (ref 0–4)
SAO2 % BLDV: 73 %
SARS-COV-2 RNA RESP QL NAA+PROBE: NOT DETECTED
SODIUM SERPL-SCNC: 143 MMOL/L (ref 136–145)
SP GR UR STRIP.AUTO: >=1.03 (ref 1–1.03)
TROPONIN, HIGH SENSITIVITY: 33 NG/L (ref 0–14)
TROPONIN, HIGH SENSITIVITY: 45 NG/L (ref 0–14)
UA COMPLETE W REFLEX CULTURE PNL UR: ABNORMAL
UA DIPSTICK W REFLEX MICRO PNL UR: YES
URN SPEC COLLECT METH UR: ABNORMAL
UROBILINOGEN UR STRIP-ACNC: 0.2 E.U./DL
WBC # BLD AUTO: 23.4 K/UL (ref 4–11)
WBC #/AREA URNS AUTO: 4 /HPF (ref 0–5)

## 2024-06-24 PROCEDURE — 02HV33Z INSERTION OF INFUSION DEVICE INTO SUPERIOR VENA CAVA, PERCUTANEOUS APPROACH: ICD-10-PCS | Performed by: INTERNAL MEDICINE

## 2024-06-24 PROCEDURE — 85025 COMPLETE CBC W/AUTO DIFF WBC: CPT

## 2024-06-24 PROCEDURE — 36556 INSERT NON-TUNNEL CV CATH: CPT

## 2024-06-24 PROCEDURE — 2580000003 HC RX 258: Performed by: PHYSICIAN ASSISTANT

## 2024-06-24 PROCEDURE — 71045 X-RAY EXAM CHEST 1 VIEW: CPT

## 2024-06-24 PROCEDURE — 80053 COMPREHEN METABOLIC PANEL: CPT

## 2024-06-24 PROCEDURE — 99285 EMERGENCY DEPT VISIT HI MDM: CPT

## 2024-06-24 PROCEDURE — 96365 THER/PROPH/DIAG IV INF INIT: CPT

## 2024-06-24 PROCEDURE — 82436 ASSAY OF URINE CHLORIDE: CPT

## 2024-06-24 PROCEDURE — 86901 BLOOD TYPING SEROLOGIC RH(D): CPT

## 2024-06-24 PROCEDURE — 6360000002 HC RX W HCPCS: Performed by: HOSPITALIST

## 2024-06-24 PROCEDURE — 96361 HYDRATE IV INFUSION ADD-ON: CPT

## 2024-06-24 PROCEDURE — 87324 CLOSTRIDIUM AG IA: CPT

## 2024-06-24 PROCEDURE — 6360000004 HC RX CONTRAST MEDICATION: Performed by: PHYSICIAN ASSISTANT

## 2024-06-24 PROCEDURE — 82803 BLOOD GASES ANY COMBINATION: CPT

## 2024-06-24 PROCEDURE — 6370000000 HC RX 637 (ALT 250 FOR IP): Performed by: HOSPITALIST

## 2024-06-24 PROCEDURE — 2580000003 HC RX 258: Performed by: HOSPITALIST

## 2024-06-24 PROCEDURE — 3E033XZ INTRODUCTION OF VASOPRESSOR INTO PERIPHERAL VEIN, PERCUTANEOUS APPROACH: ICD-10-PCS | Performed by: HOSPITALIST

## 2024-06-24 PROCEDURE — 84540 ASSAY OF URINE/UREA-N: CPT

## 2024-06-24 PROCEDURE — 96366 THER/PROPH/DIAG IV INF ADDON: CPT

## 2024-06-24 PROCEDURE — 36415 COLL VENOUS BLD VENIPUNCTURE: CPT

## 2024-06-24 PROCEDURE — P9612 CATHETERIZE FOR URINE SPEC: HCPCS

## 2024-06-24 PROCEDURE — 2500000003 HC RX 250 WO HCPCS: Performed by: EMERGENCY MEDICINE

## 2024-06-24 PROCEDURE — 85610 PROTHROMBIN TIME: CPT

## 2024-06-24 PROCEDURE — 2580000003 HC RX 258: Performed by: INTERNAL MEDICINE

## 2024-06-24 PROCEDURE — 82570 ASSAY OF URINE CREATININE: CPT

## 2024-06-24 PROCEDURE — 87449 NOS EACH ORGANISM AG IA: CPT

## 2024-06-24 PROCEDURE — 83690 ASSAY OF LIPASE: CPT

## 2024-06-24 PROCEDURE — 71250 CT THORAX DX C-: CPT

## 2024-06-24 PROCEDURE — 70450 CT HEAD/BRAIN W/O DYE: CPT

## 2024-06-24 PROCEDURE — 6360000002 HC RX W HCPCS: Performed by: EMERGENCY MEDICINE

## 2024-06-24 PROCEDURE — 87040 BLOOD CULTURE FOR BACTERIA: CPT

## 2024-06-24 PROCEDURE — 83880 ASSAY OF NATRIURETIC PEPTIDE: CPT

## 2024-06-24 PROCEDURE — 96375 TX/PRO/DX INJ NEW DRUG ADDON: CPT

## 2024-06-24 PROCEDURE — 84133 ASSAY OF URINE POTASSIUM: CPT

## 2024-06-24 PROCEDURE — 83605 ASSAY OF LACTIC ACID: CPT

## 2024-06-24 PROCEDURE — 87636 SARSCOV2 & INF A&B AMP PRB: CPT

## 2024-06-24 PROCEDURE — 2000000000 HC ICU R&B

## 2024-06-24 PROCEDURE — 84484 ASSAY OF TROPONIN QUANT: CPT

## 2024-06-24 PROCEDURE — 85730 THROMBOPLASTIN TIME PARTIAL: CPT

## 2024-06-24 PROCEDURE — 93005 ELECTROCARDIOGRAM TRACING: CPT | Performed by: PHYSICIAN ASSISTANT

## 2024-06-24 PROCEDURE — 84300 ASSAY OF URINE SODIUM: CPT

## 2024-06-24 PROCEDURE — 99291 CRITICAL CARE FIRST HOUR: CPT | Performed by: INTERNAL MEDICINE

## 2024-06-24 PROCEDURE — 74174 CTA ABD&PLVS W/CONTRAST: CPT

## 2024-06-24 PROCEDURE — 86900 BLOOD TYPING SEROLOGIC ABO: CPT

## 2024-06-24 PROCEDURE — 86850 RBC ANTIBODY SCREEN: CPT

## 2024-06-24 PROCEDURE — 6370000000 HC RX 637 (ALT 250 FOR IP): Performed by: PHYSICIAN ASSISTANT

## 2024-06-24 PROCEDURE — 83735 ASSAY OF MAGNESIUM: CPT

## 2024-06-24 PROCEDURE — 81001 URINALYSIS AUTO W/SCOPE: CPT

## 2024-06-24 PROCEDURE — 84145 PROCALCITONIN (PCT): CPT

## 2024-06-24 PROCEDURE — 6360000002 HC RX W HCPCS: Performed by: PHYSICIAN ASSISTANT

## 2024-06-24 PROCEDURE — 2580000003 HC RX 258: Performed by: EMERGENCY MEDICINE

## 2024-06-24 PROCEDURE — 96367 TX/PROPH/DG ADDL SEQ IV INF: CPT

## 2024-06-24 PROCEDURE — 2500000003 HC RX 250 WO HCPCS: Performed by: INTERNAL MEDICINE

## 2024-06-24 PROCEDURE — 82010 KETONE BODYS QUAN: CPT

## 2024-06-24 PROCEDURE — 2500000003 HC RX 250 WO HCPCS: Performed by: PHYSICIAN ASSISTANT

## 2024-06-24 RX ORDER — 0.9 % SODIUM CHLORIDE 0.9 %
275 INTRAVENOUS SOLUTION INTRAVENOUS ONCE
Status: COMPLETED | OUTPATIENT
Start: 2024-06-24 | End: 2024-06-24

## 2024-06-24 RX ORDER — ONDANSETRON 4 MG/1
4 TABLET, ORALLY DISINTEGRATING ORAL EVERY 8 HOURS PRN
Status: DISCONTINUED | OUTPATIENT
Start: 2024-06-24 | End: 2024-06-27 | Stop reason: HOSPADM

## 2024-06-24 RX ORDER — SODIUM CHLORIDE 0.9 % (FLUSH) 0.9 %
5-40 SYRINGE (ML) INJECTION PRN
Status: DISCONTINUED | OUTPATIENT
Start: 2024-06-24 | End: 2024-06-27 | Stop reason: HOSPADM

## 2024-06-24 RX ORDER — DEXTROSE MONOHYDRATE 100 MG/ML
INJECTION, SOLUTION INTRAVENOUS CONTINUOUS PRN
Status: DISCONTINUED | OUTPATIENT
Start: 2024-06-24 | End: 2024-06-27 | Stop reason: HOSPADM

## 2024-06-24 RX ORDER — POLYETHYLENE GLYCOL 3350 17 G/17G
17 POWDER, FOR SOLUTION ORAL DAILY PRN
Status: DISCONTINUED | OUTPATIENT
Start: 2024-06-24 | End: 2024-06-27 | Stop reason: HOSPADM

## 2024-06-24 RX ORDER — SODIUM CHLORIDE 0.9 % (FLUSH) 0.9 %
5-40 SYRINGE (ML) INJECTION EVERY 12 HOURS SCHEDULED
Status: DISCONTINUED | OUTPATIENT
Start: 2024-06-24 | End: 2024-06-27 | Stop reason: HOSPADM

## 2024-06-24 RX ORDER — INSULIN LISPRO 100 [IU]/ML
0-4 INJECTION, SOLUTION INTRAVENOUS; SUBCUTANEOUS NIGHTLY
Status: DISCONTINUED | OUTPATIENT
Start: 2024-06-24 | End: 2024-06-27 | Stop reason: HOSPADM

## 2024-06-24 RX ORDER — ACETAMINOPHEN 500 MG
1000 TABLET ORAL ONCE
Status: COMPLETED | OUTPATIENT
Start: 2024-06-24 | End: 2024-06-24

## 2024-06-24 RX ORDER — ACETAMINOPHEN 650 MG/1
650 SUPPOSITORY RECTAL EVERY 6 HOURS PRN
Status: DISCONTINUED | OUTPATIENT
Start: 2024-06-24 | End: 2024-06-27 | Stop reason: HOSPADM

## 2024-06-24 RX ORDER — SODIUM CHLORIDE, SODIUM LACTATE, POTASSIUM CHLORIDE, AND CALCIUM CHLORIDE .6; .31; .03; .02 G/100ML; G/100ML; G/100ML; G/100ML
1000 INJECTION, SOLUTION INTRAVENOUS ONCE
Status: COMPLETED | OUTPATIENT
Start: 2024-06-24 | End: 2024-06-24

## 2024-06-24 RX ORDER — ACETAMINOPHEN 325 MG/1
650 TABLET ORAL EVERY 6 HOURS PRN
Status: DISCONTINUED | OUTPATIENT
Start: 2024-06-24 | End: 2024-06-27 | Stop reason: HOSPADM

## 2024-06-24 RX ORDER — LIDOCAINE HYDROCHLORIDE 10 MG/ML
2 INJECTION, SOLUTION EPIDURAL; INFILTRATION; INTRACAUDAL; PERINEURAL ONCE
Status: COMPLETED | OUTPATIENT
Start: 2024-06-24 | End: 2024-06-24

## 2024-06-24 RX ORDER — INSULIN LISPRO 100 [IU]/ML
0-4 INJECTION, SOLUTION INTRAVENOUS; SUBCUTANEOUS
Status: DISCONTINUED | OUTPATIENT
Start: 2024-06-24 | End: 2024-06-27 | Stop reason: HOSPADM

## 2024-06-24 RX ORDER — SODIUM CHLORIDE 9 MG/ML
INJECTION, SOLUTION INTRAVENOUS PRN
Status: DISCONTINUED | OUTPATIENT
Start: 2024-06-24 | End: 2024-06-27 | Stop reason: HOSPADM

## 2024-06-24 RX ORDER — LEVOTHYROXINE SODIUM 112 UG/1
112 TABLET ORAL DAILY
Status: DISCONTINUED | OUTPATIENT
Start: 2024-06-24 | End: 2024-06-27 | Stop reason: HOSPADM

## 2024-06-24 RX ORDER — NOREPINEPHRINE BITARTRATE 0.06 MG/ML
1-100 INJECTION, SOLUTION INTRAVENOUS CONTINUOUS
Status: DISCONTINUED | OUTPATIENT
Start: 2024-06-24 | End: 2024-06-27 | Stop reason: ALTCHOICE

## 2024-06-24 RX ORDER — GLUCAGON 1 MG/ML
1 KIT INJECTION PRN
Status: DISCONTINUED | OUTPATIENT
Start: 2024-06-24 | End: 2024-06-27 | Stop reason: HOSPADM

## 2024-06-24 RX ORDER — ONDANSETRON 2 MG/ML
4 INJECTION INTRAMUSCULAR; INTRAVENOUS ONCE
Status: COMPLETED | OUTPATIENT
Start: 2024-06-24 | End: 2024-06-24

## 2024-06-24 RX ORDER — ONDANSETRON 2 MG/ML
4 INJECTION INTRAMUSCULAR; INTRAVENOUS EVERY 6 HOURS PRN
Status: DISCONTINUED | OUTPATIENT
Start: 2024-06-24 | End: 2024-06-27 | Stop reason: HOSPADM

## 2024-06-24 RX ADMIN — SODIUM CHLORIDE, POTASSIUM CHLORIDE, SODIUM LACTATE AND CALCIUM CHLORIDE 1000 ML: 600; 310; 30; 20 INJECTION, SOLUTION INTRAVENOUS at 09:29

## 2024-06-24 RX ADMIN — SODIUM BICARBONATE: 84 INJECTION, SOLUTION INTRAVENOUS at 20:50

## 2024-06-24 RX ADMIN — ACETAMINOPHEN 650 MG: 325 TABLET ORAL at 21:15

## 2024-06-24 RX ADMIN — SODIUM CHLORIDE, PRESERVATIVE FREE 10 ML: 5 INJECTION INTRAVENOUS at 20:52

## 2024-06-24 RX ADMIN — ACETAMINOPHEN 1000 MG: 500 TABLET ORAL at 13:19

## 2024-06-24 RX ADMIN — TICAGRELOR 90 MG: 90 TABLET ORAL at 21:15

## 2024-06-24 RX ADMIN — IOPAMIDOL 75 ML: 755 INJECTION, SOLUTION INTRAVENOUS at 09:54

## 2024-06-24 RX ADMIN — ONDANSETRON 4 MG: 2 INJECTION INTRAMUSCULAR; INTRAVENOUS at 09:57

## 2024-06-24 RX ADMIN — APIXABAN 2.5 MG: 2.5 TABLET, FILM COATED ORAL at 21:15

## 2024-06-24 RX ADMIN — SODIUM CHLORIDE, POTASSIUM CHLORIDE, SODIUM LACTATE AND CALCIUM CHLORIDE 1000 ML: 600; 310; 30; 20 INJECTION, SOLUTION INTRAVENOUS at 10:26

## 2024-06-24 RX ADMIN — VANCOMYCIN HYDROCHLORIDE 1750 MG: 10 INJECTION, POWDER, LYOPHILIZED, FOR SOLUTION INTRAVENOUS at 10:37

## 2024-06-24 RX ADMIN — DEXTROSE MONOHYDRATE 125 ML: 100 INJECTION, SOLUTION INTRAVENOUS at 18:07

## 2024-06-24 RX ADMIN — Medication 5 MCG/MIN: at 15:05

## 2024-06-24 RX ADMIN — CEFEPIME 1000 MG: 1 INJECTION, POWDER, FOR SOLUTION INTRAMUSCULAR; INTRAVENOUS at 21:39

## 2024-06-24 RX ADMIN — CEFEPIME 2000 MG: 2 INJECTION, POWDER, FOR SOLUTION INTRAVENOUS at 10:08

## 2024-06-24 RX ADMIN — SODIUM CHLORIDE 275 ML: 9 INJECTION, SOLUTION INTRAVENOUS at 14:05

## 2024-06-24 RX ADMIN — LIDOCAINE HYDROCHLORIDE 2 ML: 10 SOLUTION INTRAVENOUS at 15:44

## 2024-06-24 RX ADMIN — SODIUM BICARBONATE: 84 INJECTION, SOLUTION INTRAVENOUS at 11:22

## 2024-06-24 ASSESSMENT — PAIN SCALES - GENERAL
PAINLEVEL_OUTOF10: 3
PAINLEVEL_OUTOF10: 0
PAINLEVEL_OUTOF10: 0
PAINLEVEL_OUTOF10: 10
PAINLEVEL_OUTOF10: 5
PAINLEVEL_OUTOF10: 5

## 2024-06-24 ASSESSMENT — ENCOUNTER SYMPTOMS
CHEST TIGHTNESS: 0
VOMITING: 1
WHEEZING: 0
COUGH: 0
NAUSEA: 1
DIARRHEA: 1
SHORTNESS OF BREATH: 1
ABDOMINAL PAIN: 1
BACK PAIN: 0

## 2024-06-24 ASSESSMENT — PAIN DESCRIPTION - PAIN TYPE: TYPE: ACUTE PAIN

## 2024-06-24 ASSESSMENT — PAIN DESCRIPTION - FREQUENCY: FREQUENCY: CONTINUOUS

## 2024-06-24 ASSESSMENT — PAIN - FUNCTIONAL ASSESSMENT
PAIN_FUNCTIONAL_ASSESSMENT: NONE - DENIES PAIN
PAIN_FUNCTIONAL_ASSESSMENT: ACTIVITIES ARE NOT PREVENTED

## 2024-06-24 ASSESSMENT — PAIN DESCRIPTION - DESCRIPTORS: DESCRIPTORS: ACHING;BURNING

## 2024-06-24 ASSESSMENT — PAIN DESCRIPTION - ONSET: ONSET: ON-GOING

## 2024-06-24 ASSESSMENT — PAIN DESCRIPTION - ORIENTATION: ORIENTATION: POSTERIOR;LOWER

## 2024-06-24 ASSESSMENT — PAIN DESCRIPTION - LOCATION: LOCATION: BACK

## 2024-06-24 NOTE — ED NOTES
Head of bed was raised by family for pt to drink, pressure dropped to 51 35. Pt put back down in supine position, saline bolus infusing. Pt not reports being hot, ice pack to forehead,  given paper to fan pt. Updated on wait for room

## 2024-06-24 NOTE — ED PROVIDER NOTES
[facility] EMERGENCY DEPARTMENT  EMERGENCY DEPARTMENT ENCOUNTER        Pt Name: Alicia Cervantes  MRN: [id number]  Birthdate 1960  Date of evaluation: 6/24/2024  Provider: Cheng Camara PA-C  PCP: Marcellus Taylor DO  Note Started: 12:52 PM EDT 6/24/24       I have seen and evaluated this patient with my supervising physician Sachin Vallejo MD.      CHIEF COMPLAINT       Chief Complaint   Patient presents with    Emesis     Patient is diabetic.  Having vomiting and diarrhea for 24 hours.         HISTORY OF PRESENT ILLNESS: 1 or more Elements     History from : Patient and Family ()    Limitations to history : None    Alicia Cervantes is a 64 y.o. female with a history of hypertension, hyperlipidemia, CAD, NSTEMI, DVT, chronic anticoagulation with Eliquis, CKD, diabetes, thyroid disease and obesity who presents to the emergency department today with her  complaining of nausea, vomiting and diarrhea that began yesterday.  Patient denies significant abdominal pain but does feel she strained her abdomen while retching.  Patient states she has felt very lightheaded today.  She was in the shower after having diarrhea with incontinence and actually became very lightheaded and passed out.  Her  was helping her shower and helped her to the ground.  She did not hit her head.   states he loaded her into the car and brought her here.  Patient is alert and oriented x 3 with GCS 15 on arrival.  NIH is 0.  She does appear very ill.  She is hypotensive on arrival with blood pressure in the 60s systolic.  Heart rate in the 130s.  She is tachypneic in the 30s.  Temperature is 100.9 °F.  Patient denies headache.  She states she still feels lightheaded.  She denies vision changes, numbness or tingling in her extremities.  She denies having any chest pain but states she does feel little short of breath.         Nursing Notes were all reviewed and agreed with or any  disagreements were addressed in the HPI.    REVIEW OF SYSTEMS :      Review of Systems   Constitutional:  Negative for chills and fever.   Respiratory:  Positive for shortness of breath. Negative for cough, chest tightness and wheezing.    Cardiovascular:  Negative for chest pain, palpitations and leg swelling.   Gastrointestinal:  Positive for abdominal pain, diarrhea, nausea and vomiting.   Genitourinary:  Negative for difficulty urinating, dysuria, flank pain, frequency, hematuria and urgency.   Musculoskeletal:  Negative for arthralgias, back pain, neck pain and neck stiffness.   Neurological:  Positive for syncope and light-headedness. Negative for dizziness, seizures, facial asymmetry, numbness and headaches.   All other systems reviewed and are negative.      Positives and Pertinent negatives as per HPI.     SURGICAL HISTORY     Past Surgical History:   Procedure Laterality Date    BREAST SURGERY      left fibroid tumor    CARDIAC CATHETERIZATION  01/17/2024    CHOLECYSTECTOMY  04/2003    COLONOSCOPY  02/2015    Dr. Monroe    CORONARY ANGIOPLASTY WITH STENT PLACEMENT  12/11/2023    STent to RCA, Dr. Rapp    HYSTERECTOMY (CERVIX STATUS UNKNOWN)  01/1999    JOINT REPLACEMENT      KNEE ARTHROSCOPY  03/2015    right with medial meninsectomy    KNEE ARTHROSCOPY      right x 3-4     MOHS SURGERY  07/01/2019    Dr. Aviles    RETINAL DETACHMENT SURGERY Left 09/30/2020    X 3 LAST ONE IN 2/2021    RETINAL DETACHMENT SURGERY Left 10/24/2020    RETINAL DETACHMENT SURGERY Left 2021    TONSILLECTOMY      TOTAL KNEE ARTHROPLASTY  05/2013    right    TOTAL KNEE ARTHROPLASTY Left 12/14/2021    LEFT TOTAL KNEE ARTHROPLASTY performed by Gene Romero MD at [facility] OR    VARICOSE VEIN SURGERY  1980's.         CURRENTMEDICATIONS       Previous Medications    APIXABAN (ELIQUIS) 2.5 MG TABS TABLET    Take 1 tablet by mouth 2 times daily    BLOOD GLUCOSE TEST STRIPS (ONETOUCH ULTRA) STRIP    Test two times a day and As needed.

## 2024-06-24 NOTE — CONSULTS
PULMONARY AND CRITICAL CARE MEDICINE CONSULT NOTE      Name: Alicia Cervantes  Sex: female  : 1960  MRN: [id number]  Admission Date: 2024  Admission Diagnosis: Sepsis (HCC) [A41.9]      HPI: Patient is a 64 y.o. female with past medical history significant for CKD with baseline creatinine 1.6, hypertension, hyperlipidemia, CAD, diabetes mellitus type 2, renal cyst who presented to hospital with complaints of nausea, vomiting and diarrhea.  Patient almost passed out at home.  She was noted to have blood pressure in 60s in the ER with tachycardia and tachypnea.  Lab work was suggestive of lactic acidosis with lactic acid of 7.6 and a bicarb of 15.  Acute on chronic CKD with creatinine of 2.9.  Elevated procalcitonin of 57 with white cell count of 23,000.  UA was negative.  Patient underwent CT chest abdomen pelvis that did not show any other abnormality other than 1.9 cm exophytic lesion arising from the posterior pole of the right kidney concerning for renal cell carcinoma.  Patient was initiated on IV fluids and transferred to ICU.    REVIEW OF SYSTEMS:   Constitutional symptoms: The patient denies fever, fatigue, night sweats, weight loss or weight gain.   HEENT: No vision changes. No tinnitus, Denies sinus pain. No hoarseness, or dysphagia.   Neck: Patient denies swelling in the neck.   Cardiovascular: Denies chest pain, palpitation, syncope.  Respiratory: Denies shortness of breath, cough, wheezing  Gastrointestinal: Denies nausea, abdominal pain or change in bowel function.  Genitourinary: Denies burning in urine  Breasts: No masses or lumps in the breasts.   Skin: No rashes or itching.   Muskuloskeletal: Denies weakness or bone pain.   Neurological: No headaches or seizures.   Psychiatric: Denies mood swings or depression.   Endocrine: Denies heat or cold intolerance or excessive thirst.  Hematological/Lymphatic: Denies easy bruising or lymph node swelling.  Allergic/Immunological: No  likely combined hypovolemic and septic  Acute on chronic CKD  Lactic acidosis  Nausea/vomiting and diarrhea      PLAN:  Patient presents with shock with blood pressure in 60s.  Likely combination of hypovolemia and septic shock.  Patient is likely dehydrated with intravascular volume depletion.  Aggressive fluid resuscitation.  Has also been initiated on Levophed drip to maintain MAP of 65 mmHg. Central line being placed in ER. Hold home antihypertensives.     Lactic acidosis with a bicarb of 15 and acute kidney injury.  Continue fluid resuscitation and bicarbonate drip.  Monitor urine output. Trend lactic acid. Repeat BMP.    Leukocytosis with elevated procalcitonin.  On empiric antibiotics for now.  Blood cultures pending.  No evidence of pneumonia on imaging.  UA negative. Send stool for C. Diff.       Critical Care Time: 45 Minutes  Total critical care time caring for this patient with life threatening illness, including direct patient contact, management of life support systems, review of data including imaging and labs, discussions with other team members and physicians excluding procedures.      Electronically signed by April Dean MD on 6/24/24 at 3:10 PM EDT     This note was completed using dragon medical speech recognition software. Grammatical errors, random word insertions, pronoun errors and incomplete sentences are occasional consequences of this technology due to software limitations. If there are questions or concerns about the content of this note of information contained within the body of this dictation, they should be addressed with the provider for clarification.

## 2024-06-24 NOTE — ED NOTES
PT laying on left side, bending arm and stopping fluids, reminded her arm needs to remain straight, pt verbalized understanding, but needs constant reminder.  at bedside.

## 2024-06-24 NOTE — ED NOTES
Pt falling asleep, pulse ox dropped to 88% placed on nasal at 2 lpm. Pillows placed between knees and under arms to assist pt in keeping arms straight

## 2024-06-24 NOTE — ED PROVIDER NOTES
I have personally performed a face to face diagnostic evaluation on this patient. I have fully participated in the care of this patient I personally saw the patient and performed a substantive portion of the visit including all aspects of the medical decision making.  I have reviewed and agree with all pertinent clinical information including history, physical exam, diagnostic tests, and the plan.      HPI: Alicia Cervantes presented with nausea vomiting diarrhea severe chest pain radiating to her back.  History of diabetes.  Symptoms started rapidly last night have gotten significantly worse and unable to tolerate anything p.o.  Denies any blood in her stool or emesis.  Hypertensive cyst.  Hyperlipidemia history of CKD.  Chief Complaint   Patient presents with    Emesis     Patient is diabetic.  Having vomiting and diarrhea for 24 hours.        Review of Systems: See MARTHA note  Vital Signs: BP (!) 59/44   Pulse (!) 127   Resp 24   Wt 108.9 kg (240 lb)   SpO2 (!) 75%   BMI 41.18 kg/m²     Alert 64 y.o. female who appears ill, pale  HENT: Atraumatic, oral mucosa moist  Neck: Grossly normal ROM  Chest/Lungs: respiratory effort normal   Abdomen: Mild generalized abdominal pain  Musculoskeletal: Grossly normal ROM  Skin: No palor or diaphoresis    Medical Decision Making and Plan:  Pertinent Labs & Imaging studies reviewed. (See MARTHA chart for details)  I agree with MARTHA assessment and plan.  Ill-appearing 64-year-old female presents for hypotension, tachycardia, nausea vomiting diarrhea appears significantly dehydrated appears ill.  Patient has a history of CKD but given patient's severe chest and abdominal pain patient taken immediately for CTA to rule out aortic pathology including dissection and/or AAA.  These are negative at this time.  CT scan shows concern for new potential renal cell carcinoma.  Patient is severely orthostatic becomes very hypotensive as she sits up.  Given patient 30 cc/kg fluids with

## 2024-06-24 NOTE — CONSULTS
Clinical Pharmacy Note: Pharmacy to Dose Vancomycin    Alicia Cervantes is a 64 y.o. female started on Vancomycin for sepsis of unknown etiology; consult received from Dr. Holcomb to manage therapy. Also receiving the following antibiotics: cefepime.    Goal AUC: 400-600 mg/L*hr    Assessment/Plan:  A 1750 mg loading dose was given on 06/24 at 1037  It appears the patient has an JIMMY, will not schedule any further doses at this time.  A vancomycin random level has been ordered for 06/25 at 0600  Changes in regimen will be determined based on culture results, renal function, and clinical response.  Pharmacy will continue to monitor and adjust regimen as necessary.    Allergies:  Codeine and Morphine and codeine     Recent Labs     06/24/24  0920   CREATININE 2.9*       Recent Labs     06/24/24  0920   WBC 23.4*       Ht Readings from Last 1 Encounters:   05/20/24 1.626 m (5' 4.02\")        Wt Readings from Last 1 Encounters:   06/24/24 108.9 kg (240 lb)         Estimated Creatinine Clearance: 23 mL/min (A) (based on SCr of 2.9 mg/dL (H)).      Thank you for the consult,    Marley Butler, PharmD, BCPS

## 2024-06-24 NOTE — ED NOTES
Pt pressure remains low, PA notified, pt placed in [place] position does not tolerate well, complains of lower back pain.

## 2024-06-24 NOTE — CONSULTS
Unable to Pay for Housing in the Last Year: No     Number of Places Lived in the Last Year: 1     Unstable Housing in the Last Year: No           MEDICATIONS: reviewed by me.   Medications Prior to Admission:  No current facility-administered medications on file prior to encounter.     Current Outpatient Medications on File Prior to Encounter   Medication Sig Dispense Refill    blood glucose test strips (ONETOUCH ULTRA) strip Test two times a day and As needed. 100 strip 3    Lancets (ONETOUCH DELICA PLUS [id number]) MISC USE AS DIRECTED 3 TIMES A  each 3    rosuvastatin (CRESTOR) 20 MG tablet TAKE 1 TABLET BY MOUTH EVERY NIGHT AT BEDTIME 90 tablet 1    JANUVIA 50 MG tablet TAKE 1 TABLET DAILY 90 tablet 3    ticagrelor (BRILINTA) 90 MG TABS tablet Take 1 tablet by mouth 2 times daily 180 tablet 2    esomeprazole (NEXIUM) 40 MG delayed release capsule TAKE 1 CAPSULE DAILY IN THE MORNING BEFORE BREAKFAST 90 capsule 3    tiZANidine (ZANAFLEX) 4 MG tablet TAKE 1 TABLET BY MOUTH EVERY NIGHT 30 tablet 5    furosemide (LASIX) 20 MG tablet TAKE 1 TABLET DAILY 90 tablet 3    levothyroxine (SYNTHROID) 112 MCG tablet Take 1 tablet by mouth daily 90 tablet 1    glipiZIDE (GLUCOTROL XL) 5 MG extended release tablet TAKE 1 TABLET DAILY 90 tablet 3    vitamin D (ERGOCALCIFEROL) 1.25 MG (17335 UT) CAPS capsule Take 1 capsule by mouth once a week 12 capsule 1    apixaban (ELIQUIS) 2.5 MG TABS tablet Take 1 tablet by mouth 2 times daily 28 tablet 0    dapagliflozin (FARXIGA) 5 MG tablet Take 1 tablet by mouth every morning 90 tablet 3    irbesartan (AVAPRO) 75 MG tablet Take 0.5 tablets by mouth daily 90 tablet 3    Lancets MISC 1 each by Does not apply route 2 times daily Please dispense ONE TOUCH Lancets  Test BS  each 2    ONETOUCH DELICA LANCETS FINE MISC USE AS DIRECTED TO TEST BLOOD SUGAR ONCE EVERY  each 6         Current Facility-Administered Medications:     norepinephrine (LEVOPHED) 16 mg in sodium  BILATERAL    Result Date: 6/18/2024    [facility] SCR BILAT BRIANNA This is a summary report. The complete report is available in the patient's medical record. If you cannot access the medical record, please contact the sending organization for a detailed fax or copy. SCREENING DIGITAL BILATERAL MAMMOGRAM WITH COMPUTER AIDED DETECTION AND TOMOSYNTHESIS HISTORY: Routine annual screening COMPARISON: 2023 TECHNIQUE:   Bilateral mediolateral oblique and craniocaudal views with computer aided detection and tomosynthesis BREAST COMPOSITION: There are scattered areas of fibroglandular density FINDINGS: No suspicious masses, suspicious microcalcifications or areas of nonsurgical architectural distortion identified. IMPRESSION:  No mammographic evidence of malignancy CATEGORY BI-RADS: 1: Negative MANAGEMENT: Routine annual mammography unless otherwise clinically indicated NOTE:  If additional imaging is recommended, the [facility] will contact the patient directly to coordinate that appointment, or scheduling can be reached at [phone]. RISK ASSESSMENT:  A preliminary breast cancer risk assessment was conducted as part of the patient?s screening mammogram.  Patients with a preliminarily elevated screening score who are interested in further information are encouraged to contact our High  Risk Navigator at [phone], or provider can place a referral to the high risk breast program, [id number]. Patient's lifetime Prudence model risk:  10%  (20% and greater considered high risk) [facility] Family History Risk Score:  0  (1 and greater considered high risk) SIGNED BY: Robert Abebe MD on 6/18/2024  2:25 .1 [phone]            Imaging: [unfilled]         ======================================================================  Please note that this chart entry has been generated using voice recognition software, mainly.  So please excuse brevity and/or typos.  While every effort and attempts have been made to ensure the

## 2024-06-24 NOTE — H&P
S2. No murmur, gallop, rub. No edema in lower extremities  Respiratory: Clear to auscultation bilaterally, no wheeze, good inspiratory effort  Gastrointestinal: Abdomen soft, non-tender, not distended, normal bowel sounds  Musculoskeletal: No cyanosis in digits, neck supple  Neurology: Cranial nerves grossly intact. Alert and oriented in time, place and person. No speech or motor deficits  Psychiatry: Appropriate affect. Not agitated  Skin: Warm, dry, normal turgor, no rash    Labs:  CBC:   Lab Results   Component Value Date/Time    WBC 23.4 06/24/2024 09:20 AM    RBC 6.06 06/24/2024 09:20 AM    RBC 4.95 04/17/2015 02:34 PM    HGB 16.4 06/24/2024 09:20 AM    HCT 52.3 06/24/2024 09:20 AM    MCV 86.4 06/24/2024 09:20 AM    MCH 27.1 06/24/2024 09:20 AM    MCHC 31.4 06/24/2024 09:20 AM    RDW 16.0 06/24/2024 09:20 AM     06/24/2024 09:20 AM    MPV 9.8 06/24/2024 09:20 AM     BMP:    Lab Results   Component Value Date/Time     06/24/2024 09:20 AM    K 4.3 06/24/2024 09:20 AM    K 4.0 12/11/2023 04:50 AM     06/24/2024 09:20 AM    CO2 15 06/24/2024 09:20 AM    BUN 52 06/24/2024 09:20 AM    CREATININE 2.9 06/24/2024 09:20 AM    CALCIUM 9.2 06/24/2024 09:20 AM    GFRAA 46 08/08/2022 12:25 PM    GFRAA >60 05/12/2013 06:15 AM    LABGLOM 17 06/24/2024 09:20 AM    LABGLOM 31 03/19/2024 03:38 PM    GLUCOSE 292 06/24/2024 09:20 AM       Chest Xray:   EKG:    I visualized CXR images and EKG strips     Discussed  with  ER attending and nephrologt  Due to the high probability of clinically significant life threating deterioration of the patient's condition that required my urgent intervention, a total critical care time 55 minutes was used. This time excludes any time that may have been spent performing procedures. This includes but not limited to vital sign monitoring, telemetry monitoring, continuous pulse oximety, IV medication, clinical response to the IV medications, documentation time , consultation time,  interpretation of lab data, review of nursing notes and old record review.       Problem List  Principal Problem:    Sepsis (HCC)  Resolved Problems:    * No resolved hospital problems. *        Assessment/Plan:   Septic shock  Unclear etiology with white count of 23.  She did have some GI symptoms but unclear explaining the whole symptoms  I have initiated patient on broad-spectrum antibiotics since her presenting blood pressure in the 60s with lactic acid of 8  Will continue aggressive IV fluid started on low-dose Levophed.  Blood culture collected we will send stool sample as well if she has diarrhea  Monitor closely for improvement  Admit to ICU for now      Acute kidney injury with underlying chronic kidney  Secondary to above patient given bolus of fluids started on some bicarb per nephrology      CAD      Diabetes hold oral agents   sliding scale for now        Janeen Holcomb MD    6/24/2024 2:58 PM

## 2024-06-24 NOTE — ED NOTES
Pt pressure dropped to 59/44 with position change to sitting position for xray, pt laid flat, second line started MD to bedside with new orders

## 2024-06-25 LAB
ALBUMIN SERPL-MCNC: 2.8 G/DL (ref 3.4–5)
ANION GAP SERPL CALCULATED.3IONS-SCNC: 14 MMOL/L (ref 3–16)
BASOPHILS # BLD: 0.1 K/UL (ref 0–0.2)
BASOPHILS NFR BLD: 0.2 %
BUN SERPL-MCNC: 56 MG/DL (ref 7–20)
CALCIUM SERPL-MCNC: 8 MG/DL (ref 8.3–10.6)
CHLORIDE SERPL-SCNC: 105 MMOL/L (ref 99–110)
CHLORIDE UR-SCNC: <20 MMOL/L
CO2 SERPL-SCNC: 22 MMOL/L (ref 21–32)
CREAT SERPL-MCNC: 2.8 MG/DL (ref 0.6–1.2)
CREAT UR-MCNC: 134.6 MG/DL (ref 28–259)
DEPRECATED RDW RBC AUTO: 15.1 % (ref 12.4–15.4)
EKG ATRIAL RATE: 133 BPM
EKG DIAGNOSIS: NORMAL
EKG P AXIS: 62 DEGREES
EKG P-R INTERVAL: 100 MS
EKG Q-T INTERVAL: 328 MS
EKG QRS DURATION: 72 MS
EKG QTC CALCULATION (BAZETT): 488 MS
EKG R AXIS: 18 DEGREES
EKG T AXIS: 49 DEGREES
EKG VENTRICULAR RATE: 133 BPM
EOSINOPHIL # BLD: 0 K/UL (ref 0–0.6)
EOSINOPHIL NFR BLD: 0 %
GFR SERPLBLD CREATININE-BSD FMLA CKD-EPI: 18 ML/MIN/{1.73_M2}
GLUCOSE BLD-MCNC: 115 MG/DL (ref 70–99)
GLUCOSE BLD-MCNC: 124 MG/DL (ref 70–99)
GLUCOSE BLD-MCNC: 137 MG/DL (ref 70–99)
GLUCOSE BLD-MCNC: 145 MG/DL (ref 70–99)
GLUCOSE SERPL-MCNC: 161 MG/DL (ref 70–99)
HCT VFR BLD AUTO: 41.8 % (ref 36–48)
HGB BLD-MCNC: 13.5 G/DL (ref 12–16)
LACTATE BLDV-SCNC: 1.5 MMOL/L (ref 0.4–2)
LYMPHOCYTES # BLD: 1 K/UL (ref 1–5.1)
LYMPHOCYTES NFR BLD: 3.9 %
MAGNESIUM SERPL-MCNC: 1.7 MG/DL (ref 1.8–2.4)
MCH RBC QN AUTO: 27.1 PG (ref 26–34)
MCHC RBC AUTO-ENTMCNC: 32.2 G/DL (ref 31–36)
MCV RBC AUTO: 84 FL (ref 80–100)
MONOCYTES # BLD: 1.1 K/UL (ref 0–1.3)
MONOCYTES NFR BLD: 4.1 %
NEUTROPHILS # BLD: 24.8 K/UL (ref 1.7–7.7)
NEUTROPHILS NFR BLD: 91.8 %
PERFORMED ON: ABNORMAL
PHOSPHATE SERPL-MCNC: 5 MG/DL (ref 2.5–4.9)
PLATELET # BLD AUTO: 174 K/UL (ref 135–450)
PMV BLD AUTO: 8.9 FL (ref 5–10.5)
POTASSIUM SERPL-SCNC: 4.1 MMOL/L (ref 3.5–5.1)
POTASSIUM UR-SCNC: 62.2 MMOL/L
RBC # BLD AUTO: 4.98 M/UL (ref 4–5.2)
SODIUM SERPL-SCNC: 141 MMOL/L (ref 136–145)
SODIUM UR-SCNC: 26 MMOL/L
TSH SERPL DL<=0.005 MIU/L-ACNC: 0.31 UIU/ML (ref 0.27–4.2)
UUN UR-MCNC: 411.3 MG/DL (ref 800–1666)
VANCOMYCIN SERPL-MCNC: 12 UG/ML
WBC # BLD AUTO: 27 K/UL (ref 4–11)

## 2024-06-25 PROCEDURE — 2580000003 HC RX 258: Performed by: INTERNAL MEDICINE

## 2024-06-25 PROCEDURE — 84443 ASSAY THYROID STIM HORMONE: CPT

## 2024-06-25 PROCEDURE — 80069 RENAL FUNCTION PANEL: CPT

## 2024-06-25 PROCEDURE — 6360000002 HC RX W HCPCS: Performed by: INTERNAL MEDICINE

## 2024-06-25 PROCEDURE — 6370000000 HC RX 637 (ALT 250 FOR IP): Performed by: HOSPITALIST

## 2024-06-25 PROCEDURE — 80202 ASSAY OF VANCOMYCIN: CPT

## 2024-06-25 PROCEDURE — 83605 ASSAY OF LACTIC ACID: CPT

## 2024-06-25 PROCEDURE — 36415 COLL VENOUS BLD VENIPUNCTURE: CPT

## 2024-06-25 PROCEDURE — 87506 IADNA-DNA/RNA PROBE TQ 6-11: CPT

## 2024-06-25 PROCEDURE — 2500000003 HC RX 250 WO HCPCS: Performed by: INTERNAL MEDICINE

## 2024-06-25 PROCEDURE — 93010 ELECTROCARDIOGRAM REPORT: CPT | Performed by: INTERNAL MEDICINE

## 2024-06-25 PROCEDURE — 2580000003 HC RX 258: Performed by: HOSPITALIST

## 2024-06-25 PROCEDURE — 99291 CRITICAL CARE FIRST HOUR: CPT | Performed by: INTERNAL MEDICINE

## 2024-06-25 PROCEDURE — 87040 BLOOD CULTURE FOR BACTERIA: CPT

## 2024-06-25 PROCEDURE — 85025 COMPLETE CBC W/AUTO DIFF WBC: CPT

## 2024-06-25 PROCEDURE — 83735 ASSAY OF MAGNESIUM: CPT

## 2024-06-25 PROCEDURE — 6360000002 HC RX W HCPCS: Performed by: HOSPITALIST

## 2024-06-25 PROCEDURE — 2500000003 HC RX 250 WO HCPCS: Performed by: HOSPITALIST

## 2024-06-25 PROCEDURE — 2000000000 HC ICU R&B

## 2024-06-25 PROCEDURE — 83036 HEMOGLOBIN GLYCOSYLATED A1C: CPT

## 2024-06-25 RX ORDER — METRONIDAZOLE 500 MG/100ML
500 INJECTION, SOLUTION INTRAVENOUS EVERY 8 HOURS
Status: DISCONTINUED | OUTPATIENT
Start: 2024-06-25 | End: 2024-06-26

## 2024-06-25 RX ORDER — MAGNESIUM SULFATE IN WATER 40 MG/ML
2000 INJECTION, SOLUTION INTRAVENOUS ONCE
Status: COMPLETED | OUTPATIENT
Start: 2024-06-25 | End: 2024-06-25

## 2024-06-25 RX ORDER — SODIUM CHLORIDE 450 MG/100ML
INJECTION, SOLUTION INTRAVENOUS CONTINUOUS
Status: DISCONTINUED | OUTPATIENT
Start: 2024-06-25 | End: 2024-06-26

## 2024-06-25 RX ADMIN — LEVOTHYROXINE SODIUM 112 MCG: 0.11 TABLET ORAL at 08:15

## 2024-06-25 RX ADMIN — CEFEPIME 1000 MG: 1 INJECTION, POWDER, FOR SOLUTION INTRAMUSCULAR; INTRAVENOUS at 10:50

## 2024-06-25 RX ADMIN — SODIUM CHLORIDE: 4.5 INJECTION, SOLUTION INTRAVENOUS at 10:50

## 2024-06-25 RX ADMIN — APIXABAN 2.5 MG: 2.5 TABLET, FILM COATED ORAL at 08:15

## 2024-06-25 RX ADMIN — METRONIDAZOLE 500 MG: 500 INJECTION, SOLUTION INTRAVENOUS at 20:38

## 2024-06-25 RX ADMIN — ACETAMINOPHEN 650 MG: 325 TABLET ORAL at 08:19

## 2024-06-25 RX ADMIN — TICAGRELOR 90 MG: 90 TABLET ORAL at 08:15

## 2024-06-25 RX ADMIN — MAGNESIUM SULFATE HEPTAHYDRATE 2000 MG: 40 INJECTION, SOLUTION INTRAVENOUS at 10:53

## 2024-06-25 RX ADMIN — SODIUM BICARBONATE: 84 INJECTION, SOLUTION INTRAVENOUS at 05:41

## 2024-06-25 RX ADMIN — SODIUM CHLORIDE: 4.5 INJECTION, SOLUTION INTRAVENOUS at 23:55

## 2024-06-25 RX ADMIN — APIXABAN 2.5 MG: 2.5 TABLET, FILM COATED ORAL at 20:44

## 2024-06-25 RX ADMIN — SODIUM CHLORIDE, PRESERVATIVE FREE 10 ML: 5 INJECTION INTRAVENOUS at 08:16

## 2024-06-25 RX ADMIN — ACETAMINOPHEN 650 MG: 325 TABLET ORAL at 23:54

## 2024-06-25 RX ADMIN — SODIUM CHLORIDE, PRESERVATIVE FREE 10 ML: 5 INJECTION INTRAVENOUS at 20:45

## 2024-06-25 RX ADMIN — METRONIDAZOLE 500 MG: 500 INJECTION, SOLUTION INTRAVENOUS at 10:54

## 2024-06-25 RX ADMIN — TICAGRELOR 90 MG: 90 TABLET ORAL at 20:44

## 2024-06-25 RX ADMIN — Medication 20 MCG/MIN: at 07:33

## 2024-06-25 RX ADMIN — CEFEPIME 1000 MG: 1 INJECTION, POWDER, FOR SOLUTION INTRAMUSCULAR; INTRAVENOUS at 21:51

## 2024-06-25 ASSESSMENT — PAIN SCALES - GENERAL
PAINLEVEL_OUTOF10: 0
PAINLEVEL_OUTOF10: 3
PAINLEVEL_OUTOF10: 0
PAINLEVEL_OUTOF10: 7
PAINLEVEL_OUTOF10: 0
PAINLEVEL_OUTOF10: 0

## 2024-06-25 ASSESSMENT — PAIN DESCRIPTION - DESCRIPTORS: DESCRIPTORS: NAGGING

## 2024-06-25 ASSESSMENT — PAIN DESCRIPTION - LOCATION
LOCATION: OTHER (COMMENT)
LOCATION: HEAD

## 2024-06-25 NOTE — CONSULTS
Urology Consult Note  [facility]     Patient: Ailcia Cervantes MRN: [id number]  Room/Bed: [facility]-3914/3914-01   YOB: 1960  Age/Sex: 64 y.o.female  Admission Date: 6/24/2024     Date of Service:  6/25/2024    Consulting Provider: CLAUS Soler CNP  Admitting/Requesting Physician: No admitting provider for patient encounter.  Primary Care Physician: Marcellus Taylor DO    Reason for Consult: Renal Cyst    ASSESSMENT/PLAN     63 yo female with hx of several renal lesions over the years. History of a 9mm LEFT renal cyst on CT  from 2015, MRI follow up showed this to be a simple cyst. 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 Now admitted for n/v/d and screening for septic shock.  CT a/p incidentally notes a RIGHT 1.9cm renal cyst concerning for RCC.     Recommendations:  She has history of suspicious cysts of her LEFT kidney that have turned out to be benign.  Now with concerning RIGHT sided cyst measuring 1.9cm. Previously on Celebrex. Discussed at it's current size, even if this was to be renal cancer, it would have a low chance of metastasis. Will arrange outpatient follow up in x2-3 weeks to discuss further imaging.     All the patients questions were answered. She understands the plan as listed above.    HISTORY     Chief Complaint:   Chief Complaint   Patient presents with    Emesis     Patient is diabetic.  Having vomiting and diarrhea for 24 hours.         History of Present Illness: Alicia Cervantes is a 64 y.o. female with renal cyst and septic shick. Onset of symptoms was days ago with improving course since that time. Symptoms are aggravated by gi issues. Symptoms improved with fluids and zofran. Associated symptoms include nausea. Patient also reports diarrhea and emsis. She has tried the following treatments: zofran.     Past Medical History:  She has a past medical history of  osseous abnormalities or suspicious lesions are present.     No acute vascular pathology identified in the chest, abdomen, or pelvis. 1.9 cm partially exophytic enhancing lesion arising from the posterior midpole the right kidney, concerning for renal cell carcinoma.     CT HEAD WO CONTRAST    Result Date: 6/24/2024  EXAMINATION: CT OF THE HEAD WITHOUT CONTRAST  6/24/2024 9:38 am TECHNIQUE: CT of the head was performed without the administration of intravenous contrast. Automated exposure control, iterative reconstruction, and/or weight based adjustment of the mA/kV was utilized to reduce the radiation dose to as low as reasonably achievable. COMPARISON: None. HISTORY: ORDERING SYSTEM PROVIDED HISTORY: Syncope TECHNOLOGIST PROVIDED HISTORY: If patient is on cardiac monitor and/or pulse ox, they may be taken off cardiac monitor and pulse ox, left on O2 if currently on. All monitors reattached when patient returns to room. Has a \"code stroke\" or \"stroke alert\" been called?->No Reason for exam:->Syncope Reason for Exam: syncope FINDINGS: BRAIN/VENTRICLES: There is no acute intracranial hemorrhage, mass effect or midline shift.  No abnormal extra-axial fluid collection.  The gray-white differentiation is maintained without evidence of an acute infarct.  There is no evidence of hydrocephalus. SINUSES: The visualized paranasal sinuses and mastoid air cells demonstrate no acute abnormality. SOFT TISSUES/SKULL:  No acute abnormality of the visualized skull or soft tissues.     No acute intracranial abnormality.     [facility] DIGITAL DIAGNOSTIC W OR WO CAD BILATERAL    Result Date: 6/18/2024    [facility] SCR BILAT BRIANNA This is a summary report. The complete report is available in the patient's medical record. If you cannot access the medical record, please contact the sending organization for a detailed fax or copy. SCREENING DIGITAL BILATERAL MAMMOGRAM WITH COMPUTER AIDED DETECTION AND TOMOSYNTHESIS HISTORY: Routine annual screening

## 2024-06-25 NOTE — PROGRESS NOTES
Initial assessment completed- see doc flowsheets. VSS. On 4LPM O2 via NC. Alert and oriented x4. Lethargic but able to communicate and follow commands. Back pain 10/10. Ice pack, repositioned, and Tylenol PRN administered. On Levophed at 20 for a MAP of >65. Per day shift, pt's BP drops when HOB is elevated. Gradually going up on HOB so pt can eat and drink- BP seems to be stable at this point. Care ongoing.

## 2024-06-25 NOTE — PROGRESS NOTES
06/24/2024 11:04 AM    WBCUA 4 06/24/2024 11:04 AM    BACTERIA None Seen 06/24/2024 11:04 AM    RBCUA 1 06/24/2024 11:04 AM    BLOODU Negative 06/24/2024 11:04 AM    GLUCOSEU 250 06/24/2024 11:04 AM       Radiology:  XR CHEST PORTABLE   Final Result   Central line with tip in the SVC seen in adequate position.         XR CHEST PORTABLE   Final Result   No acute disease         CT HEAD WO CONTRAST   Final Result   No acute intracranial abnormality.         CT CHEST ABDOMEN PELVIS WO CONTRAST Additional Contrast? None   Final Result   No acute vascular pathology identified in the chest, abdomen, or pelvis.      1.9 cm partially exophytic enhancing lesion arising from the posterior   midpole the right kidney, concerning for renal cell carcinoma.         CTA CHEST ABDOMEN PELVIS W CONTRAST   Final Result   No acute vascular pathology identified in the chest, abdomen, or pelvis.      1.9 cm partially exophytic enhancing lesion arising from the posterior   midpole the right kidney, concerning for renal cell carcinoma.                 Assessment/Plan:    Active Hospital Problems    Diagnosis     Sepsis (HCC) [A41.9]          Hospital Day: 2    This is a 64 y.o. female who presented to [facility] on 6/24/2024 and is being treated for:  septic shock  Unclear etiology with white count of 23.  She reports having diarrhea that started after a [employer] meal so this may be food poisoning though no one else that they are aware of got sick.  Stool sent  for infectious work up  C.diff is negative    I have initiated patient on broad-spectrum antibiotics since her presenting blood pressure in the 60s with lactic acid of 8  - continue  Maxipime and flagyl    Will continue aggressive IV fluid started on low-dose Levophed.  Blood culture and stool cultures pending  Monitor closely for improvement        Acute kidney injury with underlying chronic kidney    Secondary to dehydration from diarrhea and volume loss  -given bolus of fluids  started on some bicarb per nephrology  Continue fluids and replaced mag        CAD  - resume home meds      Diabetes   -hold oral agents   sliding scale for now         Discussed management of the case with CCM  who recommended fluids    Drugs that require monitoring for toxicity include: Subq Insulin and the method of monitoring was/is BG    DVT ppx: Lovenox  GI ppx: PPI  Diet: ADULT DIET; Regular; 5 carb choices (75 gm/meal)  Code Status: Full Code    PT/OT Eval Status: [place] treat     Disposition:  Patient remains on IVF and perssor support  Broad sepctrum abx  Await stool studies and culture results  She is slowly improving  32 minutes of critical care time spent.        Evangelist Shah MD  6/25/2024  7:04 PM

## 2024-06-25 NOTE — PROGRESS NOTES
MD Stephanie Gilmore MD Samir Brahmbhatt, MD                  Office: [phone]                      Fax: [phone]             [employer]                   NEPHROLOGY   INPATIENT PROGRESS NOTE:     PATIENT NAME: Alicia Cervantes  : 1960  MRN: [id number]       RECOMMENDATIONS:   Was given IV contrast in the ER, which per the usual is certainly needed    IVFs bolues in ER   Then start Na.HCO3 infusion  150 mEq At 150 cc/hr  -Change to one half saline    Hold Januvia, Farxiga,  Use insulin sliding scale    Hold Lasix, irbesartan    Follow-up culture  Empirical antibiotics per primary team     Inserted urinary catheter Rm for close input output monitoring    Consulted urology for renal lesion ?RCC   (Seen in  MRI too)    no need for dialysis,    at higher risk for decompensation, needing closer monitoring.    D/C plan from renal stand point:- likely ~prolonged  -Will reschedule appointment from 2024, to continue to follow-up with us     Thank you for allowing me to participate in this patient's care. Please do not hesitate to contact me anytime. We will follow along with you.       Harris Chen MD,  [facility]  Office: [phone] or [address] [facility]  Fax: [phone]        IMPRESSION:       Admitted on:  2024  9:07 AM   For:    Hospital Problems             Last Modified POA    * (Principal) Sepsis (HCC) 2024 Yes       JIMMY : On admission  H/O proteinuric CKD: stage 3B   - BL S.Cr.:  Lowest recently 1.6-worsened to 1.8,  BL eGFR 30s-40s, last known 3-  - on admission S.Cr.:  2.9  - Etiology of JIMMY -presumably ATN, in the setting of hypovolemia, sepsis, hypotension, shock, needing aggressive pressor support  - With risk of contrast-induced nephropathy   - UA : results reviewed: Suggestive of prerenal with ATN most likely  - Renal imaging: results reviewed: CT w/ IVC   \"  1.9 cm partially exophytic enhancing lesion  Medications on File Prior to Encounter   Medication Sig Dispense Refill    blood glucose test strips (ONETOUCH ULTRA) strip Test two times a day and As needed. 100 strip 3    Lancets (ONETOUCH DELICA PLUS [id number]) MISC USE AS DIRECTED 3 TIMES A  each 3    rosuvastatin (CRESTOR) 20 MG tablet TAKE 1 TABLET BY MOUTH EVERY NIGHT AT BEDTIME 90 tablet 1    JANUVIA 50 MG tablet TAKE 1 TABLET DAILY 90 tablet 3    ticagrelor (BRILINTA) 90 MG TABS tablet Take 1 tablet by mouth 2 times daily 180 tablet 2    esomeprazole (NEXIUM) 40 MG delayed release capsule TAKE 1 CAPSULE DAILY IN THE MORNING BEFORE BREAKFAST 90 capsule 3    tiZANidine (ZANAFLEX) 4 MG tablet TAKE 1 TABLET BY MOUTH EVERY NIGHT 30 tablet 5    furosemide (LASIX) 20 MG tablet TAKE 1 TABLET DAILY 90 tablet 3    levothyroxine (SYNTHROID) 112 MCG tablet Take 1 tablet by mouth daily 90 tablet 1    glipiZIDE (GLUCOTROL XL) 5 MG extended release tablet TAKE 1 TABLET DAILY 90 tablet 3    vitamin D (ERGOCALCIFEROL) 1.25 MG (04531 UT) CAPS capsule Take 1 capsule by mouth once a week 12 capsule 1    apixaban (ELIQUIS) 2.5 MG TABS tablet Take 1 tablet by mouth 2 times daily 28 tablet 0    dapagliflozin (FARXIGA) 5 MG tablet Take 1 tablet by mouth every morning 90 tablet 3    irbesartan (AVAPRO) 75 MG tablet Take 0.5 tablets by mouth daily 90 tablet 3    Lancets MISC 1 each by Does not apply route 2 times daily Please dispense ONE TOUCH Lancets  Test BS  each 2    ONETOUCH DELICA LANCETS FINE MISC USE AS DIRECTED TO TEST BLOOD SUGAR ONCE EVERY  each 6         Current Facility-Administered Medications:     metroNIDAZOLE (FLAGYL) 500 mg in 0.9% NaCl 100 mL IVPB premix, 500 mg, IntraVENous, Q8H, April Dean MD, Stopped at 06/25/24 1132    0.45 % sodium chloride infusion, , IntraVENous, Continuous, April Dean MD, Last Rate: 150 mL/hr at 06/25/24 1050, New Bag at 06/25/24 1050    norepinephrine (LEVOPHED) 16 mg in sodium chloride 0.9 % 250 mL infusion,

## 2024-06-25 NOTE — PLAN OF CARE
Problem: Safety - Adult  Goal: Free from fall injury  Outcome: Progressing     Problem: Discharge Planning  Goal: Discharge to home or other facility with appropriate resources  Outcome: Progressing  Flowsheets (Taken 6/24/2024 2000)  Discharge to home or other facility with appropriate resources:   Identify barriers to discharge with patient and caregiver   Arrange for needed discharge resources and transportation as appropriate   Identify discharge learning needs (meds, wound care, etc)     Problem: Chronic Conditions and Co-morbidities  Goal: Patient's chronic conditions and co-morbidity symptoms are monitored and maintained or improved  Outcome: Progressing  Flowsheets (Taken 6/24/2024 2000)  Care Plan - Patient's Chronic Conditions and Co-Morbidity Symptoms are Monitored and Maintained or Improved:   Monitor and assess patient's chronic conditions and comorbid symptoms for stability, deterioration, or improvement   Collaborate with multidisciplinary team to address chronic and comorbid conditions and prevent exacerbation or deterioration   Update acute care plan with appropriate goals if chronic or comorbid symptoms are exacerbated and prevent overall improvement and discharge     Problem: Pain  Goal: Verbalizes/displays adequate comfort level or baseline comfort level  Outcome: Progressing  Flowsheets (Taken 6/24/2024 2000)  Verbalizes/displays adequate comfort level or baseline comfort level:   Encourage patient to monitor pain and request assistance   Assess pain using appropriate pain scale   Administer analgesics based on type and severity of pain and evaluate response   Implement non-pharmacological measures as appropriate and evaluate response   Consider cultural and social influences on pain and pain management   Notify Licensed Independent Practitioner if interventions unsuccessful or patient reports new pain     Problem: Skin/Tissue Integrity  Goal: Absence of new skin breakdown  Description: 1.

## 2024-06-25 NOTE — CARE COORDINATION
Discharge Planning Note:    Chart reviewed and it appears that patient has minimal needs for discharge at this time. Risk Score 16 %     Primary Care Physician is Marcellus Taylor DO   Primary insurance is [employer]    Pt is from home with her spouse, independent at baseline.  No needs anticipated, CM to follow.    Please notify case management if any discharge needs are identified.      Case management will continue to follow progress and update discharge plan as needed.

## 2024-06-25 NOTE — PROGRESS NOTES
Secure message sent to Kamla VALADEZ regarding rectal tube insertion d/t pt having multiple episodes of very watery stools.    Rectal tube inserted- see orders. Pt tolerated well. Watery, greenish-brown stool in tube. Care ongoing.      T - 96.4 and complaining of being cold. Warm blankets provided for comfort.

## 2024-06-25 NOTE — PLAN OF CARE
Problem: Safety - Adult  Goal: Free from fall injury  6/25/2024 0847 by Josselin Meeks RN  Outcome: Progressing     Problem: Discharge Planning  Goal: Discharge to home or other facility with appropriate resources  6/25/2024 0847 by Josselin Meeks RN  Outcome: Progressing     Problem: Chronic Conditions and Co-morbidities  Goal: Patient's chronic conditions and co-morbidity symptoms are monitored and maintained or improved  6/25/2024 0847 by Josselin Meeks RN  Outcome: Progressing

## 2024-06-25 NOTE — PROGRESS NOTES
PULMONARY AND CRITICAL CARE MEDICINE PROGRESS NOTE    Subjective: Patient remains on Levophed up to 15 mcg/min.  Also on bicarbonate drip.  Improving diarrhea.  Creatinine slightly improved with good urine output.  Lactic acid has normalized.      REVIEW OF SYSTEMS:   Constitutional symptoms: The patient denies fever, fatigue, night sweats, weight loss or weight gain.   HEENT: No vision changes. No tinnitus, Denies sinus pain. No hoarseness, or dysphagia.   Neck: Patient denies swelling in the neck.   Cardiovascular: Denies chest pain, palpitation, syncope.  Respiratory: Denies shortness of breath or cough.   Gastrointestinal: Improved nausea, abdominal pain and diarrhea  Genitourinary: Denies obstructive symptoms. No history of incontinence.  Skin: No rashes or itching.   Muskuloskeletal: Denies weakness or bone pain.   Neurological: No headaches or seizures.   Psychiatric: Denies mood swings or depression.     MEDICATIONS:     Scheduled Meds:   metroNIDAZOLE  500 mg IntraVENous Q8H    cefepime  1,000 mg IntraVENous Q12H    apixaban  2.5 mg Oral BID    levothyroxine  112 mcg Oral Daily    ticagrelor  90 mg Oral BID    sodium chloride flush  5-40 mL IntraVENous 2 times per day    insulin lispro  0-4 Units SubCUTAneous TID WC    insulin lispro  0-4 Units SubCUTAneous Nightly       Current Infusions:    sodium chloride 150 mL/hr at 06/25/24 1050    norepinephrine 12 mcg/min (06/25/24 1131)    dextrose      sodium chloride         PRN meds:  dextrose bolus **OR** dextrose bolus, glucagon (rDNA), dextrose, sodium chloride flush, sodium chloride, ondansetron **OR** ondansetron, polyethylene glycol, acetaminophen **OR** acetaminophen    PHYSICAL EXAM:  /66   Pulse 93   Temp 97.8 °F (36.6 °C) (Temporal)   Resp 17   Wt 114.8 kg (253 lb 1.4 oz)   SpO2 98%   BMI 43.42 kg/m²  I/O last 3 completed shifts:  In: 4892.6 [I.V.:3318.8; IV Piggyback:1573.8]  Out: 1010 [Urine:860; Stool:150] I/O this shift:  In: 1543.1

## 2024-06-26 LAB
ALBUMIN SERPL-MCNC: 2.8 G/DL (ref 3.4–5)
ANION GAP SERPL CALCULATED.3IONS-SCNC: 7 MMOL/L (ref 3–16)
BASOPHILS # BLD: 0 K/UL (ref 0–0.2)
BASOPHILS NFR BLD: 0.2 %
BUN SERPL-MCNC: 39 MG/DL (ref 7–20)
CALCIUM SERPL-MCNC: 8.1 MG/DL (ref 8.3–10.6)
CHLORIDE SERPL-SCNC: 105 MMOL/L (ref 99–110)
CO2 SERPL-SCNC: 27 MMOL/L (ref 21–32)
CREAT SERPL-MCNC: 1.9 MG/DL (ref 0.6–1.2)
DEPRECATED RDW RBC AUTO: 14.9 % (ref 12.4–15.4)
EOSINOPHIL # BLD: 0.2 K/UL (ref 0–0.6)
EOSINOPHIL NFR BLD: 1.2 %
EST. AVERAGE GLUCOSE BLD GHB EST-MCNC: 131.2 MG/DL
GFR SERPLBLD CREATININE-BSD FMLA CKD-EPI: 29 ML/MIN/{1.73_M2}
GI PATHOGENS PNL STL NAA+PROBE: ABNORMAL
GI PATHOGENS PNL STL NAA+PROBE: ABNORMAL
GLUCOSE BLD-MCNC: 111 MG/DL (ref 70–99)
GLUCOSE BLD-MCNC: 111 MG/DL (ref 70–99)
GLUCOSE BLD-MCNC: 122 MG/DL (ref 70–99)
GLUCOSE BLD-MCNC: 88 MG/DL (ref 70–99)
GLUCOSE SERPL-MCNC: 137 MG/DL (ref 70–99)
HBA1C MFR BLD: 6.2 %
HCT VFR BLD AUTO: 34.7 % (ref 36–48)
HGB BLD-MCNC: 11.3 G/DL (ref 12–16)
LYMPHOCYTES # BLD: 1 K/UL (ref 1–5.1)
LYMPHOCYTES NFR BLD: 7.3 %
MCH RBC QN AUTO: 27.2 PG (ref 26–34)
MCHC RBC AUTO-ENTMCNC: 32.5 G/DL (ref 31–36)
MCV RBC AUTO: 83.8 FL (ref 80–100)
MONOCYTES # BLD: 0.9 K/UL (ref 0–1.3)
MONOCYTES NFR BLD: 6.7 %
NEUTROPHILS # BLD: 12 K/UL (ref 1.7–7.7)
NEUTROPHILS NFR BLD: 84.6 %
ORGANISM: ABNORMAL
PERFORMED ON: ABNORMAL
PERFORMED ON: NORMAL
PHOSPHATE SERPL-MCNC: 3.7 MG/DL (ref 2.5–4.9)
PLATELET # BLD AUTO: 141 K/UL (ref 135–450)
PMV BLD AUTO: 9 FL (ref 5–10.5)
POTASSIUM SERPL-SCNC: 3.9 MMOL/L (ref 3.5–5.1)
POTASSIUM SERPL-SCNC: 3.9 MMOL/L (ref 3.5–5.1)
RBC # BLD AUTO: 4.14 M/UL (ref 4–5.2)
SODIUM SERPL-SCNC: 139 MMOL/L (ref 136–145)
WBC # BLD AUTO: 14.2 K/UL (ref 4–11)

## 2024-06-26 PROCEDURE — 80069 RENAL FUNCTION PANEL: CPT

## 2024-06-26 PROCEDURE — 6370000000 HC RX 637 (ALT 250 FOR IP): Performed by: INTERNAL MEDICINE

## 2024-06-26 PROCEDURE — 2580000003 HC RX 258: Performed by: INTERNAL MEDICINE

## 2024-06-26 PROCEDURE — 36592 COLLECT BLOOD FROM PICC: CPT

## 2024-06-26 PROCEDURE — 6370000000 HC RX 637 (ALT 250 FOR IP): Performed by: HOSPITALIST

## 2024-06-26 PROCEDURE — 85025 COMPLETE CBC W/AUTO DIFF WBC: CPT

## 2024-06-26 PROCEDURE — 2580000003 HC RX 258: Performed by: HOSPITALIST

## 2024-06-26 PROCEDURE — 6360000002 HC RX W HCPCS: Performed by: INTERNAL MEDICINE

## 2024-06-26 PROCEDURE — 1200000000 HC SEMI PRIVATE

## 2024-06-26 PROCEDURE — 99233 SBSQ HOSP IP/OBS HIGH 50: CPT | Performed by: INTERNAL MEDICINE

## 2024-06-26 RX ORDER — AZITHROMYCIN 250 MG/1
500 TABLET, FILM COATED ORAL DAILY
Status: DISCONTINUED | OUTPATIENT
Start: 2024-06-26 | End: 2024-06-27 | Stop reason: HOSPADM

## 2024-06-26 RX ADMIN — ACETAMINOPHEN 650 MG: 325 TABLET ORAL at 15:22

## 2024-06-26 RX ADMIN — APIXABAN 2.5 MG: 2.5 TABLET, FILM COATED ORAL at 08:53

## 2024-06-26 RX ADMIN — METRONIDAZOLE 500 MG: 500 INJECTION, SOLUTION INTRAVENOUS at 03:49

## 2024-06-26 RX ADMIN — METRONIDAZOLE 500 MG: 500 INJECTION, SOLUTION INTRAVENOUS at 09:31

## 2024-06-26 RX ADMIN — APIXABAN 2.5 MG: 2.5 TABLET, FILM COATED ORAL at 21:41

## 2024-06-26 RX ADMIN — ACETAMINOPHEN 650 MG: 325 TABLET ORAL at 23:46

## 2024-06-26 RX ADMIN — AZITHROMYCIN DIHYDRATE 500 MG: 250 TABLET ORAL at 12:26

## 2024-06-26 RX ADMIN — SODIUM CHLORIDE, PRESERVATIVE FREE 10 ML: 5 INJECTION INTRAVENOUS at 21:41

## 2024-06-26 RX ADMIN — TICAGRELOR 90 MG: 90 TABLET ORAL at 08:54

## 2024-06-26 RX ADMIN — SODIUM CHLORIDE, PRESERVATIVE FREE 10 ML: 5 INJECTION INTRAVENOUS at 08:59

## 2024-06-26 RX ADMIN — LEVOTHYROXINE SODIUM 112 MCG: 0.11 TABLET ORAL at 08:55

## 2024-06-26 RX ADMIN — SODIUM CHLORIDE: 4.5 INJECTION, SOLUTION INTRAVENOUS at 06:30

## 2024-06-26 RX ADMIN — TICAGRELOR 90 MG: 90 TABLET ORAL at 21:41

## 2024-06-26 ASSESSMENT — PAIN SCALES - GENERAL
PAINLEVEL_OUTOF10: 3
PAINLEVEL_OUTOF10: 0
PAINLEVEL_OUTOF10: 0
PAINLEVEL_OUTOF10: 3
PAINLEVEL_OUTOF10: 7
PAINLEVEL_OUTOF10: 0
PAINLEVEL_OUTOF10: 0

## 2024-06-26 ASSESSMENT — PAIN - FUNCTIONAL ASSESSMENT: PAIN_FUNCTIONAL_ASSESSMENT: ACTIVITIES ARE NOT PREVENTED

## 2024-06-26 ASSESSMENT — PAIN DESCRIPTION - ORIENTATION
ORIENTATION: MID
ORIENTATION: OUTER

## 2024-06-26 ASSESSMENT — PAIN DESCRIPTION - DESCRIPTORS
DESCRIPTORS: ACHING
DESCRIPTORS: ACHING

## 2024-06-26 ASSESSMENT — PAIN DESCRIPTION - LOCATION
LOCATION: GENERALIZED
LOCATION: HEAD
LOCATION: RECTUM

## 2024-06-26 ASSESSMENT — PAIN DESCRIPTION - PAIN TYPE: TYPE: ACUTE PAIN

## 2024-06-26 ASSESSMENT — PAIN DESCRIPTION - FREQUENCY: FREQUENCY: CONTINUOUS

## 2024-06-26 ASSESSMENT — PAIN DESCRIPTION - ONSET: ONSET: ON-GOING

## 2024-06-26 NOTE — PROGRESS NOTES
PULMONARY AND CRITICAL CARE MEDICINE PROGRESS NOTE    Subjective: Patient is off vasopressors.  Creatinine is improving.  Excellent urine output.  Improving leukocytosis.  Stool culture positive for Campylobacter    REVIEW OF SYSTEMS:   Constitutional symptoms: The patient denies fever, fatigue, night sweats, weight loss or weight gain.   HEENT: No vision changes. No tinnitus, Denies sinus pain. No hoarseness, or dysphagia.   Neck: Patient denies swelling in the neck.   Cardiovascular: Denies chest pain, palpitation, syncope.  Respiratory: Denies shortness of breath or cough.   Gastrointestinal: Denies nausea, abdominal pain or change in bowel function.  Genitourinary: Denies obstructive symptoms. No history of incontinence.  Skin: No rashes or itching.   Muskuloskeletal: Denies weakness or bone pain.   Neurological: No headaches or seizures.   Psychiatric: Denies mood swings or depression.     MEDICATIONS:     Scheduled Meds:   azithromycin  500 mg Oral Daily    apixaban  2.5 mg Oral BID    levothyroxine  112 mcg Oral Daily    ticagrelor  90 mg Oral BID    sodium chloride flush  5-40 mL IntraVENous 2 times per day    insulin lispro  0-4 Units SubCUTAneous TID WC    insulin lispro  0-4 Units SubCUTAneous Nightly       Current Infusions:    sodium chloride 75 mL/hr at 06/26/24 0936    norepinephrine Stopped (06/26/24 0736)    dextrose      sodium chloride         PRN meds:  dextrose bolus **OR** dextrose bolus, glucagon (rDNA), dextrose, sodium chloride flush, sodium chloride, ondansetron **OR** ondansetron, polyethylene glycol, acetaminophen **OR** acetaminophen    PHYSICAL EXAM:  /70   Pulse 93   Temp 96.8 °F (36 °C) (Temporal)   Resp 22   Wt 116.5 kg (256 lb 13.4 oz)   SpO2 96%   BMI 44.06 kg/m²  I/O last 3 completed shifts:  In: 3780.8 [P.O.:420; I.V.:2931.2; IV Piggyback:429.6]  Out: 7310 [Urine:6260; Stool:1050] I/O this shift:  In: -   Out: 750 [Urine:750]    Intake/Output Summary (Last 24  on chronic CKD  Lactic acidosis  Nausea/vomiting and diarrhea        PLAN:  Patient presents with shock with blood pressure in 60s.  Likely combination of hypovolemia and septic shock.  Has responded to aggressive fluid resuscitation.    Off vasopressors.  Decrease IV fluids to 75 cc/h.  Tolerating diet.  Hold home antihypertensives for today and slowly resume based on blood pressure.     Lactic acidosis with a bicarb of 15 and acute kidney injury.  Continue fluid resuscitation.  Good urine output.       Leukocytosis with elevated procalcitonin.  Diarrhea present.  On empiric antibiotics for now.  Stool for C. difficile negative.  Stool culture positive for Campylobacter.  Switch antibiotic to azithromycin..      Tolerating diet.    Patient can be downgraded and transferred out of ICU.      April Dean MD  Pulmonary Critical Care and Sleep Medicine  6/26/2024, 11:52 AM    This note was completed using dragon medical speech recognition software. Grammatical errors, random word insertions, pronoun errors and incomplete sentences are occasional consequences of this technology due to software limitations. If there are questions or concerns about the content of this note of information contained within the body of this dictation they should be addressed with the provider for clarification.

## 2024-06-26 NOTE — PLAN OF CARE
Problem: Safety - Adult  Goal: Free from fall injury  Outcome: Progressing     Problem: Discharge Planning  Goal: Discharge to home or other facility with appropriate resources  Outcome: Progressing     Problem: Chronic Conditions and Co-morbidities  Goal: Patient's chronic conditions and co-morbidity symptoms are monitored and maintained or improved  Outcome: Progressing     Problem: Pain  Goal: Verbalizes/displays adequate comfort level or baseline comfort level  Outcome: Progressing     Problem: Skin/Tissue Integrity  Goal: Absence of new skin breakdown  Description: 1.  Monitor for areas of redness and/or skin breakdown  2.  Assess vascular access sites hourly  3.  Every 4-6 hours minimum:  Change oxygen saturation probe site  4.  Every 4-6 hours:  If on nasal continuous positive airway pressure, respiratory therapy assess nares and determine need for appliance change or resting period.  Outcome: Progressing      22-Sep-2022

## 2024-06-26 NOTE — PLAN OF CARE
Problem: Safety - Adult  Goal: Free from fall injury  6/25/2024 2127 by Fannie Galeas RN  Outcome: Progressing     Problem: Chronic Conditions and Co-morbidities  Goal: Patient's chronic conditions and co-morbidity symptoms are monitored and maintained or improved  6/25/2024 2127 by Fannie Galeas RN  Outcome: Progressing     Problem: Pain  Goal: Verbalizes/displays adequate comfort level or baseline comfort level  Outcome: Progressing     Problem: Skin/Tissue Integrity  Goal: Absence of new skin breakdown  Description: 1.  Monitor for areas of redness and/or skin breakdown  2.  Assess vascular access sites hourly  3.  Every 4-6 hours minimum:  Change oxygen saturation probe site  4.  Every 4-6 hours:  If on nasal continuous positive airway pressure, respiratory therapy assess nares and determine need for appliance change or resting period.  Outcome: Progressing     Problem: Discharge Planning  Goal: Discharge to home or other facility with appropriate resources  6/25/2024 2127 by Fannie Galeas RN  Outcome: Progressing  Flowsheets (Taken 6/25/2024 2000)  Discharge to home or other facility with appropriate resources:   Identify barriers to discharge with patient and caregiver   Arrange for needed discharge resources and transportation as appropriate   Identify discharge learning needs (meds, wound care, etc)   Refer to discharge planning if patient needs post-hospital services based on physician order or complex needs related to functional status, cognitive ability or social support system   Arrange for interpreters to assist at discharge as needed

## 2024-06-26 NOTE — PROGRESS NOTES
MD Stephanie Gilmore MD Samir Brahmbhatt, MD                  Office: [phone]                      Fax: [phone]             [employer]                   NEPHROLOGY   INPATIENT PROGRESS NOTE:     PATIENT NAME: Alicia Cervantes  : 1960  MRN: [id number]       RECOMMENDATIONS:     Post ATN polyuria  Likely contrast-induced nephropathy after given IV contrast in the ER,    IVFs bolues in ER   Then start Na.HCO3 infusion  150 mEq At 150 cc/hr  -Changed to one half saline  -Stop now, allow oral hydration      Hold Januvia, Farxiga,-continue to hold on discharge  Use insulin sliding scale    Hold Lasix, irbesartan-continue to hold on discharge    Follow-up culture  Empirical antibiotics per primary team     Inserted urinary catheter Rm for close input output monitoring  Consulted urology for renal lesion ?RCC   (Seen in  MRI too)    no need for dialysis,    at higher risk for decompensation, needing closer monitoring.      D/C plan from renal stand point:- likely ~24-48 hours, with above recommendation  -Will reschedule appointment from 2024, to continue to follow-up with us     Thank you for allowing me to participate in this patient's care. Please do not hesitate to contact me anytime. We will follow along with you.       Harris Chen MD,  [facility]  Office: [phone] or [address]  Fax: [phone]        IMPRESSION:       Admitted on:  2024  9:07 AM   For:    Hospital Problems             Last Modified POA    * (Principal) Sepsis (HCC) 2024 Yes     JIMMY : On admission  H/O proteinuric CKD: stage 3B   - BL S.Cr.:  Lowest recently 1.6-worsened to 1.8,  BL eGFR 30s-40s, last known 3-  - on admission S.Cr.:  2.9  - Etiology of JIMMY -presumably ATN, in the setting of hypovolemia, sepsis, hypotension, shock, needing aggressive pressor support  - With risk of contrast-induced nephropathy   - UA : results

## 2024-06-27 VITALS
OXYGEN SATURATION: 97 % | BODY MASS INDEX: 42.79 KG/M2 | WEIGHT: 256.84 LBS | TEMPERATURE: 97.9 F | SYSTOLIC BLOOD PRESSURE: 134 MMHG | HEIGHT: 65 IN | HEART RATE: 79 BPM | DIASTOLIC BLOOD PRESSURE: 80 MMHG | RESPIRATION RATE: 18 BRPM

## 2024-06-27 LAB
ALBUMIN SERPL-MCNC: 2.8 G/DL (ref 3.4–5)
ANION GAP SERPL CALCULATED.3IONS-SCNC: 9 MMOL/L (ref 3–16)
BASOPHILS # BLD: 0 K/UL (ref 0–0.2)
BASOPHILS NFR BLD: 0.4 %
BUN SERPL-MCNC: 31 MG/DL (ref 7–20)
CALCIUM SERPL-MCNC: 8.5 MG/DL (ref 8.3–10.6)
CHLORIDE SERPL-SCNC: 109 MMOL/L (ref 99–110)
CO2 SERPL-SCNC: 24 MMOL/L (ref 21–32)
CREAT SERPL-MCNC: 1.8 MG/DL (ref 0.6–1.2)
DEPRECATED RDW RBC AUTO: 15 % (ref 12.4–15.4)
EOSINOPHIL # BLD: 0.2 K/UL (ref 0–0.6)
EOSINOPHIL NFR BLD: 2.4 %
GFR SERPLBLD CREATININE-BSD FMLA CKD-EPI: 31 ML/MIN/{1.73_M2}
GLUCOSE BLD-MCNC: 114 MG/DL (ref 70–99)
GLUCOSE BLD-MCNC: 87 MG/DL (ref 70–99)
GLUCOSE SERPL-MCNC: 95 MG/DL (ref 70–99)
HCT VFR BLD AUTO: 32.4 % (ref 36–48)
HGB BLD-MCNC: 10.7 G/DL (ref 12–16)
LYMPHOCYTES # BLD: 1.2 K/UL (ref 1–5.1)
LYMPHOCYTES NFR BLD: 12 %
MAGNESIUM SERPL-MCNC: 2.3 MG/DL (ref 1.8–2.4)
MCH RBC QN AUTO: 27.8 PG (ref 26–34)
MCHC RBC AUTO-ENTMCNC: 33.2 G/DL (ref 31–36)
MCV RBC AUTO: 83.9 FL (ref 80–100)
MONOCYTES # BLD: 0.7 K/UL (ref 0–1.3)
MONOCYTES NFR BLD: 6.7 %
NEUTROPHILS # BLD: 7.8 K/UL (ref 1.7–7.7)
NEUTROPHILS NFR BLD: 78.5 %
PERFORMED ON: ABNORMAL
PERFORMED ON: NORMAL
PHOSPHATE SERPL-MCNC: 2.2 MG/DL (ref 2.5–4.9)
PLATELET # BLD AUTO: 123 K/UL (ref 135–450)
PMV BLD AUTO: 9 FL (ref 5–10.5)
POTASSIUM SERPL-SCNC: 4.3 MMOL/L (ref 3.5–5.1)
RBC # BLD AUTO: 3.86 M/UL (ref 4–5.2)
SODIUM SERPL-SCNC: 142 MMOL/L (ref 136–145)
WBC # BLD AUTO: 10 K/UL (ref 4–11)

## 2024-06-27 PROCEDURE — 97530 THERAPEUTIC ACTIVITIES: CPT

## 2024-06-27 PROCEDURE — 36415 COLL VENOUS BLD VENIPUNCTURE: CPT

## 2024-06-27 PROCEDURE — 85025 COMPLETE CBC W/AUTO DIFF WBC: CPT

## 2024-06-27 PROCEDURE — 2580000003 HC RX 258: Performed by: HOSPITALIST

## 2024-06-27 PROCEDURE — 6370000000 HC RX 637 (ALT 250 FOR IP): Performed by: INTERNAL MEDICINE

## 2024-06-27 PROCEDURE — 80069 RENAL FUNCTION PANEL: CPT

## 2024-06-27 PROCEDURE — 83735 ASSAY OF MAGNESIUM: CPT

## 2024-06-27 PROCEDURE — 97161 PT EVAL LOW COMPLEX 20 MIN: CPT

## 2024-06-27 PROCEDURE — 6370000000 HC RX 637 (ALT 250 FOR IP): Performed by: HOSPITALIST

## 2024-06-27 RX ORDER — IRBESARTAN 75 MG/1
37.5 TABLET ORAL DAILY
Qty: 90 TABLET | Refills: 3 | Status: SHIPPED | OUTPATIENT
Start: 2024-07-01

## 2024-06-27 RX ORDER — AZITHROMYCIN 500 MG/1
500 TABLET, FILM COATED ORAL DAILY
Qty: 1 TABLET | Refills: 0 | Status: SHIPPED | OUTPATIENT
Start: 2024-06-27 | End: 2024-06-28

## 2024-06-27 RX ORDER — FUROSEMIDE 20 MG/1
20 TABLET ORAL DAILY
Qty: 90 TABLET | Refills: 3 | Status: SHIPPED | OUTPATIENT
Start: 2024-07-01

## 2024-06-27 RX ORDER — AZITHROMYCIN 500 MG/1
500 TABLET, FILM COATED ORAL DAILY
Qty: 5 TABLET | Refills: 0 | Status: SHIPPED | OUTPATIENT
Start: 2024-06-27 | End: 2024-06-27

## 2024-06-27 RX ADMIN — AZITHROMYCIN DIHYDRATE 500 MG: 250 TABLET ORAL at 09:01

## 2024-06-27 RX ADMIN — APIXABAN 2.5 MG: 2.5 TABLET, FILM COATED ORAL at 09:01

## 2024-06-27 RX ADMIN — SODIUM CHLORIDE, PRESERVATIVE FREE 10 ML: 5 INJECTION INTRAVENOUS at 09:02

## 2024-06-27 RX ADMIN — LEVOTHYROXINE SODIUM 112 MCG: 0.11 TABLET ORAL at 09:01

## 2024-06-27 RX ADMIN — TICAGRELOR 90 MG: 90 TABLET ORAL at 09:01

## 2024-06-27 NOTE — PROGRESS NOTES
Patient alert and oriented x 4 and up SBA. Patient voiding without difficulty, hearn removed earlier today. Patient tolerating diet with no c/o nausea at this time. Patient does c/o generalized body aches from fall PTA, PRN tylenol given per order, see MAR. Assessment completed, see flowsheet. Patient's bed is locked and in lowest position, side rails up x 2 with an active bed alarm. Non slip socks applied.

## 2024-06-27 NOTE — PROGRESS NOTES
Patient discharged with belongings, new medications, and follow up instructions. IVs removed without complications. Patient transported to main entrance via wheelchair, spouse awaiting at [facility].

## 2024-06-27 NOTE — PROGRESS NOTES
[facility] - Inpatient Rehabilitation Department   Phone: [phone]    Physical Therapy    [x] Initial Evaluation            [] Daily Treatment Note         [x] Discharge Summary      Patient: Alicia Cervantes   : 1960   MRN: [id number]   Date of Service:  2024  Admitting Diagnosis: Sepsis (HCC)    Current Admission Summary: Alicia Cervantes is a 64 y.o. female who presented with nausea vomiting.  And diarrhea ongoing for few hours.  Patient said has been having persistent nausea and vomiting.  Started with diarrhea yesterday.  And with abdominal pain.  Unclear if she ate anything unusual.  Just felt lightheaded today.  Came to ER for further evaluation patient felt as if she was about to pass out while showering.   help her go to the ground.  No loss of conscious.  On arrival patient febrile tachycardiac.  Blood pressure in the 60s patient given bolus of IV fluid with improvement in blood pressure     Past Medical History:  has a past medical history of Acute superficial venous thrombosis of lower extremity, right, Arthritis, CAD (coronary artery disease), native coronary artery, Cataract, bilateral, Chronic superficial venous thrombosis of left lower extremity, CKD (chronic kidney disease) stage 2, GFR 60-89 ml/min, CKD (chronic kidney disease) stage 3, GFR 30-59 ml/min ([facility]), COVID-19 virus infection, Cystic kidney disease, DVT (deep venous thrombosis) ([facility]), Gastroesophageal reflux disease without esophagitis, H/O deep venous thrombosis, H/O simple renal cyst, Hx of blood clots, Hyperlipidemia, Hypertension, IBS (irritable bowel syndrome), Left retinal detachment, NSTEMI (non-ST elevated myocardial infarction) ([facility]), Osteoarthritis, knee, Saphenofemoral venous reflux, Squamous cell carcinoma of skin of lower limb, left, Steatosis of liver, SVT (supraventricular tachycardia) ([facility]), Thyroid disease, Type 2 diabetes mellitus (HCC), and Venous insufficiency.    Past  Surgical History:  has a past surgical history that includes Cholecystectomy (04/2003); Hysterectomy (01/1999); Breast surgery; Knee arthroscopy (03/2015); Total knee arthroplasty (05/2013); Knee arthroscopy; Tonsillectomy; Varicose vein surgery (1980's.  ); Colonoscopy (02/2015); Mohs surgery (07/01/2019); Retinal detachment surgery (Left, 09/30/2020); Retinal detachment surgery (Left, 10/24/2020); Retinal detachment surgery (Left, 2021); joint replacement; Total knee arthroplasty (Left, 12/14/2021); Coronary angioplasty with stent (12/11/2023); and Cardiac catheterization (01/17/2024).    Discharge Recommendations: Alicia RUDOLPH Cervantes scored a 22/24 on the AM-PAC short mobility form.  At this time, no further PT is recommended upon discharge due to patient at independent level.  Recommend patient returns to prior setting with prior services.    DME Required For Discharge: No new DME required  Precautions/Restrictions: high fall risk  Positional Restrictions:no positional restrictions    Pre-Admission Information   Lives With: spouse    Type of Home: house  Home Layout: two level, bedroom/bathroom upstairs, 12 stairs to 2nd level with R HR  Bathroom Layout: walk in shower  Toilet Height: elevated height  Home Equipment: no prior equipment  Transfer Assistance: Independent without use of device  Ambulation Assistance:Independent without use of device  ADL Assistance: independent with all ADL's  IADL Assistance: independent with homemaking tasks  Active :        [x] Yes  [] No  Current Employment: retired.  Occupation: stay at home mom  Recent Falls: Not asked to patient.  RN reports patient has fall history.    Examination   Vision:   Vision Gross Assessment: Impaired and Vision Corrective Device: wears glasses at all times  Hearing:   WFL  Observation:   General Observation:  Patient supine in bed, eating.  She appears comfortable.  Posture:   WFL  Sensation:   WFL  ROM:   (B) LE ROM WFL  Strength:   (B) LE

## 2024-06-27 NOTE — DISCHARGE INSTR - COC
Continuity of Care Form    Patient Name: Alicia Cervantes   :  1960  MRN:  [id number]    Admit date:  2024  Discharge date:  ***    Code Status Order: Full Code   Advance Directives:     Admitting Physician:  Evangelist Shah MD  PCP: Marcellus Taylor DO    Discharging Nurse: ***  Discharging Hospital Unit/Room#: 3AN-3318/3318-01  Discharging Unit Phone Number: ***    Emergency Contact:   Extended Emergency Contact Information  Primary Emergency Contact: Peter Cervantes  Address: [address] [place] [address]  Home Phone: [phone]  Mobile Phone: [phone]  Relation: Spouse  Secondary Emergency Contact: Tahir Cervantes   [facility]  Home Phone: [phone]  Mobile Phone: [phone]  Relation: Child    Past Surgical History:  Past Surgical History:   Procedure Laterality Date    BREAST SURGERY      left fibroid tumor    CARDIAC CATHETERIZATION  2024    CHOLECYSTECTOMY  2003    COLONOSCOPY  2015    Dr. Monroe    CORONARY ANGIOPLASTY WITH STENT PLACEMENT  2023    STent to RCA, Dr. Rapp    HYSTERECTOMY (CERVIX STATUS UNKNOWN)  1999    JOINT REPLACEMENT      KNEE ARTHROSCOPY  2015    right with medial meninsectomy    KNEE ARTHROSCOPY      right x 3-4     MOHS SURGERY  2019    Dr. Aviles    RETINAL DETACHMENT SURGERY Left 09/30/2020    X 3 LAST ONE IN 2021    RETINAL DETACHMENT SURGERY Left 10/24/2020    RETINAL DETACHMENT SURGERY Left     TONSILLECTOMY      TOTAL KNEE ARTHROPLASTY  2013    right    TOTAL KNEE ARTHROPLASTY Left 2021    LEFT TOTAL KNEE ARTHROPLASTY performed by Gene Romero MD at [facility] OR    VARICOSE VEIN SURGERY  .         Immunization History:   Immunization History   Administered Date(s) Administered    COVID-19, MODERNA BLUE border, Primary or Immunocompromised, (age 12y+), IM, 100 mcg/0.5mL 03/10/2021, 2021    Influenza, FLUARIX, FLULAVAL, FLUZONE (age 6  mo+) AND AFLURIA, (age 3 y+), PF, 0.5mL 09/27/2019, 10/21/2020    Influenza, FLUCELVAX, (age 6 mo+), MDCK, PF, 0.5mL 10/25/2021, 11/08/2022, 10/10/2023    PPD Test 09/09/2008, 10/17/2008, 07/27/2011    Pneumococcal, PCV20, PREVNAR 20, (age 6w+), IM, 0.5mL 06/29/2022    Pneumococcal, PPSV23, PNEUMOVAX 23, (age 2y+), SC/IM, 0.5mL 09/27/2019    TD 5LF, TENIVAC, (age 7y+), IM, 0.5mL 09/09/2008    TDaP, ADACEL (age 10y-64y), BOOSTRIX (age 10y+), IM, 0.5mL 11/28/2016    Td, unspecified formulation 09/09/2008       Active Problems:  Patient Active Problem List   Diagnosis Code    Benign essential HTN I10    Hypothyroidism E03.9    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    Venous insufficiency of both lower extremities I87.2    Steatosis of liver K76.0    Impaired fasting glucose R73.01    Hyperlipidemia E78.5    CKD (chronic kidney disease) stage 3, GFR 30-59 ml/min ([facility]) N18.30    Irritable bowel syndrome with diarrhea K58.0    Primary osteoarthritis of left knee M17.12    Hearing loss due to cerumen impaction H61.20    Sensorineural hearing loss of left ear H90.5    Gastroesophageal reflux disease without esophagitis K21.9    Arthropathy M12.9    Renal lesion N28.9    Type 2 diabetes mellitus with diabetic neuropathy, without long-term current use of insulin ([facility]) E11.40    Edema of lower extremity R60.0    Low back pain M54.50    Lumbar radiculopathy M54.16    Overweight E66.3    Pain of right lower extremity M79.604    Personal history of other venous thrombosis and embolism Z86.718    History of COVID-19 Z86.16    Arthritis of left knee M17.12    Class 2 severe obesity due to excess calories with serious comorbidity and body mass index (BMI) of 38.0 to 38.9 in adult ([facility]) E66.01, Z68.38    Type 2 diabetes mellitus with chronic kidney disease E11.22    Chronic venous insufficiency I87.2    Chest pain R07.9    NSTEMI (non-ST elevated myocardial infarction) ([facility]) I21.4    Unstable angina ([facility]) I20.0    Coronary artery

## 2024-06-27 NOTE — PROGRESS NOTES
CLINICAL PHARMACY NOTE: MEDS TO BEDS    Total # of Prescriptions Filled: 2   The following medications were delivered to the patient:  AZITHROMYCIN 500MG TABS  FUROSEMIDE 20MG TABS    Additional Documentation: Monica HADDAD approved to deliver medications to patient room=signed  [facility]

## 2024-06-27 NOTE — PLAN OF CARE
Problem: Safety - Adult  Goal: Free from fall injury  6/27/2024 1112 by Monica Hernandez RN  Outcome: Progressing     Problem: Discharge Planning  Goal: Discharge to home or other facility with appropriate resources  6/27/2024 1112 by Monica Hernandez RN  Outcome: Progressing     Problem: Chronic Conditions and Co-morbidities  Goal: Patient's chronic conditions and co-morbidity symptoms are monitored and maintained or improved  6/27/2024 1112 by Monica Hernandez RN  Outcome: Progressing      Yes

## 2024-06-27 NOTE — PROGRESS NOTES
Patient A&Ox4, VSS. Pt denies pain, nausea, or diarrhea, tolerating diet well. Pt anticipating discharge home pending PT eval. Pt instructed to call out for needs. Fall precautions in place. Will continue to monitor for changes.

## 2024-06-27 NOTE — CARE COORDINATION
Pt has been informed of discharge service called [facility] that would follow up with pt after discharge in their home.  Pt has declined to have this service at this time because:    Patient is independent at baseline and feels she will have enough family at her home to assist with any needs she may have.   PT evaluation stated patient was at her baseline and safe to return to home setting.     Electronically signed by BHAVIK Rincon on 6/27/2024 at 1:40 PM

## 2024-06-27 NOTE — PROGRESS NOTES
MD Stephanie Gilmore MD Samir Brahmbhatt, MD                  Office: [phone]                      Fax: [phone]             [employer]                   NEPHROLOGY   INPATIENT PROGRESS NOTE:     PATIENT NAME: Alicia Cervantes  : 1960  MRN: [id number]       RECOMMENDATIONS:     Post ATN polyuria - better   Likely contrast-induced nephropathy after given IV contrast in the ER,    IVFs bolues in ER   Then start Na.HCO3 infusion  150 mEq At 150 cc/hr  -Changed to one half saline  -Stopped now, allow oral hydration      Hold Januvia, Farxiga,-continue to hold on discharge  Use insulin sliding scale    Hold Lasix, irbesartan-continue to hold on discharge    Empirical antibiotics per primary team     Inserted urinary catheter Rm for close input output monitoring  Consulted urology for renal lesion ?RCC   (Seen in  MRI too)  Per them  \"  Recommendations:  She has history of suspicious cysts of her LEFT kidney that have turned out to be benign.  Now with concerning RIGHT sided cyst measuring 1.9cm. Previously on Celebrex. Discussed at it's current size, even if this was to be renal cancer, it would have a low chance of metastasis. Will arrange outpatient follow up in x2-3 weeks to discuss further imaging.   \"      no need for dialysis,    at higher risk for decompensation, needing closer monitoring.      D/C plan from renal stand point:- ok w/ above recs  -Will reschedule appointment from 2024, to continue to follow-up with us     Thank you for allowing me to participate in this patient's care. Please do not hesitate to contact me anytime. We will follow along with you.       Harris Chen MD,  [facility]  Office: [phone] or [address]  Fax: [phone]        IMPRESSION:       Admitted on:  2024  9:07 AM   For:    Hospital Problems             Last Modified POA    * (Principal) Sepsis (HCC) 2024 Yes     JIMMY : On  \"IOPIATES\", \"IPHENCYC\"    HgBA1c:    Lab Results   Component Value Date/Time    LABA1C 6.2 06/25/2024 04:42 AM     RPR:  No results found for: \"RPR\"  HIV:  No results found for: \"HIV\"  BRYNN:    Lab Results   Component Value Date/Time    BRYNN Negative 11/29/2018 09:40 AM     RF:  No results found for: \"RF\"  DSDNA:  No components found for: \"DNA\"  AMYLASE:  No results found for: \"AMYLASE\"  LIPASE:    Lab Results   Component Value Date/Time    LIPASE 16.0 06/24/2024 09:20 AM     Fibrinogen Level:  No components found for: \"FIB\"       BELOW MENTIONED RADIOLOGY STUDY RESULTS BY ME (AS NEEDED FOR MY EVALUATION AND MANAGEMENT).     CT HEAD WO CONTRAST    Result Date: 6/24/2024  EXAMINATION: CT OF THE HEAD WITHOUT CONTRAST  6/24/2024 9:38 am TECHNIQUE: CT of the head was performed without the administration of intravenous contrast. Automated exposure control, iterative reconstruction, and/or weight based adjustment of the mA/kV was utilized to reduce the radiation dose to as low as reasonably achievable. COMPARISON: None. HISTORY: ORDERING SYSTEM PROVIDED HISTORY: Syncope TECHNOLOGIST PROVIDED HISTORY: If patient is on cardiac monitor and/or pulse ox, they may be taken off cardiac monitor and pulse ox, left on O2 if currently on. All monitors reattached when patient returns to room. Has a \"code stroke\" or \"stroke alert\" been called?->No Reason for exam:->Syncope Reason for Exam: syncope FINDINGS: BRAIN/VENTRICLES: There is no acute intracranial hemorrhage, mass effect or midline shift.  No abnormal extra-axial fluid collection.  The gray-white differentiation is maintained without evidence of an acute infarct.  There is no evidence of hydrocephalus. SINUSES: The visualized paranasal sinuses and mastoid air cells demonstrate no acute abnormality. SOFT TISSUES/SKULL:  No acute abnormality of the visualized skull or soft tissues.     No acute intracranial abnormality.     REYNA DIGITAL DIAGNOSTIC W OR WO CAD BILATERAL    Result

## 2024-06-27 NOTE — PLAN OF CARE
Problem: Safety - Adult  Goal: Free from fall injury  6/27/2024 0059 by Stella Zhao RN  Outcome: Progressing  Flowsheets (Taken 6/27/2024 0059)  Free From Fall Injury:   Instruct family/caregiver on patient safety   Based on caregiver fall risk screen, instruct family/caregiver to ask for assistance with transferring infant if caregiver noted to have fall risk factors     Problem: Discharge Planning  Goal: Discharge to home or other facility with appropriate resources  6/27/2024 0059 by Stella Zhao RN  Outcome: Progressing  Flowsheets (Taken 6/27/2024 0059)  Discharge to home or other facility with appropriate resources: Identify barriers to discharge with patient and caregiver     Problem: Chronic Conditions and Co-morbidities  Goal: Patient's chronic conditions and co-morbidity symptoms are monitored and maintained or improved  6/27/2024 0059 by Stella Zhao RN  Outcome: Progressing  Flowsheets (Taken 6/27/2024 0059)  Care Plan - Patient's Chronic Conditions and Co-Morbidity Symptoms are Monitored and Maintained or Improved: Monitor and assess patient's chronic conditions and comorbid symptoms for stability, deterioration, or improvement     Problem: Pain  Goal: Verbalizes/displays adequate comfort level or baseline comfort level  6/27/2024 0059 by Stella Zhao RN  Outcome: Progressing  Flowsheets  Taken 6/27/2024 0059 by Stella Zhao RN  Verbalizes/displays adequate comfort level or baseline comfort level:   Encourage patient to monitor pain and request assistance   Assess pain using appropriate pain scale   Administer analgesics based on type and severity of pain and evaluate response   Implement non-pharmacological measures as appropriate and evaluate response    Problem: Skin/Tissue Integrity  Goal: Absence of new skin breakdown  Description: 1.  Monitor for areas of redness and/or skin breakdown  2.  Assess vascular access sites hourly  3.  Every 4-6 hours minimum:   Change oxygen saturation probe site  4.  Every 4-6 hours:  If on nasal continuous positive airway pressure, respiratory therapy assess nares and determine need for appliance change or resting period.  6/27/2024 0059 by Stella Zhao RN  Outcome: Progressing

## 2024-06-28 ENCOUNTER — CARE COORDINATION (OUTPATIENT)
Dept: CASE MANAGEMENT | Age: 64
End: 2024-06-28

## 2024-06-28 DIAGNOSIS — R65.21 SEPSIS WITH ACUTE RENAL FAILURE AND SEPTIC SHOCK, DUE TO UNSPECIFIED ORGANISM, UNSPECIFIED ACUTE RENAL FAILURE TYPE (HCC): Primary | ICD-10-CM

## 2024-06-28 DIAGNOSIS — A41.9 SEPSIS WITH ACUTE RENAL FAILURE AND SEPTIC SHOCK, DUE TO UNSPECIFIED ORGANISM, UNSPECIFIED ACUTE RENAL FAILURE TYPE (HCC): Primary | ICD-10-CM

## 2024-06-28 DIAGNOSIS — N17.9 SEPSIS WITH ACUTE RENAL FAILURE AND SEPTIC SHOCK, DUE TO UNSPECIFIED ORGANISM, UNSPECIFIED ACUTE RENAL FAILURE TYPE (HCC): Primary | ICD-10-CM

## 2024-06-28 LAB — BACTERIA BLD CULT ORG #2: NORMAL

## 2024-06-28 NOTE — CARE COORDINATION
Care Transitions Note    Initial Call - Call within 2 business days of discharge: Yes    Patient Current Location:  Home: [address] [place] [address]   [facility] [address]    Care Transition Nurse contacted the patient by telephone to perform post hospital discharge assessment, verified name and  as identifiers. Provided introduction to self, and explanation of the Care Transition Nurse role.     Patient: Alicia Cervantes    Patient : 1960   MRN: [id number]    Reason for Admission: septic shock 2/ campylobacter gastroenteritis   Discharge Date: 24  RURS: Readmission Risk Score: 13.9      Last Discharge Facility       Date Complaint Diagnosis Description Type Department Provider    24 Emesis Septic shock (HCC) ... [facility]            Was this an external facility discharge? No    Additional needs identified to be addressed with provider   No needs identified             Method of communication with provider: none.    Patients top risk factors for readmission: medical condition-.    Interventions to address risk factors:   Education: .  Review of patient management of conditions/medications: .    Care Summary Note: Patient reports that she feels weak and tired, does have diarrhea, but it has improved.  She is taking antibiotic as directed, denies vomiting and/or fever.  Discussed discharge instructions and reviewed medications, 1111F completed.  She will see Dr. Causey (nephrology) today and CTN routed a message to PCP requesting a hospital follow up appointment.  Patient denies any questions or concerns at this time.  CTN will continue with outreach follow up calls.      Care Transition Nurse reviewed discharge instructions, medical action plan, and red flags with patient. The patient was given an opportunity to ask questions; all questions answered at this time.. The patient verbalized understanding.   Were discharge

## 2024-06-29 LAB — BACTERIA BLD CULT: NORMAL

## 2024-07-02 ENCOUNTER — CARE COORDINATION (OUTPATIENT)
Dept: CASE MANAGEMENT | Age: 64
End: 2024-07-02

## 2024-07-05 ENCOUNTER — CARE COORDINATION (OUTPATIENT)
Dept: CASE MANAGEMENT | Age: 64
End: 2024-07-05

## 2024-07-05 NOTE — CARE COORDINATION
Care Transitions Note    Follow Up Call     Patient Current Location:  Home: [address] [place], [place] [address]   [place] [address]    LPN Care Coordinator contacted the patient by telephone. Verified name and  as identifiers.    Additional needs identified to be addressed with provider   No needs identified                 Method of communication with provider: none.    Care Summary Note: LPN CC spoke with patient. States she is good. Taking it easy. Denies abd pain, fever/chills, N/V/D. States she is hydrating well. Had f/u with nephro who d/c various medications. Full med rec completed during this call d/t this. /108 today. LPN CC advised patient reach out to nephro regarding this BP. Patient verbalized understanding. . Appetite good. Denies problems with bowels or bladder. Had f/u with urology, no changes at this time. Patient to f/u later this year, repeat labs, & MRI later this year as well. Nephro f/u . Denies needs.   Autumn Madrid, ERICN CC  Care Transitions  [phone]    Plan of care updates since last contact:  Review of patient management of conditions/medications: med rec       Advance Care Planning:   Does patient have an Advance Directive: deferred at this time, will discuss on future follow up. .    Medication Review:  Medications changed since last call, reviewed today.     Remote Patient Monitoring:  Offered patient enrollment in the Remote Patient Monitoring (RPM) program for in-home monitoring: Yes, but did not enroll at this time: already monitoring with home equipment.    Assessments:  Care Transitions Subsequent and Final Call    Subsequent and Final Calls  Care Transitions Interventions  Other Interventions:              Follow Up Appointment:   Reviewed upcoming appointment(s).  Future Appointments         Provider Specialty Dept Phone    7/10/2024 3:00 PM (Arrive by 2:45 PM) Marcellus Taylor DO Internal Medicine [phone]    2024 1:00 PM Katie Hernandez, PAAlC Nephrology

## 2024-07-09 ENCOUNTER — CARE COORDINATION (OUTPATIENT)
Dept: CASE MANAGEMENT | Age: 64
End: 2024-07-09

## 2024-07-09 NOTE — CARE COORDINATION
Care Transitions Note    Follow Up Call     Attempted to reach patient for transitions of care follow up.  Unable to reach patient.      Outreach Attempts:   HIPAA compliant voicemail left for patient.     Patient: Alicia Cervantes                               Patient : 1960   MRN: [id number]                             Reason for Admission: septic shock 2/ campylobacter gastroenteritis   Discharge Date: 24       RURS: Readmission Risk Score: 13.9      Care Summary Note: Follow up outreach call attempt, no answer.  CTN left VM with contact information and request for return call.  CTN will continue with outreach call attempts.      Follow Up Appointment:   Future Appointments         Provider Specialty Dept Phone    7/10/2024 3:00 PM (Arrive by 2:45 PM) Marcellus Taylor,  Internal Medicine [phone]    2024 1:00 PM Katie Hernandez, PA-C Nephrology [phone]    2024 2:45 PM Armaan Rapp MD Cardiology [phone]            Plan for follow-up call in 2-5 days based on severity of symptoms and risk factors. Plan for next call: symptom management-.  self management-.  follow-up appointment-.    PAULETTE DAIGLE RN

## 2024-07-10 ENCOUNTER — OFFICE VISIT (OUTPATIENT)
Dept: INTERNAL MEDICINE CLINIC | Age: 64
End: 2024-07-10

## 2024-07-10 VITALS
SYSTOLIC BLOOD PRESSURE: 116 MMHG | HEART RATE: 79 BPM | WEIGHT: 247 LBS | BODY MASS INDEX: 41.1 KG/M2 | OXYGEN SATURATION: 99 % | DIASTOLIC BLOOD PRESSURE: 70 MMHG

## 2024-07-10 DIAGNOSIS — I10 BENIGN ESSENTIAL HTN: ICD-10-CM

## 2024-07-10 DIAGNOSIS — D69.6 THROMBOCYTOPENIA, UNSPECIFIED (HCC): ICD-10-CM

## 2024-07-10 DIAGNOSIS — A04.5 CAMPYLOBACTER GASTROENTERITIS: ICD-10-CM

## 2024-07-10 DIAGNOSIS — Z09 HOSPITAL DISCHARGE FOLLOW-UP: Primary | ICD-10-CM

## 2024-07-10 DIAGNOSIS — E11.40 TYPE 2 DIABETES MELLITUS WITH DIABETIC NEUROPATHY, WITHOUT LONG-TERM CURRENT USE OF INSULIN (HCC): ICD-10-CM

## 2024-07-10 DIAGNOSIS — N18.32 STAGE 3B CHRONIC KIDNEY DISEASE (HCC): ICD-10-CM

## 2024-07-10 RX ORDER — IRBESARTAN 75 MG/1
75 TABLET ORAL DAILY
Qty: 90 TABLET | Refills: 1 | Status: SHIPPED | OUTPATIENT
Start: 2024-07-10 | End: 2024-07-12 | Stop reason: SDUPTHER

## 2024-07-10 NOTE — PROGRESS NOTES
not take until follow up)    blood glucose test strips (ONETOUCH ULTRA) strip Test two times a day and As needed.    rosuvastatin (CRESTOR) 20 MG tablet TAKE 1 TABLET BY MOUTH EVERY NIGHT AT BEDTIME    JANUVIA 50 MG tablet TAKE 1 TABLET DAILY    ticagrelor (BRILINTA) 90 MG TABS tablet Take 1 tablet by mouth 2 times daily    esomeprazole (NEXIUM) 40 MG delayed release capsule TAKE 1 CAPSULE DAILY IN THE MORNING BEFORE BREAKFAST    tiZANidine (ZANAFLEX) 4 MG tablet TAKE 1 TABLET BY MOUTH EVERY NIGHT    levothyroxine (SYNTHROID) 112 MCG tablet Take 1 tablet by mouth daily    glipiZIDE (GLUCOTROL XL) 5 MG extended release tablet TAKE 1 TABLET DAILY    apixaban (ELIQUIS) 2.5 MG TABS tablet Take 1 tablet by mouth 2 times daily    dapagliflozin (FARXIGA) 5 MG tablet Take 1 tablet by mouth every morning    Lancets MISC 1 each by Does not apply route 2 times daily Please dispense ONE TOUCH Lancets  Test BS BID        Medications patient taking as of now reconciled against medications ordered at time of hospital discharge: Yes    A comprehensive review of systems was negative except for what was noted in the HPI.    Physical Exam    Vitals:    07/10/24 1504   BP: 116/70   Pulse: 79   SpO2: 99%   Weight: 112 kg (247 lb)     Body mass index is 41.1 kg/m².     Wt Readings from Last 3 Encounters:   07/10/24 112 kg (247 lb)   06/28/24 115.2 kg (254 lb)   06/26/24 116.5 kg (256 lb 13.4 oz)     BP Readings from Last 3 Encounters:   07/10/24 116/70   06/28/24 129/86   06/27/24 134/80        GEN:  64 y.o. female who is in NAD, A&O.  She appears stated age and well nourished.  She is cooperative and pleasant.    HEAD:  NC/AT, no lesions.  EYES:  NATAN, EOMI, No scleral icterus or conjunctival injection or discharge.  Visual fields in tact to confrontation.    EARS:  EAC's clear, TM's normal.  MOUTH & THROAT:  Oral cavity is clear without mucosal lesions.  Tongue is midline.  Dentition is in good repair.  No pharyngeal erythema or

## 2024-07-11 ENCOUNTER — CARE COORDINATION (OUTPATIENT)
Dept: CASE MANAGEMENT | Age: 64
End: 2024-07-11

## 2024-07-11 DIAGNOSIS — E11.9 TYPE 2 DIABETES MELLITUS WITHOUT COMPLICATION, WITHOUT LONG-TERM CURRENT USE OF INSULIN (HCC): ICD-10-CM

## 2024-07-11 NOTE — TELEPHONE ENCOUNTER
Pt called in for Dr. Taylor in regards to getting a Rx refill for:    blood glucose test strips (ONETOUCH ULTRA) strip     Last appointment: 7/10/2024  Next appointment: Visit date not found  Last refill: 5/29/2024      Lancets MISC   Last appointment: 7/10/2024  Next appointment: Visit date not found  Last refill: 6/21/2021      [id number] [place], [place] - [address] [facility] - P [phone] - F [phone]  [address] [place] [id number]  Phone: [phone]  Fax: [phone]

## 2024-07-11 NOTE — CARE COORDINATION
Care Transitions Note    Follow Up Call     Attempted to reach patient for transitions of care follow up.  Unable to reach patient.      Outreach Attempts:   HIPAA compliant voicemail left for patient.     Care Summary Note: Second & final follow up outreach call attempt, no answer.  CTN left VM with contact information and request for return call.  CTN will close program & remain available.      Follow Up Appointment:   Future Appointments         Provider Specialty Dept Phone    7/12/2024 1:00 PM Katie Hernandez, PAAlC Nephrology [phone]    11/7/2024 2:45 PM Armaan Rapp MD Cardiology [phone]            No further follow-up call indicated       PAULETTE DAIGLE RN

## 2024-07-12 RX ORDER — BLOOD SUGAR DIAGNOSTIC
STRIP MISCELLANEOUS
Qty: 100 STRIP | Refills: 3 | Status: SHIPPED | OUTPATIENT
Start: 2024-07-12

## 2024-07-12 RX ORDER — LANCETS 30 GAUGE
1 EACH MISCELLANEOUS 2 TIMES DAILY
Qty: 300 EACH | Refills: 3 | Status: SHIPPED | OUTPATIENT
Start: 2024-07-12

## 2024-08-05 DIAGNOSIS — R82.90 CLOUDY URINE: Primary | ICD-10-CM

## 2024-08-07 ENCOUNTER — E-VISIT (OUTPATIENT)
Dept: INTERNAL MEDICINE CLINIC | Age: 64
End: 2024-08-07
Payer: COMMERCIAL

## 2024-08-07 DIAGNOSIS — R82.90 CLOUDY URINE: Primary | ICD-10-CM

## 2024-08-07 PROCEDURE — 99421 OL DIG E/M SVC 5-10 MIN: CPT | Performed by: INTERNAL MEDICINE

## 2024-08-07 NOTE — PROGRESS NOTES
HPI: as per patient provided history  Exam: N/A (electronic visit)  ASSESSMENT/PLAN:  Diagnoses and all orders for this visit:    Cloudy urine  -     Urinalysis with Microscopic ([facility] ONLY)  -     Culture, Urine      Advised pt to return to the office if no better or worse.        Patient instructed to call the office if worsens, or fails to improve as anticipated.    5-10 minutes were spent on the digital evaluation and management of this patient.    Marcellus Taylor, DO

## 2024-08-08 DIAGNOSIS — E11.9 TYPE 2 DIABETES MELLITUS WITHOUT COMPLICATION, WITHOUT LONG-TERM CURRENT USE OF INSULIN (HCC): ICD-10-CM

## 2024-08-08 DIAGNOSIS — N39.0 URINARY TRACT INFECTION WITHOUT HEMATURIA, SITE UNSPECIFIED: Primary | ICD-10-CM

## 2024-08-08 LAB
BACTERIA URNS QL MICRO: ABNORMAL /HPF
BILIRUB UR QL STRIP.AUTO: NEGATIVE
CHARACTER UR: ABNORMAL
CLARITY UR: ABNORMAL
COLOR UR: YELLOW
EPI CELLS #/AREA URNS HPF: ABNORMAL /HPF (ref 0–5)
GLUCOSE UR STRIP.AUTO-MCNC: >=1000 MG/DL
HGB UR QL STRIP.AUTO: ABNORMAL
KETONES UR STRIP.AUTO-MCNC: NEGATIVE MG/DL
LEUKOCYTE ESTERASE UR QL STRIP.AUTO: ABNORMAL
NITRITE UR QL STRIP.AUTO: NEGATIVE
PH UR STRIP.AUTO: 6 [PH] (ref 5–8)
PROT UR STRIP.AUTO-MCNC: 30 MG/DL
RBC #/AREA URNS HPF: ABNORMAL /HPF (ref 0–4)
SP GR UR STRIP.AUTO: 1.01 (ref 1–1.03)
UA DIPSTICK W REFLEX MICRO PNL UR: YES
URN SPEC COLLECT METH UR: ABNORMAL
UROBILINOGEN UR STRIP-ACNC: 0.2 E.U./DL
WBC #/AREA URNS HPF: ABNORMAL /HPF (ref 0–5)

## 2024-08-08 RX ORDER — BLOOD SUGAR DIAGNOSTIC
STRIP MISCELLANEOUS
Qty: 100 STRIP | Refills: 3 | Status: SHIPPED | OUTPATIENT
Start: 2024-08-08

## 2024-08-08 RX ORDER — CEFDINIR 300 MG/1
300 CAPSULE ORAL 2 TIMES DAILY
Qty: 14 CAPSULE | Refills: 0 | Status: SHIPPED | OUTPATIENT
Start: 2024-08-08 | End: 2024-08-15

## 2024-08-08 NOTE — TELEPHONE ENCOUNTER
Last appointment: 8/7/2024  Next appointment: Visit date not found  Last refill: 7/12/24    Requested Prescriptions     Pending Prescriptions Disp Refills    blood glucose test strips (ONETOUCH ULTRA) strip [Pharmacy Med Name: ONE TOUCH ULTRA BLUE TEST STRP] 100 strip 3     Sig: TEST 2 TIMES A DAY & AS NEEDED FOR SYMPTOMS OF IRREGULAR BLOOD GLUCOSE.

## 2024-08-11 LAB
BACTERIA UR CULT: ABNORMAL
ORGANISM: ABNORMAL

## 2024-08-12 RX ORDER — LEVOTHYROXINE SODIUM 112 UG/1
112 TABLET ORAL DAILY
Qty: 90 TABLET | Refills: 3 | Status: SHIPPED | OUTPATIENT
Start: 2024-08-12

## 2024-08-28 RX ORDER — DAPAGLIFLOZIN 5 MG/1
5 TABLET, FILM COATED ORAL EVERY MORNING
Qty: 90 TABLET | Refills: 3 | Status: SHIPPED | OUTPATIENT
Start: 2024-08-28

## 2024-08-28 NOTE — TELEPHONE ENCOUNTER
Last appointment:   8/7/2024 E-visit  07/10/2024 HFU   Return in about 2 months (around 9/10/2024) for check up for chronic medical problems.  Next appointment: Visit date not found  Last refill: 12/13/2023

## 2024-09-13 DIAGNOSIS — M54.6 CHRONIC LEFT-SIDED THORACIC BACK PAIN: ICD-10-CM

## 2024-09-13 DIAGNOSIS — G89.29 CHRONIC LEFT-SIDED THORACIC BACK PAIN: ICD-10-CM

## 2024-09-13 RX ORDER — TIZANIDINE HYDROCHLORIDE 4 MG/1
4 CAPSULE, GELATIN COATED ORAL 3 TIMES DAILY PRN
Qty: 90 CAPSULE | Refills: 1 | Status: SHIPPED | OUTPATIENT
Start: 2024-09-13

## 2024-09-17 DIAGNOSIS — G89.29 CHRONIC LEFT-SIDED THORACIC BACK PAIN: ICD-10-CM

## 2024-09-17 DIAGNOSIS — M54.6 CHRONIC LEFT-SIDED THORACIC BACK PAIN: ICD-10-CM

## 2024-10-07 ENCOUNTER — OFFICE VISIT (OUTPATIENT)
Dept: INTERNAL MEDICINE CLINIC | Age: 64
End: 2024-10-07
Payer: COMMERCIAL

## 2024-10-07 VITALS
HEART RATE: 75 BPM | WEIGHT: 254 LBS | BODY MASS INDEX: 42.27 KG/M2 | SYSTOLIC BLOOD PRESSURE: 110 MMHG | DIASTOLIC BLOOD PRESSURE: 64 MMHG | OXYGEN SATURATION: 98 %

## 2024-10-07 DIAGNOSIS — I10 BENIGN ESSENTIAL HTN: ICD-10-CM

## 2024-10-07 DIAGNOSIS — I25.10 CORONARY ARTERY DISEASE INVOLVING NATIVE CORONARY ARTERY OF NATIVE HEART WITHOUT ANGINA PECTORIS: ICD-10-CM

## 2024-10-07 DIAGNOSIS — E11.9 TYPE 2 DIABETES MELLITUS WITHOUT COMPLICATION, WITHOUT LONG-TERM CURRENT USE OF INSULIN (HCC): ICD-10-CM

## 2024-10-07 DIAGNOSIS — Z01.818 PREOP EXAM FOR INTERNAL MEDICINE: Primary | ICD-10-CM

## 2024-10-07 DIAGNOSIS — Z01.818 PREOP EXAM FOR INTERNAL MEDICINE: ICD-10-CM

## 2024-10-07 DIAGNOSIS — E11.40 TYPE 2 DIABETES MELLITUS WITH DIABETIC NEUROPATHY, WITHOUT LONG-TERM CURRENT USE OF INSULIN (HCC): ICD-10-CM

## 2024-10-07 DIAGNOSIS — N18.31 TYPE 2 DIABETES MELLITUS WITH STAGE 3A CHRONIC KIDNEY DISEASE, WITHOUT LONG-TERM CURRENT USE OF INSULIN (HCC): ICD-10-CM

## 2024-10-07 DIAGNOSIS — E11.22 TYPE 2 DIABETES MELLITUS WITH STAGE 3A CHRONIC KIDNEY DISEASE, WITHOUT LONG-TERM CURRENT USE OF INSULIN (HCC): ICD-10-CM

## 2024-10-07 DIAGNOSIS — M54.6 CHRONIC LEFT-SIDED THORACIC BACK PAIN: ICD-10-CM

## 2024-10-07 DIAGNOSIS — G89.29 CHRONIC LEFT-SIDED THORACIC BACK PAIN: ICD-10-CM

## 2024-10-07 DIAGNOSIS — Z95.820 S/P ANGIOPLASTY WITH STENT: ICD-10-CM

## 2024-10-07 DIAGNOSIS — T84.022A INSTABILITY OF INTERNAL RIGHT KNEE PROSTHESIS, INITIAL ENCOUNTER (HCC): ICD-10-CM

## 2024-10-07 DIAGNOSIS — I87.2 CHRONIC VENOUS INSUFFICIENCY: ICD-10-CM

## 2024-10-07 PROCEDURE — 3044F HG A1C LEVEL LT 7.0%: CPT | Performed by: INTERNAL MEDICINE

## 2024-10-07 PROCEDURE — 99214 OFFICE O/P EST MOD 30 MIN: CPT | Performed by: INTERNAL MEDICINE

## 2024-10-07 PROCEDURE — 3078F DIAST BP <80 MM HG: CPT | Performed by: INTERNAL MEDICINE

## 2024-10-07 PROCEDURE — 1123F ACP DISCUSS/DSCN MKR DOCD: CPT | Performed by: INTERNAL MEDICINE

## 2024-10-07 PROCEDURE — 3074F SYST BP LT 130 MM HG: CPT | Performed by: INTERNAL MEDICINE

## 2024-10-07 PROCEDURE — 93000 ELECTROCARDIOGRAM COMPLETE: CPT | Performed by: INTERNAL MEDICINE

## 2024-10-07 RX ORDER — BLOOD SUGAR DIAGNOSTIC
STRIP MISCELLANEOUS
Qty: 100 STRIP | Refills: 3 | Status: SHIPPED | OUTPATIENT
Start: 2024-10-07

## 2024-10-07 NOTE — PATIENT INSTRUCTIONS
Stop Eliquis and Brilinta 72 hours prior to surgery.  Restart after    Hold irbesartan, glipizide, Farxiga, Januvia morning of surgery.    Avoid NSAID's (Motrin, Aleve, Advil, Ibuprofen),  vitamin supplements and fish oil 1 week prior to procedure

## 2024-10-07 NOTE — PROGRESS NOTES
[facility]  Internal Medicine  Patient Encounter  Marcellus Taylor D.O., [facility]          Chief Complaint   Patient presents with    Pre-op Exam     S/P on 10.15.24 with Dr. Gene Romero for RT knee repair.   F# [phone]          HPI-- 64 y.o. female presents today for a preoperative physical.  Pt diagnosed with sever left knee osteoarthritis.  She is S/P right total knee replacement May 2013.  She states she has done well for several years until the last 4 years. She is experiencing pain in the front of the right knee.  Pain is worse with walking and navigating steps.  The pain is affecting her ADL's.  She his not able to take NSAID's due to CKD.  Pt is now scheduled for a revision of the right total knee replacement with liner exchange and synovectomy.  I have been asked to see patient for pre-operative risk assessment and clearance.   Surgery scheduled for 10/15/2024 by Dr. Gene Romero at [facility].    Pt states she has been cleared by cardiology and Hem/Onc.      CAD-- S/P Angioplasty with stent to RCA 12/11/2023.  She denies CP, SOB, orthopnea, increased swelling.  She has been seen by Dr. Rapp.      H/O DVT-- Pt has had recurrent DVT.  The last DVT was in the setting of right TKA in 2013.  She has saphenofemoral venous reflux/Chronic venous insufficiency.  She is on Xarelto.  She has been seen by Hematology.  She wears compression stocking, but not consistently.    She was seen by Dr. Laws.  She was given instructions for Eliquis and Brilinta.      CKD-- Stage 3.  She sees Dr. Causey.  She denies foamy or frothy urine.  She denies hematuria.  She is on Avapro 75 mg daily.    Lab Results   Component Value Date    BUN 24 (H) 07/10/2024      Lab Results   Component Value Date    CREATININE 1.8 (H) 07/10/2024      Type 2 DM-- Her blood sugars have been excellent.  She is frustrated that she can't lose weight.  She is on Januvia 50 mg daily, Glipizide ER 5 mg daily, Farxiga 5 mg daily.

## 2024-10-08 LAB
ANION GAP SERPL CALCULATED.3IONS-SCNC: 10 MMOL/L (ref 3–16)
APTT BLD: 28.7 SEC (ref 22.1–36.4)
BASOPHILS # BLD: 0.1 K/UL (ref 0–0.2)
BASOPHILS NFR BLD: 0.7 %
BUN SERPL-MCNC: 29 MG/DL (ref 7–20)
CALCIUM SERPL-MCNC: 9.5 MG/DL (ref 8.3–10.6)
CHLORIDE SERPL-SCNC: 109 MMOL/L (ref 99–110)
CHOLEST SERPL-MCNC: 134 MG/DL (ref 0–199)
CO2 SERPL-SCNC: 22 MMOL/L (ref 21–32)
CREAT SERPL-MCNC: 1.8 MG/DL (ref 0.6–1.2)
DEPRECATED RDW RBC AUTO: 15.6 % (ref 12.4–15.4)
EOSINOPHIL # BLD: 0.3 K/UL (ref 0–0.6)
EOSINOPHIL NFR BLD: 4.4 %
EST. AVERAGE GLUCOSE BLD GHB EST-MCNC: 119.8 MG/DL
GFR SERPLBLD CREATININE-BSD FMLA CKD-EPI: 31 ML/MIN/{1.73_M2}
GLUCOSE SERPL-MCNC: 99 MG/DL (ref 70–99)
HBA1C MFR BLD: 5.8 %
HCT VFR BLD AUTO: 39 % (ref 36–48)
HDLC SERPL-MCNC: 43 MG/DL (ref 40–60)
HGB BLD-MCNC: 12.9 G/DL (ref 12–16)
INR PPP: 1.15 (ref 0.85–1.15)
LDLC SERPL CALC-MCNC: 69 MG/DL
LYMPHOCYTES # BLD: 1.8 K/UL (ref 1–5.1)
LYMPHOCYTES NFR BLD: 25.4 %
MCH RBC QN AUTO: 28.3 PG (ref 26–34)
MCHC RBC AUTO-ENTMCNC: 33.1 G/DL (ref 31–36)
MCV RBC AUTO: 85.5 FL (ref 80–100)
MONOCYTES # BLD: 0.8 K/UL (ref 0–1.3)
MONOCYTES NFR BLD: 11.6 %
NEUTROPHILS # BLD: 4.1 K/UL (ref 1.7–7.7)
NEUTROPHILS NFR BLD: 57.9 %
PLATELET # BLD AUTO: 232 K/UL (ref 135–450)
PMV BLD AUTO: 9.1 FL (ref 5–10.5)
POTASSIUM SERPL-SCNC: 4.9 MMOL/L (ref 3.5–5.1)
PROTHROMBIN TIME: 14.9 SEC (ref 11.9–14.9)
RBC # BLD AUTO: 4.57 M/UL (ref 4–5.2)
SODIUM SERPL-SCNC: 141 MMOL/L (ref 136–145)
TRIGL SERPL-MCNC: 112 MG/DL (ref 0–150)
VLDLC SERPL CALC-MCNC: 22 MG/DL
WBC # BLD AUTO: 7.1 K/UL (ref 4–11)

## 2024-10-11 RX ORDER — CEFADROXIL 500 MG/1
CAPSULE ORAL
COMMUNITY
Start: 2024-10-08 | End: 2024-10-21

## 2024-10-11 RX ORDER — METHOCARBAMOL 500 MG/1
TABLET, FILM COATED ORAL
COMMUNITY
Start: 2024-10-08 | End: 2025-01-05

## 2024-10-11 RX ORDER — GABAPENTIN 300 MG/1
CAPSULE ORAL
COMMUNITY
Start: 2024-10-08 | End: 2024-11-06

## 2024-10-14 ENCOUNTER — TELEPHONE (OUTPATIENT)
Dept: INTERNAL MEDICINE CLINIC | Age: 64
End: 2024-10-14

## 2024-10-14 ENCOUNTER — ANESTHESIA EVENT (OUTPATIENT)
Dept: OPERATING ROOM | Age: 64
End: 2024-10-14
Payer: COMMERCIAL

## 2024-10-14 NOTE — TELEPHONE ENCOUNTER
Pt called in for Dr. Taylor in regards to getting a New Rx for the:    tiZANidine (ZANAFLEX) 4 MG tablet   Last appointment: 10/7/2024  Next appointment: 1/9/2025  Last refill: 10/7/2024      Pt was told by Ernesto that this Rx was closed out but didn't know why. Please advise, she would like a new script sent over if possible.     [id number] - [place], [place] - [address] [place] [address] -  [phone] - F [phone]  [address] [place] [id number]  Phone: [phone]  Fax: [phone]

## 2024-10-15 ENCOUNTER — ANESTHESIA (OUTPATIENT)
Dept: OPERATING ROOM | Age: 64
End: 2024-10-15
Payer: COMMERCIAL

## 2024-10-15 ENCOUNTER — APPOINTMENT (OUTPATIENT)
Dept: GENERAL RADIOLOGY | Age: 64
End: 2024-10-15
Attending: ORTHOPAEDIC SURGERY
Payer: COMMERCIAL

## 2024-10-15 ENCOUNTER — HOSPITAL ENCOUNTER (OUTPATIENT)
Age: 64
Setting detail: OUTPATIENT SURGERY
Discharge: HOME | End: 2024-10-15
Attending: ORTHOPAEDIC SURGERY | Admitting: ORTHOPAEDIC SURGERY
Payer: COMMERCIAL

## 2024-10-15 VITALS
WEIGHT: 257.4 LBS | HEART RATE: 82 BPM | RESPIRATION RATE: 12 BRPM | SYSTOLIC BLOOD PRESSURE: 122 MMHG | DIASTOLIC BLOOD PRESSURE: 71 MMHG | BODY MASS INDEX: 42.88 KG/M2 | TEMPERATURE: 97.2 F | HEIGHT: 65 IN | OXYGEN SATURATION: 95 %

## 2024-10-15 DIAGNOSIS — T84.022A INSTABILITY OF INTERNAL RIGHT KNEE PROSTHESIS, INITIAL ENCOUNTER (HCC): ICD-10-CM

## 2024-10-15 LAB
ABO + RH BLD: NORMAL
APPEARANCE FLUID: NORMAL
APTT BLD: 25.5 SEC (ref 22.1–36.4)
BDY FLUID QUALITY: NORMAL
BLD GP AB SCN SERPL QL: NORMAL
CELL COUNT FLUID TYPE: NORMAL
COLOR FLUID: COLORLESS
CRP SERPL-MCNC: 3.4 MG/L (ref 0–5.1)
ERYTHROCYTE [SEDIMENTATION RATE] IN BLOOD BY WESTERGREN METHOD: 20 MM/HR (ref 0–30)
GLUCOSE BLD-MCNC: 111 MG/DL (ref 70–99)
GLUCOSE BLD-MCNC: 113 MG/DL (ref 70–99)
GLUCOSE BLD-MCNC: 57 MG/DL (ref 70–99)
INR PPP: 1.06 (ref 0.85–1.15)
LYMPHOCYTES NFR FLD: 4 %
MACROPHAGES # FLD: 54 %
MONOCYTES NFR FLD: 42 %
NUC CELL # FLD: 1125 /CUMM
PERFORMED ON: ABNORMAL
PROTHROMBIN TIME: 14 SEC (ref 11.9–14.9)
RBC FLUID: 2700 /CUMM
TOTAL CELLS COUNTED FLD: 100

## 2024-10-15 PROCEDURE — 2500000003 HC RX 250 WO HCPCS

## 2024-10-15 PROCEDURE — 2580000003 HC RX 258: Performed by: ORTHOPAEDIC SURGERY

## 2024-10-15 PROCEDURE — 87205 SMEAR GRAM STAIN: CPT

## 2024-10-15 PROCEDURE — 86900 BLOOD TYPING SEROLOGIC ABO: CPT

## 2024-10-15 PROCEDURE — 2709999900 HC NON-CHARGEABLE SUPPLY: Performed by: ORTHOPAEDIC SURGERY

## 2024-10-15 PROCEDURE — 97116 GAIT TRAINING THERAPY: CPT

## 2024-10-15 PROCEDURE — 2580000003 HC RX 258: Performed by: ANESTHESIOLOGY

## 2024-10-15 PROCEDURE — 85652 RBC SED RATE AUTOMATED: CPT

## 2024-10-15 PROCEDURE — 85610 PROTHROMBIN TIME: CPT

## 2024-10-15 PROCEDURE — 7100000010 HC PHASE II RECOVERY - FIRST 15 MIN: Performed by: ORTHOPAEDIC SURGERY

## 2024-10-15 PROCEDURE — 85730 THROMBOPLASTIN TIME PARTIAL: CPT

## 2024-10-15 PROCEDURE — 87206 SMEAR FLUORESCENT/ACID STAI: CPT

## 2024-10-15 PROCEDURE — 97166 OT EVAL MOD COMPLEX 45 MIN: CPT

## 2024-10-15 PROCEDURE — 7100000000 HC PACU RECOVERY - FIRST 15 MIN: Performed by: ORTHOPAEDIC SURGERY

## 2024-10-15 PROCEDURE — 6360000002 HC RX W HCPCS: Performed by: ORTHOPAEDIC SURGERY

## 2024-10-15 PROCEDURE — 89051 BODY FLUID CELL COUNT: CPT

## 2024-10-15 PROCEDURE — 62325 NJX INTERLAMINAR CRV/THRC: CPT | Performed by: ANESTHESIOLOGY

## 2024-10-15 PROCEDURE — 2720000010 HC SURG SUPPLY STERILE: Performed by: ORTHOPAEDIC SURGERY

## 2024-10-15 PROCEDURE — C1776 JOINT DEVICE (IMPLANTABLE): HCPCS | Performed by: ORTHOPAEDIC SURGERY

## 2024-10-15 PROCEDURE — 7100000011 HC PHASE II RECOVERY - ADDTL 15 MIN: Performed by: ORTHOPAEDIC SURGERY

## 2024-10-15 PROCEDURE — 86901 BLOOD TYPING SEROLOGIC RH(D): CPT

## 2024-10-15 PROCEDURE — 86850 RBC ANTIBODY SCREEN: CPT

## 2024-10-15 PROCEDURE — 7100000001 HC PACU RECOVERY - ADDTL 15 MIN: Performed by: ORTHOPAEDIC SURGERY

## 2024-10-15 PROCEDURE — 3600000004 HC SURGERY LEVEL 4 BASE: Performed by: ORTHOPAEDIC SURGERY

## 2024-10-15 PROCEDURE — 6360000002 HC RX W HCPCS: Performed by: ANESTHESIOLOGY

## 2024-10-15 PROCEDURE — 87070 CULTURE OTHR SPECIMN AEROBIC: CPT

## 2024-10-15 PROCEDURE — 87116 MYCOBACTERIA CULTURE: CPT

## 2024-10-15 PROCEDURE — A4217 STERILE WATER/SALINE, 500 ML: HCPCS | Performed by: ORTHOPAEDIC SURGERY

## 2024-10-15 PROCEDURE — 97535 SELF CARE MNGMENT TRAINING: CPT

## 2024-10-15 PROCEDURE — 87102 FUNGUS ISOLATION CULTURE: CPT

## 2024-10-15 PROCEDURE — 86140 C-REACTIVE PROTEIN: CPT

## 2024-10-15 PROCEDURE — 6370000000 HC RX 637 (ALT 250 FOR IP): Performed by: ORTHOPAEDIC SURGERY

## 2024-10-15 PROCEDURE — 97530 THERAPEUTIC ACTIVITIES: CPT

## 2024-10-15 PROCEDURE — 73560 X-RAY EXAM OF KNEE 1 OR 2: CPT

## 2024-10-15 PROCEDURE — 97161 PT EVAL LOW COMPLEX 20 MIN: CPT

## 2024-10-15 PROCEDURE — 97110 THERAPEUTIC EXERCISES: CPT

## 2024-10-15 PROCEDURE — 64447 NJX AA&/STRD FEMORAL NRV IMG: CPT | Performed by: ANESTHESIOLOGY

## 2024-10-15 PROCEDURE — 3600000014 HC SURGERY LEVEL 4 ADDTL 15MIN: Performed by: ORTHOPAEDIC SURGERY

## 2024-10-15 PROCEDURE — 3700000001 HC ADD 15 MINUTES (ANESTHESIA): Performed by: ORTHOPAEDIC SURGERY

## 2024-10-15 PROCEDURE — 6360000002 HC RX W HCPCS

## 2024-10-15 PROCEDURE — 3700000000 HC ANESTHESIA ATTENDED CARE: Performed by: ORTHOPAEDIC SURGERY

## 2024-10-15 PROCEDURE — 2500000003 HC RX 250 WO HCPCS: Performed by: ORTHOPAEDIC SURGERY

## 2024-10-15 DEVICE — IMPLANTABLE DEVICE: Type: IMPLANTABLE DEVICE | Site: KNEE | Status: FUNCTIONAL

## 2024-10-15 RX ORDER — MIDAZOLAM HYDROCHLORIDE 1 MG/ML
INJECTION INTRAMUSCULAR; INTRAVENOUS
Status: COMPLETED
Start: 2024-10-15 | End: 2024-10-15

## 2024-10-15 RX ORDER — SODIUM CHLORIDE 0.9 % (FLUSH) 0.9 %
5-40 SYRINGE (ML) INJECTION EVERY 12 HOURS SCHEDULED
Status: CANCELLED | OUTPATIENT
Start: 2024-10-15

## 2024-10-15 RX ORDER — DEXAMETHASONE SODIUM PHOSPHATE 4 MG/ML
INJECTION, SOLUTION INTRA-ARTICULAR; INTRALESIONAL; INTRAMUSCULAR; INTRAVENOUS; SOFT TISSUE
Status: DISCONTINUED | OUTPATIENT
Start: 2024-10-15 | End: 2024-10-15 | Stop reason: SDUPTHER

## 2024-10-15 RX ORDER — IPRATROPIUM BROMIDE AND ALBUTEROL SULFATE 2.5; .5 MG/3ML; MG/3ML
1 SOLUTION RESPIRATORY (INHALATION)
Status: DISCONTINUED | OUTPATIENT
Start: 2024-10-15 | End: 2024-10-15 | Stop reason: HOSPADM

## 2024-10-15 RX ORDER — OXYCODONE HCL 10 MG/1
10 TABLET, FILM COATED, EXTENDED RELEASE ORAL ONCE
Status: COMPLETED | OUTPATIENT
Start: 2024-10-15 | End: 2024-10-15

## 2024-10-15 RX ORDER — ONDANSETRON 4 MG/1
4 TABLET, ORALLY DISINTEGRATING ORAL EVERY 8 HOURS PRN
Status: CANCELLED | OUTPATIENT
Start: 2024-10-15

## 2024-10-15 RX ORDER — DEXAMETHASONE SODIUM PHOSPHATE 4 MG/ML
4 INJECTION, SOLUTION INTRA-ARTICULAR; INTRALESIONAL; INTRAMUSCULAR; INTRAVENOUS; SOFT TISSUE ONCE
Status: COMPLETED | OUTPATIENT
Start: 2024-10-15 | End: 2024-10-15

## 2024-10-15 RX ORDER — SODIUM CHLORIDE 0.9 % (FLUSH) 0.9 %
5-40 SYRINGE (ML) INJECTION PRN
Status: CANCELLED | OUTPATIENT
Start: 2024-10-15

## 2024-10-15 RX ORDER — SODIUM CHLORIDE 9 MG/ML
INJECTION, SOLUTION INTRAVENOUS PRN
Status: DISCONTINUED | OUTPATIENT
Start: 2024-10-15 | End: 2024-10-15 | Stop reason: HOSPADM

## 2024-10-15 RX ORDER — ONDANSETRON 2 MG/ML
4 INJECTION INTRAMUSCULAR; INTRAVENOUS
Status: DISCONTINUED | OUTPATIENT
Start: 2024-10-15 | End: 2024-10-15 | Stop reason: HOSPADM

## 2024-10-15 RX ORDER — SODIUM CHLORIDE 9 MG/ML
INJECTION, SOLUTION INTRAVENOUS PRN
Status: CANCELLED | OUTPATIENT
Start: 2024-10-15

## 2024-10-15 RX ORDER — BUPIVACAINE HYDROCHLORIDE 2.5 MG/ML
INJECTION, SOLUTION EPIDURAL; INFILTRATION; INTRACAUDAL PRN
Status: DISCONTINUED | OUTPATIENT
Start: 2024-10-15 | End: 2024-10-15 | Stop reason: ALTCHOICE

## 2024-10-15 RX ORDER — FENTANYL CITRATE 50 UG/ML
25 INJECTION, SOLUTION INTRAMUSCULAR; INTRAVENOUS EVERY 5 MIN PRN
Status: DISCONTINUED | OUTPATIENT
Start: 2024-10-15 | End: 2024-10-15 | Stop reason: HOSPADM

## 2024-10-15 RX ORDER — MAGNESIUM HYDROXIDE 1200 MG/15ML
LIQUID ORAL CONTINUOUS PRN
Status: DISCONTINUED | OUTPATIENT
Start: 2024-10-15 | End: 2024-10-15 | Stop reason: HOSPADM

## 2024-10-15 RX ORDER — LABETALOL HYDROCHLORIDE 5 MG/ML
10 INJECTION, SOLUTION INTRAVENOUS
Status: DISCONTINUED | OUTPATIENT
Start: 2024-10-15 | End: 2024-10-15 | Stop reason: HOSPADM

## 2024-10-15 RX ORDER — FENTANYL CITRATE 50 UG/ML
INJECTION, SOLUTION INTRAMUSCULAR; INTRAVENOUS
Status: DISCONTINUED | OUTPATIENT
Start: 2024-10-15 | End: 2024-10-15 | Stop reason: SDUPTHER

## 2024-10-15 RX ORDER — ACETAMINOPHEN 500 MG
1000 TABLET ORAL ONCE
Status: COMPLETED | OUTPATIENT
Start: 2024-10-15 | End: 2024-10-15

## 2024-10-15 RX ORDER — NALOXONE HYDROCHLORIDE 0.4 MG/ML
INJECTION, SOLUTION INTRAMUSCULAR; INTRAVENOUS; SUBCUTANEOUS PRN
Status: DISCONTINUED | OUTPATIENT
Start: 2024-10-15 | End: 2024-10-15 | Stop reason: HOSPADM

## 2024-10-15 RX ORDER — ACETAMINOPHEN 325 MG/1
650 TABLET ORAL
Status: DISCONTINUED | OUTPATIENT
Start: 2024-10-15 | End: 2024-10-15 | Stop reason: HOSPADM

## 2024-10-15 RX ORDER — OXYCODONE HYDROCHLORIDE 5 MG/1
5 TABLET ORAL EVERY 4 HOURS PRN
Status: CANCELLED | OUTPATIENT
Start: 2024-10-15

## 2024-10-15 RX ORDER — SODIUM CHLORIDE 9 MG/ML
INJECTION, SOLUTION INTRAVENOUS CONTINUOUS
Status: CANCELLED | OUTPATIENT
Start: 2024-10-15

## 2024-10-15 RX ORDER — ACETAMINOPHEN 325 MG/1
650 TABLET ORAL EVERY 6 HOURS
Status: CANCELLED | OUTPATIENT
Start: 2024-10-15

## 2024-10-15 RX ORDER — ONDANSETRON 2 MG/ML
INJECTION INTRAMUSCULAR; INTRAVENOUS
Status: DISCONTINUED | OUTPATIENT
Start: 2024-10-15 | End: 2024-10-15 | Stop reason: SDUPTHER

## 2024-10-15 RX ORDER — MIDAZOLAM HYDROCHLORIDE 1 MG/ML
INJECTION INTRAMUSCULAR; INTRAVENOUS
Status: DISCONTINUED | OUTPATIENT
Start: 2024-10-15 | End: 2024-10-15 | Stop reason: SDUPTHER

## 2024-10-15 RX ORDER — ROCURONIUM BROMIDE 10 MG/ML
INJECTION, SOLUTION INTRAVENOUS
Status: DISCONTINUED | OUTPATIENT
Start: 2024-10-15 | End: 2024-10-15 | Stop reason: SDUPTHER

## 2024-10-15 RX ORDER — METHOCARBAMOL 100 MG/ML
INJECTION, SOLUTION INTRAMUSCULAR; INTRAVENOUS
Status: DISCONTINUED | OUTPATIENT
Start: 2024-10-15 | End: 2024-10-15 | Stop reason: SDUPTHER

## 2024-10-15 RX ORDER — PROPOFOL 10 MG/ML
INJECTION, EMULSION INTRAVENOUS
Status: DISCONTINUED | OUTPATIENT
Start: 2024-10-15 | End: 2024-10-15 | Stop reason: SDUPTHER

## 2024-10-15 RX ORDER — VANCOMYCIN 1.25 G/250ML
15 INJECTION, SOLUTION INTRAVENOUS ONCE
Status: COMPLETED | OUTPATIENT
Start: 2024-10-15 | End: 2024-10-15

## 2024-10-15 RX ORDER — METHOCARBAMOL 750 MG/1
750 TABLET, FILM COATED ORAL EVERY 8 HOURS PRN
Status: CANCELLED | OUTPATIENT
Start: 2024-10-15

## 2024-10-15 RX ORDER — SODIUM CHLORIDE, SODIUM LACTATE, POTASSIUM CHLORIDE, CALCIUM CHLORIDE 600; 310; 30; 20 MG/100ML; MG/100ML; MG/100ML; MG/100ML
INJECTION, SOLUTION INTRAVENOUS CONTINUOUS
Status: DISCONTINUED | OUTPATIENT
Start: 2024-10-15 | End: 2024-10-15 | Stop reason: HOSPADM

## 2024-10-15 RX ORDER — LIDOCAINE HYDROCHLORIDE 20 MG/ML
INJECTION, SOLUTION EPIDURAL; INFILTRATION; INTRACAUDAL; PERINEURAL
Status: DISCONTINUED | OUTPATIENT
Start: 2024-10-15 | End: 2024-10-15 | Stop reason: SDUPTHER

## 2024-10-15 RX ORDER — EPHEDRINE SULFATE 50 MG/ML
INJECTION INTRAVENOUS
Status: DISCONTINUED | OUTPATIENT
Start: 2024-10-15 | End: 2024-10-15 | Stop reason: SDUPTHER

## 2024-10-15 RX ORDER — SODIUM CHLORIDE 0.9 % (FLUSH) 0.9 %
5-40 SYRINGE (ML) INJECTION PRN
Status: DISCONTINUED | OUTPATIENT
Start: 2024-10-15 | End: 2024-10-15 | Stop reason: HOSPADM

## 2024-10-15 RX ORDER — VANCOMYCIN HYDROCHLORIDE 500 MG/10ML
INJECTION, POWDER, LYOPHILIZED, FOR SOLUTION INTRAVENOUS PRN
Status: DISCONTINUED | OUTPATIENT
Start: 2024-10-15 | End: 2024-10-15 | Stop reason: ALTCHOICE

## 2024-10-15 RX ORDER — SODIUM CHLORIDE 0.9 % (FLUSH) 0.9 %
5-40 SYRINGE (ML) INJECTION EVERY 12 HOURS SCHEDULED
Status: DISCONTINUED | OUTPATIENT
Start: 2024-10-15 | End: 2024-10-15 | Stop reason: HOSPADM

## 2024-10-15 RX ORDER — HYDROMORPHONE HYDROCHLORIDE 1 MG/ML
0.5 INJECTION, SOLUTION INTRAMUSCULAR; INTRAVENOUS; SUBCUTANEOUS EVERY 5 MIN PRN
Status: DISCONTINUED | OUTPATIENT
Start: 2024-10-15 | End: 2024-10-15 | Stop reason: HOSPADM

## 2024-10-15 RX ORDER — ROPIVACAINE HYDROCHLORIDE 5 MG/ML
INJECTION, SOLUTION EPIDURAL; INFILTRATION; PERINEURAL
Status: COMPLETED | OUTPATIENT
Start: 2024-10-15 | End: 2024-10-15

## 2024-10-15 RX ORDER — PROCHLORPERAZINE EDISYLATE 5 MG/ML
5 INJECTION INTRAMUSCULAR; INTRAVENOUS
Status: DISCONTINUED | OUTPATIENT
Start: 2024-10-15 | End: 2024-10-15 | Stop reason: HOSPADM

## 2024-10-15 RX ORDER — SUCCINYLCHOLINE CHLORIDE 20 MG/ML
INJECTION INTRAMUSCULAR; INTRAVENOUS
Status: DISCONTINUED | OUTPATIENT
Start: 2024-10-15 | End: 2024-10-15 | Stop reason: SDUPTHER

## 2024-10-15 RX ORDER — FENTANYL CITRATE 50 UG/ML
INJECTION, SOLUTION INTRAMUSCULAR; INTRAVENOUS
Status: COMPLETED
Start: 2024-10-15 | End: 2024-10-15

## 2024-10-15 RX ORDER — ROPIVACAINE HYDROCHLORIDE 5 MG/ML
INJECTION, SOLUTION EPIDURAL; INFILTRATION; PERINEURAL
Status: COMPLETED
Start: 2024-10-15 | End: 2024-10-15

## 2024-10-15 RX ADMIN — SODIUM CHLORIDE, POTASSIUM CHLORIDE, SODIUM LACTATE AND CALCIUM CHLORIDE: 600; 310; 30; 20 INJECTION, SOLUTION INTRAVENOUS at 09:17

## 2024-10-15 RX ADMIN — ACETAMINOPHEN 1000 MG: 500 TABLET ORAL at 07:12

## 2024-10-15 RX ADMIN — PROPOFOL 150 MG: 10 INJECTION, EMULSION INTRAVENOUS at 08:34

## 2024-10-15 RX ADMIN — DEXAMETHASONE SODIUM PHOSPHATE 4 MG: 4 INJECTION INTRA-ARTICULAR; INTRALESIONAL; INTRAMUSCULAR; INTRAVENOUS; SOFT TISSUE at 07:34

## 2024-10-15 RX ADMIN — EPHEDRINE SULFATE 10 MG: 50 INJECTION INTRAVENOUS at 09:17

## 2024-10-15 RX ADMIN — ROPIVACAINE HYDROCHLORIDE 30 ML: 5 INJECTION, SOLUTION EPIDURAL; INFILTRATION; PERINEURAL at 08:01

## 2024-10-15 RX ADMIN — TRANEXAMIC ACID 1000 MG: 100 INJECTION, SOLUTION INTRAVENOUS at 09:00

## 2024-10-15 RX ADMIN — ROCURONIUM BROMIDE 30 MG: 10 INJECTION, SOLUTION INTRAVENOUS at 09:18

## 2024-10-15 RX ADMIN — SODIUM CHLORIDE, POTASSIUM CHLORIDE, SODIUM LACTATE AND CALCIUM CHLORIDE: 600; 310; 30; 20 INJECTION, SOLUTION INTRAVENOUS at 07:38

## 2024-10-15 RX ADMIN — OXYCODONE HYDROCHLORIDE 10 MG: 10 TABLET, FILM COATED, EXTENDED RELEASE ORAL at 07:12

## 2024-10-15 RX ADMIN — PHENYLEPHRINE HYDROCHLORIDE 200 MCG: 10 INJECTION, SOLUTION INTRAVENOUS at 08:45

## 2024-10-15 RX ADMIN — DEXAMETHASONE SODIUM PHOSPHATE 4 MG: 4 INJECTION INTRA-ARTICULAR; INTRALESIONAL; INTRAMUSCULAR; INTRAVENOUS; SOFT TISSUE at 09:00

## 2024-10-15 RX ADMIN — PHENYLEPHRINE HYDROCHLORIDE 200 MCG: 10 INJECTION, SOLUTION INTRAVENOUS at 08:49

## 2024-10-15 RX ADMIN — VANCOMYCIN 1750 MG: 1.25 INJECTION, SOLUTION INTRAVENOUS at 08:38

## 2024-10-15 RX ADMIN — LIDOCAINE HYDROCHLORIDE 100 MG: 20 INJECTION, SOLUTION EPIDURAL; INFILTRATION; INTRACAUDAL; PERINEURAL at 08:34

## 2024-10-15 RX ADMIN — PHENYLEPHRINE HYDROCHLORIDE 200 MCG: 10 INJECTION, SOLUTION INTRAVENOUS at 08:58

## 2024-10-15 RX ADMIN — MIDAZOLAM HYDROCHLORIDE 1 MG: 2 INJECTION, SOLUTION INTRAMUSCULAR; INTRAVENOUS at 08:00

## 2024-10-15 RX ADMIN — SUCCINYLCHOLINE CHLORIDE 120 MG: 20 INJECTION, SOLUTION INTRAMUSCULAR; INTRAVENOUS at 08:34

## 2024-10-15 RX ADMIN — METHOCARBAMOL 500 MG: 100 INJECTION, SOLUTION INTRAMUSCULAR; INTRAVENOUS at 09:30

## 2024-10-15 RX ADMIN — EPHEDRINE SULFATE 5 MG: 50 INJECTION INTRAVENOUS at 09:04

## 2024-10-15 RX ADMIN — MIDAZOLAM HYDROCHLORIDE 1 MG: 2 INJECTION, SOLUTION INTRAMUSCULAR; INTRAVENOUS at 08:32

## 2024-10-15 RX ADMIN — WATER 2000 MG: 1 INJECTION INTRAMUSCULAR; INTRAVENOUS; SUBCUTANEOUS at 08:45

## 2024-10-15 RX ADMIN — ONDANSETRON 4 MG: 2 INJECTION INTRAMUSCULAR; INTRAVENOUS at 09:00

## 2024-10-15 RX ADMIN — FENTANYL CITRATE 100 MCG: 50 INJECTION, SOLUTION INTRAMUSCULAR; INTRAVENOUS at 08:00

## 2024-10-15 RX ADMIN — ROCURONIUM BROMIDE 50 MG: 10 INJECTION, SOLUTION INTRAVENOUS at 08:48

## 2024-10-15 RX ADMIN — SUGAMMADEX 200 MG: 100 INJECTION, SOLUTION INTRAVENOUS at 10:06

## 2024-10-15 ASSESSMENT — PAIN SCALES - GENERAL
PAINLEVEL_OUTOF10: 0

## 2024-10-15 ASSESSMENT — PAIN - FUNCTIONAL ASSESSMENT: PAIN_FUNCTIONAL_ASSESSMENT: 0-10

## 2024-10-15 NOTE — OP NOTE
PREOPERATIVE DIAGNOSIS:  Right total knee arthroplasty ligamentous  instability.     POSTOPERATIVE DIAGNOSIS:  Right total knee arthroplasty ligamentous  instability.     OPERATIONS PERFORMED:  Right knee revision total knee arthroplasty,  modular liner exchange with extensive synovectomy.     ATTENDING SURGEON:  Gene Romero M.D.     FIRST SURGICAL ASSISTANT:  Leighton Shaikh PA-C. The physician Assistant was necessary today to  perform the operation for manipulation of the extremity and  application of the implants.  This help was otherwise not available in  the operating room today.     ESTIMATED BLOOD LOSS: 50 mL.     INTRAVENOUS FLUIDS:  300 cc of crystalloid.     TOURNIQUET TIME:  29 minutes at 350 mmHg.     IMPLANTS:  [employer] Polyethylene liner, 14 mm (12 mm CR removed)     SPECIMENS:  Three samples were sent for routine culture and   synovial fluid and sent for cell count differential.     OPERATIVE INDICATIONS:  This patient is status post remote right total knee arthroplasty by Deb Haro.  They have had recurrent and persistent pain, effusion  and instability.  I perfmored an extensive workup on their total knee  arthroplasty.  On exam, they had mid-flexion  instability.  I aspirated the joint pre-op and was negative for infection.  I treated the patient with corticosteroids and bracing.  Their symptoms improved.  I offered the patient poly liner exchange and upsize to address ligamentous instability.  We did discuss the fact that if they  possibly had ongoing problems, there could be a total revision in the future.  We discussed the  standard risk of surgery including but not limited to pain, scar,  bleeding, infection, need for recurrent surgery, fracture, stiffness,  or even loss of life or limb.  Despite these risks and other, they did  elect to proceed.     OPERATIVE DETAILS:  The patient was greeted in the preoperative  holding area.  The operative knee was marked with a

## 2024-10-15 NOTE — ANESTHESIA POSTPROCEDURE EVALUATION
Department of Anesthesiology  Postprocedure Note    Patient: Alicia Cervantes  MRN: [id number]  YOB: 1960  Date of evaluation: 10/15/2024    Procedure Summary       Date: 10/15/24 Room / Location: [place] [address] / [facility]    Anesthesia Start: 0829 Anesthesia Stop: 1042    Procedure: REVISION RIGHT TOTAL KNEE ARTHOPLASTY LINER EXCHNAGE AND SYNOVECTOMY (Right) Diagnosis:       Instability of internal right knee prosthesis, initial encounter ([facility])      (Instability of internal right knee prosthesis, initial encounter ([facility]) [T84.022A])    Surgeons: Gene Romero MD Responsible Provider: Fabián Funk MD    Anesthesia Type: MAC, epidural, regional ASA Status: 3            Anesthesia Type: No value filed.    Brock Phase I: Brock Score: 10    Brock Phase II:      Anesthesia Post Evaluation    Patient location during evaluation: PACU  Patient participation: complete - patient participated  Level of consciousness: awake  Pain score: 0  Nausea & Vomiting: no nausea and no vomiting  Cardiovascular status: blood pressure returned to baseline  Respiratory status: acceptable  Hydration status: euvolemic  Pain management: adequate    No notable events documented.

## 2024-10-15 NOTE — PROGRESS NOTES
Ambulatory Surgery/Procedure Discharge Note    Vitals:    10/15/24 1319   BP: 122/71   Pulse: 82   Resp: 12   Temp: 97.2 °F (36.2 °C)   SpO2: 95%     Phase 2 SDS Dr. Funk here spoke with pt and  about possible spinal fluids leak from epidural tx with dark room tylenol and caffeine if unrelieved may need to come back for blood patch to site . Rt. Knee ace cdi toes warm pink ice to rt. knee        Revision rt. Knee surgery     In: 2030 [P.O.:120; I.V.:1500]  Out: 20     Restroom use offered before discharge.  Yes    Pain assessment:  none  Pain Level: 0/10    1330 reviewed d/c instructions with pt and her  , dsg supplies given to pt ; theron. Given for how to apply dsg, referred pt to follow detailed d/c instructions , both verbalized their understanding   D/c iv , pt dressed in PACU with P.T.      1352    Patient discharged to home/self care. Patient discharged via wheel chair by transporter to waiting family/S.O.       10/15/2024  
Left VM for John Hall's  to ask if a clearance for surgery has been sent to their office from [facility] sees  and if clearance letter can be faced to [facility] PAT-AS  
Physical Therapy  Facility/Department: [facility] GENERAL SURGERY  Physical Therapy Initial Assessment, Treatment and D/c    Name: Alicia Cervantes  : 1960  MRN: [id number]  Date of Service: 10/15/2024    Discharge Recommendations:  24 hour supervision or assist (PT)   PT Equipment Recommendations  Equipment Needed: No      Patient Diagnosis(es): The encounter diagnosis was Instability of internal right knee prosthesis, initial encounter (HCC).  Past Medical History:  has a past medical history of Acute superficial venous thrombosis of lower extremity, right, Arthritis, CAD (coronary artery disease), native coronary artery, Cataract, bilateral, Chronic superficial venous thrombosis of left lower extremity, CKD (chronic kidney disease) stage 2, GFR 60-89 ml/min, CKD (chronic kidney disease) stage 3, GFR 30-59 ml/min (HCC), COVID-19 virus infection, Cystic kidney disease, DVT (deep venous thrombosis) ([facility]), Gastroesophageal reflux disease without esophagitis, H/O deep venous thrombosis, H/O simple renal cyst, Hx of blood clots, Hyperlipidemia, Hypertension, IBS (irritable bowel syndrome), Left retinal detachment, NSTEMI (non-ST elevated myocardial infarction) ([facility]), Osteoarthritis, knee, Prolonged emergence from general anesthesia, Saphenofemoral venous reflux, Squamous cell carcinoma of skin of lower limb, left, Steatosis of liver, SVT (supraventricular tachycardia) ([facility]), Thyroid disease, Type 2 diabetes mellitus (HCC), Venous insufficiency, and Wears glasses.  Past Surgical History:  has a past surgical history that includes Cholecystectomy (2003); Hysterectomy (1999); Breast surgery; Knee arthroscopy (2015); Total knee arthroplasty (2013); Knee arthroscopy; Tonsillectomy; Varicose vein surgery (.  ); Colonoscopy (2015); Mohs surgery (2019); Retinal detachment surgery (Left, 2020); Retinal detachment surgery (Left, 10/24/2020); Retinal detachment surgery (Left, ); joint 
Procedure: Adductor Canal  MD: Dr. Funk  Timeout performed.  Pt monitored closely on heart monitor, 2L NC, continuous pulse oximetry, EtCO2, and frequent BPs.   Pt remained alert and oriented x4. pt tolerated procedure well.  
Pt received from OR to PACU # 16 via stretcher.     Post: Procedure(s):  REVISION RIGHT TOTAL KNEE ARTHOPLASTY LINER EXCHNAGE AND SYNOVECTOMY     Report received from OR RN and BEN Allan CRNA.    Per report pt did well, received a preop nerve block.    Respirations reg and easy.  Pt is drowsy.       Attached to PACU monitoring system. Alarms and parameters set    Pain none and no complaints of nausea.      
Pt states she watched joint replacement video.  
Total Joint Same Day Readiness Screen  PAT Questionnaire    Does patient have at least one day of 24 hr assist of capable caregiver at d/c?  [x] Yes = 0  [] No = 2      Was patient using an assistive device to walk prior to surgery?  [x] No = 0  [] Yes = 1    How many steps do you have to get to the floor where you plan to initially sleep and use the restroom? (At least 1/2 bath)  [] 0-2 steps= 0 [x] 3+ steps = 1    Has patient fallen in the last 3 months? If yes, how many times?  [] 0 falls = 0 [x] 1 fall = 1     [] 2+ falls = 2    Does patient have a hx of post-op nausea/vomiting?  [x] No = 0 [] Yes = 2    Other Factors    Age  [x] <70 = 0 [] 71-79 = 1  [] 80+ = 2    BMI  [] <30 = 0       []31-39 = 1    [x] >40 = 2    Co-morbidities  [] 0 = 0           [] 1-2 = 1       [x] 3+ = 2    Sleep apnea  [x] No = 0         [] Yes = 1    Hx of prolonged emergence from general anesthesia  [] No = 0         [x] Yes = 1      Score: 7      Interpretation:  Red (10-16): Low probability of safe same day discharge  Yellow (6-9): Moderate probability of safe same day discharge  Green (0-5): High probability of safe same day discharge    Score completed by:  Sejal Nguyen RN  
Total Joint video emailed & reviewed to patient by 's office. Hibiclens instructions reviewed to use x 5 days preop.  JENNIE screening done. TJ book, IS instructions, TJ video link, and fall contract placed on chart for DOS.  
Yes  Supine to Sit: Stand-by assistance  Sit to Supine: Stand-by assistance  Balance  Sitting: Intact  Standing: Intact  Transfer Training  Transfer Training: Yes  Sit to Stand: Contact-guard assistance  Stand to Sit: Contact-guard assistance  Gait  Gait Training: Yes  Overall Level of Assistance: Contact-guard assistance;Stand-by assistance  Assistive Device: Gait belt;Walker, rolling     AROM: Generally decreased, functional  Strength: Generally decreased, functional  ADL  Feeding: Independent;Beverage management  UE Dressing: Supervision  LE Dressing: Stand by assistance  Toileting: Stand by assistance  Functional Mobility: Contact guard assistance;Stand by assistance              Vision  Vision: Impaired  Vision Exceptions: Wears glasses at all times  Hearing  Hearing: Within functional limits  Cognition  Overall Cognitive Status: WNL  Cognition Comment: Midly delayed responses  Orientation  Overall Orientation Status: Within Normal Limits                  Education Given To: Patient  Education Provided: Role of Therapy;Plan of Care;Transfer Training;ADL Adaptive Strategies  Education Method: Demonstration;Verbal  Barriers to Learning: None  Education Outcome: Verbalized understanding;Demonstrated understanding                     G-Code     OutComes Score                                                  AM-PAC - ADL  AM-PAC Daily Activity - Inpatient   How much help is needed for putting on and taking off regular lower body clothing?: A Little  How much help is needed for bathing (which includes washing, rinsing, drying)?: A Little  How much help is needed for toileting (which includes using toilet, bedpan, or urinal)?: A Little  How much help is needed for putting on and taking off regular upper body clothing?: A Little  How much help is needed for taking care of personal grooming?: None  How much help for eating meals?: None  AM-[facility] Inpatient Daily Activity Raw Score: 20  AM-PAC Inpatient ADL T-Scale Score : 
please bring it with you on the day of your procedure.    10. If you use oxygen at home, please bring your oxygen tank with you to hospital..     11. We recommend that valuable personal belongings such as cash, cell phones, e-tablets, or jewelry, be left at home during your stay. The hospital will not be responsible for valuables that are not secured in the hospital safe. However, if your insurance requires a co-pay, you may want to bring a method of payment, i.e., Check or credit card, if you wish to pay your co-pay the day of surgery.      12. If you are to stay overnight, you may bring a bag with personal items. Please have any large items you may need brought in by your family after your arrival to your hospital room.    13. If you have a Living Will or Durable Power of , please bring a copy on the day of your procedure.     How we keep you safe and work to prevent surgical site infections:   1. Health care workers should always check your ID bracelet to verify your name and birth date. You will be asked many times to state your name, date of birth, and allergies.    2. Health care workers should always clean their hands with soap or alcohol gel before providing care to you. It is okay to ask anyone if they cleaned their hands before they touch you.    3. You will be actively involved in verifying the type of procedure you are having and ensuring the correct surgical site. This will be confirmed multiple times prior to your procedure. Do NOT leonor your surgery site UNLESS instructed to by your surgeon.     4. When you are in the operating room, your surgical site will be cleansed with a special soap, and in most cases, you will be given an antibiotic before the surgery begins.      What to expect AFTER your procedure?  1. Immediately following your procedure, your will be taken to the PACU for the first phase of your recovery.  Your nurse will help you recover from any potential side effects of

## 2024-10-15 NOTE — H&P
Date of Surgery Update:  Alicia Cervantes was seen, history and physical examination reviewed, and patient examined by me today. There have been no significant clinical changes since the completion of the previous history and physical.    The risk, benefits, and alternatives of the proposed procedure have been explained to the patient (or appropriate guardian) and understanding verbalized. All questions answered. Patient wishes to proceed.    Electronically signed by: Gene Romero MD,10/15/2024,7:09 AM

## 2024-10-15 NOTE — DISCHARGE INSTRUCTIONS
change this dressing as needed when moist/wet.    - There is a mesh called Prineo underneath the larger dressing. This mesh is surgically glued over your incision. Leave this mesh in place until you see Dr. Romero in the office for your 3 week post-operative appointment.        (  ) Remove Prevena on post-operative day # 7. The battery pack attached to the purple dressing will automatically die 7 days after your surgery. A week after your surgery, peel off the dressing like you would a large band-aid and the entire dressing and battery pack may be thrown in the garbage.   - It is important to keep your incision covered for 2 weeks after surgery. After the Purple Prevena dressing is removed on post-op day 7, cover your incision with sterile gauze and tegaderm for another 7 days. You may change this dressing as needed when moist/wet.      - If staples were used to close your incision: Staples are safe and may remain for up to 4 weeks post-operatively. These will be removed at your 3 week post-operative appointment.      You may shower. No tub baths.   Do not scrub wound. Pat dry with soft towel.   NO LOTIONS OR CREAMS   Dr. Romero does not utilize home healthcare or home nursing. It is not needed after this procedure.    7. (  x ) DVT PROPHYLAXIS -   (  x ) Thigh/knee high sherron hose bilateral lower extremities, OFF AT NIGHT  (   ) Aspirin 81 mg twice daily for 4 weeks  (  X) Eliquis (Apixaban) 2.5 mg tablets 2 times a day for 4 weeks  (  )  Xarelto (Rivaroxaban) 10 mg daily for 4 weeks  (   ) If you are taking Xarelto (Rivaroxaban) or Eliquis (Apixaban), start taking Aspirin 325mg 2 x daily the day after you finish your Xarelto (Rivaroxaban) or Eliquis (Apixaban). Continue Aspirin until 6 weeks post op.  (   ) Patient's who were on coumadin prior to surgery should resume their pre op dose  and discontinue lovenox when INR = 2.0      8.  Take all medications ordered from Dr. Romero's office as directed at your Pre-op

## 2024-10-15 NOTE — ANESTHESIA PROCEDURE NOTES
Peripheral Block    Patient location during procedure: pre-op  Reason for block: post-op pain management and at surgeon's request  Start time: 10/15/2024 8:01 AM  End time: 10/15/2024 8:05 AM  Staffing  Performed: anesthesiologist   Anesthesiologist: Fabián Funk MD  Performed by: Fabián Funk MD  Authorized by: Fabián Funk MD    Preanesthetic Checklist  Completed: patient identified, IV checked, site marked, risks and benefits discussed, surgical/procedural consents, equipment checked, pre-op evaluation, timeout performed, anesthesia consent given, oxygen available, monitors applied/VS acknowledged, fire risk safety assessment completed and verbalized and blood product R/B/A discussed and consented  Peripheral Block   Patient position: supine  Prep: ChloraPrep  Provider prep: mask and sterile gloves  Patient monitoring: cardiac monitor, continuous pulse ox, continuous capnometry, frequent blood pressure checks, IV access, oxygen and responsive to questions  Block type: Femoral  Adductor canal  Laterality: right  Injection technique: single-shot  Guidance: ultrasound guided    Needle   Needle type: insulated echogenic nerve stimulator needle   Needle gauge: 22 G  Needle localization: ultrasound guidance  Needle length: 8 cm  Assessment   Injection assessment: negative aspiration for heme, no paresthesia on injection, local visualized surrounding nerve on ultrasound and no intravascular symptoms  Paresthesia pain: none  Slow fractionated injection: yes  Hemodynamics: stable  Outcomes: uncomplicated and patient tolerated procedure well    Additional Notes  0.5% ropivacaine 30 ml injected in 5 ml increments with negative aspiration between. No complications.  Medications Administered  ropivacaine (NAROPIN) injection 0.5% - Perineural   30 mL - 10/15/2024 8:01:00 AM

## 2024-10-15 NOTE — FLOWSHEET NOTE
PACU Transfer to [facility]    Vitals:    10/15/24 1230   BP: (!) 157/89   Pulse: 91   Resp: 22   Temp:    SpO2: 97%         Intake/Output Summary (Last 24 hours) at 10/15/2024 1315  Last data filed at 10/15/2024 1025  Gross per 24 hour   Intake 1910 ml   Output 20 ml   Net 1890 ml       Pain assessment:  none  Pain Level: 0    Patient transferred to care of [facility] RN.    10/15/2024 1:15 PM

## 2024-10-15 NOTE — ANESTHESIA PROCEDURE NOTES
Epidural Block    Patient location during procedure: pre-op  Start time: 10/15/2024 8:14 AM  End time: 10/15/2024 8:24 AM  Reason for block: procedure for pain, post-op pain management and at surgeon's request  Staffing  Performed: anesthesiologist   Anesthesiologist: Fabián Funk MD  Performed by: Fabián Funk MD  Authorized by: Fabián Funk MD    Epidural  Patient position: sitting  Prep: ChloraPrep  Patient monitoring: cardiac monitor, continuous pulse ox, capnometry and frequent blood pressure checks  Approach: midline  Location: L3-4  Injection technique: MARTA air  Provider prep: mask and sterile gloves  Needle  Needle type: Tuohy   Needle gauge: 18 G  Needle length: 3.5 in  Catheter type: multi-orifice  Assessment  Events: cerebrospinal fluid  Hemodynamics: stable  Attempts: 1  Outcomes: patient tolerated procedure well  Additional Notes  Wet tap during epidural attempt. Procedure aborted. Will proceed with GETA.  Preanesthetic Checklist  Completed: patient identified, IV checked, site marked, risks and benefits discussed, surgical/procedural consents, equipment checked, pre-op evaluation, timeout performed, anesthesia consent given, oxygen available, monitors applied/VS acknowledged, fire risk safety assessment completed and verbalized and blood product R/B/A discussed and consented

## 2024-10-15 NOTE — ANESTHESIA PRE PROCEDURE
venous reflux 08/13/2015    BIlateral   • Squamous cell carcinoma of skin of lower limb, left    • Steatosis of liver    • SVT (supraventricular tachycardia) (HCC) 03/05/2019   • Thyroid disease    • Type 2 diabetes mellitus (HCC)    • Venous insufficiency    • Wears glasses        Past Surgical History:        Procedure Laterality Date   • BREAST SURGERY      left fibroid tumor   • CARDIAC CATHETERIZATION  01/17/2024   • CHOLECYSTECTOMY  04/2003   • COLONOSCOPY  02/2015    Dr. Monroe   • CORONARY ANGIOPLASTY WITH STENT PLACEMENT  12/11/2023    STent to RCA, Dr. Rapp   • HYSTERECTOMY (CERVIX STATUS UNKNOWN)  01/1999   • JOINT REPLACEMENT Bilateral     Knee   • KNEE ARTHROSCOPY  03/2015    right with medial meninsectomy   • KNEE ARTHROSCOPY      right x 3-4    • MOHS SURGERY  07/01/2019    Dr. Aviles   • RETINAL DETACHMENT SURGERY Left 09/30/2020    X 3 LAST ONE IN 2/2021   • RETINAL DETACHMENT SURGERY Left 10/24/2020   • RETINAL DETACHMENT SURGERY Left 2021   • TONSILLECTOMY     • TOTAL KNEE ARTHROPLASTY  05/2013    right   • TOTAL KNEE ARTHROPLASTY Left 12/14/2021    LEFT TOTAL KNEE ARTHROPLASTY performed by Gene [facility]ero MD at [facility] OR   • VARICOSE VEIN SURGERY  1980's.         Social History:    Social History     Tobacco Use   • Smoking status: Never   • Smokeless tobacco: Never   Substance Use Topics   • Alcohol use: Never                                Counseling given: Not Answered      Vital Signs (Current):   Vitals:    10/11/24 1321 10/15/24 0636 10/15/24 0712   BP:  (!) 141/92    Pulse:  89    Resp:  14 16   Temp:  97.7 °F (36.5 °C)    TempSrc:  Temporal    SpO2:  100%    Weight: 111.1 kg (245 lb) 116.8 kg (257 lb 6.4 oz)    Height: 1.651 m (5' 5\") 1.651 m (5' 5\")                                               BP Readings from Last 3 Encounters:   10/15/24 (!) 141/92   10/07/24 110/64   07/12/24 130/80       NPO Status: Time of last liquid consumption: 2330                        Time of last solid

## 2024-10-16 LAB
ACID FAST STN SPEC QL: NORMAL
LOEFFLER MB STN SPEC: NORMAL

## 2024-10-20 LAB
BACTERIA SPEC AEROBE CULT: NORMAL
BACTERIA SPEC ANAEROBE CULT: NORMAL
GRAM STN SPEC: NORMAL

## 2024-10-21 LAB
BACTERIA SPEC AEROBE CULT: NORMAL
BACTERIA SPEC ANAEROBE CULT: NORMAL
GRAM STN SPEC: NORMAL

## 2024-10-23 ENCOUNTER — TELEPHONE (OUTPATIENT)
Dept: INTERNAL MEDICINE CLINIC | Age: 64
End: 2024-10-23

## 2024-10-23 DIAGNOSIS — Z23 FLU VACCINE NEED: Primary | ICD-10-CM

## 2024-10-23 DIAGNOSIS — Z23 NEED FOR INFLUENZA VACCINATION: Primary | ICD-10-CM

## 2024-10-24 LAB
FUNGUS SPEC CULT: NORMAL
LOEFFLER MB STN SPEC: NORMAL

## 2024-10-25 DIAGNOSIS — M54.6 CHRONIC LEFT-SIDED THORACIC BACK PAIN: ICD-10-CM

## 2024-10-25 DIAGNOSIS — G89.29 CHRONIC LEFT-SIDED THORACIC BACK PAIN: ICD-10-CM

## 2024-10-28 LAB
BACTERIA SPEC AEROBE CULT: NORMAL
BACTERIA SPEC ANAEROBE CULT: NORMAL
FUNGUS SPEC CULT: NORMAL
FUNGUS SPEC CULT: NORMAL
GRAM STN SPEC: NORMAL
LOEFFLER MB STN SPEC: NORMAL
LOEFFLER MB STN SPEC: NORMAL

## 2024-10-29 LAB
ACID FAST STN SPEC QL: NORMAL
MYCOBACTERIUM SPEC CULT: NORMAL

## 2024-10-30 ENCOUNTER — NURSE ONLY (OUTPATIENT)
Dept: INTERNAL MEDICINE CLINIC | Age: 64
End: 2024-10-30
Payer: COMMERCIAL

## 2024-10-30 DIAGNOSIS — Z23 NEED FOR IMMUNIZATION AGAINST INFLUENZA: Primary | ICD-10-CM

## 2024-10-30 PROCEDURE — 90653 IIV ADJUVANT VACCINE IM: CPT | Performed by: INTERNAL MEDICINE

## 2024-10-30 PROCEDURE — 90471 IMMUNIZATION ADMIN: CPT | Performed by: INTERNAL MEDICINE

## 2024-10-30 RX ORDER — TRAMADOL HYDROCHLORIDE 50 MG/1
50 TABLET ORAL EVERY 6 HOURS PRN
Qty: 30 TABLET | Refills: 0 | Status: CANCELLED | OUTPATIENT
Start: 2024-10-30 | End: 2024-11-09

## 2024-10-30 NOTE — TELEPHONE ENCOUNTER
Last appointment: 10/7/2024  Next appointment: 1/9/2025  Last refill: 10/14/2024 by KAYA Saez for 7 day supply

## 2024-10-31 LAB
FUNGUS SPEC CULT: NORMAL
LOEFFLER MB STN SPEC: NORMAL

## 2024-11-04 LAB
BACTERIA SPEC AEROBE CULT: NORMAL
BACTERIA SPEC ANAEROBE CULT: NORMAL
FUNGUS SPEC CULT: ABNORMAL
FUNGUS SPEC CULT: NORMAL
FUNGUS SPEC CULT: NORMAL
GRAM STN SPEC: NORMAL
LOEFFLER MB STN SPEC: ABNORMAL
LOEFFLER MB STN SPEC: NORMAL
LOEFFLER MB STN SPEC: NORMAL
ORGANISM: ABNORMAL

## 2024-11-05 LAB
ACID FAST STN SPEC QL: NORMAL
MYCOBACTERIUM SPEC CULT: NORMAL

## 2024-11-06 NOTE — PROGRESS NOTES
[facility]  H+P  Consult  OP Visit  FU Visit   CC/HPI   CC Followup visit for cardiac conditions detailed in assessment and plan below.   Intervention PCI   General Doing well.  No new concerns.     Cardiac Sx -CP, -SOB, -dizziness, -syncope, -edema, -orthopnea, -pnd, -fatigue, -palpitations   HISTORY/ALLERGY/ROS   M/S/S/F Hx Reviewed in Epic and updated as appropriate   ALLERG Codeine and Morphine and codeine   ROS Full ROS obtained and negative except as mentioned in HPI   MEDICATIONS   Cardiac medications reviewed in Epic during visit with patient.   PHYSICAL EXAM   Vitals /84 (Site: Left Upper Arm, Position: Sitting, Cuff Size: Medium Adult)   Pulse 98   Ht 1.651 m (5' 5\")   Wt 114.3 kg (252 lb)   SpO2 97%   BMI 41.93 kg/m²    Gen Alert, coop, no distress Heart  RRR, no MRG   Lung CTA-B, unlabored, no DTP Extrem Edema -Grade 1 (2mm)      COMPLIANCE   Discussed and counseled on diet, exercise, weight loss, smoking, alcohol, drugs.  All questions answered.   CODING   SCI (03722) - 30-39 mins spent reviewing hx/tests/consults, performing exam, counseling/educating, ordering meds/tests/procedures, referring/communicating w/PCP/consultants, documenting in EMR, interpreting results, communicating medical information and plan with family.   SCRIBE ATTESTATION   Nurse I, Lana Jay, RN, am scribing for and in the presence of Armaan Rapp MD. 11/6/2024 8:36 AM EST   Doctor Lana Jay is working as scribe for and in presence of me and may have prepopulated components of note with my historical intellectual property (IP) under my supervision.  Any additions to this IP performed in my presence and at my direction. Content and accuracy of this note reviewed by me (Armaan Rapp MD).   NEW MEDICATIONS   Pt verbalizes understanding of the need for treatment and education provided at today's visit.  Additional education provided in AVS.   ASSESSMENT AND PLAN   *CAD    Date EF Results   Sx

## 2024-11-07 ENCOUNTER — OFFICE VISIT (OUTPATIENT)
Dept: CARDIOLOGY CLINIC | Age: 64
End: 2024-11-07
Payer: COMMERCIAL

## 2024-11-07 VITALS
SYSTOLIC BLOOD PRESSURE: 114 MMHG | WEIGHT: 252 LBS | HEIGHT: 65 IN | HEART RATE: 98 BPM | OXYGEN SATURATION: 97 % | BODY MASS INDEX: 41.99 KG/M2 | DIASTOLIC BLOOD PRESSURE: 84 MMHG

## 2024-11-07 DIAGNOSIS — E78.00 HYPERCHOLESTEROLEMIA: ICD-10-CM

## 2024-11-07 DIAGNOSIS — I25.10 CORONARY ARTERY DISEASE DUE TO LIPID RICH PLAQUE: Primary | ICD-10-CM

## 2024-11-07 DIAGNOSIS — I10 ESSENTIAL HYPERTENSION: ICD-10-CM

## 2024-11-07 DIAGNOSIS — I25.83 CORONARY ARTERY DISEASE DUE TO LIPID RICH PLAQUE: Primary | ICD-10-CM

## 2024-11-07 PROCEDURE — 99214 OFFICE O/P EST MOD 30 MIN: CPT | Performed by: INTERNAL MEDICINE

## 2024-11-07 PROCEDURE — 3079F DIAST BP 80-89 MM HG: CPT | Performed by: INTERNAL MEDICINE

## 2024-11-07 PROCEDURE — 1123F ACP DISCUSS/DSCN MKR DOCD: CPT | Performed by: INTERNAL MEDICINE

## 2024-11-07 PROCEDURE — 3074F SYST BP LT 130 MM HG: CPT | Performed by: INTERNAL MEDICINE

## 2024-11-07 RX ORDER — ASPIRIN 81 MG/1
81 TABLET ORAL DAILY
Qty: 90 TABLET | Refills: 0
Start: 2024-11-07

## 2024-11-07 RX ORDER — TRAMADOL HYDROCHLORIDE 50 MG/1
TABLET ORAL
COMMUNITY
Start: 2024-10-28

## 2024-11-11 LAB
FUNGUS SPEC CULT: NORMAL
FUNGUS SPEC CULT: NORMAL
LOEFFLER MB STN SPEC: NORMAL
LOEFFLER MB STN SPEC: NORMAL

## 2024-11-12 LAB
ACID FAST STN SPEC QL: NORMAL
MYCOBACTERIUM SPEC CULT: NORMAL

## 2024-11-17 LAB
FUNGUS SPEC CULT: ABNORMAL
LOEFFLER MB STN SPEC: ABNORMAL
ORGANISM: ABNORMAL

## 2024-11-19 LAB
ACID FAST STN SPEC QL: NORMAL
MYCOBACTERIUM SPEC CULT: NORMAL

## 2024-11-20 ENCOUNTER — HOSPITAL ENCOUNTER (OUTPATIENT)
Age: 64
Discharge: HOME | End: 2024-11-20
Payer: COMMERCIAL

## 2024-11-20 LAB
BUN SERPL-MCNC: 26 MG/DL (ref 7–20)
CREAT SERPL-MCNC: 1.7 MG/DL (ref 0.6–1.2)
GFR SERPLBLD CREATININE-BSD FMLA CKD-EPI: 33 ML/MIN/{1.73_M2}

## 2024-11-20 PROCEDURE — 36415 COLL VENOUS BLD VENIPUNCTURE: CPT

## 2024-11-20 PROCEDURE — 82565 ASSAY OF CREATININE: CPT

## 2024-11-20 PROCEDURE — 84520 ASSAY OF UREA NITROGEN: CPT

## 2024-11-21 DIAGNOSIS — E78.5 HYPERLIPIDEMIA, UNSPECIFIED HYPERLIPIDEMIA TYPE: ICD-10-CM

## 2024-11-22 RX ORDER — ROSUVASTATIN CALCIUM 20 MG/1
20 TABLET, COATED ORAL NIGHTLY
Qty: 90 TABLET | Refills: 1 | Status: SHIPPED | OUTPATIENT
Start: 2024-11-22

## 2024-11-22 NOTE — TELEPHONE ENCOUNTER
Received refill request for rosuvastatin from [facility] pharmacy.    Last ov: 11/07/2024 DCE    Last labs: 10/07/2024     Last Refill: 05/17/2024 #90 w/ 1    Next appointment: 11/06/2025 NPTS

## 2024-11-26 ENCOUNTER — HOSPITAL ENCOUNTER (OUTPATIENT)
Dept: MRI IMAGING | Age: 64
Discharge: HOME | End: 2024-11-26
Attending: UROLOGY
Payer: COMMERCIAL

## 2024-11-26 DIAGNOSIS — N28.89 OTHER SPECIFIED DISORDERS OF KIDNEY AND URETER: ICD-10-CM

## 2024-11-26 LAB
ACID FAST STN SPEC QL: NORMAL
MYCOBACTERIUM SPEC CULT: NORMAL

## 2024-11-26 PROCEDURE — A9577 INJ MULTIHANCE: HCPCS | Performed by: UROLOGY

## 2024-11-26 PROCEDURE — 6360000004 HC RX CONTRAST MEDICATION: Performed by: UROLOGY

## 2024-11-26 PROCEDURE — 74183 MRI ABD W/O CNTR FLWD CNTR: CPT

## 2024-11-26 RX ADMIN — GADOBENATE DIMEGLUMINE 19 ML: 529 INJECTION, SOLUTION INTRAVENOUS at 19:52

## 2024-12-02 DIAGNOSIS — E55.9 VITAMIN D DEFICIENCY: ICD-10-CM

## 2024-12-02 DIAGNOSIS — N18.32 STAGE 3B CHRONIC KIDNEY DISEASE (HCC): ICD-10-CM

## 2024-12-03 LAB
25(OH)D3 SERPL-MCNC: 21 NG/ML
ACID FAST STN SPEC QL: NORMAL
ALBUMIN SERPL-MCNC: 4.1 G/DL (ref 3.4–5)
ANION GAP SERPL CALCULATED.3IONS-SCNC: 10 MMOL/L (ref 3–16)
BACTERIA URNS QL MICRO: ABNORMAL /HPF
BILIRUB UR QL STRIP.AUTO: NEGATIVE
BUN SERPL-MCNC: 31 MG/DL (ref 7–20)
CALCIUM SERPL-MCNC: 9.4 MG/DL (ref 8.3–10.6)
CHLORIDE SERPL-SCNC: 110 MMOL/L (ref 99–110)
CLARITY UR: ABNORMAL
CO2 SERPL-SCNC: 21 MMOL/L (ref 21–32)
COLOR UR: YELLOW
CREAT SERPL-MCNC: 1.8 MG/DL (ref 0.6–1.2)
CREAT UR-MCNC: 150 MG/DL (ref 28–259)
CREAT UR-MCNC: 151 MG/DL (ref 28–259)
DEPRECATED RDW RBC AUTO: 15.2 % (ref 12.4–15.4)
EPI CELLS #/AREA URNS AUTO: 9 /HPF (ref 0–5)
GFR SERPLBLD CREATININE-BSD FMLA CKD-EPI: 31 ML/MIN/{1.73_M2}
GLUCOSE SERPL-MCNC: 89 MG/DL (ref 70–99)
GLUCOSE UR STRIP.AUTO-MCNC: >=1000 MG/DL
HCT VFR BLD AUTO: 41 % (ref 36–48)
HGB BLD-MCNC: 13.1 G/DL (ref 12–16)
HGB UR QL STRIP.AUTO: NEGATIVE
HYALINE CASTS #/AREA URNS AUTO: 0 /LPF (ref 0–8)
KETONES UR STRIP.AUTO-MCNC: NEGATIVE MG/DL
LEUKOCYTE ESTERASE UR QL STRIP.AUTO: ABNORMAL
MCH RBC QN AUTO: 27.2 PG (ref 26–34)
MCHC RBC AUTO-ENTMCNC: 32 G/DL (ref 31–36)
MCV RBC AUTO: 85.1 FL (ref 80–100)
MICROALBUMIN UR DL<=1MG/L-MCNC: <1.2 MG/DL
MICROALBUMIN/CREAT UR: NORMAL MG/G (ref 0–30)
MUCUS: PRESENT
MYCOBACTERIUM SPEC CULT: NORMAL
NITRITE UR QL STRIP.AUTO: NEGATIVE
PH UR STRIP.AUTO: 5.5 [PH] (ref 5–8)
PHOSPHATE SERPL-MCNC: 3.5 MG/DL (ref 2.5–4.9)
PLATELET # BLD AUTO: 270 K/UL (ref 135–450)
PMV BLD AUTO: 9.2 FL (ref 5–10.5)
POTASSIUM SERPL-SCNC: 4.5 MMOL/L (ref 3.5–5.1)
PROT UR STRIP.AUTO-MCNC: 30 MG/DL
PROT UR-MCNC: 27.7 MG/DL
PROT/CREAT UR-RTO: 0.2 MG/DL
PTH-INTACT SERPL-MCNC: 118 PG/ML (ref 14–72)
RBC # BLD AUTO: 4.82 M/UL (ref 4–5.2)
RBC CLUMPS #/AREA URNS AUTO: 1 /HPF (ref 0–4)
SODIUM SERPL-SCNC: 141 MMOL/L (ref 136–145)
SP GR UR STRIP.AUTO: 1.03 (ref 1–1.03)
UA DIPSTICK W REFLEX MICRO PNL UR: YES
URN SPEC COLLECT METH UR: ABNORMAL
UROBILINOGEN UR STRIP-ACNC: 0.2 E.U./DL
WBC # BLD AUTO: 8.7 K/UL (ref 4–11)
WBC #/AREA URNS AUTO: 39 /HPF (ref 0–5)

## 2024-12-17 ENCOUNTER — OFFICE VISIT (OUTPATIENT)
Dept: GYNECOLOGY | Age: 64
End: 2024-12-17
Payer: COMMERCIAL

## 2024-12-17 VITALS
DIASTOLIC BLOOD PRESSURE: 74 MMHG | WEIGHT: 256 LBS | HEART RATE: 92 BPM | SYSTOLIC BLOOD PRESSURE: 122 MMHG | OXYGEN SATURATION: 96 % | BODY MASS INDEX: 42.6 KG/M2

## 2024-12-17 DIAGNOSIS — B37.2 SKIN YEAST INFECTION: Primary | ICD-10-CM

## 2024-12-17 PROCEDURE — 99214 OFFICE O/P EST MOD 30 MIN: CPT | Performed by: OBSTETRICS & GYNECOLOGY

## 2024-12-17 PROCEDURE — 3078F DIAST BP <80 MM HG: CPT | Performed by: OBSTETRICS & GYNECOLOGY

## 2024-12-17 PROCEDURE — 1123F ACP DISCUSS/DSCN MKR DOCD: CPT | Performed by: OBSTETRICS & GYNECOLOGY

## 2024-12-17 PROCEDURE — 3074F SYST BP LT 130 MM HG: CPT | Performed by: OBSTETRICS & GYNECOLOGY

## 2024-12-17 RX ORDER — KETOCONAZOLE 20 MG/G
CREAM TOPICAL
Qty: 60 G | Refills: 1 | Status: SHIPPED | OUTPATIENT
Start: 2024-12-17

## 2024-12-17 NOTE — PROGRESS NOTES
The sensitive parts of the examination were performed with Jose Weldon MA as a chaperone.  Jose was present during the entirety of the sensitive parts of the examination.

## 2024-12-27 NOTE — PROGRESS NOTES
Chief complaint: Rash (Under left breast, drainage. Itchy )    History of present illness: Alicia Cervantes 64 y.o. female No LMP recorded. Patient has had a hysterectomy.  Who presents with complaint of rash under the breasts.  The patient notices an odor as well.  It is pruritic.      Patient Active Problem List   Diagnosis    Benign essential HTN    Hypothyroidism    Saphenofemoral venous reflux    Venous insufficiency of both lower extremities    Steatosis of liver    Impaired fasting glucose    Hyperlipidemia    CKD (chronic kidney disease) stage 3, GFR 30-59 ml/min ([facility])    Irritable bowel syndrome with diarrhea    Primary osteoarthritis of left knee    Hearing loss due to cerumen impaction    Sensorineural hearing loss of left ear    Gastroesophageal reflux disease without esophagitis    Arthropathy    Renal lesion    Type 2 diabetes mellitus with diabetic neuropathy, without long-term current use of insulin ([facility])    Edema of lower extremity    Low back pain    Lumbar radiculopathy    Overweight    Pain of right lower extremity    Personal history of other venous thrombosis and embolism    History of COVID-19    Arthritis of left knee    Class 2 severe obesity due to excess calories with serious comorbidity and body mass index (BMI) of 38.0 to 38.9 in adult    Type 2 diabetes mellitus with chronic kidney disease    Chronic venous insufficiency    Chest pain    NSTEMI (non-ST elevated myocardial infarction) ([facility])    Unstable angina ([facility])    Coronary artery disease involving native coronary artery of native heart without angina pectoris    Sepsis ([facility])    Thrombocytopenia, unspecified ([facility])       Review of systems:  No complaints of symptoms involving:  Constitutional: negative for fever, chills.  Eyes: No change in vision, double vision, or scotomata.  HENT: No sore throat, ear pain or nasal congestion.  Respiratory: No cough, shortness of breath or hemoptysis.  Cardiovascular: No chest pain, orthopnea,

## 2024-12-31 RX ORDER — TICAGRELOR 90 MG/1
90 TABLET ORAL 2 TIMES DAILY
Qty: 180 TABLET | Refills: 3 | OUTPATIENT
Start: 2024-12-31

## 2024-12-31 NOTE — TELEPHONE ENCOUNTER
Received refill request for Brilinta from [facility].    Last ov: 11/07/2024 DCE    Next appointment: 11/06/2025 NPTS     Historical Provider

## 2025-01-06 ASSESSMENT — PATIENT HEALTH QUESTIONNAIRE - PHQ9
2. FEELING DOWN, DEPRESSED OR HOPELESS: NOT AT ALL
SUM OF ALL RESPONSES TO PHQ9 QUESTIONS 1 & 2: 0
SUM OF ALL RESPONSES TO PHQ9 QUESTIONS 1 & 2: 0
1. LITTLE INTEREST OR PLEASURE IN DOING THINGS: NOT AT ALL
SUM OF ALL RESPONSES TO PHQ QUESTIONS 1-9: 0
2. FEELING DOWN, DEPRESSED OR HOPELESS: NOT AT ALL
1. LITTLE INTEREST OR PLEASURE IN DOING THINGS: NOT AT ALL
SUM OF ALL RESPONSES TO PHQ QUESTIONS 1-9: 0

## 2025-01-09 ENCOUNTER — OFFICE VISIT (OUTPATIENT)
Dept: INTERNAL MEDICINE CLINIC | Age: 65
End: 2025-01-09

## 2025-01-09 VITALS
WEIGHT: 254 LBS | DIASTOLIC BLOOD PRESSURE: 68 MMHG | BODY MASS INDEX: 42.27 KG/M2 | OXYGEN SATURATION: 98 % | SYSTOLIC BLOOD PRESSURE: 114 MMHG | HEART RATE: 93 BPM | TEMPERATURE: 97.5 F

## 2025-01-09 DIAGNOSIS — R06.83 SNORING: ICD-10-CM

## 2025-01-09 DIAGNOSIS — E03.9 ACQUIRED HYPOTHYROIDISM: ICD-10-CM

## 2025-01-09 DIAGNOSIS — E11.40 TYPE 2 DIABETES MELLITUS WITH DIABETIC NEUROPATHY, WITHOUT LONG-TERM CURRENT USE OF INSULIN (HCC): ICD-10-CM

## 2025-01-09 DIAGNOSIS — I10 BENIGN ESSENTIAL HTN: ICD-10-CM

## 2025-01-09 DIAGNOSIS — E11.22 TYPE 2 DIABETES MELLITUS WITH STAGE 3B CHRONIC KIDNEY DISEASE, WITHOUT LONG-TERM CURRENT USE OF INSULIN (HCC): ICD-10-CM

## 2025-01-09 DIAGNOSIS — K21.9 GASTROESOPHAGEAL REFLUX DISEASE WITHOUT ESOPHAGITIS: ICD-10-CM

## 2025-01-09 DIAGNOSIS — I87.2 CHRONIC VENOUS INSUFFICIENCY: ICD-10-CM

## 2025-01-09 DIAGNOSIS — N18.32 TYPE 2 DIABETES MELLITUS WITH STAGE 3B CHRONIC KIDNEY DISEASE, WITHOUT LONG-TERM CURRENT USE OF INSULIN (HCC): ICD-10-CM

## 2025-01-09 DIAGNOSIS — I25.10 CORONARY ARTERY DISEASE INVOLVING NATIVE CORONARY ARTERY OF NATIVE HEART WITHOUT ANGINA PECTORIS: ICD-10-CM

## 2025-01-09 DIAGNOSIS — Z95.820 S/P ANGIOPLASTY WITH STENT: ICD-10-CM

## 2025-01-09 DIAGNOSIS — N28.89 RIGHT KIDNEY MASS: ICD-10-CM

## 2025-01-09 DIAGNOSIS — E78.5 HYPERLIPIDEMIA, UNSPECIFIED HYPERLIPIDEMIA TYPE: ICD-10-CM

## 2025-01-09 DIAGNOSIS — E11.22 TYPE 2 DIABETES MELLITUS WITH STAGE 3B CHRONIC KIDNEY DISEASE, WITHOUT LONG-TERM CURRENT USE OF INSULIN (HCC): Primary | ICD-10-CM

## 2025-01-09 DIAGNOSIS — N18.32 TYPE 2 DIABETES MELLITUS WITH STAGE 3B CHRONIC KIDNEY DISEASE, WITHOUT LONG-TERM CURRENT USE OF INSULIN (HCC): Primary | ICD-10-CM

## 2025-01-09 PROBLEM — D69.6 THROMBOCYTOPENIA, UNSPECIFIED (HCC): Status: RESOLVED | Noted: 2024-07-10 | Resolved: 2025-01-09

## 2025-01-09 PROBLEM — R60.0 EDEMA OF LOWER EXTREMITY: Status: RESOLVED | Noted: 2020-09-18 | Resolved: 2025-01-09

## 2025-01-09 PROBLEM — R07.9 CHEST PAIN: Status: RESOLVED | Noted: 2023-12-11 | Resolved: 2025-01-09

## 2025-01-09 PROBLEM — M54.50 LOW BACK PAIN: Status: RESOLVED | Noted: 2020-05-13 | Resolved: 2025-01-09

## 2025-01-09 PROBLEM — I21.4 NSTEMI (NON-ST ELEVATED MYOCARDIAL INFARCTION) (HCC): Status: RESOLVED | Noted: 2023-12-12 | Resolved: 2025-01-09

## 2025-01-09 PROBLEM — M79.604 PAIN OF RIGHT LOWER EXTREMITY: Status: RESOLVED | Noted: 2020-09-18 | Resolved: 2025-01-09

## 2025-01-09 PROBLEM — Z86.16 HISTORY OF COVID-19: Status: RESOLVED | Noted: 2021-01-12 | Resolved: 2025-01-09

## 2025-01-09 PROBLEM — I20.0 UNSTABLE ANGINA (HCC): Status: RESOLVED | Noted: 2024-01-17 | Resolved: 2025-01-09

## 2025-01-09 PROBLEM — M12.9 ARTHROPATHY: Status: RESOLVED | Noted: 2019-06-07 | Resolved: 2025-01-09

## 2025-01-09 PROBLEM — A41.9 SEPSIS (HCC): Status: RESOLVED | Noted: 2024-06-24 | Resolved: 2025-01-09

## 2025-01-09 LAB
ALBUMIN SERPL-MCNC: 4.1 G/DL (ref 3.4–5)
ALP SERPL-CCNC: 117 U/L (ref 40–129)
ALT SERPL-CCNC: 18 U/L (ref 10–40)
AST SERPL-CCNC: 13 U/L (ref 15–37)
BILIRUB DIRECT SERPL-MCNC: 0.2 MG/DL (ref 0–0.3)
BILIRUB INDIRECT SERPL-MCNC: 0.2 MG/DL (ref 0–1)
BILIRUB SERPL-MCNC: 0.4 MG/DL (ref 0–1)
CHOLEST SERPL-MCNC: 163 MG/DL (ref 0–199)
EST. AVERAGE GLUCOSE BLD GHB EST-MCNC: 131.2 MG/DL
FOLATE SERPL-MCNC: 7.79 NG/ML (ref 4.78–24.2)
HBA1C MFR BLD: 6.2 %
HDLC SERPL-MCNC: 61 MG/DL (ref 40–60)
LDLC SERPL CALC-MCNC: 86 MG/DL
PROT SERPL-MCNC: 6 G/DL (ref 6.4–8.2)
TRIGL SERPL-MCNC: 80 MG/DL (ref 0–150)
TSH SERPL DL<=0.005 MIU/L-ACNC: 2.68 UIU/ML (ref 0.27–4.2)
VIT B12 SERPL-MCNC: 360 PG/ML (ref 211–911)
VLDLC SERPL CALC-MCNC: 16 MG/DL

## 2025-01-09 SDOH — ECONOMIC STABILITY: FOOD INSECURITY: WITHIN THE PAST 12 MONTHS, THE FOOD YOU BOUGHT JUST DIDN'T LAST AND YOU DIDN'T HAVE MONEY TO GET MORE.: NEVER TRUE

## 2025-01-09 SDOH — ECONOMIC STABILITY: FOOD INSECURITY: WITHIN THE PAST 12 MONTHS, YOU WORRIED THAT YOUR FOOD WOULD RUN OUT BEFORE YOU GOT MONEY TO BUY MORE.: NEVER TRUE

## 2025-01-09 NOTE — PROGRESS NOTES
[facility]  Internal Medicine  Patient Encounter  Marcellsu Taylor D.O., [facility]        Chief Complaint   Patient presents with    Check-Up     3 MO    Medication Check       HPI: 64 y.o. female seen today requesting her routine checkup regarding the status of current issues listed below along with a medication review and reconciliation    Patient is status post right total knee replacement revision on 10/15/2024.    Patient admitted to the hospital 6/24/2024 - 6/27/2024 with sepsis due to Campylobacter infectious colitis.  Patient was hypotensive and required vasopressors for a brief time.    Pt is seeing urology, Dr. Liu.  He is following a kidney cyst. They are contemplating biopsy vs cryotherapy. MRI ABD 11/27/2024--IMPRESSION:  1. Unchanged 1.8 cm right renal mass consistent with renal cell carcinoma  until proven otherwise.    CAD--patient suffered an NSTEMI on 12/11/2023.  S/P Angioplasty with stent to RCA 12/11/2023.  She denies CP, SOB, orthopnea, increased swelling.  She has been seen by Dr. Rapp.    She underwent another left heart cath on 1/17/2024.  This showed widely patent stents, 50% mid LAD and OM1 stenoses.  She remains on Brilinta, Eliquis, and aspirin.  She has been told she can stop the Brilinta when she runs out.  She will remain on aspirin and Eliquis.  The latter is for DVT prophylaxis given her history of recurrent DVT.  She denies CP, tightness, SOB, orthopnea, worsening edema, palpitations.    She has never had a sleep study.  She does snore.  She has not wanted to explore.  She is now considering.     H/O DVT-- Pt has had recurrent DVT.  The last DVT was in the setting of right TKA in 2013.  She has saphenofemoral venous reflux/Chronic venous insufficiency.  She is now on Eliquis.  She has been seen by Hematology.  She wears compression stocking, but not consistently.    She was seen by Dr. Laws.    CKD-- Stage 3.  She sees Dr. Chen as well as their NP..  She

## 2025-01-09 NOTE — PATIENT INSTRUCTIONS
To do list:    #1 you are due for a repeat colonoscopy this February.  I would postpone until at least off of Brilinta    #2 recommend doing a home sleep study.

## 2025-01-12 DIAGNOSIS — E78.5 HYPERLIPIDEMIA, UNSPECIFIED HYPERLIPIDEMIA TYPE: ICD-10-CM

## 2025-01-12 DIAGNOSIS — I25.10 CORONARY ARTERY DISEASE INVOLVING NATIVE CORONARY ARTERY OF NATIVE HEART WITHOUT ANGINA PECTORIS: Primary | ICD-10-CM

## 2025-01-13 ENCOUNTER — TELEPHONE (OUTPATIENT)
Dept: INTERVENTIONAL RADIOLOGY/VASCULAR | Age: 65
End: 2025-01-13

## 2025-01-16 RX ORDER — ROSUVASTATIN CALCIUM 40 MG/1
40 TABLET, COATED ORAL DAILY
Qty: 90 TABLET | Refills: 3 | Status: SHIPPED | OUTPATIENT
Start: 2025-01-16

## 2025-01-21 DIAGNOSIS — E11.65 UNCONTROLLED TYPE 2 DIABETES MELLITUS WITH HYPERGLYCEMIA (HCC): ICD-10-CM

## 2025-01-22 RX ORDER — GLIPIZIDE 5 MG/1
5 TABLET, FILM COATED, EXTENDED RELEASE ORAL DAILY
Qty: 90 TABLET | Refills: 3 | Status: SHIPPED | OUTPATIENT
Start: 2025-01-22

## 2025-01-24 ENCOUNTER — TELEPHONE (OUTPATIENT)
Dept: INTERVENTIONAL RADIOLOGY/VASCULAR | Age: 65
End: 2025-01-24

## 2025-01-27 ENCOUNTER — TELEPHONE (OUTPATIENT)
Dept: CARDIOLOGY CLINIC | Age: 65
End: 2025-01-27

## 2025-01-27 NOTE — TELEPHONE ENCOUNTER
Pt is having an ablation with Dr Liu and is wanting DCE to know she is holding her aspirin took last one on Sat 1/25  and eliquis taking last one tonight 1/27/25. States she will restart them on Sat 12/1/25. Any questions please call pt.

## 2025-01-31 ENCOUNTER — ANESTHESIA (OUTPATIENT)
Dept: CT IMAGING | Age: 65
End: 2025-01-31
Payer: COMMERCIAL

## 2025-01-31 ENCOUNTER — HOSPITAL ENCOUNTER (OUTPATIENT)
Dept: CT IMAGING | Age: 65
Discharge: HOME | End: 2025-01-31
Payer: COMMERCIAL

## 2025-01-31 ENCOUNTER — ANESTHESIA EVENT (OUTPATIENT)
Dept: CT IMAGING | Age: 65
End: 2025-01-31
Payer: COMMERCIAL

## 2025-01-31 VITALS
RESPIRATION RATE: 18 BRPM | BODY MASS INDEX: 42.32 KG/M2 | HEART RATE: 102 BPM | SYSTOLIC BLOOD PRESSURE: 137 MMHG | WEIGHT: 254 LBS | OXYGEN SATURATION: 96 % | DIASTOLIC BLOOD PRESSURE: 84 MMHG | TEMPERATURE: 98.5 F | HEIGHT: 65 IN

## 2025-01-31 DIAGNOSIS — N28.89 OTHER SPECIFIED DISORDERS OF KIDNEY AND URETER: ICD-10-CM

## 2025-01-31 LAB
ANION GAP SERPL CALCULATED.3IONS-SCNC: 11 MMOL/L (ref 3–16)
BUN SERPL-MCNC: 26 MG/DL (ref 7–20)
CALCIUM SERPL-MCNC: 9.1 MG/DL (ref 8.3–10.6)
CHLORIDE SERPL-SCNC: 109 MMOL/L (ref 99–110)
CO2 SERPL-SCNC: 22 MMOL/L (ref 21–32)
CREAT SERPL-MCNC: 2.1 MG/DL (ref 0.6–1.2)
DEPRECATED RDW RBC AUTO: 16.9 % (ref 12.4–15.4)
EKG ATRIAL RATE: 92 BPM
EKG DIAGNOSIS: NORMAL
EKG P AXIS: 69 DEGREES
EKG P-R INTERVAL: 120 MS
EKG Q-T INTERVAL: 356 MS
EKG QRS DURATION: 82 MS
EKG QTC CALCULATION (BAZETT): 440 MS
EKG R AXIS: 10 DEGREES
EKG T AXIS: 46 DEGREES
EKG VENTRICULAR RATE: 92 BPM
GFR SERPLBLD CREATININE-BSD FMLA CKD-EPI: 26 ML/MIN/{1.73_M2}
GLUCOSE SERPL-MCNC: 109 MG/DL (ref 70–99)
HCT VFR BLD AUTO: 39 % (ref 36–48)
HGB BLD-MCNC: 12.3 G/DL (ref 12–16)
INR PPP: 0.96 (ref 0.85–1.15)
MCH RBC QN AUTO: 27.1 PG (ref 26–34)
MCHC RBC AUTO-ENTMCNC: 31.7 G/DL (ref 31–36)
MCV RBC AUTO: 85.6 FL (ref 80–100)
PLATELET # BLD AUTO: 262 K/UL (ref 135–450)
PMV BLD AUTO: 8.6 FL (ref 5–10.5)
POTASSIUM SERPL-SCNC: 4.7 MMOL/L (ref 3.5–5.1)
PROTHROMBIN TIME: 13 SEC (ref 11.9–14.9)
RBC # BLD AUTO: 4.55 M/UL (ref 4–5.2)
SODIUM SERPL-SCNC: 142 MMOL/L (ref 136–145)
WBC # BLD AUTO: 11.5 K/UL (ref 4–11)

## 2025-01-31 PROCEDURE — 85027 COMPLETE CBC AUTOMATED: CPT

## 2025-01-31 PROCEDURE — 85610 PROTHROMBIN TIME: CPT

## 2025-01-31 PROCEDURE — 36415 COLL VENOUS BLD VENIPUNCTURE: CPT

## 2025-01-31 PROCEDURE — 93005 ELECTROCARDIOGRAM TRACING: CPT | Performed by: INTERNAL MEDICINE

## 2025-01-31 PROCEDURE — 6360000002 HC RX W HCPCS: Performed by: INTERNAL MEDICINE

## 2025-01-31 PROCEDURE — 2500000003 HC RX 250 WO HCPCS: Performed by: INTERNAL MEDICINE

## 2025-01-31 PROCEDURE — 80048 BASIC METABOLIC PNL TOTAL CA: CPT

## 2025-01-31 RX ORDER — HYDROMORPHONE HYDROCHLORIDE 2 MG/ML
0.5 INJECTION, SOLUTION INTRAMUSCULAR; INTRAVENOUS; SUBCUTANEOUS EVERY 5 MIN PRN
OUTPATIENT
Start: 2025-01-31

## 2025-01-31 RX ORDER — NALOXONE HYDROCHLORIDE 0.4 MG/ML
INJECTION, SOLUTION INTRAMUSCULAR; INTRAVENOUS; SUBCUTANEOUS PRN
OUTPATIENT
Start: 2025-01-31

## 2025-01-31 RX ORDER — SODIUM CHLORIDE 0.9 % (FLUSH) 0.9 %
5-40 SYRINGE (ML) INJECTION EVERY 12 HOURS SCHEDULED
OUTPATIENT
Start: 2025-01-31

## 2025-01-31 RX ORDER — DIPHENHYDRAMINE HYDROCHLORIDE 50 MG/ML
12.5 INJECTION INTRAMUSCULAR; INTRAVENOUS
OUTPATIENT
Start: 2025-01-31 | End: 2025-02-01

## 2025-01-31 RX ORDER — HYDROMORPHONE HYDROCHLORIDE 2 MG/ML
0.25 INJECTION, SOLUTION INTRAMUSCULAR; INTRAVENOUS; SUBCUTANEOUS EVERY 5 MIN PRN
OUTPATIENT
Start: 2025-01-31

## 2025-01-31 RX ORDER — SODIUM CHLORIDE 9 MG/ML
INJECTION, SOLUTION INTRAVENOUS PRN
OUTPATIENT
Start: 2025-01-31

## 2025-01-31 RX ORDER — ONDANSETRON 2 MG/ML
4 INJECTION INTRAMUSCULAR; INTRAVENOUS
OUTPATIENT
Start: 2025-01-31 | End: 2025-02-01

## 2025-01-31 RX ORDER — SODIUM CHLORIDE 0.9 % (FLUSH) 0.9 %
5-40 SYRINGE (ML) INJECTION PRN
OUTPATIENT
Start: 2025-01-31

## 2025-01-31 RX ADMIN — WATER 1000 MG: 1 INJECTION INTRAMUSCULAR; INTRAVENOUS; SUBCUTANEOUS at 08:17

## 2025-01-31 ASSESSMENT — PAIN - FUNCTIONAL ASSESSMENT: PAIN_FUNCTIONAL_ASSESSMENT: NONE - DENIES PAIN

## 2025-01-31 NOTE — PRE SEDATION
Sedation Pre-Procedure Note    Patient Name: Alicia Cervantes   YOB: 1960  Room/Bed: Room/bed info not found  Medical Record Number: [id number]  Date: 1/31/2025   Time: 8:03 AM       Indication:  Right renal mass, presumed RCC - cryoablation    Consent: I have discussed with the patient and/or the patient representative the indication, alternatives, and the possible risks and/or complications of the planned procedure and the anesthesia methods. The patient and/or patient representative appear to understand and agree to proceed.    Vital Signs:   Vitals:    01/31/25 0743   BP: 137/84   Pulse: (!) 102   Resp: 18   Temp: 98.5 °F (36.9 °C)   SpO2: 96%       Past Medical History:   has a past medical history of Acute superficial venous thrombosis of lower extremity, right, Arthritis, CAD (coronary artery disease), native coronary artery, Campylobacter gastroenteritis, Cataract, bilateral, Chronic superficial venous thrombosis of left lower extremity, CKD (chronic kidney disease) stage 2, GFR 60-89 ml/min, CKD (chronic kidney disease) stage 3, GFR 30-59 ml/min ([facility]), COVID-19 virus infection, Cystic kidney disease, DVT (deep venous thrombosis) ([facility]), Gastroesophageal reflux disease without esophagitis, H/O deep venous thrombosis, H/O simple renal cyst, Hx of blood clots, Hyperlipidemia, Hypertension, IBS (irritable bowel syndrome), Left retinal detachment, NSTEMI (non-ST elevated myocardial infarction) ([facility]), Osteoarthritis, knee, Prolonged emergence from general anesthesia, Saphenofemoral venous reflux, Sepsis ([facility]), Squamous cell carcinoma of skin of lower limb, left, Steatosis of liver, SVT (supraventricular tachycardia) ([facility]), Thyroid disease, Type 2 diabetes mellitus ([facility]), Venous insufficiency, and Wears glasses.    Past Surgical History:   has a past surgical history that includes Cholecystectomy (04/2003); Hysterectomy (01/1999); Breast surgery; Knee arthroscopy (03/2015); Total knee arthroplasty

## 2025-01-31 NOTE — ANESTHESIA PRE PROCEDURE
Department of Anesthesiology  Preprocedure Note       Name:  Alicia Cervantes   Age:  64 y.o.  :  1960                                          MRN:  [id number]         Date:  2025      Surgeon: * No surgeons listed *    Procedure: * No procedures listed *    Medications prior to admission:   Prior to Admission medications    Medication Sig Start Date End Date Taking? Authorizing Provider   glipiZIDE (GLUCOTROL XL) 5 MG extended release tablet TAKE 1 TABLET DAILY 25  Yes Marcellus Taylor DO   rosuvastatin (CRESTOR) 40 MG tablet Take 1 tablet by mouth daily 25  Yes Marcellus Taylor DO   irbesartan (AVAPRO) 75 MG tablet Take 1 tablet by mouth daily 24  Yes Katie Hernandez PA-C   vitamin D (ERGOCALCIFEROL) 1.25 MG (41951 UT) CAPS capsule Take 1 capsule by mouth once a week 24  Yes Harris Chen MD   rosuvastatin (CRESTOR) 20 MG tablet TAKE 1 TABLET BY MOUTH EVERY NIGHT AT BEDTIME 24  Yes Tish Perez, APRN - CNP   aspirin 81 MG EC tablet Take 1 tablet by mouth daily 24  Yes Armaan Rapp MD   apixaban (ELIQUIS) 2.5 MG TABS tablet Take 1 tablet by mouth 2 times daily 10/16/24  Yes Osvaldo Verduzco PA-C   Cholecalciferol (VITAMIN D-3 PO) Take 50,000 Units by mouth once a week   Yes Provider, MD Chepe   levothyroxine (SYNTHROID) 112 MCG tablet Take 1 tablet by mouth Daily 24  Yes Marcellus Taylor DO   JANUVIA 50 MG tablet TAKE 1 TABLET DAILY 24  Yes Marcellus Taylor DO   esomeprazole (NEXIUM) 40 MG delayed release capsule TAKE 1 CAPSULE DAILY IN THE MORNING BEFORE BREAKFAST 4/3/24  Yes Marcellus Taylor DO   ketoconazole (NIZORAL) 2 % cream Apply topically daily. 24   Marcellus Gustafson MD   tiZANidine (ZANAFLEX) 4 MG tablet Take 1 tablet by mouth nightly 24   Kotzin, Marcellus A, DO   blood glucose test strips (ONETOUCH ULTRA) strip TEST 2 TIMES A DAY & AS NEEDED FOR SYMPTOMS OF IRREGULAR BLOOD GLUCOSE. 10/7/24

## 2025-01-31 NOTE — PROGRESS NOTES
Anesthesia assessed the patient and determined that the case needed to be postponed as farxiga was taken within the last three days. Per anethesia, SGLT2 inhibitors need to be held 3 days prior to intubation. Patient will be rescheduled.    Isaac Stubbs MD  1/31/2025, 9:34 AM  Interventional Radiology  [phone] ([phone])

## 2025-02-12 ENCOUNTER — TELEPHONE (OUTPATIENT)
Dept: INTERVENTIONAL RADIOLOGY/VASCULAR | Age: 65
End: 2025-02-12

## 2025-02-19 ENCOUNTER — ANESTHESIA EVENT (OUTPATIENT)
Dept: CT IMAGING | Age: 65
End: 2025-02-19
Payer: COMMERCIAL

## 2025-02-19 ENCOUNTER — HOSPITAL ENCOUNTER (OUTPATIENT)
Dept: CT IMAGING | Age: 65
Discharge: HOME | End: 2025-02-19
Payer: COMMERCIAL

## 2025-02-19 ENCOUNTER — ANESTHESIA (OUTPATIENT)
Dept: CT IMAGING | Age: 65
End: 2025-02-19
Payer: COMMERCIAL

## 2025-02-19 DIAGNOSIS — N28.89 OTHER SPECIFIED DISORDERS OF KIDNEY AND URETER: ICD-10-CM

## 2025-02-19 PROCEDURE — 2580000003 HC RX 258: Performed by: NURSE ANESTHETIST, CERTIFIED REGISTERED

## 2025-02-19 PROCEDURE — P9045 ALBUMIN (HUMAN), 5%, 250 ML: HCPCS | Performed by: NURSE ANESTHETIST, CERTIFIED REGISTERED

## 2025-02-19 PROCEDURE — 6360000002 HC RX W HCPCS: Performed by: NURSE ANESTHETIST, CERTIFIED REGISTERED

## 2025-02-19 PROCEDURE — 50593 PERC CRYO ABLATE RENAL TUM: CPT

## 2025-02-19 PROCEDURE — 2500000003 HC RX 250 WO HCPCS: Performed by: NURSE ANESTHETIST, CERTIFIED REGISTERED

## 2025-02-19 RX ORDER — LIDOCAINE HYDROCHLORIDE 20 MG/ML
INJECTION, SOLUTION EPIDURAL; INFILTRATION; INTRACAUDAL; PERINEURAL
Status: DISCONTINUED | OUTPATIENT
Start: 2025-02-19 | End: 2025-02-19 | Stop reason: SDUPTHER

## 2025-02-19 RX ORDER — ONDANSETRON 2 MG/ML
INJECTION INTRAMUSCULAR; INTRAVENOUS
Status: DISCONTINUED | OUTPATIENT
Start: 2025-02-19 | End: 2025-02-19 | Stop reason: SDUPTHER

## 2025-02-19 RX ORDER — PHENYLEPHRINE HCL IN 0.9% NACL 1 MG/10 ML
SYRINGE (ML) INTRAVENOUS
Status: DISCONTINUED | OUTPATIENT
Start: 2025-02-19 | End: 2025-02-19 | Stop reason: SDUPTHER

## 2025-02-19 RX ORDER — MIDAZOLAM HYDROCHLORIDE 1 MG/ML
INJECTION, SOLUTION INTRAMUSCULAR; INTRAVENOUS
Status: DISCONTINUED | OUTPATIENT
Start: 2025-02-19 | End: 2025-02-19 | Stop reason: SDUPTHER

## 2025-02-19 RX ORDER — DEXAMETHASONE SODIUM PHOSPHATE 4 MG/ML
INJECTION, SOLUTION INTRA-ARTICULAR; INTRALESIONAL; INTRAMUSCULAR; INTRAVENOUS; SOFT TISSUE
Status: DISCONTINUED | OUTPATIENT
Start: 2025-02-19 | End: 2025-02-19 | Stop reason: SDUPTHER

## 2025-02-19 RX ORDER — SODIUM CHLORIDE 9 MG/ML
INJECTION, SOLUTION INTRAVENOUS
Status: DISCONTINUED | OUTPATIENT
Start: 2025-02-19 | End: 2025-02-19 | Stop reason: SDUPTHER

## 2025-02-19 RX ORDER — ROCURONIUM BROMIDE 10 MG/ML
INJECTION, SOLUTION INTRAVENOUS
Status: DISCONTINUED | OUTPATIENT
Start: 2025-02-19 | End: 2025-02-19 | Stop reason: SDUPTHER

## 2025-02-19 RX ORDER — ALBUMIN HUMAN 50 G/1000ML
SOLUTION INTRAVENOUS
Status: DISCONTINUED | OUTPATIENT
Start: 2025-02-19 | End: 2025-02-19 | Stop reason: SDUPTHER

## 2025-02-19 RX ORDER — PROPOFOL 10 MG/ML
INJECTION, EMULSION INTRAVENOUS
Status: DISCONTINUED | OUTPATIENT
Start: 2025-02-19 | End: 2025-02-19 | Stop reason: SDUPTHER

## 2025-02-19 RX ADMIN — LIDOCAINE HYDROCHLORIDE 100 MG: 20 INJECTION, SOLUTION EPIDURAL; INFILTRATION; INTRACAUDAL; PERINEURAL at 08:27

## 2025-02-19 RX ADMIN — Medication 200 MCG: at 08:32

## 2025-02-19 RX ADMIN — PHENYLEPHRINE HYDROCHLORIDE 30 MCG/MIN: 10 INJECTION INTRAVENOUS at 08:53

## 2025-02-19 RX ADMIN — SODIUM CHLORIDE: 9 INJECTION, SOLUTION INTRAVENOUS at 08:22

## 2025-02-19 RX ADMIN — ROCURONIUM BROMIDE 50 MG: 10 INJECTION INTRAVENOUS at 08:27

## 2025-02-19 RX ADMIN — ONDANSETRON 4 MG: 2 INJECTION INTRAMUSCULAR; INTRAVENOUS at 09:59

## 2025-02-19 RX ADMIN — PROPOFOL 200 MG: 10 INJECTION, EMULSION INTRAVENOUS at 08:27

## 2025-02-19 RX ADMIN — Medication 200 MCG: at 08:53

## 2025-02-19 RX ADMIN — DEXAMETHASONE SODIUM PHOSPHATE 8 MG: 4 INJECTION, SOLUTION INTRAMUSCULAR; INTRAVENOUS at 08:32

## 2025-02-19 RX ADMIN — Medication 300 MCG: at 08:34

## 2025-02-19 RX ADMIN — Medication 300 MCG: at 08:44

## 2025-02-19 RX ADMIN — ALBUMIN (HUMAN) 12.5 G: 12.5 INJECTION, SOLUTION INTRAVENOUS at 08:56

## 2025-02-19 RX ADMIN — ROCURONIUM BROMIDE 10 MG: 10 INJECTION INTRAVENOUS at 09:42

## 2025-02-19 RX ADMIN — SUGAMMADEX 200 MG: 100 INJECTION, SOLUTION INTRAVENOUS at 10:10

## 2025-02-19 RX ADMIN — MIDAZOLAM 2 MG: 1 INJECTION INTRAMUSCULAR; INTRAVENOUS at 08:20

## 2025-02-19 NOTE — ANESTHESIA PRE PROCEDURE
Department of Anesthesiology  Preprocedure Note       Name:  Alicia Cervantes   Age:  65 y.o.  :  1960                                          MRN:  [id number]         Date:  2025      Surgeon: * Surgery not found *    Procedure:     Medications prior to admission:   Prior to Admission medications    Medication Sig Start Date End Date Taking? Authorizing Provider   irbesartan (AVAPRO) 75 MG tablet Take 1 tablet by mouth daily 25   Katie Hernandez PA-C   glipiZIDE (GLUCOTROL XL) 5 MG extended release tablet TAKE 1 TABLET DAILY 25   Marcellus Taylor DO   rosuvastatin (CRESTOR) 40 MG tablet Take 1 tablet by mouth daily 25   Marcellus Taylor DO   ketoconazole (NIZORAL) 2 % cream Apply topically daily. 24   Marcellus Gustafson MD   vitamin D (ERGOCALCIFEROL) 1.25 MG (96451 UT) CAPS capsule Take 1 capsule by mouth once a week 24   Harris Chen MD   rosuvastatin (CRESTOR) 20 MG tablet TAKE 1 TABLET BY MOUTH EVERY NIGHT AT BEDTIME 24   Tish Perez, APRN - CNP   aspirin 81 MG EC tablet Take 1 tablet by mouth daily 24   Armaan Rapp MD   tiZANidine (ZANAFLEX) 4 MG tablet Take 1 tablet by mouth nightly 24   Marcellus Taylor DO   apixaban (ELIQUIS) 2.5 MG TABS tablet Take 1 tablet by mouth 2 times daily 10/16/24   Osvaldo Verduzco PA-C   Cholecalciferol (VITAMIN D-3 PO) Take 50,000 Units by mouth once a week    Provider, MD Chepe   blood glucose test strips (ONETOUCH ULTRA) strip TEST 2 TIMES A DAY & AS NEEDED FOR SYMPTOMS OF IRREGULAR BLOOD GLUCOSE. 10/7/24   Marcellus Taylor DO   FARXIGA 5 MG tablet TAKE 1 TABLET EVERY MORNING 24   Marcellus Taylor DO   levothyroxine (SYNTHROID) 112 MCG tablet Take 1 tablet by mouth Daily 24   Marcellus Taylor DO   Lancets MISC 1 each by Does not apply route 2 times daily Please dispense ONE TOUCH Lancets  Test BS BID 24   Marcellus Taylor DO   JANUVIA 50 MG tablet TAKE 1

## 2025-02-19 NOTE — ANESTHESIA POSTPROCEDURE EVALUATION
Department of Anesthesiology  Postprocedure Note    Patient: Alicia Cervantes  MRN: [id number]  YOB: 1960  Date of evaluation: 2/19/2025    Procedure Summary       Date: 02/19/25 Room / Location: [facility] CT Scan; [facility] Special Procedures    Anesthesia Start: 0822 Anesthesia Stop:     Procedures:       CT CRYOABLATION OF RENAL TUMOR      CT BIOPSY RENAL Diagnosis:       Other specified disorders of kidney and ureter      Other specified disorders of kidney and ureter    Scheduled Providers: Radiologist, Reji Ir; Sagrario Ramirez APRN - CRNA; Ed Bragg DO Responsible Provider: Ed Bragg DO    Anesthesia Type: general ASA Status: 3            Anesthesia Type: No value filed.    Brock Phase I:      Brock Phase II:      Anesthesia Post Evaluation    Patient location during evaluation: PACU  Patient participation: complete - patient participated  Level of consciousness: awake  Pain score: 0  Airway patency: patent  Nausea & Vomiting: no nausea and no vomiting  Cardiovascular status: blood pressure returned to baseline  Respiratory status: acceptable  Hydration status: euvolemic  Multimodal analgesia pain management approach  Pain management: adequate    No notable events documented.

## 2025-02-20 ENCOUNTER — TELEPHONE (OUTPATIENT)
Dept: INTERVENTIONAL RADIOLOGY/VASCULAR | Age: 65
End: 2025-02-20

## 2025-02-20 DIAGNOSIS — I25.10 CORONARY ARTERY DISEASE INVOLVING NATIVE CORONARY ARTERY OF NATIVE HEART WITHOUT ANGINA PECTORIS: ICD-10-CM

## 2025-02-20 DIAGNOSIS — E78.5 HYPERLIPIDEMIA, UNSPECIFIED HYPERLIPIDEMIA TYPE: ICD-10-CM

## 2025-02-21 ENCOUNTER — TELEPHONE (OUTPATIENT)
Dept: ENT CLINIC | Age: 65
End: 2025-02-21

## 2025-02-21 LAB
ALBUMIN SERPL-MCNC: 4 G/DL (ref 3.4–5)
ALP SERPL-CCNC: 103 U/L (ref 40–129)
ALT SERPL-CCNC: 27 U/L (ref 10–40)
AST SERPL-CCNC: 24 U/L (ref 15–37)
BILIRUB DIRECT SERPL-MCNC: <0.1 MG/DL (ref 0–0.3)
BILIRUB INDIRECT SERPL-MCNC: 0.2 MG/DL (ref 0–1)
BILIRUB SERPL-MCNC: 0.3 MG/DL (ref 0–1)
CHOLEST SERPL-MCNC: 132 MG/DL (ref 0–199)
HDLC SERPL-MCNC: 44 MG/DL (ref 40–60)
LDLC SERPL CALC-MCNC: 72 MG/DL
PROT SERPL-MCNC: 5.8 G/DL (ref 6.4–8.2)
TRIGL SERPL-MCNC: 81 MG/DL (ref 0–150)
VLDLC SERPL CALC-MCNC: 16 MG/DL

## 2025-02-22 DIAGNOSIS — G89.29 CHRONIC LEFT-SIDED THORACIC BACK PAIN: ICD-10-CM

## 2025-02-22 DIAGNOSIS — M54.6 CHRONIC LEFT-SIDED THORACIC BACK PAIN: ICD-10-CM

## 2025-02-27 ENCOUNTER — OFFICE VISIT (OUTPATIENT)
Dept: ENT CLINIC | Age: 65
End: 2025-02-27
Payer: COMMERCIAL

## 2025-02-27 VITALS
TEMPERATURE: 97.3 F | WEIGHT: 263 LBS | BODY MASS INDEX: 43.82 KG/M2 | OXYGEN SATURATION: 98 % | HEART RATE: 91 BPM | DIASTOLIC BLOOD PRESSURE: 83 MMHG | SYSTOLIC BLOOD PRESSURE: 135 MMHG | HEIGHT: 65 IN

## 2025-02-27 DIAGNOSIS — H90.0 CONDUCTIVE HEARING LOSS, BILATERAL: ICD-10-CM

## 2025-02-27 DIAGNOSIS — H61.23 BILATERAL IMPACTED CERUMEN: Primary | ICD-10-CM

## 2025-02-27 PROCEDURE — 69210 REMOVE IMPACTED EAR WAX UNI: CPT | Performed by: OTOLARYNGOLOGY

## 2025-02-27 RX ORDER — ESOMEPRAZOLE MAGNESIUM 40 MG/1
CAPSULE, DELAYED RELEASE ORAL
Qty: 90 CAPSULE | Refills: 3 | Status: SHIPPED | OUTPATIENT
Start: 2025-02-27

## 2025-02-27 NOTE — PROGRESS NOTES
CHIEF COMPLAINT:  Chief Complaint   Patient presents with    Cerumen Impaction    Hearing Loss       HISTORY:    Alicia stated that the hearing is decreased in both ears, which are plugged up with ear wax.      EXAMINATION:    Both external ear canals were occluded by cerumen impaction, obscuring visualization of the TMs.        PROCEDURE - REMOVAL OF BILATERAL CERUMEN IMPACTION (CPT 27771):   The cerumen impaction was removed from both of the EACs, under otomicroscopic visualization, with instrumentation, using a Billeau wire loop.  After successful cerumen removal, the EACs appeared to be normal and clear bilaterally without mass, exudate, or edema.  The tympanic membranes appeared to be normal, including normal pneumatic mobility bilaterally.  There was no evidence of acute disease.  Alicia reported improved hearing, back to usual level, after cerumen removal.          IMPRESSION / DIAGNOSES / ORDERS / PROCEDURES:       Alicia was seen today for cerumen impaction and hearing loss.    Diagnoses and all orders for this visit:    Bilateral impacted cerumen    Conductive hearing loss, bilateral        RECOMMENDATIONS / PLAN:   See Patient Instructions on file for this visit.  Return for recurrence of symptoms of excessive ear wax, or any other ear, nose, throat, or sinus problems.

## 2025-03-13 ENCOUNTER — OFFICE VISIT (OUTPATIENT)
Age: 65
End: 2025-03-13

## 2025-03-13 VITALS
SYSTOLIC BLOOD PRESSURE: 129 MMHG | TEMPERATURE: 98 F | OXYGEN SATURATION: 96 % | BODY MASS INDEX: 41.65 KG/M2 | DIASTOLIC BLOOD PRESSURE: 79 MMHG | WEIGHT: 250 LBS | HEIGHT: 65 IN | HEART RATE: 92 BPM | RESPIRATION RATE: 20 BRPM

## 2025-03-13 DIAGNOSIS — J18.9 COMMUNITY ACQUIRED PNEUMONIA OF LEFT LOWER LOBE OF LUNG: Primary | ICD-10-CM

## 2025-03-13 RX ORDER — FLUTICASONE PROPIONATE 50 MCG
2 SPRAY, SUSPENSION (ML) NASAL DAILY PRN
COMMUNITY
Start: 2025-03-13 | End: 2026-03-13

## 2025-03-13 RX ORDER — DEXTROMETHORPHAN HYDROBROMIDE AND PROMETHAZINE HYDROCHLORIDE 15; 6.25 MG/5ML; MG/5ML
5 SYRUP ORAL 4 TIMES DAILY PRN
Qty: 118 ML | Refills: 0 | Status: SHIPPED | OUTPATIENT
Start: 2025-03-13 | End: 2025-03-20

## 2025-03-13 RX ORDER — ONDANSETRON 4 MG/1
TABLET, ORALLY DISINTEGRATING ORAL
COMMUNITY

## 2025-03-13 RX ORDER — AZITHROMYCIN 250 MG/1
TABLET, FILM COATED ORAL
Qty: 1 PACKET | Refills: 0 | Status: SHIPPED | OUTPATIENT
Start: 2025-03-13 | End: 2025-03-18

## 2025-03-13 RX ORDER — BENZONATATE 100 MG/1
200 CAPSULE ORAL 3 TIMES DAILY PRN
Qty: 21 CAPSULE | Refills: 0 | Status: SHIPPED | OUTPATIENT
Start: 2025-03-13 | End: 2025-03-20

## 2025-03-13 RX ORDER — POLYETHYLENE GLYCOL 3350, SODIUM SULFATE, SODIUM CHLORIDE, POTASSIUM CHLORIDE, ASCORBIC ACID, SODIUM ASCORBATE 140-9-5.2G
KIT ORAL
COMMUNITY

## 2025-03-13 ASSESSMENT — ENCOUNTER SYMPTOMS
WHEEZING: 1
COUGH: 1
SHORTNESS OF BREATH: 1
HEMOPTYSIS: 0
RHINORRHEA: 1
SORE THROAT: 0

## 2025-03-13 NOTE — PROGRESS NOTES
Alicia Cervantes (: 1960) is a 65 y.o. female, here for evaluation of the following chief complaint(s): Cough (Coughing congestion x2 weeks )    Alicia Cervantes is a: New patient.   Last Urgent Care Visit: Visit date not found   I have reviewed the patient's medications and basic medical history; see Medication Reconciliation.    ASSESSMENT/PLAN:    ICD-10-CM    1. Acute cough  R05.1 XR CHEST (2 VW)      2. Community acquired pneumonia of left lower lobe of lung  J18.9           No point of care tests were completed  Symptoms include congestion, cough, chest tightness, shortness of breath, with exam findings of congestion, post nasal drip, rhonchi, there is concern Community Acquired Pneumonia  Low concern for respiratory distress, PE  CXR ordered d/t the above, demonstrates possible consolidation in left lower lobe, will call with overread  Recommended:  Mucinex DM for cough with expectorant relief or plain DM if needed, advised to not use this during the night or with other DM products  Dx of HTN: OTC Coricidin, Flonase, Zyrtec, and Saline nasal spray for congestion relief  Prescribed:   Benzonatate prescribed for daytime cough relief  Promethazine-DM prescribed for nightime cough relief, discussed this is a drowsy med and to avoid operating heavy machinery while taking  Augmentin and Azithromycin for suspect community acquired pneumonia  Recommended several other OTC and home remedy treatments for symptomatic relief as well  Strict ED follow up instructions provided  Discussed PCP follow up for persisting or worsening symptoms, or to return to the clinic if unable to obtain PCP follow up for worsening symptoms  The patient and/or the family were informed of the results of any tests, a time was given to answer questions, a plan was proposed and they agreed with plan. Reviewed AVS with treatment instructions and answered questions - pt/family expresses understanding and agreement with the

## 2025-03-13 NOTE — PATIENT INSTRUCTIONS
No point of care tests were completed  Take the antibiotics until completed, do not stop unless otherwise directed by a healthcare provider. I recommend daily yogurt or other probiotics while on antibiotics.  Benzonatate was prescribed for daytime cough relief. This is a non-drowsy medication.  Promethazine DM was prescribed for nighttime cough relief. Do not take other cough medications while on this medication. This medication will make you drowsy.  High Blood Pressure: For congestion and runny nose, I recommend Coricidin, Flonase (or other steroid nasal sprays), Zyrtec (or other antihistamines), Vicks vapor rub, and saline nasal spray. Do not use any products with Pseudoephedrine or Phenylephrine in them since you have high blood pressure.  For cough, I recommend Dextromethorphan (Robitussin, Delsym) and Mitch's Vapor Rub. Do not take other cough medications containing Dextromethorphan (DM) while on this medication. You can also use cough drops, honey, and throat lozenges. I recommend 1-2 teaspoons of honey every hour for relief of throat irritation and coughing fits.  Increase your fluid intake and get lots of rest.  Warm teas, humidifiers, nasal lavages, and sleeping in an inclined position are also helpful options that can lessen symptoms.  Follow up with your PCP within 5 days or, if unable, you can return to the clinic if symptoms persist beyond 5 days or if symptoms worsen  If you develop shortness of breath, increased work of breathing, lightheadedness, or chest pain, contact 911, or follow up immediately with the nearest Emergency Department for further evaluation

## 2025-03-29 ENCOUNTER — PATIENT MESSAGE (OUTPATIENT)
Dept: INTERNAL MEDICINE CLINIC | Age: 65
End: 2025-03-29

## 2025-04-01 NOTE — TELEPHONE ENCOUNTER
I called and left a voicemail for patient to callback and clarify message as to exactly what she is needing

## 2025-04-02 DIAGNOSIS — G89.29 CHRONIC LEFT-SIDED THORACIC BACK PAIN: ICD-10-CM

## 2025-04-02 DIAGNOSIS — M54.6 CHRONIC LEFT-SIDED THORACIC BACK PAIN: ICD-10-CM

## 2025-04-02 NOTE — TELEPHONE ENCOUNTER
Patient is calling in for  in regards to needing a medication refill for :    tiZANidine (ZANAFLEX) 4 MG tablet   Last appointment: 1/9/2025  Next appointment: 5/9/2025  Last refill: 2/24/25        Please send refill to :    [id number] - [facility] [address] - P [phone] - F [phone]

## 2025-04-02 NOTE — TELEPHONE ENCOUNTER
Spoke to patient and per patient she is wanting a referral from  for her breast reduction with .  Patient has an appointment on 4/9/25    Please advise.

## 2025-04-04 DIAGNOSIS — N18.30 TYPE 2 DIABETES MELLITUS WITH STAGE 3 CHRONIC KIDNEY DISEASE AND HYPERTENSION (HCC): ICD-10-CM

## 2025-04-04 DIAGNOSIS — N18.32 STAGE 3B CHRONIC KIDNEY DISEASE (HCC): ICD-10-CM

## 2025-04-04 DIAGNOSIS — E55.9 VITAMIN D DEFICIENCY: ICD-10-CM

## 2025-04-04 DIAGNOSIS — E11.22 TYPE 2 DIABETES MELLITUS WITH STAGE 3 CHRONIC KIDNEY DISEASE AND HYPERTENSION (HCC): ICD-10-CM

## 2025-04-04 DIAGNOSIS — I12.9 TYPE 2 DIABETES MELLITUS WITH STAGE 3 CHRONIC KIDNEY DISEASE AND HYPERTENSION (HCC): ICD-10-CM

## 2025-04-05 LAB
25(OH)D3 SERPL-MCNC: 40.1 NG/ML
ALBUMIN SERPL-MCNC: 4.1 G/DL (ref 3.4–5)
ANION GAP SERPL CALCULATED.3IONS-SCNC: 9 MMOL/L (ref 3–16)
BACTERIA URNS QL MICRO: ABNORMAL /HPF
BILIRUB UR QL STRIP.AUTO: NEGATIVE
BUN SERPL-MCNC: 39 MG/DL (ref 7–20)
CALCIUM SERPL-MCNC: 8.9 MG/DL (ref 8.3–10.6)
CHLORIDE SERPL-SCNC: 105 MMOL/L (ref 99–110)
CLARITY UR: CLEAR
CO2 SERPL-SCNC: 24 MMOL/L (ref 21–32)
COLOR UR: YELLOW
CREAT SERPL-MCNC: 1.6 MG/DL (ref 0.6–1.2)
CREAT UR-MCNC: 62.9 MG/DL (ref 28–259)
CREAT UR-MCNC: 63 MG/DL (ref 28–259)
CRP SERPL-MCNC: <3 MG/L (ref 0–5.1)
DEPRECATED RDW RBC AUTO: 15.7 % (ref 12.4–15.4)
EPI CELLS #/AREA URNS AUTO: 14 /HPF (ref 0–5)
ERYTHROCYTE [SEDIMENTATION RATE] IN BLOOD BY WESTERGREN METHOD: 11 MM/HR (ref 0–30)
GFR SERPLBLD CREATININE-BSD FMLA CKD-EPI: 35 ML/MIN/{1.73_M2}
GLUCOSE SERPL-MCNC: 122 MG/DL (ref 70–99)
GLUCOSE UR STRIP.AUTO-MCNC: >=1000 MG/DL
HCT VFR BLD AUTO: 41.7 % (ref 36–48)
HGB BLD-MCNC: 13.1 G/DL (ref 12–16)
HGB UR QL STRIP.AUTO: NEGATIVE
HYALINE CASTS #/AREA URNS AUTO: 0 /LPF (ref 0–8)
KETONES UR STRIP.AUTO-MCNC: NEGATIVE MG/DL
LEUKOCYTE ESTERASE UR QL STRIP.AUTO: ABNORMAL
MCH RBC QN AUTO: 28.1 PG (ref 26–34)
MCHC RBC AUTO-ENTMCNC: 31.4 G/DL (ref 31–36)
MCV RBC AUTO: 89.3 FL (ref 80–100)
MICROALBUMIN UR DL<=1MG/L-MCNC: <1.2 MG/DL
MICROALBUMIN/CREAT UR: NORMAL MG/G (ref 0–30)
NITRITE UR QL STRIP.AUTO: NEGATIVE
PH UR STRIP.AUTO: 6.5 [PH] (ref 5–8)
PHOSPHATE SERPL-MCNC: 4 MG/DL (ref 2.5–4.9)
PLATELET # BLD AUTO: 265 K/UL (ref 135–450)
PMV BLD AUTO: 9.3 FL (ref 5–10.5)
POTASSIUM SERPL-SCNC: 4.4 MMOL/L (ref 3.5–5.1)
PROT UR STRIP.AUTO-MCNC: ABNORMAL MG/DL
PROT UR-MCNC: 13.6 MG/DL
PROT/CREAT UR-RTO: 0.2 MG/DL
PTH-INTACT SERPL-MCNC: 195 PG/ML (ref 14–72)
RBC # BLD AUTO: 4.67 M/UL (ref 4–5.2)
RBC CLUMPS #/AREA URNS AUTO: 0 /HPF (ref 0–4)
SODIUM SERPL-SCNC: 138 MMOL/L (ref 136–145)
SP GR UR STRIP.AUTO: 1.02 (ref 1–1.03)
UA COMPLETE W REFLEX CULTURE PNL UR: YES
UA DIPSTICK W REFLEX MICRO PNL UR: YES
URN SPEC COLLECT METH UR: ABNORMAL
UROBILINOGEN UR STRIP-ACNC: 1 E.U./DL
WBC # BLD AUTO: 12.8 K/UL (ref 4–11)
WBC #/AREA URNS AUTO: 13 /HPF (ref 0–5)

## 2025-04-06 LAB
BACTERIA UR CULT: ABNORMAL
BACTERIA UR CULT: ABNORMAL
ORGANISM: ABNORMAL

## 2025-04-09 ENCOUNTER — OFFICE VISIT (OUTPATIENT)
Dept: SURGERY | Age: 65
End: 2025-04-09
Payer: COMMERCIAL

## 2025-04-09 VITALS
HEART RATE: 100 BPM | HEIGHT: 65 IN | TEMPERATURE: 97.3 F | OXYGEN SATURATION: 98 % | BODY MASS INDEX: 43.99 KG/M2 | DIASTOLIC BLOOD PRESSURE: 77 MMHG | WEIGHT: 264 LBS | SYSTOLIC BLOOD PRESSURE: 131 MMHG

## 2025-04-09 DIAGNOSIS — M79.3 PANNICULITIS: ICD-10-CM

## 2025-04-09 DIAGNOSIS — N62 MACROMASTIA: Primary | ICD-10-CM

## 2025-04-09 PROCEDURE — 99204 OFFICE O/P NEW MOD 45 MIN: CPT

## 2025-04-09 PROCEDURE — 3078F DIAST BP <80 MM HG: CPT

## 2025-04-09 PROCEDURE — 3075F SYST BP GE 130 - 139MM HG: CPT

## 2025-04-09 PROCEDURE — 1123F ACP DISCUSS/DSCN MKR DOCD: CPT

## 2025-04-09 NOTE — PROGRESS NOTES
[facility]    CC: Symptomatic macromastia    REFERRING PHYSICIAN: Marcellus Taylor MD.     HPI: This is a 65 y.o.  female who presents to clinic with desire for breast reduction.  Her pertinent breast history include the following:    Last Mammogram: mammogram done     Current bra size: 48 DD  Desired bra size: \"small as possible\"   Pregnancies/miscarriages: 3/0  Breast feeding: no future plans    Breast Symptoms:    Macromastia Symptoms:  Upper back pain: Yes      Bra strap grooves: Yes      Wears supportive bras (>1 yr): Yes      Tried conservative measures (PT, MDs,etc): Yes, back pain PCP, chiropractor, san injections in back, beacon neck injections       Intertrigo: Yes      Head/neck pain: Yes      Headaches: Yes      Paresthesias of hands/fingers: No      PMHx:   Past Medical History:   Diagnosis Date    Acute superficial venous thrombosis of lower extremity, right 2019    Arthritis     CAD (coronary artery disease), native coronary artery 2023    Dr. Rapp    Campylobacter gastroenteritis 2024    Cataract, bilateral     Chronic superficial venous thrombosis of left lower extremity 2015    CKD (chronic kidney disease) stage 2, GFR 60-89 ml/min     CKD (chronic kidney disease) stage 3, GFR 30-59 ml/min ([facility])     COVID-19 virus infection 2020    Cystic kidney disease     DVT (deep venous thrombosis) ([facility]) 2003, 2013    Gastroesophageal reflux disease without esophagitis 2016    H/O deep venous thrombosis     H/O simple renal cyst     Hx of blood clots     Hyperlipidemia     Hypertension     IBS (irritable bowel syndrome)     Left retinal detachment 2020    NSTEMI (non-ST elevated myocardial infarction) ([facility]) 2023    Dr. Rapp    Osteoarthritis, knee     Prolonged emergence from general anesthesia     Saphenofemoral venous reflux 2015    BIlateral    Sepsis ([facility]) 2024    Squamous cell carcinoma of skin of

## 2025-05-09 ENCOUNTER — OFFICE VISIT (OUTPATIENT)
Dept: INTERNAL MEDICINE CLINIC | Age: 65
End: 2025-05-09

## 2025-05-09 VITALS
BODY MASS INDEX: 45.1 KG/M2 | TEMPERATURE: 97.4 F | SYSTOLIC BLOOD PRESSURE: 134 MMHG | WEIGHT: 271 LBS | DIASTOLIC BLOOD PRESSURE: 78 MMHG | HEART RATE: 95 BPM | OXYGEN SATURATION: 97 %

## 2025-05-09 DIAGNOSIS — I25.10 CORONARY ARTERY DISEASE INVOLVING NATIVE CORONARY ARTERY OF NATIVE HEART WITHOUT ANGINA PECTORIS: ICD-10-CM

## 2025-05-09 DIAGNOSIS — N18.32 TYPE 2 DIABETES MELLITUS WITH STAGE 3B CHRONIC KIDNEY DISEASE, WITHOUT LONG-TERM CURRENT USE OF INSULIN (HCC): Primary | ICD-10-CM

## 2025-05-09 DIAGNOSIS — E03.9 ACQUIRED HYPOTHYROIDISM: ICD-10-CM

## 2025-05-09 DIAGNOSIS — K21.9 GASTROESOPHAGEAL REFLUX DISEASE WITHOUT ESOPHAGITIS: ICD-10-CM

## 2025-05-09 DIAGNOSIS — E11.22 TYPE 2 DIABETES MELLITUS WITH STAGE 3B CHRONIC KIDNEY DISEASE, WITHOUT LONG-TERM CURRENT USE OF INSULIN (HCC): Primary | ICD-10-CM

## 2025-05-09 DIAGNOSIS — E11.22 TYPE 2 DIABETES MELLITUS WITH STAGE 3B CHRONIC KIDNEY DISEASE, WITHOUT LONG-TERM CURRENT USE OF INSULIN (HCC): ICD-10-CM

## 2025-05-09 DIAGNOSIS — G51.31 CLONIC HEMIFACIAL SPASM OF MUSCLE OF RIGHT SIDE OF FACE: ICD-10-CM

## 2025-05-09 DIAGNOSIS — N18.32 TYPE 2 DIABETES MELLITUS WITH STAGE 3B CHRONIC KIDNEY DISEASE, WITHOUT LONG-TERM CURRENT USE OF INSULIN (HCC): ICD-10-CM

## 2025-05-09 DIAGNOSIS — E11.40 TYPE 2 DIABETES MELLITUS WITH DIABETIC NEUROPATHY, WITHOUT LONG-TERM CURRENT USE OF INSULIN (HCC): ICD-10-CM

## 2025-05-09 DIAGNOSIS — G51.4 FACIAL TWITCHING: ICD-10-CM

## 2025-05-09 DIAGNOSIS — I10 BENIGN ESSENTIAL HTN: ICD-10-CM

## 2025-05-09 DIAGNOSIS — I87.2 CHRONIC VENOUS INSUFFICIENCY: ICD-10-CM

## 2025-05-09 DIAGNOSIS — E78.5 HYPERLIPIDEMIA, UNSPECIFIED HYPERLIPIDEMIA TYPE: ICD-10-CM

## 2025-05-09 NOTE — PROGRESS NOTES
Comprehensive Metabolic Panel; Future  - Lipid Panel; Future  - Hemoglobin A1C; Future  - TSH; Future    3. Benign essential HTN  Blood pressure is fairly well-controlled at 134/78  Hopefully she will be able to realize some weight loss which may improve her blood pressure further  - CBC with Auto Differential; Future  - Comprehensive Metabolic Panel; Future  - Lipid Panel; Future    4. Coronary artery disease involving native coronary artery of native heart without angina pectoris  Asymptomatic  Status post NSTEMI  Status post angioplasty with stent  Under the care of cardiology  She is no longer on Brilinta  She is on aspirin 81 mg daily  Continue aggressive cardiovascular risk factor management  Continue monitoring for signs of angina and anginal equivalents  - Lipid Panel; Future    5. Chronic venous insufficiency  Stable without signs of increasing edema  Recommend ongoing use of compression stockings which she is noncompliant with  Recommend leg elevation while at rest    6. Hyperlipidemia, unspecified hyperlipidemia type  Stability and control are uncertain  Due for lab  Continue Crestor 40 mg daily as part of her overall cardiovascular disease secondary prevention strategy  - Comprehensive Metabolic Panel; Future  - Lipid Panel; Future  - TSH; Future    7. Acquired hypothyroidism    - TSH; Future    8. Gastroesophageal reflux disease without esophagitis  Well-controlled  Continue Nexium  - Magnesium; Future    9. Facial twitching  10. Clonic hemifacial spasm of muscle of right side of face  Etiology is unclear  Rule out TIA  Will refer to neurology for consultation  - Giselle Carballo MD, Neurology, [facility]                  Patient is due for colonoscopy February 25, 2025.      Discussed medications with patient who voiced understanding of their use, indication and potential side effects.  Pt also understands the above recommendations.   All questions answered.    This note was generated

## 2025-05-10 LAB
ALBUMIN SERPL-MCNC: 4 G/DL (ref 3.4–5)
ALBUMIN/GLOB SERPL: 2.2 {RATIO} (ref 1.1–2.2)
ALP SERPL-CCNC: 129 U/L (ref 40–129)
ALT SERPL-CCNC: 14 U/L (ref 10–40)
ANION GAP SERPL CALCULATED.3IONS-SCNC: 10 MMOL/L (ref 3–16)
AST SERPL-CCNC: 14 U/L (ref 15–37)
BASOPHILS # BLD: 0.1 K/UL (ref 0–0.2)
BASOPHILS NFR BLD: 0.6 %
BILIRUB SERPL-MCNC: 0.4 MG/DL (ref 0–1)
BUN SERPL-MCNC: 19 MG/DL (ref 7–20)
CALCIUM SERPL-MCNC: 9.5 MG/DL (ref 8.3–10.6)
CHLORIDE SERPL-SCNC: 107 MMOL/L (ref 99–110)
CHOLEST SERPL-MCNC: 132 MG/DL (ref 0–199)
CO2 SERPL-SCNC: 25 MMOL/L (ref 21–32)
CREAT SERPL-MCNC: 1.4 MG/DL (ref 0.6–1.2)
DEPRECATED RDW RBC AUTO: 14.6 % (ref 12.4–15.4)
EOSINOPHIL # BLD: 0.2 K/UL (ref 0–0.6)
EOSINOPHIL NFR BLD: 2 %
EST. AVERAGE GLUCOSE BLD GHB EST-MCNC: 119.8 MG/DL
GFR SERPLBLD CREATININE-BSD FMLA CKD-EPI: 41 ML/MIN/{1.73_M2}
GLUCOSE SERPL-MCNC: 106 MG/DL (ref 70–99)
HBA1C MFR BLD: 5.8 %
HCT VFR BLD AUTO: 36.7 % (ref 36–48)
HDLC SERPL-MCNC: 43 MG/DL (ref 40–60)
HGB BLD-MCNC: 12.2 G/DL (ref 12–16)
LDLC SERPL CALC-MCNC: 66 MG/DL
LYMPHOCYTES # BLD: 2 K/UL (ref 1–5.1)
LYMPHOCYTES NFR BLD: 23.1 %
MAGNESIUM SERPL-MCNC: 1.98 MG/DL (ref 1.8–2.4)
MCH RBC QN AUTO: 28.6 PG (ref 26–34)
MCHC RBC AUTO-ENTMCNC: 33.3 G/DL (ref 31–36)
MCV RBC AUTO: 86.1 FL (ref 80–100)
MONOCYTES # BLD: 0.8 K/UL (ref 0–1.3)
MONOCYTES NFR BLD: 9 %
NEUTROPHILS # BLD: 5.6 K/UL (ref 1.7–7.7)
NEUTROPHILS NFR BLD: 65.3 %
PLATELET # BLD AUTO: 213 K/UL (ref 135–450)
PMV BLD AUTO: 9.5 FL (ref 5–10.5)
POTASSIUM SERPL-SCNC: 4.5 MMOL/L (ref 3.5–5.1)
PROT SERPL-MCNC: 5.8 G/DL (ref 6.4–8.2)
RBC # BLD AUTO: 4.27 M/UL (ref 4–5.2)
SODIUM SERPL-SCNC: 142 MMOL/L (ref 136–145)
TRIGL SERPL-MCNC: 115 MG/DL (ref 0–150)
TSH SERPL DL<=0.005 MIU/L-ACNC: 1.94 UIU/ML (ref 0.27–4.2)
VLDLC SERPL CALC-MCNC: 23 MG/DL
WBC # BLD AUTO: 8.6 K/UL (ref 4–11)

## 2025-05-11 ENCOUNTER — RESULTS FOLLOW-UP (OUTPATIENT)
Dept: INTERNAL MEDICINE CLINIC | Age: 65
End: 2025-05-11

## 2025-06-02 DIAGNOSIS — E11.22 TYPE 2 DIABETES MELLITUS WITH STAGE 3B CHRONIC KIDNEY DISEASE AND HYPERTENSION (HCC): ICD-10-CM

## 2025-06-02 DIAGNOSIS — N39.0 URINARY TRACT INFECTION WITHOUT HEMATURIA, SITE UNSPECIFIED: ICD-10-CM

## 2025-06-02 DIAGNOSIS — I12.9 TYPE 2 DIABETES MELLITUS WITH STAGE 3B CHRONIC KIDNEY DISEASE AND HYPERTENSION (HCC): ICD-10-CM

## 2025-06-02 DIAGNOSIS — N18.32 TYPE 2 DIABETES MELLITUS WITH STAGE 3B CHRONIC KIDNEY DISEASE AND HYPERTENSION (HCC): ICD-10-CM

## 2025-06-02 LAB
25(OH)D3 SERPL-MCNC: 52 NG/ML
ALBUMIN SERPL-MCNC: 4 G/DL (ref 3.4–5)
ANION GAP SERPL CALCULATED.3IONS-SCNC: 8 MMOL/L (ref 3–16)
BUN SERPL-MCNC: 26 MG/DL (ref 7–20)
CALCIUM SERPL-MCNC: 9.2 MG/DL (ref 8.3–10.6)
CHLORIDE SERPL-SCNC: 110 MMOL/L (ref 99–110)
CO2 SERPL-SCNC: 25 MMOL/L (ref 21–32)
CREAT SERPL-MCNC: 1.6 MG/DL (ref 0.6–1.2)
DEPRECATED RDW RBC AUTO: 14.9 % (ref 12.4–15.4)
GFR SERPLBLD CREATININE-BSD FMLA CKD-EPI: 35 ML/MIN/{1.73_M2}
GLUCOSE SERPL-MCNC: 98 MG/DL (ref 70–99)
HCT VFR BLD AUTO: 40.5 % (ref 36–48)
HGB BLD-MCNC: 12.9 G/DL (ref 12–16)
MCH RBC QN AUTO: 27.7 PG (ref 26–34)
MCHC RBC AUTO-ENTMCNC: 31.9 G/DL (ref 31–36)
MCV RBC AUTO: 86.8 FL (ref 80–100)
PHOSPHATE SERPL-MCNC: 3.9 MG/DL (ref 2.5–4.9)
PLATELET # BLD AUTO: 194 K/UL (ref 135–450)
PMV BLD AUTO: 9.7 FL (ref 5–10.5)
POTASSIUM SERPL-SCNC: 4.6 MMOL/L (ref 3.5–5.1)
PTH-INTACT SERPL-MCNC: 168 PG/ML (ref 14–72)
RBC # BLD AUTO: 4.67 M/UL (ref 4–5.2)
SODIUM SERPL-SCNC: 143 MMOL/L (ref 136–145)
WBC # BLD AUTO: 8.8 K/UL (ref 4–11)

## 2025-06-02 NOTE — TELEPHONE ENCOUNTER
Last appointment: 5/9/2025  Next appointment: 6/9/2025        Last refill: 5/17/2024) by Marcellus Taylor DO

## 2025-06-04 DIAGNOSIS — N39.0 URINARY TRACT INFECTION WITHOUT HEMATURIA, SITE UNSPECIFIED: ICD-10-CM

## 2025-06-04 DIAGNOSIS — E11.22 TYPE 2 DIABETES MELLITUS WITH STAGE 3B CHRONIC KIDNEY DISEASE AND HYPERTENSION (HCC): ICD-10-CM

## 2025-06-04 DIAGNOSIS — I12.9 TYPE 2 DIABETES MELLITUS WITH STAGE 3B CHRONIC KIDNEY DISEASE AND HYPERTENSION (HCC): ICD-10-CM

## 2025-06-04 DIAGNOSIS — N18.32 TYPE 2 DIABETES MELLITUS WITH STAGE 3B CHRONIC KIDNEY DISEASE AND HYPERTENSION (HCC): ICD-10-CM

## 2025-06-05 LAB
BACTERIA URNS QL MICRO: ABNORMAL /HPF
BILIRUB UR QL STRIP.AUTO: NEGATIVE
CHARACTER UR: ABNORMAL
CLARITY UR: ABNORMAL
COLOR UR: YELLOW
CREAT UR-MCNC: 208 MG/DL (ref 28–259)
EPI CELLS #/AREA URNS HPF: ABNORMAL /HPF (ref 0–5)
GLUCOSE UR STRIP.AUTO-MCNC: NEGATIVE MG/DL
HGB UR QL STRIP.AUTO: NEGATIVE
HYALINE CASTS #/AREA URNS LPF: ABNORMAL /LPF (ref 0–2)
KETONES UR STRIP.AUTO-MCNC: ABNORMAL MG/DL
LEUKOCYTE ESTERASE UR QL STRIP.AUTO: ABNORMAL
MICROALBUMIN UR DL<=1MG/L-MCNC: 1.68 MG/DL
MICROALBUMIN/CREAT UR: 8.1 MG/G (ref 0–30)
MUCOUS THREADS #/AREA URNS LPF: ABNORMAL /LPF
NITRITE UR QL STRIP.AUTO: NEGATIVE
PH UR STRIP.AUTO: 6 [PH] (ref 5–8)
PROT UR STRIP.AUTO-MCNC: 30 MG/DL
RBC #/AREA URNS HPF: ABNORMAL /HPF (ref 0–4)
SP GR UR STRIP.AUTO: 1.03 (ref 1–1.03)
UA COMPLETE W REFLEX CULTURE PNL UR: ABNORMAL
UA DIPSTICK W REFLEX MICRO PNL UR: YES
URN SPEC COLLECT METH UR: ABNORMAL
UROBILINOGEN UR STRIP-ACNC: 1 E.U./DL
WBC #/AREA URNS HPF: ABNORMAL /HPF (ref 0–5)

## 2025-06-09 ENCOUNTER — OFFICE VISIT (OUTPATIENT)
Dept: INTERNAL MEDICINE CLINIC | Age: 65
End: 2025-06-09
Payer: COMMERCIAL

## 2025-06-09 VITALS
DIASTOLIC BLOOD PRESSURE: 78 MMHG | OXYGEN SATURATION: 96 % | WEIGHT: 264 LBS | HEART RATE: 90 BPM | SYSTOLIC BLOOD PRESSURE: 136 MMHG | TEMPERATURE: 97.3 F | BODY MASS INDEX: 43.93 KG/M2

## 2025-06-09 DIAGNOSIS — E11.40 TYPE 2 DIABETES MELLITUS WITH DIABETIC NEUROPATHY, WITHOUT LONG-TERM CURRENT USE OF INSULIN (HCC): ICD-10-CM

## 2025-06-09 DIAGNOSIS — N18.32 TYPE 2 DIABETES MELLITUS WITH STAGE 3B CHRONIC KIDNEY DISEASE, WITHOUT LONG-TERM CURRENT USE OF INSULIN (HCC): Primary | ICD-10-CM

## 2025-06-09 DIAGNOSIS — E11.22 TYPE 2 DIABETES MELLITUS WITH STAGE 3B CHRONIC KIDNEY DISEASE, WITHOUT LONG-TERM CURRENT USE OF INSULIN (HCC): Primary | ICD-10-CM

## 2025-06-09 PROCEDURE — 1123F ACP DISCUSS/DSCN MKR DOCD: CPT | Performed by: INTERNAL MEDICINE

## 2025-06-09 PROCEDURE — 99213 OFFICE O/P EST LOW 20 MIN: CPT | Performed by: INTERNAL MEDICINE

## 2025-06-09 PROCEDURE — 3078F DIAST BP <80 MM HG: CPT | Performed by: INTERNAL MEDICINE

## 2025-06-09 PROCEDURE — 3075F SYST BP GE 130 - 139MM HG: CPT | Performed by: INTERNAL MEDICINE

## 2025-06-09 PROCEDURE — 3044F HG A1C LEVEL LT 7.0%: CPT | Performed by: INTERNAL MEDICINE

## 2025-06-09 NOTE — PATIENT INSTRUCTIONS
TO Do List:    #1 Please schedule Mammogram.    #2 Take your 4th dose of 0.25 mg and then the next week, you will start the 0.5 mg dose.      Keep up the great work!!!!

## 2025-06-11 NOTE — OP NOTE
Left message for patient to call back to discuss symptoms, changes to overall health and medications. Will update MD when information is available.    PREOPERATIVE DIAGNOSIS: Left knee tricompartmental arthrosis. POSTOPERATIVE DIAGNOSIS: Left knee tricompartmental arthrosis. PROCEDURE(S) PERFORMED: Minimally Invasive left total knee arthroplasty, cemented,  cruciate-retaining. SURGEON: Nahed Ceja M.D. FIRST SURGICAL ASSISTANT:  Luis Carlos Alvarez, [facility]. The Physician Assistant was necessary to perform the surgery today by assisting with application of implants and manipulation of the extremity. This help was not otherwise available in the operating room today. PRE-OPERATIVE ANTIBIOTICS:  Cefazolin 2G + 1G Vancomycin    ANESTHESIA:  Epidural + Adductor Canal Block + 40 cc of 0.25% Bupivacaine Periarticular injection    ESTIMATED BLOOD LOSS: 50 cc     INTRAVENOUS FLUIDS: 900 cc    URINE OUTPUT: 0 cc    TOURNIQUET TIME: 51 minutes at 128 mm Hg    COMPLICATIONS: None. SPECIMENS: None. IMPLANTS:   Julián NexGen CR Flex, size Left D minus GSF femur  Julián NexGen Cemented Tibia with Standard Keel, size 3  All-polyethylene patella, size 29   Cruciate-retaining highly-crosslinked polyethylene insert, size 14 mm     OPERATIVE INDICATIONS: This patient had longstanding knee pain. They have attempted to treat this nonoperatively for years. At  this point they have exhausted all nonoperative options, and have persistent pain  and dysfunction. I offered them a total knee arthroplasty for treatment  of their symptomatic arthrosis. The risks and benefits of total knee  arthroplasty were clearly explained to the patient, and they elected to proceed  despite these risks. DETAILS OF PROCEDURE: The patient was greeted in the preoperative holding  area, and their operative extremity was identified and marked with an  indelible marker. The patient was transported to the operating theater where  anesthesia was obtained. The patient was placed supine on the operating table  table. A bump was applied under the ipsilateral hip.  A tourniquet was applied  to the thigh. The operative  extremity was prepped and draped in a standard sterile surgical fashion. The patient received intravenous antibiotics prior to the inflation of the tourniquet and  surgical incision. A final timeout was performed, verifying  correct patient, operative site and operative plan. The leg was exanguinated with an Ace bandage and the tourniquet was inflated. A minimally invasive medial-based incision was placed on the anterior medial knee. The extensor retinaculum was exposed, and a low mid-vastus approach was utilized to access the intraarticular space. Eburnated and arthritic bone was encountered. A small medial release was performed followed by resection of osteophytes from the medial plateau. The intra-patellar fat bad was resected in extension. The anterior cruciate ligament was resected and a drill was utilized to access the femoral canal.  An intramedullary minimally invasive distal femoral cutting guide was applied, and a distal femoral cut was made in 4 degrees of valgus. The knee was taken into extension where the patella was recessed followed by application of a patella protector. The minimally invasive extramedullary tibia alignment guide was pinned perpendicular to the long axis of the tibia with approximately 7 degrees of posterior slope in extension. Care was taken to preserve the  bone island for the posterior cruciate ligament. The tibia resection was removed in medial and lateral segments followed by excision of the medial and lateral menisci. The bipolar sealer was utilized to cauterize the posterior capsule and geniculate arteries for improved hemostasis. The femoral sizing guide was pinned in 3 degrees of external rotation. After sizing the femur in extension, drill holes were placed and the sizing guide was removed. The 4-in-1 cutting guide was pinned in placed followed by anterior, posterior and chamfer resections with an oscillating saw.    The 4-in-1 guide was removed. Laminar  was placed with the femur in flexion and posterior osteophytes were removed from the femur with a curved osteotome and kocher clamp. The tibia was then sized and the base plate trial was pinned into place with external rotation centered over the medial 1/3 of the tibia tubercle. The trial femur was placed followed by a trial liner insert. The knee was taken through a full range of motion. The knee was stable to varus and valgus stresses throughout the arc of motion with well balanced flexion and extension gaps. The posterior cruciate ligament was well balanced. The patella was then sized and drill holes placed. Osteophytes were removed from the lateral facet and trial patella button was placed. The patella tracked centrally. The tibia was prepared and a 2.0 drill utilized in the sclerotic subchondral bone. The cut surfaces were then copiously irrigated and thoroughly dried while simplex cement was mixed on the back table. The components were cemented into place and all excess cement removed. A trial liner was placed and the cement was allowed to cure in extension. After allowing the cement to cure I was once again took the knee through a full arc of motion verifying stability throughout the arc with balanced gaps and a well balanced posterior cruciate ligament. The actual liner was opened engaged into the tibia base plate. The wound was soaked in a dilute betadine solution followed by pulsatile irrigation with 1 L of sterile saline with bacitracin. I then turned my attention to closure of the wound. The capsule and extensor mechanism was closed with a running #2 quill suture followed by a running 0 quill in the subcutaneous tissue. A running 2-0 quill was placed in the subdermal layer followed by surgical adhesive on the incision. A sterile silver impregnated occlusive was placed followed by cast padding and an Ace bandage.   The tourniquet was deflated and the patient was transported to their hospital bed. The patient was transported to the post-operative anesthesia care unit in stable condition. All sponge and needle counts were correct x 2.

## 2025-06-16 DIAGNOSIS — E11.22 TYPE 2 DIABETES MELLITUS WITH STAGE 3B CHRONIC KIDNEY DISEASE, WITHOUT LONG-TERM CURRENT USE OF INSULIN (HCC): ICD-10-CM

## 2025-06-16 DIAGNOSIS — N18.32 TYPE 2 DIABETES MELLITUS WITH STAGE 3B CHRONIC KIDNEY DISEASE, WITHOUT LONG-TERM CURRENT USE OF INSULIN (HCC): ICD-10-CM

## 2025-06-16 DIAGNOSIS — E11.40 TYPE 2 DIABETES MELLITUS WITH DIABETIC NEUROPATHY, WITHOUT LONG-TERM CURRENT USE OF INSULIN (HCC): ICD-10-CM

## 2025-06-16 NOTE — TELEPHONE ENCOUNTER
6/9/2025) by Marcellus Taylor DO     Patient comment: This prescription needs to be filled now at [facility]. On [place] in [facility] back up to patient requested pharmacy

## 2025-07-15 ENCOUNTER — OFFICE VISIT (OUTPATIENT)
Dept: INTERNAL MEDICINE CLINIC | Age: 65
End: 2025-07-15

## 2025-07-15 VITALS
TEMPERATURE: 97.2 F | OXYGEN SATURATION: 98 % | DIASTOLIC BLOOD PRESSURE: 76 MMHG | BODY MASS INDEX: 43.27 KG/M2 | SYSTOLIC BLOOD PRESSURE: 118 MMHG | HEART RATE: 65 BPM | WEIGHT: 260 LBS

## 2025-07-15 DIAGNOSIS — R39.89 DARK YELLOW-COLORED URINE: ICD-10-CM

## 2025-07-15 DIAGNOSIS — N18.32 TYPE 2 DIABETES MELLITUS WITH STAGE 3B CHRONIC KIDNEY DISEASE, WITHOUT LONG-TERM CURRENT USE OF INSULIN (HCC): Primary | ICD-10-CM

## 2025-07-15 DIAGNOSIS — E11.22 TYPE 2 DIABETES MELLITUS WITH STAGE 3B CHRONIC KIDNEY DISEASE, WITHOUT LONG-TERM CURRENT USE OF INSULIN (HCC): Primary | ICD-10-CM

## 2025-07-15 DIAGNOSIS — E11.40 TYPE 2 DIABETES MELLITUS WITH DIABETIC NEUROPATHY, WITHOUT LONG-TERM CURRENT USE OF INSULIN (HCC): ICD-10-CM

## 2025-07-15 DIAGNOSIS — G47.62 NOCTURNAL LEG CRAMPS: ICD-10-CM

## 2025-07-15 RX ORDER — GLIPIZIDE 2.5 MG/1
2.5 TABLET, EXTENDED RELEASE ORAL DAILY
Qty: 90 TABLET | Refills: 1 | Status: SHIPPED | OUTPATIENT
Start: 2025-07-15

## 2025-07-15 NOTE — PROGRESS NOTES
[facility]  Internal Medicine  Patient Encounter  Marcellus Taylor D.O., [facility]        Chief Complaint   Patient presents with    Diabetes     5 wk f/u  Last OV Increase Ozempic dose to 0.5 mg weekly after the next 0.25 mg.   Stop Januvia          HPI: 65 y.o. female seen today for a short interval follow-up regarding her type 2 diabetes and weight management  At her last office visit we increased her Ozempic to 0.5 mg weekly.  At her last visit patient was found to have lost about 7 pounds since starting Ozempic.  She indicated she was drinking about 80 ounces of water per day and that her sugars were well-controlled.  Patient has lost 4 additional pounds since her last visit.  She states she is tolerating the current dose.  She does report early satiety.  She is staying well hydrated.  She denies adverse effects-- No N/V/D/C.    She did have a low sugar this past Sunday--64.  She felt shaky, clammy, jittery.  She had not eaten much.  Sugars AM and hs.  80's-150.      Also, she has additional complaints of alem horses in her legs.  This happens at night.  She is trying leg cramp cream.  Patient states that she can have some cramping in her hands as well.  She continues to have some twitching and spasms in the right face.  She has an appointment with neurology at the end of the month.    Also, she has additional complaints of urine being dark yellow.  She denies dysuria.  She denies hematuria.       Past Medical History:   Diagnosis Date    Acute superficial venous thrombosis of lower extremity, right 03/05/2019    Arthritis     CAD (coronary artery disease), native coronary artery 12/11/2023    Dr. Rapp    Campylobacter gastroenteritis 06/24/2024    Cataract, bilateral 2020    Chronic superficial venous thrombosis of left lower extremity 08/13/2015    CKD (chronic kidney disease) stage 2, GFR 60-89 ml/min     CKD (chronic kidney disease) stage 3, GFR 30-59 ml/min ([facility])     COVID-19 virus infection

## 2025-07-15 NOTE — PATIENT INSTRUCTIONS
From Dr. Chen:  prescribed Vitamin-D 50,000 unit, once a week for 12 weeks   ---> after that is done , start over the counter, daily supplements with around 1000 unit of vitamin D and around 1200 mg of calcium once a day with food.      Push fluids.  I recommend at least 80 ounces per day

## 2025-07-16 ENCOUNTER — RESULTS FOLLOW-UP (OUTPATIENT)
Dept: INTERNAL MEDICINE CLINIC | Age: 65
End: 2025-07-16

## 2025-07-16 DIAGNOSIS — N28.89 RENAL MASS: Primary | ICD-10-CM

## 2025-07-16 LAB
BACTERIA URNS QL MICRO: ABNORMAL /HPF
BILIRUB UR QL STRIP.AUTO: NEGATIVE
CHARACTER UR: ABNORMAL
CLARITY UR: CLEAR
COLOR UR: YELLOW
EPI CELLS #/AREA URNS HPF: ABNORMAL /HPF (ref 0–5)
GLUCOSE UR STRIP.AUTO-MCNC: NEGATIVE MG/DL
HGB UR QL STRIP.AUTO: NEGATIVE
KETONES UR STRIP.AUTO-MCNC: NEGATIVE MG/DL
LEUKOCYTE ESTERASE UR QL STRIP.AUTO: ABNORMAL
NITRITE UR QL STRIP.AUTO: NEGATIVE
PH UR STRIP.AUTO: 6.5 [PH] (ref 5–8)
PROT UR STRIP.AUTO-MCNC: ABNORMAL MG/DL
RBC #/AREA URNS HPF: ABNORMAL /HPF (ref 0–4)
SP GR UR STRIP.AUTO: 1.01 (ref 1–1.03)
UA COMPLETE W REFLEX CULTURE PNL UR: YES
UA DIPSTICK W REFLEX MICRO PNL UR: YES
URN SPEC COLLECT METH UR: ABNORMAL
UROBILINOGEN UR STRIP-ACNC: 0.2 E.U./DL
WBC #/AREA URNS HPF: ABNORMAL /HPF (ref 0–5)

## 2025-07-17 DIAGNOSIS — N39.0 URINARY TRACT INFECTION WITHOUT HEMATURIA, SITE UNSPECIFIED: Primary | ICD-10-CM

## 2025-07-17 RX ORDER — LANCETS 30 GAUGE
EACH MISCELLANEOUS
Qty: 200 EACH | Refills: 3 | Status: SHIPPED | OUTPATIENT
Start: 2025-07-17

## 2025-07-17 RX ORDER — CEFDINIR 300 MG/1
300 CAPSULE ORAL 2 TIMES DAILY
Qty: 14 CAPSULE | Refills: 0 | Status: SHIPPED | OUTPATIENT
Start: 2025-07-17 | End: 2025-07-24

## 2025-07-17 NOTE — TELEPHONE ENCOUNTER
Last appointment: 7/15/2025  Next appointment: 8/11/2025        Last refill: (7/12/2024) by Marcellus Taylor DO

## 2025-07-18 LAB
BACTERIA UR CULT: ABNORMAL
BACTERIA UR CULT: ABNORMAL
ORGANISM: ABNORMAL
ORGANISM: ABNORMAL

## 2025-07-18 NOTE — TELEPHONE ENCOUNTER
Called patient & informed her that her Rx is at the pharmacy & to increase fluids/hydration call with any ?s.  Obviously if she is at any time in distress she should head to her nearest ER

## 2025-07-29 RX ORDER — LEVOTHYROXINE SODIUM 112 UG/1
112 TABLET ORAL DAILY
Qty: 90 TABLET | Refills: 3 | Status: SHIPPED | OUTPATIENT
Start: 2025-07-29

## 2025-07-29 NOTE — TELEPHONE ENCOUNTER
Last appointment: 7/15/2025  Next appointment: 8/11/2025        Last refill: (8/12/2024) by Marcellus Taylor DO

## 2025-07-31 ENCOUNTER — OFFICE VISIT (OUTPATIENT)
Dept: NEUROLOGY | Age: 65
End: 2025-07-31
Payer: COMMERCIAL

## 2025-07-31 VITALS
HEART RATE: 102 BPM | BODY MASS INDEX: 43.32 KG/M2 | DIASTOLIC BLOOD PRESSURE: 80 MMHG | WEIGHT: 260 LBS | HEIGHT: 65 IN | SYSTOLIC BLOOD PRESSURE: 130 MMHG

## 2025-07-31 DIAGNOSIS — N18.32 TYPE 2 DIABETES MELLITUS WITH STAGE 3B CHRONIC KIDNEY DISEASE, WITHOUT LONG-TERM CURRENT USE OF INSULIN (HCC): ICD-10-CM

## 2025-07-31 DIAGNOSIS — E11.22 TYPE 2 DIABETES MELLITUS WITH STAGE 3B CHRONIC KIDNEY DISEASE, WITHOUT LONG-TERM CURRENT USE OF INSULIN (HCC): ICD-10-CM

## 2025-07-31 DIAGNOSIS — G51.31 HEMIFACIAL SPASM OF RIGHT SIDE OF FACE: Primary | ICD-10-CM

## 2025-07-31 DIAGNOSIS — I10 BENIGN ESSENTIAL HTN: ICD-10-CM

## 2025-07-31 PROCEDURE — 1123F ACP DISCUSS/DSCN MKR DOCD: CPT | Performed by: PSYCHIATRY & NEUROLOGY

## 2025-07-31 PROCEDURE — 99203 OFFICE O/P NEW LOW 30 MIN: CPT | Performed by: PSYCHIATRY & NEUROLOGY

## 2025-07-31 PROCEDURE — 3044F HG A1C LEVEL LT 7.0%: CPT | Performed by: PSYCHIATRY & NEUROLOGY

## 2025-07-31 PROCEDURE — 3079F DIAST BP 80-89 MM HG: CPT | Performed by: PSYCHIATRY & NEUROLOGY

## 2025-07-31 PROCEDURE — 3075F SYST BP GE 130 - 139MM HG: CPT | Performed by: PSYCHIATRY & NEUROLOGY

## 2025-07-31 NOTE — PATIENT INSTRUCTIONS

## 2025-07-31 NOTE — PROGRESS NOTES
The patient is a 65 y.o. years old female who  was referred by Marcellus Taylor DO for consultation regarding new onset facial twitches    HPI:    Patient described new onset intermittent right facial twitches for the last 3 months.  Symptoms were initially daily and persistent and now less frequent over the last month or so.  Triggered by stress.  She has been under stress recently as she was caring for her sick late mother.  She denies any symptoms of blepharospasm.  She denies any loss of consciousness or awareness.  Does not involve her right arm or leg.  No speech impairment with the spells.  No change in medicine recently.  She is diabetic.  Recent blood test showed B12 at 360.  No recent MRI of the brain.  No recent febrile illness.  She is not taking any medicine for these event.  Other review of system was unremarkable    ROS : A 10-14 system review of constitutional, cardiovascular, respiratory, GI, eyes, , ENT, musculoskeletal, endocrine, skin, SHEENT, genitourinary, psychiatric and neurologic systems was obtained and updated today and is unremarkable except as mentioned in my HPI        Exam:   Constitutional:   Vitals:    07/31/25 1107   BP: 130/80   Pulse: (!) 102   Weight: 117.9 kg (260 lb)   Height: 1.651 m (5' 5\")       General appearance:  Normal development and appear in no acute distress.   Mental Status:   Oriented to person, place, problem, and time.    Memory: Good immediate recall.  Intact remote memory  Normal attention span and concentration.  Language: intact naming, repeating and fluency   Good fund of Knowledge. Aware of current events and vocabulary   Cranial Nerves: Pupil round regular and reactive, extraocular muscles are intact, face is symmetric, no ophthalmoplegia, tongue is midline and rest of cranial nerves are normal.   No evidence of blepharospasm.  Occasional intermittent right facial twitches.  Musculoskeletal: no focal weakness in UE or LL.   Reflexes: Symmetric 2+ in both

## 2025-08-07 ENCOUNTER — HOSPITAL ENCOUNTER (OUTPATIENT)
Dept: MRI IMAGING | Age: 65
Discharge: HOME | End: 2025-08-07
Attending: INTERNAL MEDICINE
Payer: COMMERCIAL

## 2025-08-07 ENCOUNTER — HOSPITAL ENCOUNTER (OUTPATIENT)
Dept: MRI IMAGING | Age: 65
Discharge: HOME | End: 2025-08-07
Attending: PSYCHIATRY & NEUROLOGY
Payer: COMMERCIAL

## 2025-08-07 DIAGNOSIS — N28.89 RENAL MASS: ICD-10-CM

## 2025-08-07 DIAGNOSIS — G51.31 HEMIFACIAL SPASM OF RIGHT SIDE OF FACE: ICD-10-CM

## 2025-08-07 PROCEDURE — 70551 MRI BRAIN STEM W/O DYE: CPT

## 2025-08-07 PROCEDURE — 74183 MRI ABD W/O CNTR FLWD CNTR: CPT

## 2025-08-07 PROCEDURE — A9577 INJ MULTIHANCE: HCPCS | Performed by: INTERNAL MEDICINE

## 2025-08-07 PROCEDURE — 6360000004 HC RX CONTRAST MEDICATION: Performed by: INTERNAL MEDICINE

## 2025-08-07 RX ADMIN — GADOBENATE DIMEGLUMINE 15 ML: 529 INJECTION, SOLUTION INTRAVENOUS at 18:25

## 2025-08-11 ENCOUNTER — OFFICE VISIT (OUTPATIENT)
Dept: INTERNAL MEDICINE CLINIC | Age: 65
End: 2025-08-11
Payer: COMMERCIAL

## 2025-08-11 VITALS
BODY MASS INDEX: 43.43 KG/M2 | OXYGEN SATURATION: 98 % | TEMPERATURE: 97.2 F | WEIGHT: 261 LBS | DIASTOLIC BLOOD PRESSURE: 70 MMHG | SYSTOLIC BLOOD PRESSURE: 112 MMHG | HEART RATE: 98 BPM

## 2025-08-11 DIAGNOSIS — G51.4 FACIAL TWITCHING: ICD-10-CM

## 2025-08-11 DIAGNOSIS — E11.22 TYPE 2 DIABETES MELLITUS WITH STAGE 3B CHRONIC KIDNEY DISEASE, WITHOUT LONG-TERM CURRENT USE OF INSULIN (HCC): Primary | ICD-10-CM

## 2025-08-11 DIAGNOSIS — E11.40 TYPE 2 DIABETES MELLITUS WITH DIABETIC NEUROPATHY, WITHOUT LONG-TERM CURRENT USE OF INSULIN (HCC): ICD-10-CM

## 2025-08-11 DIAGNOSIS — N18.32 TYPE 2 DIABETES MELLITUS WITH STAGE 3B CHRONIC KIDNEY DISEASE, WITHOUT LONG-TERM CURRENT USE OF INSULIN (HCC): Primary | ICD-10-CM

## 2025-08-11 PROBLEM — N28.9 RENAL LESION: Status: RESOLVED | Noted: 2019-06-07 | Resolved: 2025-08-11

## 2025-08-11 PROBLEM — Z86.718 PERSONAL HISTORY OF OTHER VENOUS THROMBOSIS AND EMBOLISM: Status: RESOLVED | Noted: 2020-09-18 | Resolved: 2025-08-11

## 2025-08-11 PROCEDURE — 1123F ACP DISCUSS/DSCN MKR DOCD: CPT | Performed by: INTERNAL MEDICINE

## 2025-08-11 PROCEDURE — 3074F SYST BP LT 130 MM HG: CPT | Performed by: INTERNAL MEDICINE

## 2025-08-11 PROCEDURE — 99213 OFFICE O/P EST LOW 20 MIN: CPT | Performed by: INTERNAL MEDICINE

## 2025-08-11 PROCEDURE — 3044F HG A1C LEVEL LT 7.0%: CPT | Performed by: INTERNAL MEDICINE

## 2025-08-11 PROCEDURE — 3078F DIAST BP <80 MM HG: CPT | Performed by: INTERNAL MEDICINE

## 2025-08-11 RX ORDER — CHOLECALCIFEROL (VITAMIN D3) 25 MCG
1000 TABLET ORAL DAILY
COMMUNITY

## (undated) DEVICE — SUTURE VICRYL + SZ 1 L18IN ABSRB UD L36MM CT-1 1/2 CIR VCP841D

## (undated) DEVICE — DUAL CUT SAGITTAL BLADE

## (undated) DEVICE — BOWL AND CEMENT CARTRIDGE WITH BREAKAWAY FEMORAL NOZZLE AND MEDIUM PRESSURIZER: Brand: ACM

## (undated) DEVICE — SST BUR, WIRE PASS DRILL, 2 FLUTES, MED., 2MM DIA.: Brand: MICROAIRE®

## (undated) DEVICE — SOLUTION IV 100ML 0.9% SOD CHL PLAS CONT USP VIAFLX 1 PER

## (undated) DEVICE — SYRINGE MED 30ML STD CLR PLAS LUERLOCK TIP N CTRL DISP

## (undated) DEVICE — TOWEL,OR,DSP,ST,BLUE,DLX,8/PK,10PK/CS: Brand: MEDLINE

## (undated) DEVICE — SUTURE VCRL SZ 1 L18IN ABSRB UD L36MM CT-1 1/2 CIR J841D

## (undated) DEVICE — SOLUTION IV 1000ML 0.9% SOD CHL

## (undated) DEVICE — COUNTER NDL 40 COUNT HLD 70 NUM FOAM BLK SGL MAG W BLDE REMV

## (undated) DEVICE — STANDARD HYPODERMIC NEEDLE,POLYPROPYLENE HUB: Brand: MONOJECT

## (undated) DEVICE — NEPTUNE E-SEP SMOKE EVACUATION PENCIL, COATED, 70MM BLADE, PUSH BUTTON SWITCH: Brand: NEPTUNE E-SEP

## (undated) DEVICE — TOTAL KNEE: Brand: MEDLINE INDUSTRIES, INC.

## (undated) DEVICE — SUTURE VICRYL + SZ 0 L18IN ABSRB UD L36MM CT-1 1/2 CIR VCP840D

## (undated) DEVICE — ZIMMER® STERILE DISPOSABLE TOURNIQUET CUFF WITH PLC, DUAL PORT, SINGLE BLADDER, 30 IN. (76 CM)

## (undated) DEVICE — SOLUTION SURG PREP 26 CC PURPREP

## (undated) DEVICE — GLOVE 6 LTX ST BIOGEL M PF BEAD CUFF

## (undated) DEVICE — SUTURE ETHIBOND EXCEL SZ 0 L18IN NONABSORBABLE GRN L36MM CT-1 CX21D

## (undated) DEVICE — ELECTRODE PT RET AD L9FT HI MOIST COND ADH HYDRGEL CORDED

## (undated) DEVICE — SUTURE VICRYL SZ 2-0 L18IN ABSRB UD CT-1 L36MM 1/2 CIR J839D

## (undated) DEVICE — STERILE PVP: Brand: MEDLINE INDUSTRIES, INC.

## (undated) DEVICE — SYSTEM SKIN CLSR 22CM DERMBND PRINEO

## (undated) DEVICE — GLOVE SURG SZ 65 L12IN FNGR THK79MIL GRN LTX FREE

## (undated) DEVICE — YANKAUER,OPEN TIP,W/O VENT,STERILE: Brand: MEDLINE INDUSTRIES, INC.

## (undated) DEVICE — BLADE SAW RECIP HVY DUTY LNG 27796327] STRYKER CORP]

## (undated) DEVICE — 3M™ COBAN™ NL STERILE NON-LATEX SELF-ADHERENT WRAP, 2086S, 6 IN X 5 YD (15 CM X 4,5 M), 12 ROLLS/CASE: Brand: 3M™ COBAN™

## (undated) DEVICE — BIPOLAR SEALER 23-112-1 AQM 6.0: Brand: AQUAMANTYS ®

## (undated) DEVICE — GLOVE ORANGE PI 8   MSG9080

## (undated) DEVICE — COVER,TABLE,HEAVY DUTY,77"X90",STRL: Brand: MEDLINE

## (undated) DEVICE — SOLUTION WND IRRIGATION 450 ML 0.5 PVP-I 0.9 NACL

## (undated) DEVICE — SOLUTION IRRIG 3000ML 0.9% SOD CHL ARTHROMATIC PLAS CONT

## (undated) DEVICE — APPLICATOR PREP 26ML 0.7% IOD POVACRYLEX 74% ISO ALC ST

## (undated) DEVICE — GLOVE SURG SZ 85 L12IN FNGR ORTHO 126MIL CRM LTX FREE

## (undated) DEVICE — SUTURE VCRL SZ 2-0 L18IN ABSRB UD CT-1 L36MM 1/2 CIR J839D

## (undated) DEVICE — SOLUTION IRRIG 1000ML STRL H2O USP PLAS POUR BTL

## (undated) DEVICE — DRESSING W4XL8IN ISLANDTHERABOND 3D

## (undated) DEVICE — GLOVE SURG SZ 85 L12IN FNGR THK79MIL GRN LTX FREE

## (undated) DEVICE — 3M™ IOBAN™ 2 ANTIMICROBIAL INCISE DRAPE 6640EZ: Brand: IOBAN™ 2

## (undated) DEVICE — BLANKET WRM W29.9XL79.1IN UP BODY FORC AIR MISTRAL-AIR

## (undated) DEVICE — KNEE HOLDER DISPOSABLE LINER: Brand: ALVARADO®  KNEE SUPPORT

## (undated) DEVICE — PADDING CAST N ADH 12X6 IN CRIMPED FINISH 100% COTTON WBRLII

## (undated) DEVICE — 2108 SERIES SAGITTAL BLADE FAN, OFFSET  (29.0 X 0.89 X 73.0MM)

## (undated) DEVICE — CONTAINER,SPECIMEN,PNEU TUBE,3OZ,OR STRL: Brand: MEDLINE

## (undated) DEVICE — UNDERGLOVE SURG SZ 8.5 FNGR THK0.21MIL GRN LTX BEAD CUF

## (undated) DEVICE — SUTURE MONOCRYL + SZ 4-0 L18IN ABSRB UD L19MM PS-2 3/8 CIR MCP496G

## (undated) DEVICE — SOLUTION IV IRRIG WATER 1000ML POUR BRL 2F7114

## (undated) DEVICE — TOWEL,STOP FLAG GOLD N-W: Brand: MEDLINE

## (undated) DEVICE — SUTURE MONOCRYL + SZ 4-0 L27IN ABSRB UD L19MM PS-2 3/8 CIR MCP426H

## (undated) DEVICE — SUTURE ABSORBABLE MONOFILAMENT 1 CTX 36 CM 48 MM VIO PDS +

## (undated) DEVICE — STERILE SYNTHETIC POLYISOPRENE POWDER-FREE SURGICAL GLOVES WITH HYDROGEL COATING, SMOOTH FINISH, STRAIGHT FINGER: Brand: PROTEXIS

## (undated) DEVICE — SOLUTION IRRIG 3000ML 0.9% SOD CHL USP UROMATIC PLAS CONT

## (undated) DEVICE — GOWN,SIRUS,POLYRNF,BRTHSLV,XL,30/CS: Brand: MEDLINE

## (undated) DEVICE — SUTURE VCRL SZ 0 L18IN ABSRB UD L36MM CT-1 1/2 CIR J840D

## (undated) DEVICE — BIT DRILL 3.2

## (undated) DEVICE — TRAP FLUID

## (undated) DEVICE — SUTURE MCRYL SZ 4-0 L27IN ABSRB UD L19MM PS-2 1/2 CIR PRIM Y426H

## (undated) DEVICE — SUTURE STRATAFIX SPRL SZ 1 L14IN ABSRB VLT L48CM CTX 1/2 SXPD2B405

## (undated) DEVICE — SUTURE VICRYL + SZ 2-0 L18IN ABSRB UD CT1 L36MM 1/2 CIR VCP839D